# Patient Record
Sex: FEMALE | Race: WHITE | NOT HISPANIC OR LATINO | Employment: OTHER | URBAN - METROPOLITAN AREA
[De-identification: names, ages, dates, MRNs, and addresses within clinical notes are randomized per-mention and may not be internally consistent; named-entity substitution may affect disease eponyms.]

---

## 2017-03-06 ENCOUNTER — HOSPITAL ENCOUNTER (EMERGENCY)
Facility: MEDICAL CENTER | Age: 67
End: 2017-03-06
Attending: EMERGENCY MEDICINE
Payer: MEDICARE

## 2017-03-06 VITALS
HEART RATE: 62 BPM | BODY MASS INDEX: 23.04 KG/M2 | DIASTOLIC BLOOD PRESSURE: 56 MMHG | WEIGHT: 130 LBS | TEMPERATURE: 98.2 F | OXYGEN SATURATION: 96 % | RESPIRATION RATE: 15 BRPM | SYSTOLIC BLOOD PRESSURE: 109 MMHG | HEIGHT: 63 IN

## 2017-03-06 DIAGNOSIS — F13.10 XANAX USE DISORDER, MILD, ABUSE (HCC): ICD-10-CM

## 2017-03-06 PROCEDURE — 304562 HCHG STAT O2 MASK/CANNULA

## 2017-03-06 PROCEDURE — 99283 EMERGENCY DEPT VISIT LOW MDM: CPT

## 2017-03-06 PROCEDURE — 304561 HCHG STAT O2

## 2017-03-06 RX ORDER — PROPRANOLOL HYDROCHLORIDE 20 MG/1
20 TABLET ORAL 3 TIMES DAILY
Status: SHIPPED | COMMUNITY
End: 2017-04-07

## 2017-03-06 RX ORDER — GABAPENTIN 600 MG/1
600 TABLET ORAL 3 TIMES DAILY
Status: SHIPPED | COMMUNITY
End: 2017-04-07 | Stop reason: SDUPTHER

## 2017-03-06 RX ORDER — ALPRAZOLAM 2 MG/1
2 TABLET ORAL 3 TIMES DAILY
Status: SHIPPED | COMMUNITY
End: 2017-04-07

## 2017-03-06 RX ORDER — AMITRIPTYLINE HYDROCHLORIDE 10 MG/1
10-20 TABLET, FILM COATED ORAL NIGHTLY PRN
Status: SHIPPED | COMMUNITY
End: 2017-04-07 | Stop reason: SDUPTHER

## 2017-03-06 RX ORDER — CITALOPRAM 20 MG/1
20 TABLET ORAL EVERY MORNING
Status: SHIPPED | COMMUNITY
End: 2017-04-07 | Stop reason: SDUPTHER

## 2017-03-06 ASSESSMENT — LIFESTYLE VARIABLES
HAVE PEOPLE ANNOYED YOU BY CRITICIZING YOUR DRINKING: YES
AVERAGE NUMBER OF DAYS PER WEEK YOU HAVE A DRINK CONTAINING ALCOHOL: 4
HAVE YOU EVER FELT YOU SHOULD CUT DOWN ON YOUR DRINKING: YES
DO YOU DRINK ALCOHOL: YES
DOES PATIENT WANT TO TALK TO SOMEONE ABOUT QUITTING: YES
EVER HAD A DRINK FIRST THING IN THE MORNING TO STEADY YOUR NERVES TO GET RID OF A HANGOVER: YES
HOW MANY TIMES IN THE PAST YEAR HAVE YOU HAD 5 OR MORE DRINKS IN A DAY: 250
TOTAL SCORE: 4
TOTAL SCORE: 4
EVER FELT BAD OR GUILTY ABOUT YOUR DRINKING: YES
CONSUMPTION TOTAL: POSITIVE
DOES PATIENT WANT TO STOP DRINKING: YES
TOTAL SCORE: 4
ON A TYPICAL DAY WHEN YOU DRINK ALCOHOL HOW MANY DRINKS DO YOU HAVE: 3

## 2017-03-06 ASSESSMENT — PAIN SCALES - GENERAL: PAINLEVEL_OUTOF10: 0

## 2017-03-06 NOTE — ED AVS SNAPSHOT
CIBDO Access Code: [email or web address]  Expires: 4/5/2017  1:42 PM    Your email address is not on file at [facility].  Email Addresses are required for you to sign up for CIBDO, please contact [phone] to verify your personal information and to provide your email address prior to attempting to register for CIBDO.    Marcia Koch  [address] [place] [address]    CIBDO  A secure, online tool to manage your health information     [facility]’s CIBDO® is a secure, online tool that connects you to your personalized health information from the privacy of your home -- day or night - making it very easy for you to manage your healthcare. Once the activation process is completed, you can even access your medical information using the CIBDO norberto, which is available for free in the Apple Norberto store or Google Play store.     To learn more about CIBDO, visit [email or web address]    There are two levels of access available (as shown below):   My Chart Features  [facility] Primary Care Doctor [facility]  Specialists [facility]  Urgent  Care Non-[facility] Primary Care Doctor   Email your healthcare team securely and privately 24/7 X X X    Manage appointments: schedule your next appointment; view details of past/upcoming appointments X      Request prescription refills. X      View recent personal medical records, including lab and immunizations X X X X   View health record, including health history, allergies, medications X X X X   Read reports about your outpatient visits, procedures, consult and ER notes X X X X   See your discharge summary, which is a recap of your hospital and/or ER visit that includes your diagnosis, lab results, and care plan X X  X     How to register for CIBDO:  Once your e-mail address has been verified, follow the following steps to sign up for CIBDO.     1. Go to  [email or web address]  2. Click on the Sign Up Now box, which takes you to the New Member Sign Up page. You  will need to provide the following information:  a. Enter your [employer] Access Code exactly as it appears at the top of this page. (You will not need to use this code after you’ve completed the sign-up process. If you do not sign up before the expiration date, you must request a new code.)   b. Enter your date of birth.   c. Enter your home email address.   d. Click Submit, and follow the next screen’s instructions.  3. Create a Steviet ID. This will be your Kukunu login ID and cannot be changed, so think of one that is secure and easy to remember.  4. Create a Kukunu password. You can change your password at any time.  5. Enter your Password Reset Question and Answer. This can be used at a later time if you forget your password.   6. Enter your e-mail address. This allows you to receive e-mail notifications when new information is available in Kukunu.  7. Click Sign Up. You can now view your health information.    For assistance activating your Kukunu account, call [phone]

## 2017-03-06 NOTE — DISCHARGE INSTRUCTIONS
Alcohol Problems  Most adults who drink alcohol drink in moderation (not a lot) are at low risk for developing problems related to their drinking. However, all drinkers, including low-risk drinkers, should know about the health risks connected with drinking alcohol.  RECOMMENDATIONS FOR LOW-RISK DRINKING   Drink in moderation. Moderate drinking is defined as follows:   · Men - no more than 2 drinks per day.  · Nonpregnant women - no more than 1 drink per day.  · Over age 65 - no more than 1 drink per day.  A standard drink is 12 grams of pure alcohol, which is equal to a 12 ounce bottle of beer or wine cooler, a 5 ounce glass of wine, or 1.5 ounces of distilled spirits (such as whiskey, coy, vodka, or rum).   ABSTAIN FROM (DO NOT DRINK) ALCOHOL:  · When pregnant or considering pregnancy.  · When taking a medication that interacts with alcohol.  · If you are alcohol dependent.  · A medical condition that prohibits drinking alcohol (such as ulcer, liver disease, or heart disease).  DISCUSS WITH YOUR CAREGIVER:  · If you are at risk for coronary heart disease, discuss the potential benefits and risks of alcohol use: Light to moderate drinking is associated with lower rates of coronary heart disease in certain populations (for example, men over age 45 and postmenopausal women). Infrequent or nondrinkers are advised not to begin light to moderate drinking to reduce the risk of coronary heart disease so as to avoid creating an alcohol-related problem. Similar protective effects can likely be gained through proper diet and exercise.  · Women and the elderly have smaller amounts of body water than men. As a result women and the elderly achieve a higher blood alcohol concentration after drinking the same amount of alcohol.  · Exposing a fetus to alcohol can cause a broad range of birth defects referred to as Fetal Alcohol Syndrome (FAS) or Alcohol-Related Birth Defects (ARBD). Although FAS/ARBD is connected with excessive  alcohol consumption during pregnancy, studies also have reported neurobehavioral problems in infants born to mothers reporting drinking an average of 1 drink per day during pregnancy.  · Heavier drinking (the consumption of more than 4 drinks per occasion by men and more than 3 drinks per occasion by women) impairs learning (cognitive) and psychomotor functions and increases the risk of alcohol-related problems, including accidents and injuries.  CAGE QUESTIONS:   · Have you ever felt that you should Cut down on your drinking?  · Have people Annoyed you by criticizing your drinking?  · Have you ever felt bad or Guilty about your drinking?  · Have you ever had a drink first thing in the morning to steady your nerves or get rid of a hangover (Eye opener)?  If you answered positively to any of these questions: You may be at risk for alcohol-related problems if alcohol consumption is:   · Men: Greater than 14 drinks per week or more than 4 drinks per occasion.  · Women: Greater than 7 drinks per week or more than 3 drinks per occasion.  Do you or your family have a medical history of alcohol-related problems, such as:  · Blackouts.  · Sexual dysfunction.  · Depression.  · Trauma.  · Liver dysfunction.  · Sleep disorders.  · Hypertension.  · Chronic abdominal pain.  · Has your drinking ever caused you problems, such as problems with your family, problems with your work (or school) performance, or accidents/injuries?  · Do you have a compulsion to drink or a preoccupation with drinking?  · Do you have poor control or are you unable to stop drinking once you have started?  · Do you have to drink to avoid withdrawal symptoms?  · Do you have problems with withdrawal such as tremors, nausea, sweats, or mood disturbances?  · Does it take more alcohol than in the past to get you high?  · Do you feel a strong urge to drink?  · Do you change your plans so that you can have a drink?  · Do you ever drink in the morning to relieve  the shakes or a hangover?  If you have answered a number of the previous questions positively, it may be time for you to talk to your caregivers, family, and friends and see if they think you have a problem. Alcoholism is a chemical dependency that keeps getting worse and will eventually destroy your health and relationships. Many alcoholics end up dead, impoverished, or in FDC. This is often the end result of all chemical dependency.  · Do not be discouraged if you are not ready to take action immediately.  · Decisions to change behavior often involve up and down desires to change and feeling like you cannot decide.  · Try to think more seriously about your drinking behavior.  · Think of the reasons to quit.  WHERE TO GO FOR ADDITIONAL INFORMATION   · The National [place] on Alcohol Abuse and Alcoholism (NIAAA)  www.niaaa.nih.gov  · National [place] on Alcoholism and Drug Dependence (NCADD)  www.ncadd.org  · American Society of Addiction Medicine (ASAM)  www.asam.org   Document Released: 12/18/2006 Document Revised: 03/11/2013 Document Reviewed: 08/05/2009  ExitCare® Patient Information ©2014 pijajo.com.      Driving and Equipment Restrictions  Some medical problems make it dangerous to drive, ride a bike, or use machines. Some of these problems are:  · A hard blow to the head (concussion).  · Passing out (fainting).  · Twitching and shaking (seizures).  · Low blood sugar.  · Taking medicine to help you relax (sedatives).  · Taking pain medicines.  · Wearing an eye patch.  · Wearing splints. This can make it hard to use parts of your body that you need to drive safely.  HOME CARE   · Do not drive until your doctor says it is okay.  · Do not use machines until your doctor says it is okay.  You may need a form signed by your doctor (medical release) before you can drive again. You may also need this form before you do other tasks where you need to be fully alert.  MAKE SURE YOU:  · Understand these  instructions.  · Will watch your condition.  · Will get help right away if you are not doing well or get worse.     This information is not intended to replace advice given to you by your health care provider. Make sure you discuss any questions you have with your health care provider.     Document Released: 01/25/2006 Document Revised: 03/11/2013 Document Reviewed: 04/27/2011  [employer] Interactive Patient Education ©2016 [employer] Inc.

## 2017-03-06 NOTE — ED PROVIDER NOTES
ED Provider Note    CHIEF COMPLAINT  Chief Complaint   Patient presents with   • ALOC     confused patient found wandering around Doctor's office after driving there       HPI  Marciapatrick Koch is a 66 y.o. female who presents to the emergency department without behavior. The patient drank wine and 2 exam X this morning. She was noted to be acting strangely. Therefore she was brought to the emergency department for evaluation. On presentation the patient admits to taking Xanax and drinking wine. She says that she was recently in a treatment program but when she left the treatment program she was in an argument with her sister and she started drinking and using benzodiazepines once again. She has not had any trauma. She denies pain. She does not be suicidal or homicidal ideation.    REVIEW OF SYSTEMS  See HPI for further details. All other systems are negative.     PAST MEDICAL HISTORY  Past Medical History   Diagnosis Date   • Psychiatric disorder      anxiety, depression   • Hypertension    • Unspecified urinary incontinence    • Indigestion    • Hiatus hernia syndrome    • Urinary bladder disorder    • Sleep apnea    • Snoring    • Pneumonia      for 2 days   • Other specified disorder of intestines      diarrhea   • Chronic obstructive pulmonary disease ([facility])        FAMILY HISTORY  History reviewed. No pertinent family history.    SOCIAL HISTORY  Social History     Social History   • Marital Status:      Spouse Name: N/A   • Number of Children: N/A   • Years of Education: N/A     Social History Main Topics   • Smoking status: Former Smoker -- 0.25 packs/day for 25 years     Types: Cigarettes     Quit date: 08/26/2011   • Smokeless tobacco: None      Comment: occ   • Alcohol Use: Yes      Comment: post rehab, 1/2 bottle of wine some days   • Drug Use: No   • Sexual Activity: Not Asked     Other Topics Concern   • None     Social History Narrative       SURGICAL HISTORY  Past Surgical History  "  Procedure Laterality Date   • Gyn surgery  1991     hyst   • Other abdominal surgery  2002     choley   • Other  2010     surgery for sleep apnea   • Other  1956     tonsillectomy   • Other  2010     big toenails and second toenails removed   • Bladder sling female  8/30/2011     Performed by RENATE STERLING at [facility]S   • [facility] by laparoscopy       Cholecystectomy, Laparoscopic   • Abdominal hysterectomy total       Hysterectomy, Total Abdominal       CURRENT MEDICATIONS  Home Medications     Reviewed by Sarah Garrett (Pharmacy Tech) on 03/06/17 at 1254  Med List Status: Complete    Medication Last Dose Status    alprazolam (XANAX) 2 MG tablet 3/6/2017 Active    amitriptyline (ELAVIL) 10 MG Tab 3/5/2017 Active    citalopram (CELEXA) 20 MG Tab 3/6/2017 Active    gabapentin (NEURONTIN) 600 MG tablet 3/5/2017 Active    propranolol (INDERAL) 20 MG Tab 3/6/2017 Active    Vilazodone HCl (VIIBRYD) 40 MG TABS 3/6/2017 Active                ALLERGIES  Allergies   Allergen Reactions   • Codeine Vomiting   • Morphine Vomiting       PHYSICAL EXAM  VITAL SIGNS: /56 mmHg  Pulse 62  Temp(Src) 36.8 °C (98.2 °F)  Resp 15  Ht 1.6 m (5' 3\")  Wt 58.968 kg (130 lb)  BMI 23.03 kg/m2  SpO2 96%  Constitutional: Well developed, Well nourished, No acute distress, Non-toxic appearance. Awake, alert, slightly slurred intoxicated speech.  HENT: Normocephalic, atraumatic, oropharynx is moist.  Eyes: Pupils are equal round and reactive to light.  Neck: Normal range of motion, No tenderness, Supple, No stridor.   Cardiovascular: Normal heart rate, Normal rhythm, No murmurs, No rubs, No gallops.   Thorax & Lungs: Normal breath sounds, No respiratory distress, No wheezing, No chest tenderness.   Abdomen: Bowel sounds normal, Soft, No tenderness, No masses, No pulsatile masses.   Skin: Warm, Dry, No erythema, No rash.   Back: No tenderness, No CVA tenderness.   Extremities: Intact distal pulses, No edema, No " tenderness, No cyanosis, No clubbing.   Neurologic: Cranial nerves II through XII are intact. Slightly slurred intoxicated speech. Awake, alert, interactive, answers questions appropriately at this time.  Psychiatric: Affect normal, Judgment normal, Mood normal. No suicidal ideation.  EKG        RADIOLOGY/PROCEDURES        COURSE & MEDICAL DECISION MAKING  Pertinent Labs & Imaging studies reviewed. (See chart for details)    The patient presents today with on-call intoxication and contaminant benzodiazepine use. I feel that her odd behavior this morning was related to her polysubstance abuse.    The patient was observed in the emergency department. With sobriety she is able to live a stable gait. I told the patient that she is in no way able to drive. I told the patient that she may not drive if she is using Xanax or drinking alcohol. The patient verbalizes understanding and states that she will not drive. She plans to take a taxi. She seems sincere.    FINAL IMPRESSION  1. Alcoholism /alcohol abuse (CMS-HCC)    2. Xanax use disorder, mild, abuse              Electronically signed by: Fadi Mobley, 3/6/2017 5:22 PM

## 2017-03-06 NOTE — ED NOTES
i called for patient  Waiting for taxi before releasing patient  Given discharge instructions and report to Vladimir PALOMO

## 2017-03-06 NOTE — ED NOTES
Patient states she started drinking when released from passages  Currently drinking 1/2 bottle of wine per day

## 2017-03-06 NOTE — ED AVS SNAPSHOT
3/6/2017          Marcia Koch  [address] [place] [address]    Dear Marcia:    [facility] wants to ensure your discharge home is safe and you or your loved ones have had all your questions answered regarding your care after you leave the hospital.    You may receive a telephone call within two days of your discharge.  This call is to make certain you understand your discharge instructions as well as ensure we provided you with the best care possible during your stay with us.     The call will only last approximately 3-5 minutes and will be done by a nurse.    Once again, we want to ensure your discharge home is safe and that you have a clear understanding of any next steps in your care.  If you have any questions or concerns, please do not hesitate to contact us, we are here for you.  Thank you for choosing [facility] for your healthcare needs.    Sincerely,    Marvel Barnes    [facility]

## 2017-03-06 NOTE — ED NOTES
Patient brought in by [facility], after she was found confused wandering outside a doctor's office. Patient stated she took 2mg of Xanex and half a bottle of wine about 630 this morning. Was enrolled at [facility] for ETOH dependence, returned home Jan 28th.

## 2017-03-06 NOTE — ED NOTES
Patient wants to go home and getting dressed.  Nurse and Dr Mobley in room  Patient will go home via taxi and not  car until sober. She is alert and oriented

## 2017-03-06 NOTE — ED AVS SNAPSHOT
Home Care Instructions                                                                                                                Marcia Koch   MRN: [id number]    Department:  [facility], Emergency Dept   Date of Visit:  3/6/2017            [facility], Emergency Dept    [address] [place] [address]    Phone:  [phone]      You were seen by     Fadi Mobley M.D.      Your Diagnosis Was     Alcoholism /alcohol abuse ([facility])     F10.20       Follow-up Information     1. Follow up with [facility], Emergency Dept.    Specialty:  Emergency Medicine    Why:  If symptoms worsen    Contact information    [address]  [place] [address] [phone]        2. Schedule an appointment as soon as possible for a visit with Flaco Garber D.O..    Specialty:  Family Medicine    Contact information    [address] [address]  [place] [address]  [phone]        Medication Information     Review all of your home medications and newly ordered medications with your primary doctor and/or pharmacist as soon as possible. Follow medication instructions as directed by your doctor and/or pharmacist.     Please keep your complete medication list with you and share with your physician. Update the information when medications are discontinued, doses are changed, or new medications (including over-the-counter products) are added; and carry medication information at all times in the event of emergency situations.               Medication List      ASK your doctor about these medications        Instructions    amitriptyline 10 MG Tabs   Commonly known as:  ELAVIL    Take 10-20 mg by mouth at bedtime as needed for Sleep.   Dose:  10-20 mg       citalopram 20 MG Tabs   Commonly known as:  CELEXA    Take 20 mg by mouth every morning.   Dose:  20 mg       gabapentin 600 MG tablet   Commonly known as:  NEURONTIN    Take 600 mg by  mouth 3 times a day.   Dose:  600 mg       propranolol 20 MG Tabs   Commonly known as:  INDERAL    Take 20 mg by mouth 3 times a day.   Dose:  20 mg       VIIBRYD 40 MG Tabs   Generic drug:  Vilazodone HCl    Take 40 mg by mouth every day.   Dose:  40 mg       XANAX 2 MG tablet   Generic drug:  alprazolam    Take 2 mg by mouth 3 times a day.   Dose:  2 mg                 Discharge Instructions       Alcohol Problems  Most adults who drink alcohol drink in moderation (not a lot) are at low risk for developing problems related to their drinking. However, all drinkers, including low-risk drinkers, should know about the health risks connected with drinking alcohol.  RECOMMENDATIONS FOR LOW-RISK DRINKING   Drink in moderation. Moderate drinking is defined as follows:   · Men - no more than 2 drinks per day.  · Nonpregnant women - no more than 1 drink per day.  · Over age 65 - no more than 1 drink per day.  A standard drink is 12 grams of pure alcohol, which is equal to a 12 ounce bottle of beer or wine cooler, a 5 ounce glass of wine, or 1.5 ounces of distilled spirits (such as whiskey, coy, vodka, or rum).   ABSTAIN FROM (DO NOT DRINK) ALCOHOL:  · When pregnant or considering pregnancy.  · When taking a medication that interacts with alcohol.  · If you are alcohol dependent.  · A medical condition that prohibits drinking alcohol (such as ulcer, liver disease, or heart disease).  DISCUSS WITH YOUR CAREGIVER:  · If you are at risk for coronary heart disease, discuss the potential benefits and risks of alcohol use: Light to moderate drinking is associated with lower rates of coronary heart disease in certain populations (for example, men over age 45 and postmenopausal women). Infrequent or nondrinkers are advised not to begin light to moderate drinking to reduce the risk of coronary heart disease so as to avoid creating an alcohol-related problem. Similar protective effects can likely be gained through proper diet and  exercise.  · Women and the elderly have smaller amounts of body water than men. As a result women and the elderly achieve a higher blood alcohol concentration after drinking the same amount of alcohol.  · Exposing a fetus to alcohol can cause a broad range of birth defects referred to as Fetal Alcohol Syndrome (FAS) or Alcohol-Related Birth Defects (ARBD). Although FAS/ARBD is connected with excessive alcohol consumption during pregnancy, studies also have reported neurobehavioral problems in infants born to mothers reporting drinking an average of 1 drink per day during pregnancy.  · Heavier drinking (the consumption of more than 4 drinks per occasion by men and more than 3 drinks per occasion by women) impairs learning (cognitive) and psychomotor functions and increases the risk of alcohol-related problems, including accidents and injuries.  CAGE QUESTIONS:   · Have you ever felt that you should Cut down on your drinking?  · Have people Annoyed you by criticizing your drinking?  · Have you ever felt bad or Guilty about your drinking?  · Have you ever had a drink first thing in the morning to steady your nerves or get rid of a hangover (Eye opener)?  If you answered positively to any of these questions: You may be at risk for alcohol-related problems if alcohol consumption is:   · Men: Greater than 14 drinks per week or more than 4 drinks per occasion.  · Women: Greater than 7 drinks per week or more than 3 drinks per occasion.  Do you or your family have a medical history of alcohol-related problems, such as:  · Blackouts.  · Sexual dysfunction.  · Depression.  · Trauma.  · Liver dysfunction.  · Sleep disorders.  · Hypertension.  · Chronic abdominal pain.  · Has your drinking ever caused you problems, such as problems with your family, problems with your work (or school) performance, or accidents/injuries?  · Do you have a compulsion to drink or a preoccupation with drinking?  · Do you have poor control or are you  unable to stop drinking once you have started?  · Do you have to drink to avoid withdrawal symptoms?  · Do you have problems with withdrawal such as tremors, nausea, sweats, or mood disturbances?  · Does it take more alcohol than in the past to get you high?  · Do you feel a strong urge to drink?  · Do you change your plans so that you can have a drink?  · Do you ever drink in the morning to relieve the shakes or a hangover?  If you have answered a number of the previous questions positively, it may be time for you to talk to your caregivers, family, and friends and see if they think you have a problem. Alcoholism is a chemical dependency that keeps getting worse and will eventually destroy your health and relationships. Many alcoholics end up dead, impoverished, or in residential. This is often the end result of all chemical dependency.  · Do not be discouraged if you are not ready to take action immediately.  · Decisions to change behavior often involve up and down desires to change and feeling like you cannot decide.  · Try to think more seriously about your drinking behavior.  · Think of the reasons to quit.  WHERE TO GO FOR ADDITIONAL INFORMATION   · The National [place] on Alcohol Abuse and Alcoholism (NIAAA)  www.niaaa.nih.gov  · National Cecil on Alcoholism and Drug Dependence (NCADD)  www.ncadd.org  · American Society of Addiction Medicine (ASAM)  www.asam.org   Document Released: 12/18/2006 Document Revised: 03/11/2013 Document Reviewed: 08/05/2009  ExitCare® Patient Information ©2014 [email or web address].      Driving and Equipment Restrictions  Some medical problems make it dangerous to drive, ride a bike, or use machines. Some of these problems are:  · A hard blow to the head (concussion).  · Passing out (fainting).  · Twitching and shaking (seizures).  · Low blood sugar.  · Taking medicine to help you relax (sedatives).  · Taking pain medicines.  · Wearing an eye patch.  · Wearing splints. This can make it  hard to use parts of your body that you need to drive safely.  HOME CARE   · Do not drive until your doctor says it is okay.  · Do not use machines until your doctor says it is okay.  You may need a form signed by your doctor (medical release) before you can drive again. You may also need this form before you do other tasks where you need to be fully alert.  MAKE SURE YOU:  · Understand these instructions.  · Will watch your condition.  · Will get help right away if you are not doing well or get worse.     This information is not intended to replace advice given to you by your health care provider. Make sure you discuss any questions you have with your health care provider.     Document Released: 01/25/2006 Document Revised: 03/11/2013 Document Reviewed: 04/27/2011  [employer] Interactive Patient Education ©2016 [employer]            Patient Information     Patient Information    Following emergency treatment: all patient requiring follow-up care must return either to a private physician or a clinic if your condition worsens before you are able to obtain further medical attention, please return to the emergency room.     Billing Information    At [facility], we work to make the billing process streamlined for our patients.  Our Representatives are here to answer any questions you may have regarding your hospital bill.  If you have insurance coverage and have supplied your insurance information to us, we will submit a claim to your insurer on your behalf.  Should you have any questions regarding your bill, we can be reached online or by phone as follows:  Online: You are able pay your bills online or live chat with our representatives about any billing questions you may have. We are here to help Monday - Friday from 8:00am to 7:30pm and 9:00am - 12:00pm on Saturdays.  Please visit [email or web address]  for more information.   Phone:  [phone] or [phone]    Please note  that your emergency physician, surgeon, pathologist, radiologist, anesthesiologist, and other specialists are not employed by [facility] and will therefore bill separately for their services.  Please contact them directly for any questions concerning their bills at the numbers below:     Emergency Physician Services:  [phone]  [facility]:  [phone]  [facility]:  [phone]  [facility]:  [phone]    1. Your final bill may vary from the amount quoted upon discharge if all procedures are not complete at that time, or if your doctor has additional procedures of which we are not aware. You will receive an additional bill if you return to the Emergency Department at [facility] for suture removal regardless of the facility of which the sutures were placed.     2. Please arrange for settlement of this account at the emergency registration.    3. All self-pay accounts are due in full at the time of treatment.  If you are unable to meet this obligation then payment is expected within 4-5 days.     4. If you have had radiology studies (CT, X-ray, Ultrasound, MRI), you have received a preliminary result during your emergency department visit. Please contact the radiology department [phone] to receive a copy of your final result. Please discuss the Final result with your primary physician or with the follow up physician provided.     Crisis Hotline:  [place] Crisis Hotline:  5-741-MKYSDCY or 1-136.140.9989  [place] Crisis Hotline:    1-209.698.5415 or 768-528-3405         ED Discharge Follow Up Questions    1. In order to provide you with very good care, we would like to follow up with a phone call in the next few days.  May we have your permission to contact you?     YES /  NO    2. What is the best phone number to call you? (       )_____-__________    3. What is the best time to call you?      Morning  /  Afternoon  /  Evening                   Patient  Signature:  ____________________________________________________________    Date:  ____________________________________________________________      Your appointments     Mar 28, 2017  3:40 PM   MA [id number] with EARLE JUARES MG 1   [facility] [place] [facility] ([place])    [address]  [place] [address]   [phone]           No deodorant, powder, perfume or lotion under the arm or breast area.            Apr 07, 2017  1:15 PM   Initial Psychiatric Eval with DELANO Romero   [facility]    [address]  [address]  [place] [address] [phone]           30 MIN ARRIVAL PRIOR TO SCHEDULED APPOINTMENT IS REQUIRED. Your appointment will be rescheduled if you arrive later than 30 min before the appointed time.

## 2017-03-06 NOTE — ED NOTES
Patient relaxed in bed. Slow speech and delayed responses, some confusion but generally responds appropriately.

## 2017-03-27 ENCOUNTER — HOSPITAL ENCOUNTER (OUTPATIENT)
Dept: BEHAVIORAL HEALTH | Facility: MEDICAL CENTER | Age: 67
End: 2017-03-27
Attending: PSYCHIATRY & NEUROLOGY
Payer: MEDICARE

## 2017-03-27 DIAGNOSIS — F10.10 ETOH ABUSE: ICD-10-CM

## 2017-03-27 DIAGNOSIS — E78.5 DYSLIPIDEMIA: Primary | ICD-10-CM

## 2017-03-27 DIAGNOSIS — F33.1 MODERATE EPISODE OF RECURRENT MAJOR DEPRESSIVE DISORDER (HCC): ICD-10-CM

## 2017-03-27 NOTE — BH THERAPY
[facility]  INITIAL ASSESSMENT    Name: Marcia Koch  MRN: [id number]  : 1950  Age: 66 y.o.  Date of assessment: 3/27/2017  PCP: Flaco Garber D.O.  Persons in attendance: Patient    CHIEF COMPLAINT AND HISTORY OF PRESENTING PROBLEM: F33.1 Depressive disorder, F10.20 Alcohol use disorder, in remission and F41.1 MICHEAL  (as stated by Patient):  Marcia Koch is a 66 y.o., White female referred for assessment by No ref. provider found.  Primary presenting issue includes   Chief Complaint   Patient presents with   • Depression   • Addiction Problem   .    FAMILY/SOCIAL HISTORY  Current living situation/household members: lives alone with two cats  Relevant family history/structure/dynamics: moved to area to be close to sister and her wife, Abdon  Current family/social stressors: sister has RA, somewhat supportive  Quality/quantity of current family and/or social support: fair, sister has her own health issues.   Does patient/parent report a family history of behavioral health issues, diagnoses, or treatment? Yes  Family History   Problem Relation Age of Onset   • Alcohol abuse Mother    • Alcohol abuse Father    • Anxiety disorder Brother    • Depression Brother         BEHAVIORAL HEALTH TREATMENT HISTORY  Does patient/parent report a history of prior behavioral health treatment for patient? Yes:    Dates Level of Care Facilty/Provider Diagnosis/Problem Medications   2017 [facility] herbs   3/2015 [facility] none    [facility] ?                                                          History of untreated behavioral health issues identified? No    MEDICAL HISTORY  Primary care behavioral health screenings: @PHQ@   Past medical/surgical history:   Past Medical History   Diagnosis Date   • Psychiatric disorder      anxiety, depression   • Hypertension    • Unspecified urinary incontinence    • Indigestion    • Hiatus  hernia syndrome    • Urinary bladder disorder    • Sleep apnea    • Snoring    • Pneumonia      for 2 days   • Other specified disorder of intestines      diarrhea   • Chronic obstructive pulmonary disease (CMS-HCC)    • Alcoholism (CMS-[facility])    • Alcohol abuse    • Depression       Past Surgical History   Procedure Laterality Date   • Gyn surgery  1991     hyst   • Other abdominal surgery  2002     choley   • Other  2010     surgery for sleep apnea   • Other  1956     tonsillectomy   • Other  2010     big toenails and second toenails removed   • Bladder sling female  8/30/2011     Performed by RENATE STERLING at SURGERY [facility]   • [place] by laparoscopy       Cholecystectomy, Laparoscopic   • Abdominal hysterectomy total       Hysterectomy, Total Abdominal        Medication Allergies:  Codeine and Morphine     Patient reports last physical exam: 2017   Does patient/parent report any history of or current developmental concerns? No  Does patient/parent report nutritional concerns? No  Does patient/parent report change in appetite or weight loss/gain? No  Does patient/parent report history of eating disorder symptoms? No  Does patient/parent report dental problem? No  Does patient/parent report physical pain? No   Indicate if pain is acute or chronic, and location: n/a   Pain scale rating: [unfilled]   Does patient/parent report functional impact of medical, developmental, or pain issues?   no    EDUCATIONAL  Is patient currently enrolled in a school/educational program?   No:   Highest grade level completed: one year college  School performance/functioning: average  History of Special Education/repeated grades/learning issues: no  Preferred learning style: doing  Current learning needs (large print, language barrier, etc):  None noted    EMPLOYMENT/RESOURCES  Is the patient currently employed? No  Does the patient/parent report adequate financial resources? Yes  Does patient identify impact of presenting  issue on work functioning? No  Work or income-related stressors:  retired     HISTORY:  Does patient report current or past enlistment? No    [If yes, trigger below section]  Does patient report history of exposure to combat? No  Does patient report history of  sexual trauma? No  Does patient report other -related stressors? No    SPIRITUAL/CULTURAL/IDENTITY:  What are the patient’s/family’s spiritual beliefs or practices?    What is the patient’s cultural or ethnic background/identity?   How does the patient identify their sexual orientation? heterosexual  How does the patient identify their gender? female  Does the patient identify any spiritual/cultural/identity factors as relevant to the presenting issue? No    LEGAL HISTORY  Has the patient ever been involved with juvenile, adult, or family legal systems? Yes   [If yes, trigger section below:]  Does patient report ever being a victim of a crime?  No  Does patient report involvement in any current legal issues?  No  Does patient report ever being arrested or committing a crime? Yes  Does patient report any current agency (parole/probation/CPS/) involvement? No    ABUSE/NEGLECT/TRAUMA SCREENING  Does patient report feeling “unsafe” in his/her home, or afraid of anyone? No  Does patient report any history of physical, sexual, or emotional abuse? No  Does parent or significant other report any of the above? No  Is there evidence of neglect by self? No  Is there evidence of neglect by a caregiver? No  Does the patient/parent report any history of CPS/APS/police involvement related to suspected abuse/neglect or domestic violence? No  Does the patient/parent report any other history of potentially traumatic life events? No  Based on the information provided during the current assessment, is a mandated report of suspected abuse/neglect being made?  No     SAFETY ASSESSMENT - SELF  Does patient acknowledge  current or past symptoms of dangerousness to self? No  Does parent/significant other report patient has current or past symptoms of dangerousness to self? No      Recent change in frequency/specificity/intensity of suicidal thoughts or self-harm behavior? No  Current access to firearms, medications, or other identified means of suicide/self-harm? No  If yes, willing to restrict access to means of suicide/self-harm? No  Protective factors present: Future-oriented, Good impulse control, Optimism, Positive coping skills and Spiritual beliefs/practices    Current Suicide Risk: Not applicable  Crisis Safety Plan completed and copy given to patient: No    SAFETY ASSESSMENT - OTHERS  Does paor past symptoms of aggressive behavior or risk to others? No  Does parent/significant othtient acknowledge current or past symptoms of aggressive behavior or risk to others? No  Does parent/significant other report patient has current or past symptoms of aggressive behavior or risk to others? No    Recent change in frequency/specificity/intensity of thoughts or threats to harm others? No  Current access to firearms/other identified means of harm? No  If yes, willing to restrict access to weapons/means of harm? No  Protective factors present: Moral/spiritual prohibition, Well-developed sense of empathy and Positive impulse-control    Current Homicide Risk:  Not applicable  Crisis Safety Plan completed and copy given to patient? No  Based on information provided during the current assessment, is a mandated “duty to warn” being exercised? No    SUBSTANCE USE/ADDICTION HISTORY  [] Not applicable - patient 10 years of age or younger    Is there a family history of substance use/addiction? Yes  Does patient acknowledge or parent/significant other report use of/dependence on substances? Yes  Last time patient used alcohol: 2/1/17  Within the past week? No  Last time patient used marijuana: 2/1/17  Within the past month? No  Any other street  drugs ever tried even once? Yes  Any use of prescription medications/pills without a prescription, or for reasons others than originally prescribed?  No  Any other addictive behavior reported (gambling, shopping, sex)? Yes     Drug History:  Amphetamine:  Amphetamine frequency: Past rare use      Cannibis:  Cannabis frequency: Past occasional use  Cannabis last use: 2/1/17  Cannabis method: Smoke      Cocaine:  Cocaine frequency: Past occasional use  Cocaine method: Snort      Ecstasy:  Ecstasy frequency: Never used      Hallucinogen:  Hallucinogen frequency: Past rare use      Inhalant:   Inhalant frequency: Never used      Opiate:  Opiate frequency: Never used  Cannabis frequency: Past occasional use  Cannabis last use: 2/1/17      Other:  Other drug frequency: Never used      Sedative:   Sedative frequency: Daily  Sedative method: Pill  Comments: prescribed          What consequences does the patient associate with any of the above substance use and or addictive behaviors? Legal: DUI 2015, Family problems: sister and her wife, Health problems: various, Monetary problems: gambling addiction, in remission    Patient’s motivation/readiness for change: highly motivated to resume recovery with IOP; past treatment has been positive for the most part, especially the IOP    [] Patient denies use of any substance/addictive behaviors    STRENGTHS/ASSETS  Strengths Identified by interviewer: Self-awareness, Optimism, Effeectively addressed past stressors/challenges and History of effective treatment  Strengths Identified by patient: compassionate, generous, loving, caring and openminded.     MENTAL STATUS/OBSERVATIONS   Participation: Active verbal participation, Attentive, Engaged and Open to feedback  Grooming: Casual and Neat  Orientation:Alert and Fully Oriented   Behavior: Calm  Eye contact: Good   Mood:Depressed and Anxious  Affect:Flexible, Full range and Congruent with content  Thought process: Logical and  Goal-directed  Thought content:  Within normal limits  Speech: Rate within normal limits and Volume within normal limits  Perception: Within normal limits  Memory: No gross evidence of memory deficits  Insight: Adequate  Judgment:  Adequate  Other:    Family/couple interaction observations: n/a    RESULTS OF SCREENING MEASURES:  [] Not applicable  Measure:   Score:     Measure:   Score:       CLINICAL FORMULATION: 66 year old CF presents for treatment of depression, anxiety and alcohol use disorder. Marcia has been to rehabs before and felt she was successful with IOP format. She had last drink 2/1/17. She has history of depression and anxiety, feeling that post treatment in 2015 that she experienced PTSD symptoms and was prescribed Xanax. She would eventually like to be off Xanax as well in the future. She states she is willing to attend 12 step programs while in [facility] and can start tomorrow. Her support group is limited; she would like to belong to recovery community.       DIAGNOSTIC IMPRESSION(S):  No diagnosis found. F33.1 MDD, F41.1 MICHEAL and F10.20 Alcohol use disorder, in remission.       IDENTIFIED NEEDS/PLAN:  [If any of these marked, trigger DISPOSITION list]  Mood/anxiety and Substance use/Addictive behavior  Refer to [facility]: Intensive Outpatient Program    Does patient express agreement with the above plan? Yes     Referral appointment(s) scheduled? Yes     Yelena De La Vega R.N.

## 2017-03-28 ENCOUNTER — HOSPITAL ENCOUNTER (OUTPATIENT)
Dept: RADIOLOGY | Facility: MEDICAL CENTER | Age: 67
End: 2017-03-28
Attending: FAMILY MEDICINE
Payer: MEDICARE

## 2017-03-28 ENCOUNTER — HOSPITAL ENCOUNTER (OUTPATIENT)
Dept: BEHAVIORAL HEALTH | Facility: MEDICAL CENTER | Age: 67
End: 2017-03-28
Attending: PSYCHIATRY & NEUROLOGY
Payer: MEDICARE

## 2017-03-28 DIAGNOSIS — F10.10 ETOH ABUSE: ICD-10-CM

## 2017-03-28 DIAGNOSIS — Z13.9 SCREENING: ICD-10-CM

## 2017-03-28 PROCEDURE — 90853 GROUP PSYCHOTHERAPY: CPT

## 2017-03-29 NOTE — BH THERAPY
Behavioral Health  Group Therapy Progress Note    Name: Marcia Koch  MRN: [id number]  Date: 3/28/2017    Attendance: Attended   Attendance Duration (min): Greater than 60  Number of Participants: 5     Program/Group: Intensive Outpatient Program  Topics Covered: Steps 1/2/3  Participation: Active verbal participation, Attentive  Affect/Mood Range: Normal range, Flexible  Affect/Mood Display: Congruent w/content  Cognition: Alert, Oriented  Evidence of Imminent Suicide Risk: No  Evidence of imminent homicide risk: No  Therapeutic Interventions: Relapse prevention, Motivation addressed  Progress Toward Treatment Goal: No change.  Marcia shared the story of her many treatment programs and relapses.  This time she has been sober since 2/1/17 and is going to AA 5 times a week.        Treatment Plan: - Next appointment scheduled:  3/30/2017     Sary Bernabe R.N.

## 2017-03-29 NOTE — BH THERAPY
Group Therapy Checklist  Attendance: Attended  Attendance Duration (min): Greater than 60  Number of Participants: 5  Program/Group: Intensive Outpatient Program  Topics Covered: Steps 1/2/3  Participation: Active verbal participation, Attentive  Affect/Mood Range: Normal range, Flexible  Affect/Mood Display: Congruent w/content  Cognition: Alert, Oriented  Evidence of Imminent Suicide Risk: No  Evidence of imminent homicide risk: No  Therapeutic Interventions: Relapse prevention, Motivation addressed  Progress Toward Treatment Goal: No change.

## 2017-03-30 ENCOUNTER — HOSPITAL ENCOUNTER (OUTPATIENT)
Dept: BEHAVIORAL HEALTH | Facility: MEDICAL CENTER | Age: 67
End: 2017-03-30
Attending: PSYCHIATRY & NEUROLOGY
Payer: MEDICARE

## 2017-03-30 DIAGNOSIS — F10.10 ETOH ABUSE: ICD-10-CM

## 2017-03-30 PROCEDURE — 90853 GROUP PSYCHOTHERAPY: CPT

## 2017-03-31 NOTE — BH THERAPY
"Behavioral Health  Group Therapy Progress Note    Name: Marcia Koch  MRN: [id number]  Date: 3/30/2017    Attendance: Attended   Attendance Duration (min): Greater than 60  Number of Participants: 4     Program/Group: Intensive Outpatient Program  Topics Covered:  (bridging the gap)  Participation: Active verbal participation, Attentive  Affect/Mood Range: Normal range, Flexible  Affect/Mood Display: Congruent w/content  Cognition: Alert, Oriented  Evidence of Imminent Suicide Risk: No  Evidence of imminent homicide risk: No  Therapeutic Interventions: Relapse prevention, Goal-setting  Progress Toward Treatment Goal: Mild improvement.  She has been to 4 AA meetings this week and \"loves them\" and plans to go again tomorrow.  She remains motivated for recovery.          Treatment Plan: - \"Homework\" recommendation: next qappointment April 3, 1917     Sary Bernabe R.N.             "

## 2017-03-31 NOTE — BH THERAPY
Group Therapy Checklist  Attendance: Attended  Attendance Duration (min): Greater than 60  Number of Participants: 4  Program/Group: Intensive Outpatient Program  Topics Covered:  (bridging the gap)  Participation: Active verbal participation, Attentive  Affect/Mood Range: Normal range, Flexible  Affect/Mood Display: Congruent w/content  Cognition: Alert, Oriented  Evidence of Imminent Suicide Risk: No  Evidence of imminent homicide risk: No  Therapeutic Interventions: Relapse prevention, Goal-setting  Progress Toward Treatment Goal: Mild improvement.

## 2017-04-03 ENCOUNTER — HOSPITAL ENCOUNTER (OUTPATIENT)
Dept: BEHAVIORAL HEALTH | Facility: MEDICAL CENTER | Age: 67
End: 2017-04-03
Attending: PSYCHIATRY & NEUROLOGY
Payer: MEDICARE

## 2017-04-03 DIAGNOSIS — F33.1 MODERATE EPISODE OF RECURRENT MAJOR DEPRESSIVE DISORDER (HCC): ICD-10-CM

## 2017-04-03 DIAGNOSIS — F10.10 ETOH ABUSE: ICD-10-CM

## 2017-04-03 PROCEDURE — 90834 PSYTX W PT 45 MINUTES: CPT

## 2017-04-03 PROCEDURE — 90853 GROUP PSYCHOTHERAPY: CPT

## 2017-04-03 NOTE — CARE PLAN
Problem: Substance Withdrawal Symptoms  Goal: Abstinence  Intervention: Intensive Outpatient Program  Start EOP 3/28/17  Intervention: Psychiatric Evaluation and Management  Appointment 4/7/17 APRN for psych eval and med management  Intervention: Individual Counseling Sessions  First session today, developed treatment plan to complement relapse prevention plan developed at residential treatment center  Intervention: 12 Step guides  Step one assigned, meet 4/11 to discuss  Intervention: AA  Attending at noon most days, [facility]; enjoying the Fellowship

## 2017-04-03 NOTE — BH THERAPY
[facility]  Therapy Progress Note    Patient Name: Marcia Koch  Patient MRN: [id number]  Today's Date: 4/3/2017     Type of session:Individual psychotherapy  Length of session: 50  Persons in attendance:Patient    Subjective/New Info: initial individual session. Processed leisure and spiritual assessments; Marcia has a routine that complements both components. She is involved in yoga, chanting and Jain studies weekly; she is getting out, enjoying meditation and gardening daily. Marcia started acupuncture for cravings and for quitting cigarettes. She is attending AA at noon each day and enjoys the fellowship. She had expressed meeting sober people as one of her post residential treatment goals and is pleased that it is going so well. She stays in touch with people she met in treatment. She has a supportive relationship with her father and his girlfriend and she is reaching out to her sister and her wife with an amends. Marcia sees her PCP on Tuesday and the APRN on Friday for medication management. She is open to a Xanax taper.      Objective/Observations:   Participation: Active verbal participation, Attentive, Engaged and Open to feedback   Grooming: Casual and Neat   Cognition: Alert and Fully Oriented   Eye contact: Good   Mood: Depressed   Affect: Flexible and Full range   Thought process: Logical and Goal-directed   Speech: Rate within normal limits and Volume within normal limits   Other:     Diagnoses: No diagnosis found. F33.1 MDD and F10.20 Alcohol dependency, in remission    Current risk:   SUICIDE: Not applicable   Homicide: Not applicable   Self-harm: Not applicable   Relapse: Moderate   Other:    Safety Plan reviewed? Not Indicated   If evidence of imminent risk is present, intervention/plan:     Therapeutic Intervention(s): Clarify:  Clarify values, Develop/modify treatment plan, Establish rapport, Leisure and recreation skills, Self-care skills and Supportive  "psychotherapy    Treatment Goal(s)/Objective(s) addressed: Develop treatment plan, leisure and spiritual assessments     Progress toward Treatment Goals: Mild improvement    Plan:  - Continue Intensive Outpatient Program  - \"Homework\" recommendation: Step One  - Patient is in agreement with the above plan:  YES    Yelena De La Vega R.N.  4/3/2017                                     "

## 2017-04-04 ENCOUNTER — HOSPITAL ENCOUNTER (OUTPATIENT)
Dept: BEHAVIORAL HEALTH | Facility: MEDICAL CENTER | Age: 67
End: 2017-04-04
Attending: PSYCHIATRY & NEUROLOGY
Payer: MEDICARE

## 2017-04-04 DIAGNOSIS — F33.1 MODERATE EPISODE OF RECURRENT MAJOR DEPRESSIVE DISORDER (HCC): ICD-10-CM

## 2017-04-04 PROCEDURE — 90853 GROUP PSYCHOTHERAPY: CPT

## 2017-04-04 NOTE — BH THERAPY
Group Therapy Checklist  Attendance: Attended  Attendance Duration (min): Greater than 60  Number of Participants: 4  Program/Group: Intensive Outpatient Program  Topics Covered: Addiction behaviors  Participation: Active verbal participation (shared how she remembers growing up how her parents never communicated with her or her siblings.  Receptive to support from peers.)  Affect/Mood Range: Flexible, Normal range  Affect/Mood Display: Congruent w/content  Cognition: Oriented, Alert  Evidence of Imminent Suicide Risk: No  Evidence of imminent homicide risk: No  Therapeutic Interventions: Emotion clarification  Progress Toward Treatment Goal: Moderate improvement

## 2017-04-04 NOTE — BH THERAPY
"Group Therapy Checklist  Attendance: Attended  Attendance Duration (min): 46-60  Number of Participants: 4  Program/Group: Intensive Outpatient Program  Topics Covered: \"Pieces of Silence\"  Participation: Active verbal participation  Affect/Mood Range: Flexible, Normal range  Affect/Mood Display: Congruent w/content  Cognition: Oriented, Alert  Evidence of Imminent Suicide Risk: No  Evidence of imminent homicide risk: No  Therapeutic Interventions: Cognitive clarification  Progress Toward Treatment Goal: Moderate improvement  "

## 2017-04-05 NOTE — BH THERAPY
Group Therapy Checklist  Attendance: Attended  Attendance Duration (min): Greater than 60  Number of Participants: 4  Program/Group: Intensive Outpatient Program  Topics Covered: Other (Comment): (process group)  Participation: Active verbal participation, Attentive, Supportive to other group members, Open to feedback  Affect/Mood Range: Normal range, Flexible  Affect/Mood Display: Congruent w/content  Cognition: Alert, Oriented  Evidence of Imminent Suicide Risk: No  Evidence of imminent homicide risk: No  Therapeutic Interventions: Emotion clarification, Supportive psychotherapy  Progress Toward Treatment Goal: Mild improvement  Marcia processed her daily rituals and routines, classes and self care that support her recovery. She attends AA and feels she is working her program. She states idle time is her worst trigger. Receptive to peer support.

## 2017-04-06 ENCOUNTER — HOSPITAL ENCOUNTER (OUTPATIENT)
Dept: BEHAVIORAL HEALTH | Facility: MEDICAL CENTER | Age: 67
End: 2017-04-06
Attending: PSYCHIATRY & NEUROLOGY
Payer: MEDICARE

## 2017-04-06 DIAGNOSIS — F10.10 ETOH ABUSE: ICD-10-CM

## 2017-04-06 PROCEDURE — 90853 GROUP PSYCHOTHERAPY: CPT

## 2017-04-07 ENCOUNTER — OFFICE VISIT (OUTPATIENT)
Dept: BEHAVIORAL HEALTH | Facility: PHYSICIAN GROUP | Age: 67
End: 2017-04-07
Payer: MEDICARE

## 2017-04-07 DIAGNOSIS — F41.9 ANXIETY DISORDER, UNSPECIFIED: ICD-10-CM

## 2017-04-07 DIAGNOSIS — F13.20 MODERATE SEDATIVE, HYPNOTIC, OR ANXIOLYTIC USE DISORDER (HCC): ICD-10-CM

## 2017-04-07 DIAGNOSIS — F10.20 ALCOHOL USE DISORDER, MODERATE, DEPENDENCE (HCC): ICD-10-CM

## 2017-04-07 DIAGNOSIS — F33.1 MAJOR DEPRESSIVE DISORDER, RECURRENT EPISODE, MODERATE (HCC): ICD-10-CM

## 2017-04-07 DIAGNOSIS — F10.20 ALCOHOLISM (HCC): ICD-10-CM

## 2017-04-07 PROBLEM — F34.1 PERSISTENT DEPRESSIVE DISORDER: Status: ACTIVE | Noted: 2017-04-07

## 2017-04-07 PROCEDURE — 90792 PSYCH DIAG EVAL W/MED SRVCS: CPT | Performed by: NURSE PRACTITIONER

## 2017-04-07 PROCEDURE — 1036F TOBACCO NON-USER: CPT | Performed by: NURSE PRACTITIONER

## 2017-04-07 PROCEDURE — G8432 DEP SCR NOT DOC, RNG: HCPCS | Performed by: NURSE PRACTITIONER

## 2017-04-07 RX ORDER — AMITRIPTYLINE HYDROCHLORIDE 10 MG/1
10-20 TABLET, FILM COATED ORAL NIGHTLY PRN
Qty: 30 TAB | Refills: 0 | Status: SHIPPED | OUTPATIENT
Start: 2017-04-07 | End: 2017-05-11

## 2017-04-07 RX ORDER — NALTREXONE HYDROCHLORIDE 50 MG/1
50 TABLET, FILM COATED ORAL DAILY
Qty: 30 TAB | Refills: 0 | Status: SHIPPED | OUTPATIENT
Start: 2017-04-07 | End: 2017-06-07 | Stop reason: SDUPTHER

## 2017-04-07 RX ORDER — CITALOPRAM 20 MG/1
20 TABLET ORAL EVERY MORNING
Qty: 30 TAB | Refills: 0 | Status: SHIPPED | OUTPATIENT
Start: 2017-04-07 | End: 2017-04-18 | Stop reason: SDUPTHER

## 2017-04-07 RX ORDER — GABAPENTIN 600 MG/1
600 TABLET ORAL 3 TIMES DAILY
Qty: 90 TAB | Refills: 0 | Status: SHIPPED | OUTPATIENT
Start: 2017-04-07 | End: 2020-07-27

## 2017-04-07 RX ORDER — ALPRAZOLAM 1 MG/1
1 TABLET ORAL 3 TIMES DAILY PRN
Refills: 2 | COMMUNITY
Start: 2017-02-02 | End: 2020-07-27

## 2017-04-07 RX ORDER — MIRTAZAPINE 15 MG/1
15 TABLET, FILM COATED ORAL
Qty: 30 TAB | Refills: 0 | Status: SHIPPED | OUTPATIENT
Start: 2017-04-07 | End: 2017-05-06 | Stop reason: SDUPTHER

## 2017-04-07 RX ORDER — PROPRANOLOL HYDROCHLORIDE 20 MG/1
10 TABLET ORAL 3 TIMES DAILY
Qty: 90 TAB | COMMUNITY
Start: 2017-04-07 | End: 2017-05-11

## 2017-04-07 NOTE — PROGRESS NOTES
"BEHAVIORAL HEALTH  INITIAL PSYCHIATRIC EVALUATION    Name: Marcia Koch  MRN: [id number]  : 1950  Age: 66 y.o.  Date of assessment: 2017  PCP: Flaco Garber D.O.  Persons in attendance:Patient  Total face-to-face time: 45 minutes    CHIEF COMPLAINT AND HISTORY OF PRESENTING PROBLEM:   (As stated by Patient)  Marcia Koch is a 66 y.o., White female referred for assessment by No ref. provider found. Primary presenting issue includes: \"they told me to come here\"; patient is attending [facility] currently for alcohol problems .    HISTORY OF PRESENT ILLNESS:    Patient reports she has been attending [facility] for problems related to alcoholism. Completed an inpatient stay in drug treatment facility in [place], [place] for 60 days, then once released started taking alprazolam again.PCP prescribed her Xanax, has cut down her amount to taking 1 mg in the morning only now. Patient is taking a lot of medications, is concerned she is taking too many and would like to work on reducing her medication, states Viibryd is too expensive.  Patient reports history of depression, states she is feeling much better now and is sad anymore. Patient was drinking a bottle of wine/day prior to inpatient stay, now is managing cravings with naltrexone. Last drink was in February. Patient verbalizes depression started around , around time of mother's death. Had little interest in activities, would stay in bed, little enjoyment, was doing nothing but drinking. Denies suicidal thoughts, denies any past suicide attempts. Denies symptoms of dhaval or hypomania, has had a longstanding history of sleep problems and has been diagnosed with sleep apnea. Patient is attending AA 5 times/week, works with a spiritual healer, is using acupuncture and multiple modalities to heal and recover. Patient has brought her bag of medications for provider to sort out, patient is taking multiple antidepressants concurrently  CURRENT PSYCHIATRIC " MEDS:  Citalopram 20 mg daily  Mirtazapine 15 mg hs  Gabapentin 600 mg BID  Viibryd 40 mg daily  Propranolol 20 mg TID  Naltrexone 50 mg daily  Amitriptyline 10 mg hs  Alprazolam 1 mg qam      PAST PSYCHIATRIC MEDICATIONS TRIED:  Fluoxetine 20 mg daily  Patient previously took 6 mg alprazolam daily     FAMILY/SOCIAL HISTORY:  Does patient/parent report a family history of behavioral health issues, diagnoses, or treatment? father-alcoholism, sister is addicted to marijuana  Family History   Problem Relation Age of Onset   • Alcohol abuse Mother    • Alcohol abuse Father    • Anxiety disorder Brother    • Depression Brother    • Cancer Sister      breast   • Drug abuse Sister      Current living situation/household members:  X 45 years, lives alone; continues to be on good terms with her ex  Relevant family history/structure/dynamics: moved to [place] to be closer to her sister  Quality/quantity of current family and/or social support: sister is supportive, but has her own problems to manage   Social History     Social History   • Marital Status:      Spouse Name: N/A   • Number of Children: N/A   • Years of Education: N/A     Occupational History   • Not on file.     Social History Main Topics   • Smoking status: Former Smoker -- 0.25 packs/day for 25 years     Types: Cigarettes     Quit date: 08/26/2011   • Smokeless tobacco: Not on file      Comment: occ   • Alcohol Use: Yes      Comment: post rehab, 1/2 bottle of wine some days. no drinking now, sober X 2/1/17   • Drug Use: No   • Sexual Activity:     Partners: Male     Birth Control/ Protection: Abstinence     Other Topics Concern   • Not on file     Social History Narrative       [employer] PRACTICE/PREFERENCE: non denom    EMPLOYMENT/RESOURCES  Is the patient currently employed? No  Does the patient/parent report adequate financial resources? Yes  Does patient identify impact of presenting issue on work functioning? n/a  Work or income-related  stressors:  n/a     HISTORY:  Does patient report current or past enlistment? No    [If yes, trigger below section]  Does patient report history of exposure to combat? No  Does patient report history of  sexual trauma? No  Does patient report other -related stressors? No    SPIRITUAL/CULTURAL/IDENTITY:  What are the patient’s/family’s spiritual beliefs or practices? Non denom  What is the patient’s cultural or ethnic background/identity?   How does the patient identify their sexual orientation? hetero  How does the patient identify their gender? female  Does the patient identify any spiritual/cultural/identity factors as relevant to the presenting issue? No      PSYCHIATRIC TREATMENT HISTORY:  Does patient/parent report a history of outpatient psychiatric treatment for patient?   patient was treated by Dr. Cota PCP, also during inpatient detox stays.     Does patient/parent report a history of psychiatric hospitalizations for patient?  patient has gone to inpatient rehab X3    Does patient/parent report a history of psychotherapy or other behavioral health treatment for patient?   currently attending [facility], sees Jennifer Carl for spiritual counseling. Sees Rachel Houston for hypnotherapy, does acupuncture to stop smoking       PAST MEDICAL HISTORY:          REVIEW OF SYSTEMS:        Constitutional negative - denies feve   Eyes negative   Ears/Nose/Mouth/Throat negative   Cardiovascular Negative, hx hypertension   Respiratory  COPD listed in med history but patient denies respiratory problems or shortness of breath. Sleep apnea treated with surgery   Gastrointestinal Negative, denies liver problems   Genitourinary negative - denies kidney disease, post menopausal   Muscular negative   Integumentary negative   Neurological negative - denies seizure history   Endocrine negative - no diabetes or thyroid disease   Hematologic/Lymphatic negative - denies anemia or history of clots        Past  Medical History   Diagnosis Date   • Psychiatric disorder      anxiety, depression   • Hypertension    • Unspecified urinary incontinence    • Indigestion    • Hiatus hernia syndrome    • Urinary bladder disorder    • Sleep apnea    • Snoring    • Pneumonia      for 2 days   • Other specified disorder of intestines      diarrhea   • Chronic obstructive pulmonary disease (CMS-[facility])    • Alcoholism (CMS-[facility])    • Alcohol abuse    • Depression        LABS REVIEWED: none    Medication Allergies  Codeine and Morphine    Medications (non psychiatric)  Current Outpatient Prescriptions   Medication Sig Dispense Refill   • gabapentin (NEURONTIN) 600 MG tablet Take 1 Tab by mouth 3 times a day. 90 Tab 0   • citalopram (CELEXA) 20 MG Tab Take 1 Tab by mouth every morning. 30 Tab 0   • amitriptyline (ELAVIL) 10 MG Tab Take 1-2 Tabs by mouth at bedtime as needed for Sleep. 30 Tab 0   • naltrexone (DEPADE) 50 MG Tab Take 1 Tab by mouth every day. 30 Tab 0   • mirtazapine (REMERON) 15 MG Tab Take 1 Tab by mouth every bedtime. 30 Tab 0   • alprazolam (XANAX) 1 MG Tab 1 mg qam  2   • propranolol (INDERAL) 20 MG Tab Take 0.5 Tabs by mouth 3 times a day. Hold for pulse less than 60 beats/minute 90 Tab    • Vilazodone HCl (VIIBRYD) 40 MG TABS Take 40 mg by mouth every day.       No current facility-administered medications for this visit.       DEVELOPMENTAL HISTORY:  Delivery:Full term, normal vaginal delivery  Birth weight:   Prenatal complications:  No   complications:  No   complications:  No  In utero substance exposure:  no exposures known    Reached developmental milestones within normal limits?   Gross motor:  Yes  Fine motor:  Yes   Speech:  Yes   Social interaction:  Yes    EDUCATIONAL:  Is patient currently enrolled in a school/educational program?   High school plus some college    Psychiatric Review of Systems:   Mood: Other: denies depression, feels anxious in the morning  Anxiety: Situational  anxiety  Sleep: Frequent waking and Typical hours per night: sleeping 7 hours/night  Psychomotor/Energy: Other: within normal limits  Eating/Appetite: Other: within normal limits  Cognitive: Other: within normal limits  Behavior: Other: denies risky behaviors outside of drinking   Psychosis: Other: denies a/v hallucinations  Social: Other: patient active in many groups  Sensory: Other: within normal limits       Other symptoms:     LEGAL HISTORY:  Has the patient ever been involved with juvenile, adult, or family legal systems? DUI    Does patient report ever committing a crime? DUI   Does patient report every being arrested? Yes  Does patient report ever being a victim of a crime? No  Does patient report involvement in any current legal issues?No  Does patient report any current agency (parole/probation/CPS/) involvement? No    ABUSE/NEGLECT SCREENING:  Does patient report feeling “unsafe” in his/her home, or afraid of anyone? No  Does patient report any history of physical, sexual, or emotional abuse? No  Does parent or significant other report any of the above? No  Is there evidence of neglect by self?No  Is there evidence of neglect by a caregiver? No  Does the patient/parent report any history of CPS/APS/police involvement related to suspected abuse/neglect or domestic violence? No    Based on the information provided during the current assessment, is a mandated report of suspected abuse/neglect being made?   No    SAFETY ASSESSMENT - SELF:  Does patient acknowledge current or past symptoms of dangerousness to self? No    Does parent/significant other report patient has current or past symptoms of dangerousness to self? No      History of suicide by family member: No  History of suicide by friend/significant other: No    Recent change in amount/specificity/intensity of suicidal thoughts or self-harm behavior?No  Current access to firearms, medications, or other identified means of  "suicide/self-harm? No  If yes, willing to restrict access to means of suicide/self-harm? Yes  Protective factors present: Spiritual beliefs/practices and Strong family connections    Current Suicide Risk: Low  Safety Plan completed, documented in chart, and copy given to patient: No     Crisis Safety Plan completed and copy given to patient: No     SAFETY ASSESSMENT - OTHERS:  Does patient acknowledge current or past symptoms of aggressive behavior or risk to others? No  Does parent/significant other report patient has current or past symptoms of aggressive behavior or risk to others? No      Recent change in amount/specificity/intensity of thoughts or threats to harm others? No  Current access to firearms/other identified means of harm? n/a  If yes, willing to restrict access to weapons/means of harm? n/a    Current Homicide Risk: Not applicable  Crisis safety Plan completed, documented in chart, and copy given to patient? No    Based on information provided during the current assessment, is a mandated \"duty to warn\" being exercised? No    SUBSTANCE USE/ADDICTION HISTORY:  [] Not applicable - patient 10 years of age or younger    Is there a family history of substance use/addiction? Yes    Last time patient used alcohol: 2/2017  Within the past week? No  Last time patient used marijuana: n/a  Within the past month? No  Any other street drugs ever tried even once? Yes    Any use of prescription medications/pills without a prescription, or for reasons others than originally prescribed?  patient was prescribed benzos previously by PCP, did however return to taking Xanax after discharge from detox facility  Any other addictive behavior reported (gambling, shopping, sex)? No    Drug History:  Amphetamine:  Amphetamine frequency: Past rare use      Cannibis:  Cannabis frequency: Past occasional use  Cannabis last use: 2/1/17  Cannabis method: Smoke      Cocaine:  Cocaine frequency: Past occasional use  Cocaine method: " "Snort      Ecstasy:  Ecstasy frequency: Never used      Hallucinogen:  Hallucinogen frequency: Past rare use      Inhalant:   Inhalant frequency: Never used      Opiate:  Opiate frequency: Never used  Cannabis frequency: Past occasional use  Cannabis last use: 2/1/17      Other:  Other drug frequency: Never used      Sedative:   Sedative frequency: Daily  Sedative method: Pill  Comments: prescribed        What consequences does the patient associate with any of the above substance use and or addictive behaviors?   Patient’s motivation/readiness for change: patient appears motivated for maintaining sobriety    [] Patient denies use of any substance/addictive behaviors    STRENGTHS/ASSETS:  Strengths Identified by interviewer: Insight into problems and Self-awareness  Strengths Identified by patient: willing to engage in treatment    MENTAL STATUS EXAM/OBSERVATIONS  /82 mmHg  Pulse 54  Resp 13  Ht 1.6 m (5' 2.99\")  Wt 65.318 kg (144 lb)  BMI 25.51 kg/m2  Breastfeeding? No  Participation: Active verbal participation, Attentive, Engaged and Open to feedback  Grooming:  Casual and Neat  Orientation: Fully Oriented   Eye contact:  Good  Behavior: Calm   Mood:  Euthymic  Affect: Full range and Congruent with content  Thought process: Logical and Goal-directed  Thought content: Within normal limits  Speech: Rate within normal limits and Volume within normal limits  Perception: Within normal limits  Memory:  No gross evidence of memory deficits  Insight:  Good  Judgment: Good  Other:   Family/couple interaction observations: n/a          CLINICAL FORMULATION: 66 year old  hetero female, referred from [facility] for med management, is in possession of 5 antidepressants, 4 she has been taking concurrently. Alcoholism, has been through inpatient detox and is managing cravings with naltrexone, father was an alcoholic and sister is addicted to THC patient states. Depressive episode started around 2009, patient was " put on multiple medications and now feels she can start reducing the meds as she is feeling much less depressed and has remained sobriety. Patient returned to taking Xanax right after inpatient detox, has reduced her dose greatly but knows benzos are not a good option for her. No suicidal thoughts. Willing to engage in treatment, has started with a sponsor, attends AA regularly, engages in alternative spiritual practices and non-med modalities as well.       DIAGNOSTIC IMPRESSION(S):  1. Alcoholism (CMS-[facility])    2. Alcohol use disorder, moderate, dependence (CMS-[facility])    3. Major depressive disorder, recurrent episode, moderate (CMS-[facility])    4. Moderate sedative, hypnotic, or anxiolytic use disorder    5. Anxiety disorder, unspecified         IDENTIFIED NEEDS/PLAN:   -Discussed with patient she is taking too many antidepressants, will work to reduce to eventually 1 at proper dose.  -First titrate down and off of Viibryd, patient does not feel she can afford that one; will start by reducing dose to 20 mg daily for one week, then taking 20 mg qod X1 week then stop.  -discuss with patient her pulse is below 60, needs to hold propranolol. Patient is to check her pulse and if less than 60 beats per minute should not take it, reduce dose to 10 mg TID for now.  -review with patient she is in recovery, will eventually work to get her completely off of Xanax as she is not a good candidate for benzos.  -will for now continue mirtazapine, citalopram, amitriptyline, naltrexone for alcohol cravings, gabapentin.  -labs ordered for CMP, CMP. Reviewed with patient need to check her kidney and liver function.      Current outpatient prescriptions:   •  gabapentin (NEURONTIN) 600 MG tablet, Take 1 Tab by mouth 3 times a day., Disp: 90 Tab, Rfl: 0  •  citalopram (CELEXA) 20 MG Tab, Take 1 Tab by mouth every morning., Disp: 30 Tab, Rfl: 0  •  amitriptyline (ELAVIL) 10 MG Tab, Take 1-2 Tabs by mouth at bedtime as needed for Sleep., Disp:  30 Tab, Rfl: 0  •  naltrexone (DEPADE) 50 MG Tab, Take 1 Tab by mouth every day., Disp: 30 Tab, Rfl: 0  •  mirtazapine (REMERON) 15 MG Tab, Take 1 Tab by mouth every bedtime., Disp: 30 Tab, Rfl: 0  •  alprazolam (XANAX) 1 MG Tab, 1 mg qam, Disp: , Rfl: 2  •  propranolol (INDERAL) 20 MG Tab, Take 0.5 Tabs by mouth 3 times a day. Hold for pulse less than 60 beats/minute, Disp: 90 Tab, Rfl:   •  Vilazodone HCl (VIIBRYD) 40 MG TABS, Take 40 mg by mouth every day., Disp: , Rfl:      Recheck 1 month or sooner if needed    Does patient express agreement with the above plan? Yes      JAYDEN Romero.

## 2017-04-07 NOTE — BH THERAPY
Group Therapy Checklist  Attendance: Attended  Attendance Duration (min): Greater than 60  Number of Participants: 4  Program/Group: Intensive Outpatient Program  Topics Covered: Weekend planning  Participation: Active verbal participation, Attentive (shared that she is feeling very connected in AA.  Is having some challenges with her sister not being supportive.  Receptive to support from peers.)  Affect/Mood Range: Normal range, Flexible  Affect/Mood Display: Congruent w/content, Labile  Cognition: Oriented, Alert  Evidence of Imminent Suicide Risk: No  Evidence of imminent homicide risk: No  Therapeutic Interventions: Emotion clarification  Progress Toward Treatment Goal: Moderate improvement

## 2017-04-07 NOTE — BH THERAPY
Group Therapy Checklist  Attendance: Attended  Program/Group: Intensive Outpatient Program  Topics Covered: Belief Systems  Participation: Active verbal participation  Affect/Mood Range: Flexible, Normal range  Affect/Mood Display: Congruent w/content  Cognition: Oriented, Alert  Evidence of Imminent Suicide Risk: No  Evidence of imminent homicide risk: No  Therapeutic Interventions: Cognitive clarification  Progress Toward Treatment Goal: Moderate improvement

## 2017-04-10 ENCOUNTER — HOSPITAL ENCOUNTER (OUTPATIENT)
Dept: BEHAVIORAL HEALTH | Facility: MEDICAL CENTER | Age: 67
End: 2017-04-10
Attending: PSYCHIATRY & NEUROLOGY
Payer: MEDICARE

## 2017-04-10 VITALS
BODY MASS INDEX: 25.52 KG/M2 | RESPIRATION RATE: 13 BRPM | HEART RATE: 54 BPM | SYSTOLIC BLOOD PRESSURE: 108 MMHG | DIASTOLIC BLOOD PRESSURE: 82 MMHG | WEIGHT: 144 LBS | HEIGHT: 63 IN

## 2017-04-10 DIAGNOSIS — F41.9 ANXIETY DISORDER, UNSPECIFIED: ICD-10-CM

## 2017-04-10 DIAGNOSIS — F33.1 MODERATE EPISODE OF RECURRENT MAJOR DEPRESSIVE DISORDER (HCC): ICD-10-CM

## 2017-04-10 DIAGNOSIS — F10.20 ALCOHOL USE DISORDER, MODERATE, DEPENDENCE (HCC): ICD-10-CM

## 2017-04-10 PROCEDURE — 90853 GROUP PSYCHOTHERAPY: CPT

## 2017-04-11 ENCOUNTER — HOSPITAL ENCOUNTER (OUTPATIENT)
Dept: BEHAVIORAL HEALTH | Facility: MEDICAL CENTER | Age: 67
End: 2017-04-11
Attending: PSYCHIATRY & NEUROLOGY
Payer: MEDICARE

## 2017-04-11 DIAGNOSIS — F10.20 ALCOHOL USE DISORDER, MODERATE, DEPENDENCE (HCC): ICD-10-CM

## 2017-04-11 DIAGNOSIS — F33.1 MODERATE EPISODE OF RECURRENT MAJOR DEPRESSIVE DISORDER (HCC): ICD-10-CM

## 2017-04-11 DIAGNOSIS — F41.9 ANXIETY DISORDER, UNSPECIFIED: ICD-10-CM

## 2017-04-11 PROCEDURE — 90837 PSYTX W PT 60 MINUTES: CPT | Mod: XE

## 2017-04-11 PROCEDURE — 90853 GROUP PSYCHOTHERAPY: CPT

## 2017-04-11 NOTE — BH THERAPY
Group Therapy Checklist  Attendance: Attended  Attendance Duration (min): Greater than 60  Number of Participants: 4  Program/Group: Intensive Outpatient Program  Topics Covered: Recovery Planning  Participation: Active verbal participation, Attentive (Client shared her weekend with the group. she reported spending time with friends in a sober gathering. Marcia shared feeling sad due to her sisters relationship.Receptive to support to peers )  Affect/Mood Range: Normal range  Affect/Mood Display: Congruent w/content, Sad  Cognition: Alert  Evidence of Imminent Suicide Risk: No  Evidence of imminent homicide risk: No  Therapeutic Interventions: Socialization, Emotion clarification  Progress Toward Treatment Goal: Moderate improvement

## 2017-04-11 NOTE — BH THERAPY
Group Therapy Checklist  Attendance: Attended  Attendance Duration (min): 46-60  Number of Participants: 4  Program/Group: Intensive Outpatient Program  Topics Covered: Assertiveness  Participation: Active verbal participation  Affect/Mood Range: Flexible, Normal range  Affect/Mood Display: Congruent w/content  Cognition: Oriented, Alert  Evidence of Imminent Suicide Risk: No  Evidence of imminent homicide risk: No  Therapeutic Interventions: Emotion clarification  Progress Toward Treatment Goal: Moderate improvement

## 2017-04-12 ENCOUNTER — TELEPHONE (OUTPATIENT)
Dept: BEHAVIORAL HEALTH | Facility: MEDICAL CENTER | Age: 67
End: 2017-04-12

## 2017-04-12 NOTE — BH THERAPY
Attendance: Attended   Attendance Duration (min): 46-60  Number of Participants: 4     Program/Group: Intensive Outpatient Program  Topics Covered: Chalk talk  Participation: Attentive  Affect/Mood Range: Normal range  Affect/Mood Display: Congruent w/content  Cognition: Alert  Evidence of Imminent Suicide Risk: No  Evidence of imminent homicide risk: No  Therapeutic Interventions: Cognitive clarification, Relapse prevention  Progress Toward Treatment Goal: Moderate improvement

## 2017-04-12 NOTE — CARE PLAN
"Problem: Substance Withdrawal Symptoms  Goal: Abstinence  Intervention: 12 Step guides   [facility]  Therapy Progress Note    Patient Name: Marcia Koch  Patient MRN: [id number]  Today's Date: 4/11/2017     Type of session:Individual psychotherapy  Length of session: 60  Persons in attendance:Patient    Subjective/New Info: individual session. Processed step one, admitting powerless and unmanageable life    Objective/Observations:              Participation: Active verbal participation, Attentive, Engaged and Open to feedback              Grooming: Casual and Neat              Cognition: Alert and Fully Oriented              Eye contact: Good              Mood: Euthymic              Affect: Flexible, Full range and Congruent with content              Thought process: Logical and Goal-directed              Speech: Rate within normal limits and Volume within normal limits              Other:     Diagnoses: No diagnosis found.     Current risk:              SUICIDE: Not applicable              Homicide: Not applicable              Self-harm: Not applicable              Relapse: Low              Other:               Safety Plan reviewed? Not Indicated              If evidence of imminent risk is present, intervention/plan:     Therapeutic Intervention(s): Clarify:  Clarify values, Interpersonal effectiveness skills, Review treatment plan, Self-care skills and Supportive psychotherapy    Treatment Goal(s)/Objective(s) addressed: 12 step, relapse prevention     Progress toward Treatment Goals: Moderate improvement    Plan:  - Continue Intensive Outpatient Program  - \"Homework\" recommendation: step two  - Patient is in agreement with the above plan:  YES    Yelena De La Vega R.N.  4/11/2017                                                  Intervention: AA  Plans to attend more women's meetings; socializing with home group including brunch with members          "

## 2017-04-12 NOTE — BH THERAPY
Attendance: Attended   Attendance Duration (min): Greater than 60  Number of Participants: 4     Program/Group: Intensive Outpatient Program  Topics Covered: Recovery Planning (process group )  Participation: Active verbal participation, Attentive, Supportive to other group members  Affect/Mood Range: Normal range  Affect/Mood Display: Congruent w/content  Cognition: Alert  Evidence of Imminent Suicide Risk: No  Evidence of imminent homicide risk: No  Therapeutic Interventions: Cognitive modification, Self-care skills, Relapse prevention  Progress Toward Treatment Goal: Moderate improvement    Patient stated that she is looking forward for her massage this week. She stated that she has been practicing self care and is reading on her free time. She states that she has been attending AA meetings. She was positive about her recovery and AA. Patient also reflected on the video when asked and was exteremly insightful

## 2017-04-13 ENCOUNTER — TELEPHONE (OUTPATIENT)
Dept: BEHAVIORAL HEALTH | Facility: MEDICAL CENTER | Age: 67
End: 2017-04-13

## 2017-04-13 ENCOUNTER — HOSPITAL ENCOUNTER (OUTPATIENT)
Dept: LAB | Facility: MEDICAL CENTER | Age: 67
End: 2017-04-13
Attending: NURSE PRACTITIONER
Payer: MEDICARE

## 2017-04-13 ENCOUNTER — HOSPITAL ENCOUNTER (OUTPATIENT)
Dept: BEHAVIORAL HEALTH | Facility: MEDICAL CENTER | Age: 67
End: 2017-04-13
Attending: PSYCHIATRY & NEUROLOGY
Payer: MEDICARE

## 2017-04-13 LAB
ALBUMIN SERPL BCP-MCNC: 3.9 G/DL (ref 3.2–4.9)
ALBUMIN/GLOB SERPL: 1.4 G/DL
ALP SERPL-CCNC: 86 U/L (ref 30–99)
ALT SERPL-CCNC: 21 U/L (ref 2–50)
ANION GAP SERPL CALC-SCNC: 8 MMOL/L (ref 0–11.9)
AST SERPL-CCNC: 28 U/L (ref 12–45)
BASOPHILS # BLD AUTO: 0.8 % (ref 0–1.8)
BASOPHILS # BLD: 0.06 K/UL (ref 0–0.12)
BILIRUB SERPL-MCNC: 0.6 MG/DL (ref 0.1–1.5)
BUN SERPL-MCNC: 11 MG/DL (ref 8–22)
CALCIUM SERPL-MCNC: 9 MG/DL (ref 8.5–10.5)
CHLORIDE SERPL-SCNC: 102 MMOL/L (ref 96–112)
CO2 SERPL-SCNC: 28 MMOL/L (ref 20–33)
CREAT SERPL-MCNC: 0.77 MG/DL (ref 0.5–1.4)
EOSINOPHIL # BLD AUTO: 0.25 K/UL (ref 0–0.51)
EOSINOPHIL NFR BLD: 3.4 % (ref 0–6.9)
ERYTHROCYTE [DISTWIDTH] IN BLOOD BY AUTOMATED COUNT: 45.9 FL (ref 35.9–50)
GFR SERPL CREATININE-BSD FRML MDRD: >60 ML/MIN/1.73 M 2
GLOBULIN SER CALC-MCNC: 2.7 G/DL (ref 1.9–3.5)
GLUCOSE SERPL-MCNC: 97 MG/DL (ref 65–99)
HCT VFR BLD AUTO: 46.5 % (ref 37–47)
HGB BLD-MCNC: 15.5 G/DL (ref 12–16)
IMM GRANULOCYTES # BLD AUTO: 0.01 K/UL (ref 0–0.11)
IMM GRANULOCYTES NFR BLD AUTO: 0.1 % (ref 0–0.9)
LYMPHOCYTES # BLD AUTO: 3.19 K/UL (ref 1–4.8)
LYMPHOCYTES NFR BLD: 43.5 % (ref 22–41)
MCH RBC QN AUTO: 31.5 PG (ref 27–33)
MCHC RBC AUTO-ENTMCNC: 33.3 G/DL (ref 33.6–35)
MCV RBC AUTO: 94.5 FL (ref 81.4–97.8)
MONOCYTES # BLD AUTO: 0.57 K/UL (ref 0–0.85)
MONOCYTES NFR BLD AUTO: 7.8 % (ref 0–13.4)
NEUTROPHILS # BLD AUTO: 3.26 K/UL (ref 2–7.15)
NEUTROPHILS NFR BLD: 44.4 % (ref 44–72)
NRBC # BLD AUTO: 0 K/UL
NRBC BLD AUTO-RTO: 0 /100 WBC
PLATELET # BLD AUTO: 222 K/UL (ref 164–446)
PMV BLD AUTO: 9.8 FL (ref 9–12.9)
POTASSIUM SERPL-SCNC: 4.1 MMOL/L (ref 3.6–5.5)
PROT SERPL-MCNC: 6.6 G/DL (ref 6–8.2)
RBC # BLD AUTO: 4.92 M/UL (ref 4.2–5.4)
SODIUM SERPL-SCNC: 138 MMOL/L (ref 135–145)
WBC # BLD AUTO: 7.3 K/UL (ref 4.8–10.8)

## 2017-04-13 PROCEDURE — 80053 COMPREHEN METABOLIC PANEL: CPT

## 2017-04-13 PROCEDURE — 36415 COLL VENOUS BLD VENIPUNCTURE: CPT

## 2017-04-13 PROCEDURE — 90853 GROUP PSYCHOTHERAPY: CPT

## 2017-04-13 PROCEDURE — 85025 COMPLETE CBC W/AUTO DIFF WBC: CPT

## 2017-04-13 NOTE — TELEPHONE ENCOUNTER
[facility]    Treatment Team Staffing    Patient Name: Marcia Koch Program: IOP Date: 4/13/2017     Attendees: Madisyn Luke RN; Yelena De La Vega RN; Bart Stevens MD    Patient's Progress toward Goals Listed on the Treatment Plan: sobriety with AA meetings; several self care and spiritual activities scheduled daily    1. Client's Participation When in Attendance Was: Active in a Positive Way    2. Counselor's Evaluation of Client's Progress: Positive Movement    3. Patient is attending group and individual sessions and is progressing well toward the treatment goals: yes      YES NO   A. Relapse During Program []  [x]    B. Requires physician review []  [x]    C. Referral to program inappropriate []  [x]    D. Non compliance with Treatment Plan []  [x]    E. Early treatment termination (lack of attendance) []  [x]     []  []      Comments: out of [place] she relapsed, joined [facility], now working disciplined recovery program    Treatment Plan Review: - Continue Intensive Outpatient Program and 12-Step participation  - Next appointment scheduled:  4/13/2017  - Patient is in agreement with the above plan:  YES

## 2017-04-14 NOTE — ADDENDUM NOTE
Encounter addended by: Stefany Disla L.C.S.W. on: 4/14/2017  3:15 PM<BR>     Documentation filed: Notes Section, Chief Complaint Section, Visit Diagnoses

## 2017-04-14 NOTE — BH THERAPY
Attendance: Attended   Attendance Duration (min): Greater than 60  Number of Participants: 5     Program/Group: Intensive Outpatient Program  Topics Covered: Weekend planning  Participation: Attentive, Active verbal participation. Patient shared her experience at  this week. She reports that she is struggling to find a sponsor but states that she will continue to look for one in her meetings. Client stated that he has been in contact with ehr family and has plans to spend Easter with them. Client shared her relapse prevention skills with the group. She plans on taking her own drinks and reports that she will use effective communication to express her needs.   Affect/Mood Range: Normal range  Affect/Mood Display: Congruent w/content  Cognition: Alert, Oriented  Evidence of Imminent Suicide Risk: No  Evidence of imminent homicide risk: No  Therapeutic Interventions: Goal-setting, Relapse prevention, Self-care skills  Progress Toward Treatment Goal: Moderate improvement

## 2017-04-14 NOTE — BH THERAPY
Attendance: Attended   Attendance Duration (min): Greater than 60  Number of Participants: 5     Program/Group: Intensive Outpatient Program  Topics Covered: Weekend planning  Participation: Attentive, Active verbal participation  Affect/Mood Range: Normal range  Affect/Mood Display: Congruent w/content  Cognition: Alert, Oriented  Evidence of Imminent Suicide Risk: No  Evidence of imminent homicide risk: No  Therapeutic Interventions: Goal-setting, Relapse prevention, Self-care skills  Progress Toward Treatment Goal: Moderate improvement

## 2017-04-17 ENCOUNTER — HOSPITAL ENCOUNTER (OUTPATIENT)
Dept: BEHAVIORAL HEALTH | Facility: MEDICAL CENTER | Age: 67
End: 2017-04-17
Attending: PSYCHIATRY & NEUROLOGY
Payer: MEDICARE

## 2017-04-17 DIAGNOSIS — F10.20 ALCOHOL USE DISORDER, MODERATE, DEPENDENCE (HCC): ICD-10-CM

## 2017-04-17 DIAGNOSIS — F41.9 ANXIETY DISORDER, UNSPECIFIED: ICD-10-CM

## 2017-04-17 PROCEDURE — 90853 GROUP PSYCHOTHERAPY: CPT

## 2017-04-17 PROCEDURE — 90837 PSYTX W PT 60 MINUTES: CPT | Mod: XU

## 2017-04-18 ENCOUNTER — HOSPITAL ENCOUNTER (OUTPATIENT)
Dept: BEHAVIORAL HEALTH | Facility: MEDICAL CENTER | Age: 67
End: 2017-04-18
Attending: PSYCHIATRY & NEUROLOGY
Payer: MEDICARE

## 2017-04-18 PROCEDURE — 90853 GROUP PSYCHOTHERAPY: CPT

## 2017-04-18 RX ORDER — CITALOPRAM 20 MG/1
TABLET ORAL
Qty: 30 TAB | Refills: 0 | Status: SHIPPED | OUTPATIENT
Start: 2017-04-18 | End: 2017-05-28 | Stop reason: SDUPTHER

## 2017-04-18 NOTE — BH THERAPY
Group Therapy Checklist  Attendance: Attended  Attendance Duration (min): 46-60  Number of Participants: 5  Program/Group: Intensive Outpatient Program  Topics Covered: Addiction behaviors  Participation: Active verbal participation  Affect/Mood Range: Normal range, Flexible  Affect/Mood Display: Congruent w/content  Cognition: Oriented, Alert  Evidence of Imminent Suicide Risk: No  Evidence of imminent homicide risk: No  Therapeutic Interventions: Socialization  Progress Toward Treatment Goal: Moderate improvement

## 2017-04-18 NOTE — CARE PLAN
"Problem: Substance Withdrawal Symptoms  Goal: Abstinence  Intervention: Individual Counseling Sessions   [facility]  Therapy Progress Note    Patient Name: Marcia Koch  Patient MRN: [id number]  Today's Date: 4/17/2017     Type of session:Individual psychotherapy  Length of session: 60  Persons in attendance:Patient    Subjective/New Info: individual session. Processed step two: Marcia explores many wisdom traditions, participates in prayer, meditation and chanting. Attends AA, looking for sponsor.     Objective/Observations:              Participation: Active verbal participation, Attentive, Engaged and Open to feedback              Grooming: Casual and Neat              Cognition: Alert and Fully Oriented              Eye contact: Good              Mood: Euthymic              Affect: Flexible, Full range and Congruent with content              Thought process: Logical and Goal-directed              Speech: Rate within normal limits and Volume within normal limits              Other:     Diagnoses: No diagnosis found.     Current risk:              SUICIDE: Not applicable              Homicide: Not applicable              Self-harm: Not applicable              Relapse: Moderate              Other:               Safety Plan reviewed? Not Indicated              If evidence of imminent risk is present, intervention/plan:     Therapeutic Intervention(s): Clarify:  Clarify feelings, Interpersonal effectiveness skills, Leisure and recreation skills, Review treatment plan and Supportive psychotherapy    Treatment Goal(s)/Objective(s) addressed: relapse prevention     Progress toward Treatment Goals: Moderate improvement    Plan:  - Continue Intensive Outpatient Program and 12-Step participation  - \"Homework\" recommendation: step three  - Patient is in agreement with the above plan:  YES    Yelena D eLa Vega R.N.  4/17/2017                                                    Intervention: 12 Step " guides  Processed step two   Intervention: AA  Attending several meetings weekly, looking for sponsor.

## 2017-04-19 NOTE — BH THERAPY
Attendance: Attended   Attendance Duration (min): 46-60  Number of Participants: 4     Program/Group: Intensive Outpatient Program  Topics Covered: Relationships in Recovery  Participation: Active verbal participation, Attentive  Affect/Mood Range: Normal range  Affect/Mood Display: Congruent w/content  Cognition: Alert, Oriented  Evidence of Imminent Suicide Risk: No  Evidence of imminent homicide risk: No  Therapeutic Interventions: Problem-solving, Relapse prevention, Goal-setting  Progress Toward Treatment Goal: Moderate improvement

## 2017-04-19 NOTE — BH THERAPY
"Behavioral Health  Group Therapy Progress Note    Name: Marcia Koch  MRN: [id number]  Date: 4/18/2017    Attendance: Attended   Attendance Duration (min): Greater than 60  Number of Participants: 4     Program/Group: Intensive Outpatient Program  Topics Covered: Relapse Prevention  Participation: Active verbal participation  Affect/Mood Range: Normal range  Affect/Mood Display: Congruent w/content  Cognition: Alert, Oriented  Evidence of Imminent Suicide Risk: No  Evidence of imminent homicide risk: No  Therapeutic Interventions: Relapse prevention, Problem-solving  Progress Toward Treatment Goal: Moderate improvement    Marcia shared her biggest challenge in recovery with the group and something she did today for her recovery. Marcia stated that she attended a AA meeting which went extremely well. She also reports practicing self care and plans to have dinner with a friend tonight. She shared her concerns about having alcoholic drinks  around her at the restaurant. client was asked to problem solve during this session. She reported that she would set clear boundaries with her friend and would order an iced tea. Marcia was insightful throughout this process.     Treatment Plan: - \"Homework\" recommendation: Attend AA meeting      Stefany Disla L.C.S.W.             "

## 2017-04-20 ENCOUNTER — HOSPITAL ENCOUNTER (OUTPATIENT)
Dept: BEHAVIORAL HEALTH | Facility: MEDICAL CENTER | Age: 67
End: 2017-04-20
Attending: PSYCHIATRY & NEUROLOGY
Payer: MEDICARE

## 2017-04-20 PROCEDURE — 90853 GROUP PSYCHOTHERAPY: CPT

## 2017-04-21 NOTE — BH THERAPY
"Behavioral Health  Group Therapy Progress Note    Name: Marcia Koch  MRN: [id number]  Date: 4/21/2017    Attendance: Attended   Attendance Duration (min): Greater than 60  Number of Participants: 6     Program/Group: Intensive Outpatient Program  Topics Covered: Relapse Prevention, Acceptance, Recovery Planning, Weekend planning  Participation: Attentive, Active verbal participation  Affect/Mood Range: Normal range  Affect/Mood Display: Congruent w/content  Cognition: Alert, Oriented  Evidence of Imminent Suicide Risk: No  Evidence of imminent homicide risk: No  Therapeutic Interventions: Relapse prevention, Self-care skills, Problem-solving  Progress Toward Treatment Goal: Moderate improvement    Marcia is looking forward to the weekend as she will be spending time with her sister whom she has not seen in over 100 days. Marcia also reports finishing her big book and is looking for a sponsor at this time. Marcia reports having symptoms of anxiety in the mornings. She also related to others experiences of decrease in sleep and restless nights.     Treatment Plan: - \"Homework\" recommendation: attend AA GREGORY LealS.W.             "

## 2017-04-21 NOTE — BH THERAPY
Attendance: Attended   Attendance Duration (min): 45-60 minutes   Number of Participants: 6     Program/Group: Intensive Outpatient Program  Topics Covered: Relapse Prevention  Participation: Attentive, Active verbal participation  Affect/Mood Range: Normal range  Affect/Mood Display: Congruent w/content  Cognition: Alert, Oriented  Evidence of Imminent Suicide Risk: No  Evidence of imminent homicide risk: No  Therapeutic Interventions: Relapse prevention  Progress Toward Treatment Goal: Moderate improvement

## 2017-04-24 ENCOUNTER — HOSPITAL ENCOUNTER (OUTPATIENT)
Dept: BEHAVIORAL HEALTH | Facility: MEDICAL CENTER | Age: 67
End: 2017-04-24
Attending: PSYCHIATRY & NEUROLOGY
Payer: MEDICARE

## 2017-04-24 DIAGNOSIS — F10.20 ALCOHOL USE DISORDER, MODERATE, DEPENDENCE (HCC): ICD-10-CM

## 2017-04-24 PROCEDURE — 90837 PSYTX W PT 60 MINUTES: CPT | Mod: XU

## 2017-04-24 PROCEDURE — 90853 GROUP PSYCHOTHERAPY: CPT

## 2017-04-25 ENCOUNTER — HOSPITAL ENCOUNTER (OUTPATIENT)
Dept: BEHAVIORAL HEALTH | Facility: MEDICAL CENTER | Age: 67
End: 2017-04-25
Attending: PSYCHIATRY & NEUROLOGY
Payer: MEDICARE

## 2017-04-25 PROCEDURE — 90853 GROUP PSYCHOTHERAPY: CPT

## 2017-04-25 NOTE — CARE PLAN
Problem: Substance Withdrawal Symptoms  Goal: Abstinence  Intervention: 12 Step guides  Processed step three  Intervention: AA  Has a sponsor!

## 2017-04-25 NOTE — BH THERAPY
"Behavioral Health  Group Therapy Progress Note    Name: Marcia Koch  MRN: [id number]  Date: 4/24/2017    Attendance: Attended   Attendance Duration (min): Greater than 60  Number of Participants: 7     Program/Group: Intensive Outpatient Program  Topics Covered: Relapse Prevention, Recovery Planning, Letting Go  Participation: Active verbal participation  Affect/Mood Range: Normal range  Affect/Mood Display: Congruent w/content  Cognition: Alert, Oriented  Evidence of Imminent Suicide Risk: No  Evidence of imminent homicide risk: No  Therapeutic Interventions: Relapse prevention, Problem-solving, Socialization  Progress Toward Treatment Goal: Moderate improvement    Marcia shared her weekend with the group. She reports that her dinner with her sister did not go as planned, Marcia stated that she was disrespected by her sisters partner during dinner. Expressed her feelings of anger and shared her coping skills with the group. Marcia stated that she used her favorite  quote of ''let it go'' and was able to redirect her anger and processed her feelings later that evening.     Treatment Plan: - \"Homework\" recommendation: attend AA meeting, practice self care      GREGORY AlstonSLUCIA.             "

## 2017-04-25 NOTE — BH THERAPY
" [facility]  Therapy Progress Note    Patient Name: Marcia Koch  Patient MRN: [id number]  Today's Date: 4/24/2017     Type of session:Individual psychotherapy  Length of session: 60  Persons in attendance:Patient    Subjective/New Info: individual session. Processed dinner party at her dad's home; upset by sister and her wife. Explored strategies to manage differently next time. Processed step three.     Objective/Observations:   Participation: Active verbal participation, Attentive, Engaged and Open to feedback   Grooming: Casual and Neat   Cognition: Alert and Fully Oriented   Eye contact: Good   Mood: Depressed   Affect: Flexible and Full range   Thought process: Logical and Goal-directed   Speech: Rate within normal limits and Volume within normal limits   Other:     Diagnoses: No diagnosis found.     Current risk:   SUICIDE: Not applicable   Homicide: Not applicable   Self-harm: Not applicable   Relapse: Low   Other:    Safety Plan reviewed? Not Indicated   If evidence of imminent risk is present, intervention/plan:     Therapeutic Intervention(s): Behavior:  Behavior analysis, Clarify:  Clarify feelings, Interpersonal effectiveness skills and Review treatment plan    Treatment Goal(s)/Objective(s) addressed: relapse prevention     Progress toward Treatment Goals: Moderate improvement    Plan:  - Continue Intensive Outpatient Program  - \"Homework\" recommendation: relapse prevention plan  - Patient is in agreement with the above plan:  YES    Yelena De La Vega R.N.  4/24/2017                                     "

## 2017-04-25 NOTE — BH THERAPY
Group Therapy Checklist  Attendance: Attended  Attendance Duration (min): 46-60  Number of Participants: 7  Program/Group: Intensive Outpatient Program  Topics Covered: Relapse Prevention  Participation: Attentive  Affect/Mood Range: Normal range, Flexible  Affect/Mood Display: Congruent w/content  Cognition: Alert, Oriented  Evidence of Imminent Suicide Risk: No  Evidence of imminent homicide risk: No  Therapeutic Interventions: Relapse prevention  Progress Toward Treatment Goal: Mild improvement

## 2017-04-26 ENCOUNTER — TELEPHONE (OUTPATIENT)
Dept: BEHAVIORAL HEALTH | Facility: MEDICAL CENTER | Age: 67
End: 2017-04-26

## 2017-04-26 NOTE — BH THERAPY
Behavioral Health  Group Therapy Progress Note    Name: Marcia Koch  MRN: [id number]  Date: 4/25/2017    Attendance: Attended   Attendance Duration (min): 46-60  Number of Participants: 8     Program/Group: Intensive Outpatient Program  Topics Covered: Mindfulness  Participation: Active verbal participation  Affect/Mood Range: Normal range  Affect/Mood Display: Congruent w/content  Cognition: Alert, Oriented  Evidence of Imminent Suicide Risk: No  Evidence of imminent homicide risk: No  Therapeutic Interventions: Mindfulness exercise  Progress Toward Treatment Goal: Moderate improvement           Stefany Disla L.C.S.W.

## 2017-04-26 NOTE — TELEPHONE ENCOUNTER
[facility]    Treatment Team Staffing    Patient Name: Marcia Koch Program: IOP Date: 4/26/2017     Attendees: Madisyn Luke RN; Yelena De La Vega, RN and Bart Stevens MD    Patient's Progress toward Goals Listed on the Treatment Plan: on schedule to complete program 5/1/17; remains clean and sober    1. Client's Participation When in Attendance Was: Active in a Positive Way    2. Counselor's Evaluation of Client's Progress: Positive Movement    3. Patient is attending group and individual sessions and is progressing well toward the treatment goals: yes      YES NO   A. Relapse During Program []  [x]    B. Requires physician review []  [x]    C. Referral to program inappropriate []  [x]    D. Non compliance with Treatment Plan []  [x]    E. Early treatment termination (lack of attendance) []  [x]     []  []      Comments: attends AA; active treatment plan from treatment center, [facility] and is compliant    Treatment Plan Review: - Continue Intensive Outpatient Program  - Patient is in agreement with the above plan:  YES

## 2017-04-27 ENCOUNTER — HOSPITAL ENCOUNTER (OUTPATIENT)
Dept: BEHAVIORAL HEALTH | Facility: MEDICAL CENTER | Age: 67
End: 2017-04-27
Attending: PSYCHIATRY & NEUROLOGY
Payer: MEDICARE

## 2017-04-27 PROCEDURE — 90853 GROUP PSYCHOTHERAPY: CPT

## 2017-04-28 NOTE — BH THERAPY
Attendance: Attended   Attendance Duration (min): 46-60  Number of Participants: 8     Program/Group: Intensive Outpatient Program  Topics Covered: Grief & Loss  Participation: Active verbal participation  Affect/Mood Range: Normal range  Affect/Mood Display: Congruent w/content  Cognition: Alert, Oriented  Evidence of Imminent Suicide Risk: No  Evidence of imminent homicide risk: No  Therapeutic Interventions: Relapse prevention, Problem-solving, Socialization  Progress Toward Treatment Goal: Moderate improvement

## 2017-04-28 NOTE — BH THERAPY
"Behavioral Health  Group Therapy Progress Note    Name: Marcia Koch  MRN: [id number]  Date: 4/28/2017    Attendance: Attended   Attendance Duration (min): Greater than 60  Number of Participants: 8     Program/Group: Intensive Outpatient Program  Topics Covered: Recovery Planning, Relapse Prevention  Participation: Active verbal participation  Affect/Mood Range: Normal range  Affect/Mood Display: Congruent w/content  Cognition: Alert, Oriented  Evidence of Imminent Suicide Risk: No  Evidence of imminent homicide risk: No  Therapeutic Interventions: Relapse prevention  Progress Toward Treatment Goal: Moderate improvement    Marcia shared her goodbye letter with the class which was to her sister. She reports feeling thankful for the other group members. She states that she plans to continue group therapy in after care and will continue to see her therapist as scheduled.       Treatment Plan: - \"Homework\" recommendation: attend AA meetings      Stefany Disla L.C.S.W.             "

## 2017-04-28 NOTE — ADDENDUM NOTE
Encounter addended by: Stefany Disla L.C.S.W. on: 4/28/2017  8:21 AM<BR>     Documentation filed: LOS Section

## 2017-05-01 ENCOUNTER — TELEPHONE (OUTPATIENT)
Dept: BEHAVIORAL HEALTH | Facility: MEDICAL CENTER | Age: 67
End: 2017-05-01

## 2017-05-01 ENCOUNTER — HOSPITAL ENCOUNTER (OUTPATIENT)
Dept: BEHAVIORAL HEALTH | Facility: MEDICAL CENTER | Age: 67
End: 2017-05-01
Attending: PSYCHIATRY & NEUROLOGY
Payer: MEDICARE

## 2017-05-01 DIAGNOSIS — F10.20 ALCOHOL USE DISORDER, MODERATE, DEPENDENCE (HCC): ICD-10-CM

## 2017-05-01 DIAGNOSIS — F41.9 ANXIETY DISORDER, UNSPECIFIED: ICD-10-CM

## 2017-05-01 PROCEDURE — 90837 PSYTX W PT 60 MINUTES: CPT

## 2017-05-02 NOTE — TELEPHONE ENCOUNTER
[facility]  TRANSFER/DISCHARGE SUMMARY FORM    HHPI / SCP: yes Other Ins.: Medicare     Patient Name: Marcia Koch  Admission Date: 3/28/17  Level of Care Attended:  Intens.OP : 1950  Transfer/Discharge Date: MRN: [id number]  17       SIGNIFICANT FINDINGS/CLINICAL IMPRESSION:   DSM Codes:   F41.1 and F10.20    ICD10 Codes:   No diagnosis found.    Additional problems identified via assessment: relationship with her sister is unhappy    Treatment Components in Which Patient Participated (check all that apply):  Education group(s), 1:1 teaching/therapy, Group Therapy and 12-Step Group(s)    Summary of Course of Treatment: actively participated all groups and individual sessions    Condition at Time of Transfer/Discharge: Met treatment goals.    [] Medications Reviewed with Copy to Patient    Referred to: Refer to 12 Step program: AA, Primary Care Physician and [facility]     Patient is in agreement with discharge plan: yes    Yelena De La Vega R.N.

## 2017-05-02 NOTE — BH THERAPY
[facility]  Therapy Progress Note    Patient Name: Marcia Koch  Patient MRN: [id number]  Today's Date: 5/1/2017     Type of session:Individual psychotherapy  Length of session: 60  Persons in attendance:Patient    Subjective/New Info: last individual session. Processed relapse prevention plan. Marcia identified triggers, chrystal signs and wrote solutions that she can utilize before picking up a drink. She looked at her relationship with her sister and processed anger, resentment and sadness about their current situation. She has a schedule of activities including drumming, chanting, yoga, AA, coloring and hiking; she has an AA sponsor and plans to attend Aftercare on Tuesday at 5:00pm weekly.     Objective/Observations:   Participation: Active verbal participation, Attentive, Engaged and Open to feedback   Grooming: Casual and Neat   Cognition: Alert and Fully Oriented   Eye contact: Limited   Mood: Depressed and Anxious   Affect: Flexible and Full range   Thought process: Logical and Goal-directed   Speech: Rate within normal limits and Volume within normal limits   Other:     Diagnoses: No diagnosis found. F41.1 and F10.20    Current risk:   SUICIDE: Not applicable   Homicide: Not applicable   Self-harm: Not applicable   Relapse: Low   Other:    Safety Plan reviewed? Not Indicated   If evidence of imminent risk is present, intervention/plan:     Therapeutic Intervention(s): Clarify:  Clarify feelings and Clarify values, Conflict resolution skills, Develop/modify treatment plan, Interpersonal effectiveness skills, Limit-setting, Self-care skills and Supportive psychotherapy    Treatment Goal(s)/Objective(s) addressed: relapse prevention plan     Progress toward Treatment Goals: Mild improvement    Plan:  - Termination of IOP  - Patient is in agreement with the above plan:  YES    Yelena De La Vega R.N.  5/1/2017

## 2017-05-08 RX ORDER — MIRTAZAPINE 15 MG/1
TABLET, FILM COATED ORAL
Qty: 30 TAB | Refills: 0 | Status: SHIPPED | OUTPATIENT
Start: 2017-05-08 | End: 2017-06-06 | Stop reason: SDUPTHER

## 2017-05-11 ENCOUNTER — OFFICE VISIT (OUTPATIENT)
Dept: BEHAVIORAL HEALTH | Facility: PHYSICIAN GROUP | Age: 67
End: 2017-05-11
Payer: MEDICARE

## 2017-05-11 VITALS — RESPIRATION RATE: 13 BRPM | HEART RATE: 70 BPM | SYSTOLIC BLOOD PRESSURE: 110 MMHG | DIASTOLIC BLOOD PRESSURE: 82 MMHG

## 2017-05-11 DIAGNOSIS — F41.9 ANXIETY: ICD-10-CM

## 2017-05-11 DIAGNOSIS — F10.20 ALCOHOL USE DISORDER, MODERATE, DEPENDENCE (HCC): ICD-10-CM

## 2017-05-11 DIAGNOSIS — F34.1 PERSISTENT DEPRESSIVE DISORDER: ICD-10-CM

## 2017-05-11 DIAGNOSIS — F33.1 MAJOR DEPRESSIVE DISORDER, RECURRENT EPISODE, MODERATE (HCC): ICD-10-CM

## 2017-05-11 PROBLEM — F45.8 ANXIETY HYPERVENTILATION: Status: ACTIVE | Noted: 2017-04-10

## 2017-05-11 PROBLEM — F45.8 ANXIETY HYPERVENTILATION: Status: RESOLVED | Noted: 2017-04-10 | Resolved: 2017-05-11

## 2017-05-11 PROCEDURE — 3014F SCREEN MAMMO DOC REV: CPT | Performed by: NURSE PRACTITIONER

## 2017-05-11 PROCEDURE — 3017F COLORECTAL CA SCREEN DOC REV: CPT | Mod: 8P | Performed by: NURSE PRACTITIONER

## 2017-05-11 PROCEDURE — G8419 CALC BMI OUT NRM PARAM NOF/U: HCPCS | Performed by: NURSE PRACTITIONER

## 2017-05-11 PROCEDURE — G8432 DEP SCR NOT DOC, RNG: HCPCS | Performed by: NURSE PRACTITIONER

## 2017-05-11 PROCEDURE — 99214 OFFICE O/P EST MOD 30 MIN: CPT | Performed by: NURSE PRACTITIONER

## 2017-05-11 PROCEDURE — 4040F PNEUMOC VAC/ADMIN/RCVD: CPT | Performed by: NURSE PRACTITIONER

## 2017-05-11 PROCEDURE — 1036F TOBACCO NON-USER: CPT | Performed by: NURSE PRACTITIONER

## 2017-05-11 PROCEDURE — 1101F PT FALLS ASSESS-DOCD LE1/YR: CPT | Mod: 8P | Performed by: NURSE PRACTITIONER

## 2017-05-11 RX ORDER — AMITRIPTYLINE HYDROCHLORIDE 10 MG/1
5 TABLET, FILM COATED ORAL NIGHTLY PRN
Qty: 30 TAB | Refills: 0 | Status: SHIPPED | OUTPATIENT
Start: 2017-05-11 | End: 2017-07-13 | Stop reason: SDUPTHER

## 2017-05-11 RX ORDER — PROPRANOLOL HYDROCHLORIDE 40 MG/1
40 TABLET ORAL 3 TIMES DAILY
COMMUNITY
Start: 2017-05-11 | End: 2020-08-12

## 2017-05-11 NOTE — PROGRESS NOTES
[facility]  PSYCHIATRIC FOLLOW-UP NOTE    Name: Marcia Koch  MRN: [id number]  : 1950  Age: 66 y.o.  Date of assessment: 2017  PCP: DELANO Thakkar  Persons in attendance: Patient    REASON FOR VISIT/CHIEF COMPLAINT (as stated by Patient):  Marcia Koch is a 66 y.o., White female, attending follow-up appointment for depression, anxiety,alcohol use disorder.      HISTORY OF PRESENT ILLNESS:    Depression: patient is not feeling sad, denies any suicidal thoughts. Has been able to titrate off of Viibryd with no change in her mood. Continues to take citalopram 20 mg, mirtazapine 15 mg, and amitriptyline 10 mg hs. Would like to continue to decrease her med list if possible.  Anxiety: patient complains of anxiety in the morning while she is rushing to get a number of things done. Has continued to take Xanax 1 mg every morning despite being told she needs to start reducing the dose. Sleeping a full night, takes about 15 minutes.currently also taking propranolol 10 mg TID.   Alcohol use disorder: remaining sober for 190 days now she reports. Continues to take naltrexone.    PSYCHOSOCIAL CHANGES SINCE PREVIOUS CONTACT:  190 days sober    MEDICATION SIDE EFFECTS: none    REVIEW OF SYSTEMS:      General appearance: casually dressed  female, good grooming/hygiene. Pleasant and cooperative  Constitutional negative - no fever/chills   Eyes not tested   Ears/Nose/Mouth/Throat not tested   Cardiovascular negative - denies pulse below 60, has been taking propranolol    Respiratory not tested   Gastrointestinal negative   Genitourinary not tested   Muscular not tested   Integumentary not tested   Neurological negative   Endocrine not tested   Hematologic/Lymphatic not tested       PSYCHIATRIC EXAMINATION/MENTAL STATUS  /82 mmHg  Pulse 70  Resp 13  Breastfeeding? No  Participation: Active verbal participation, Attentive, Engaged and Open to feedback  Grooming:Casual and  Neat  Orientation: Fully Oriented  Eye contact: Good  Behavior:Calm and Hypoactive   Mood: Euthymic  Affect: Congruent with content  Thought process: Logical and Goal-directed  Thought content:  Within normal limits  Speech: Rate within normal limits and Soft  Perception:  Within normal limits  Memory:  No gross evidence of memory deficits  Insight: Adequate  Judgment: Good  Family/couple interaction observations:   Other:    Current risk:    Suicide: Low   Homicide: Not applicable   Self-harm: Not applicable  Relapse: Low  Other:   Crisis Safety Plan reviewed?n/a  If evidence of imminent risk is present, intervention/plan:    Medical Records/Labs/Diagnostic Tests Reviewed: none    Medical Records/Labs/Diagnostic Tests Ordered: none    DIAGNOSTIC IMPRESSION(S):  1. Major depressive disorder, recurrent episode, moderate (CMS-[facility])    2. Persistent depressive disorder    3. Alcohol use disorder, moderate, dependence (CMS-[facility])    4. Anxiety           ASSESSMENT AND PLAN:   Depression: improved. Continue to work to reduce number of medications patient is on.    -Patient can increase citalopram to 40 mg daily   -Reduce amitriptyline to 5 mg hs   -continue mirtazapine  Anxiety: patient still having anxiety in mornings. Increasing citalopram to help with anxiety.    -start reducing Xanax, patient is to reduce to 3/4 tablet daily. Discussed again she is in recovery, needs to start tapering off of it again.    -reduce propranolol to 10 mg BID.   Alcohol use disorder: remaining sober.    -continue naltrexone, patient is remaining sober      Current outpatient prescriptions:   •  propranolol (INDERAL) 20 MG Tab, Take 0.5 Tabs by mouth 2 times a day. Hold for pulse less than 60 beats/minute, Disp: , Rfl:   •  amitriptyline (ELAVIL) 10 MG Tab, Take 0.5 Tabs by mouth at bedtime as needed for Sleep., Disp: 30 Tab, Rfl: 0  •  mirtazapine (REMERON) 15 MG Tab, TAKE 1 TAB BY MOUTH EVERY BEDTIME., Disp: 30 Tab, Rfl: 0  •  gabapentin  "(NEURONTIN) 600 MG tablet, Take 1 Tab by mouth 3 times a day., Disp: 90 Tab, Rfl: 0  •  naltrexone (DEPADE) 50 MG Tab, Take 1 Tab by mouth every day., Disp: 30 Tab, Rfl: 0  •  alprazolam (XANAX) 1 MG Tab, 1 mg qam, Disp: , Rfl: 2    Patient educated many of her \" Herbs\" are not in the database to check interactions. Discussed this provider has no idea why she is taking each particular one of the many, no idea the effect on her body or with other medications and cannot make recommendations about them. Encourage patient to discuss vitamin d supplementation with primary care, has been taking 2 different supplements; also duplicating Ca++ supplements.     Recheck 2  Months or sooner if needed    JAYDEN Romero.                     "

## 2017-05-30 RX ORDER — CITALOPRAM 20 MG/1
TABLET ORAL
Qty: 30 TAB | Refills: 0 | Status: SHIPPED | OUTPATIENT
Start: 2017-05-30 | End: 2017-06-27 | Stop reason: SDUPTHER

## 2017-06-06 RX ORDER — MIRTAZAPINE 15 MG/1
TABLET, FILM COATED ORAL
Qty: 30 TAB | Refills: 0 | Status: SHIPPED | OUTPATIENT
Start: 2017-06-06 | End: 2017-07-03 | Stop reason: SDUPTHER

## 2017-06-07 RX ORDER — NALTREXONE HYDROCHLORIDE 50 MG/1
TABLET, FILM COATED ORAL
Qty: 30 TAB | Refills: 0 | Status: SHIPPED | OUTPATIENT
Start: 2017-06-07 | End: 2017-07-04 | Stop reason: SDUPTHER

## 2017-06-27 RX ORDER — CITALOPRAM 20 MG/1
TABLET ORAL
Qty: 30 TAB | Refills: 1 | Status: SHIPPED | OUTPATIENT
Start: 2017-06-27 | End: 2017-08-28 | Stop reason: SDUPTHER

## 2017-07-05 RX ORDER — NALTREXONE HYDROCHLORIDE 50 MG/1
TABLET, FILM COATED ORAL
Qty: 30 TAB | Refills: 0 | Status: SHIPPED | OUTPATIENT
Start: 2017-07-05 | End: 2017-08-05 | Stop reason: SDUPTHER

## 2017-07-05 RX ORDER — MIRTAZAPINE 15 MG/1
TABLET, FILM COATED ORAL
Qty: 30 TAB | Refills: 0 | Status: SHIPPED | OUTPATIENT
Start: 2017-07-05 | End: 2017-08-01 | Stop reason: SDUPTHER

## 2017-07-13 ENCOUNTER — HOSPITAL ENCOUNTER (OUTPATIENT)
Dept: LAB | Facility: MEDICAL CENTER | Age: 67
End: 2017-07-13
Attending: NURSE PRACTITIONER
Payer: MEDICARE

## 2017-07-13 LAB
25(OH)D3 SERPL-MCNC: 36 NG/ML (ref 30–100)
ALBUMIN SERPL BCP-MCNC: 3.9 G/DL (ref 3.2–4.9)
ALBUMIN/GLOB SERPL: 1.6 G/DL
ALP SERPL-CCNC: 86 U/L (ref 30–99)
ALT SERPL-CCNC: 15 U/L (ref 2–50)
ANION GAP SERPL CALC-SCNC: 4 MMOL/L (ref 0–11.9)
APPEARANCE UR: ABNORMAL
AST SERPL-CCNC: 17 U/L (ref 12–45)
BACTERIA #/AREA URNS HPF: ABNORMAL /HPF
BASOPHILS # BLD AUTO: 0.9 % (ref 0–1.8)
BASOPHILS # BLD: 0.06 K/UL (ref 0–0.12)
BILIRUB SERPL-MCNC: 0.3 MG/DL (ref 0.1–1.5)
BILIRUB UR QL STRIP.AUTO: NEGATIVE
BUN SERPL-MCNC: 9 MG/DL (ref 8–22)
CALCIUM SERPL-MCNC: 9 MG/DL (ref 8.5–10.5)
CHLORIDE SERPL-SCNC: 105 MMOL/L (ref 96–112)
CHOLEST SERPL-MCNC: 247 MG/DL (ref 100–199)
CO2 SERPL-SCNC: 31 MMOL/L (ref 20–33)
COLOR UR: YELLOW
CREAT SERPL-MCNC: 0.71 MG/DL (ref 0.5–1.4)
CREAT UR-MCNC: 43.9 MG/DL
CULTURE IF INDICATED INDCX: YES UA CULTURE
EOSINOPHIL # BLD AUTO: 0.21 K/UL (ref 0–0.51)
EOSINOPHIL NFR BLD: 3.3 % (ref 0–6.9)
EPI CELLS #/AREA URNS HPF: ABNORMAL /HPF
ERYTHROCYTE [DISTWIDTH] IN BLOOD BY AUTOMATED COUNT: 40.4 FL (ref 35.9–50)
EST. AVERAGE GLUCOSE BLD GHB EST-MCNC: 120 MG/DL
GFR SERPL CREATININE-BSD FRML MDRD: >60 ML/MIN/1.73 M 2
GLOBULIN SER CALC-MCNC: 2.5 G/DL (ref 1.9–3.5)
GLUCOSE SERPL-MCNC: 100 MG/DL (ref 65–99)
GLUCOSE UR STRIP.AUTO-MCNC: NEGATIVE MG/DL
HBA1C MFR BLD: 5.8 % (ref 0–5.6)
HCT VFR BLD AUTO: 45.3 % (ref 37–47)
HDLC SERPL-MCNC: 38 MG/DL
HGB BLD-MCNC: 15.2 G/DL (ref 12–16)
HYALINE CASTS #/AREA URNS LPF: ABNORMAL /LPF
IMM GRANULOCYTES # BLD AUTO: 0.01 K/UL (ref 0–0.11)
IMM GRANULOCYTES NFR BLD AUTO: 0.2 % (ref 0–0.9)
KETONES UR STRIP.AUTO-MCNC: NEGATIVE MG/DL
LDLC SERPL CALC-MCNC: 174 MG/DL
LEUKOCYTE ESTERASE UR QL STRIP.AUTO: ABNORMAL
LYMPHOCYTES # BLD AUTO: 2.62 K/UL (ref 1–4.8)
LYMPHOCYTES NFR BLD: 41.1 % (ref 22–41)
MCH RBC QN AUTO: 31.3 PG (ref 27–33)
MCHC RBC AUTO-ENTMCNC: 33.6 G/DL (ref 33.6–35)
MCV RBC AUTO: 93.4 FL (ref 81.4–97.8)
MICRO URNS: ABNORMAL
MICROALBUMIN UR-MCNC: <0.7 MG/DL
MICROALBUMIN/CREAT UR: NORMAL MG/G (ref 0–30)
MONOCYTES # BLD AUTO: 0.51 K/UL (ref 0–0.85)
MONOCYTES NFR BLD AUTO: 8 % (ref 0–13.4)
MUCOUS THREADS #/AREA URNS HPF: ABNORMAL /HPF
NEUTROPHILS # BLD AUTO: 2.96 K/UL (ref 2–7.15)
NEUTROPHILS NFR BLD: 46.5 % (ref 44–72)
NITRITE UR QL STRIP.AUTO: NEGATIVE
NRBC # BLD AUTO: 0 K/UL
NRBC BLD AUTO-RTO: 0 /100 WBC
PH UR STRIP.AUTO: 5.5 [PH]
PLATELET # BLD AUTO: 219 K/UL (ref 164–446)
PMV BLD AUTO: 10.1 FL (ref 9–12.9)
POTASSIUM SERPL-SCNC: 4.7 MMOL/L (ref 3.6–5.5)
PROT SERPL-MCNC: 6.4 G/DL (ref 6–8.2)
PROT UR QL STRIP: NEGATIVE MG/DL
RBC # BLD AUTO: 4.85 M/UL (ref 4.2–5.4)
RBC # URNS HPF: ABNORMAL /HPF
RBC UR QL AUTO: NEGATIVE
SODIUM SERPL-SCNC: 140 MMOL/L (ref 135–145)
SP GR UR STRIP.AUTO: 1.01
TRIGL SERPL-MCNC: 174 MG/DL (ref 0–149)
TSH SERPL DL<=0.005 MIU/L-ACNC: 3.32 UIU/ML (ref 0.3–3.7)
UROBILINOGEN UR STRIP.AUTO-MCNC: 0.2 MG/DL
WBC # BLD AUTO: 6.4 K/UL (ref 4.8–10.8)
WBC #/AREA URNS HPF: ABNORMAL /HPF
YEAST BUDDING URNS QL: PRESENT /HPF

## 2017-07-13 PROCEDURE — 85025 COMPLETE CBC W/AUTO DIFF WBC: CPT

## 2017-07-13 PROCEDURE — 82570 ASSAY OF URINE CREATININE: CPT

## 2017-07-13 PROCEDURE — 84443 ASSAY THYROID STIM HORMONE: CPT

## 2017-07-13 PROCEDURE — 81001 URINALYSIS AUTO W/SCOPE: CPT

## 2017-07-13 PROCEDURE — 82306 VITAMIN D 25 HYDROXY: CPT

## 2017-07-13 PROCEDURE — 80061 LIPID PANEL: CPT

## 2017-07-13 PROCEDURE — 80053 COMPREHEN METABOLIC PANEL: CPT

## 2017-07-13 PROCEDURE — 83036 HEMOGLOBIN GLYCOSYLATED A1C: CPT

## 2017-07-13 PROCEDURE — 82043 UR ALBUMIN QUANTITATIVE: CPT

## 2017-07-13 PROCEDURE — 36415 COLL VENOUS BLD VENIPUNCTURE: CPT

## 2017-07-13 PROCEDURE — 87086 URINE CULTURE/COLONY COUNT: CPT

## 2017-07-13 RX ORDER — AMITRIPTYLINE HYDROCHLORIDE 10 MG/1
TABLET, FILM COATED ORAL
Qty: 15 TAB | Refills: 0 | Status: SHIPPED | OUTPATIENT
Start: 2017-07-13 | End: 2017-08-21 | Stop reason: SDUPTHER

## 2017-07-15 LAB
BACTERIA UR CULT: NORMAL
SIGNIFICANT IND 70042: NORMAL
SOURCE SOURCE: NORMAL

## 2017-08-01 RX ORDER — MIRTAZAPINE 15 MG/1
TABLET, FILM COATED ORAL
Qty: 30 TAB | Refills: 0 | Status: SHIPPED | OUTPATIENT
Start: 2017-08-01 | End: 2017-08-28 | Stop reason: SDUPTHER

## 2017-08-07 RX ORDER — NALTREXONE HYDROCHLORIDE 50 MG/1
TABLET, FILM COATED ORAL
Qty: 30 TAB | Refills: 0 | Status: SHIPPED | OUTPATIENT
Start: 2017-08-07 | End: 2017-09-16 | Stop reason: SDUPTHER

## 2017-08-21 RX ORDER — AMITRIPTYLINE HYDROCHLORIDE 10 MG/1
TABLET, FILM COATED ORAL
Qty: 15 TAB | Refills: 0 | Status: SHIPPED | OUTPATIENT
Start: 2017-08-21 | End: 2020-10-06

## 2017-08-26 PROBLEM — D22.61 MELANOCYTIC NEVI OF RIGHT UPPER LIMB, INCLUDING SHOULDER: Status: ACTIVE | Noted: 2017-08-26

## 2017-08-28 RX ORDER — MIRTAZAPINE 15 MG/1
TABLET, FILM COATED ORAL
Qty: 30 TAB | Refills: 0 | Status: SHIPPED | OUTPATIENT
Start: 2017-08-28 | End: 2017-09-25 | Stop reason: SDUPTHER

## 2017-08-28 RX ORDER — CITALOPRAM 20 MG/1
TABLET ORAL
Qty: 30 TAB | Refills: 0 | Status: SHIPPED | OUTPATIENT
Start: 2017-08-28 | End: 2017-10-18 | Stop reason: SDUPTHER

## 2017-09-18 RX ORDER — NALTREXONE HYDROCHLORIDE 50 MG/1
TABLET, FILM COATED ORAL
Qty: 30 TAB | Refills: 0 | Status: SHIPPED | OUTPATIENT
Start: 2017-09-18 | End: 2017-10-16 | Stop reason: SDUPTHER

## 2017-09-26 RX ORDER — MIRTAZAPINE 15 MG/1
TABLET, FILM COATED ORAL
Qty: 30 TAB | Refills: 0 | Status: SHIPPED | OUTPATIENT
Start: 2017-09-26 | End: 2017-10-22 | Stop reason: SDUPTHER

## 2017-10-16 RX ORDER — NALTREXONE HYDROCHLORIDE 50 MG/1
TABLET, FILM COATED ORAL
Qty: 30 TAB | Refills: 0 | Status: SHIPPED | OUTPATIENT
Start: 2017-10-16 | End: 2020-07-27

## 2017-10-19 ENCOUNTER — TELEPHONE (OUTPATIENT)
Dept: BEHAVIORAL HEALTH | Facility: PHYSICIAN GROUP | Age: 67
End: 2017-10-19

## 2017-10-23 RX ORDER — MIRTAZAPINE 15 MG/1
TABLET, FILM COATED ORAL
Qty: 30 TAB | Refills: 0 | Status: SHIPPED | OUTPATIENT
Start: 2017-10-23 | End: 2017-11-18 | Stop reason: SDUPTHER

## 2017-10-26 ENCOUNTER — TELEPHONE (OUTPATIENT)
Dept: BEHAVIORAL HEALTH | Facility: PHYSICIAN GROUP | Age: 67
End: 2017-10-26

## 2017-11-06 ENCOUNTER — HOSPITAL ENCOUNTER (EMERGENCY)
Facility: MEDICAL CENTER | Age: 67
End: 2017-11-07
Attending: EMERGENCY MEDICINE
Payer: MEDICARE

## 2017-11-06 DIAGNOSIS — S61.211A LACERATION OF LEFT INDEX FINGER WITHOUT FOREIGN BODY, NAIL DAMAGE STATUS UNSPECIFIED, INITIAL ENCOUNTER: ICD-10-CM

## 2017-11-06 PROCEDURE — 99283 EMERGENCY DEPT VISIT LOW MDM: CPT

## 2017-11-06 PROCEDURE — 99284 EMERGENCY DEPT VISIT MOD MDM: CPT

## 2017-11-06 PROCEDURE — 304999 HCHG REPAIR-SIMPLE/INTERMED LEVEL 1

## 2017-11-06 PROCEDURE — 303747 HCHG EXTRA SUTURE

## 2017-11-06 PROCEDURE — 304217 HCHG IRRIGATION SYSTEM

## 2017-11-06 ASSESSMENT — PAIN SCALES - GENERAL: PAINLEVEL_OUTOF10: 4

## 2017-11-07 VITALS
HEIGHT: 63 IN | DIASTOLIC BLOOD PRESSURE: 78 MMHG | SYSTOLIC BLOOD PRESSURE: 144 MMHG | WEIGHT: 145.72 LBS | BODY MASS INDEX: 25.82 KG/M2 | OXYGEN SATURATION: 95 % | HEART RATE: 64 BPM | RESPIRATION RATE: 18 BRPM | TEMPERATURE: 97.3 F

## 2017-11-07 PROCEDURE — 700111 HCHG RX REV CODE 636 W/ 250 OVERRIDE (IP): Performed by: EMERGENCY MEDICINE

## 2017-11-07 PROCEDURE — 90471 IMMUNIZATION ADMIN: CPT

## 2017-11-07 PROCEDURE — 90715 TDAP VACCINE 7 YRS/> IM: CPT | Performed by: EMERGENCY MEDICINE

## 2017-11-07 RX ADMIN — CLOSTRIDIUM TETANI TOXOID ANTIGEN (FORMALDEHYDE INACTIVATED), CORYNEBACTERIUM DIPHTHERIAE TOXOID ANTIGEN (FORMALDEHYDE INACTIVATED), BORDETELLA PERTUSSIS TOXOID ANTIGEN (GLUTARALDEHYDE INACTIVATED), BORDETELLA PERTUSSIS FILAMENTOUS HEMAGGLUTININ ANTIGEN (FORMALDEHYDE INACTIVATED), BORDETELLA PERTUSSIS PERTACTIN ANTIGEN, AND BORDETELLA PERTUSSIS FIMBRIAE 2/3 ANTIGEN 0.5 ML: 5; 2; 2.5; 5; 3; 5 INJECTION, SUSPENSION INTRAMUSCULAR at 00:01

## 2017-11-07 ASSESSMENT — PAIN SCALES - GENERAL: PAINLEVEL_OUTOF10: 0

## 2017-11-07 NOTE — ED NOTES
Pt bib self with c/o of left index finger laceration. Pt states she accidentally lacerated her finger with her kitchen knife. Unsure of last tetanus vaccination

## 2017-11-07 NOTE — ED PROVIDER NOTES
ED Provider Note    CHIEF COMPLAINT  Chief Complaint   Patient presents with   • Digit Pain   • Laceration       HPI  Marcia Koch is a 67 y.o. female who presentsComplaining of a laceration to the tip of her left index finger. She states that she cut it with a very sharp knife, round 8 o'clock this evening. She placed Betadine on it immediately and then wrapped it with a very tight dressing. She is not diabetic and does not take any blood thinning medications. Her tetanus is not up-to-date.    REVIEW OF SYSTEMS  No history of poor wound healing diabetes or bony abnormalities     PAST MEDICAL HISTORY  Past Medical History:   Diagnosis Date   • Alcohol abuse    • Alcoholism (CMS-HCC)    • Chronic obstructive pulmonary disease (CMS-HCC)    • Depression    • Hiatus hernia syndrome    • Hypertension    • Indigestion    • Other specified disorder of intestines     diarrhea   • Pneumonia     for 2 days   • Psychiatric disorder     anxiety, depression   • Sleep apnea    • Snoring    • Unspecified urinary incontinence    • Urinary bladder disorder      Tetanus Is not up-to-date    SOCIAL HISTORY  Social History     Social History   • Marital status:      Spouse name: N/A   • Number of children: N/A   • Years of education: N/A     Social History Main Topics   • Smoking status: Former Smoker     Packs/day: 0.25     Years: 25.00     Types: Cigarettes     Quit date: 8/26/2011   • Smokeless tobacco: Never Used      Comment: occ   • Alcohol use Yes      Comment: post rehab, 1/2 bottle of wine some days. no drinking now, sober X 2/1/17   • Drug use: No   • Sexual activity: Not Currently     Partners: Male     Birth control/ protection: Abstinence     Other Topics Concern   • Not on file     Social History Narrative   • No narrative on file       CURRENT MEDICATIONS  Home Medications     Reviewed by Vladmiir Gonzales R.N. (Registered Nurse) on 11/06/17 at 2031  Med List Status: Complete   Medication Last Dose Status  "  alprazolam (XANAX) 1 MG Tab 11/6/2017 Active   amitriptyline (ELAVIL) 10 MG Tab 11/6/2017 Active   citalopram (CELEXA) 20 MG Tab 11/6/2017 Active   gabapentin (NEURONTIN) 600 MG tablet 11/6/2017 Active   mirtazapine (REMERON) 15 MG Tab 11/6/2017 Active   naltrexone (DEPADE) 50 MG Tab 11/6/2017 Active   propranolol (INDERAL) 20 MG Tab 11/6/2017 Active                ALLERGIES  Allergies   Allergen Reactions   • Codeine Vomiting   • Morphine Vomiting       PHYSICAL EXAM  VITAL SIGNS: /90   Pulse (!) 57   Temp 36.3 °C (97.3 °F)   Resp 18   Ht 1.6 m (5' 3\")   Wt 66.1 kg (145 lb 11.6 oz)   SpO2 93%   BMI 25.81 kg/m²    Constitutional: No distress  Skin: +3 centimeter laceration on the finger pad surface of the patient's left index finger past the DIP joint. Bleeding is controlled  Musculoskeletal: Positive sensation at the tip of the left index finger. Normal flexion and extension, normal capillary refill. No other injuries to the hand  Vascular: warm to touch good capillary refill   Neurologic: distally neurovascularly intact  Psychiatric: Affect normal      Medical Decision Making  Laceration Repair Procedure Note    Indication: Laceration    Procedure: The patient was placed in the appropriate position and anesthesia around the laceration was obtained with a full digital block of the left index finger using 1% Lidocaine without epinephrine. The area was then cleansed with betadine and draped in a sterile fashion and irrigated with normal saline. The laceration was closed with 5-0 Ethilon using interrupted sutures. There were no additional lacerations requiring repair. The wound area was then dressed with bacitracin and a sterile dressing.      Total repaired wound length: 3 cm.     Other Items: Suture count: 6    The patient tolerated the procedure well.    Complications: None      The patient's tetanus is updated. She'll be discharged home in improved condition. I gave her good wound care instructions " and advised to return for any signs of infection. She should have the sutures removed in 7-10 days.        Patient has had high blood pressure while in the emergency department, felt likely secondary to medical condition. Counseled patient to monitor blood pressure at home and follow up with primary care physician.    [facility], Emergency Dept  [id number] [facility] [address]  [phone]  In 1 week  For suture removal    [facility], Emergency Dept  [id number] [facility] [address]  [phone]    As needed, If symptoms worsen        Diagnosis  1. Laceration of left index finger without foreign body, nail damage status unspecified, initial encounter

## 2017-11-07 NOTE — DISCHARGE INSTRUCTIONS
Laceration Care, Adult  A laceration is a cut that goes through all layers of the skin. The cut also goes into the tissue that is right under the skin. Some cuts heal on their own. Others need to be closed with stitches (sutures), staples, skin adhesive strips, or wound glue. Taking care of your cut lowers your risk of infection and helps your cut to heal better.  HOW TO TAKE CARE OF YOUR CUT  For stitches or staples:  · Keep the wound clean and dry.  · If you were given a bandage (dressing), you should change it at least one time per day or as told by your doctor. You should also change it if it gets wet or dirty.  · Keep the wound completely dry for the first 24 hours or as told by your doctor. After that time, you may take a shower or a bath. However, make sure that the wound is not soaked in water until after the stitches or staples have been removed.  · Clean the wound one time each day or as told by your doctor:  ¨ Wash the wound with soap and water.  ¨ Rinse the wound with water until all of the soap comes off.  ¨ Pat the wound dry with a clean towel. Do not rub the wound.  · After you clean the wound, put a thin layer of antibiotic ointment on it as told by your doctor. This ointment:  ¨ Helps to prevent infection.  ¨ Keeps the bandage from sticking to the wound.  · Have your stitches or staples removed as told by your doctor.  If your doctor used skin adhesive strips:   · Keep the wound clean and dry.  · If you were given a bandage, you should change it at least one time per day or as told by your doctor. You should also change it if it gets dirty or wet.  · Do not get the skin adhesive strips wet. You can take a shower or a bath, but be careful to keep the wound dry.  · If the wound gets wet, pat it dry with a clean towel. Do not rub the wound.  · Skin adhesive strips fall off on their own. You can trim the strips as the wound heals. Do not remove any strips that are still stuck to the wound. They will  fall off after a while.  If your doctor used wound glue:  · Try to keep your wound dry, but you may briefly wet it in the shower or bath. Do not soak the wound in water, such as by swimming.  · After you take a shower or a bath, gently pat the wound dry with a clean towel. Do not rub the wound.  · Do not do any activities that will make you really sweaty until the skin glue has fallen off on its own.  · Do not apply liquid, cream, or ointment medicine to your wound while the skin glue is still on.  · If you were given a bandage, you should change it at least one time per day or as told by your doctor. You should also change it if it gets dirty or wet.  · If a bandage is placed over the wound, do not let the tape for the bandage touch the skin glue.  · Do not pick at the glue. The skin glue usually stays on for 5-10 days. Then, it falls off of the skin.  General Instructions   · To help prevent scarring, make sure to cover your wound with sunscreen whenever you are outside after stitches are removed, after adhesive strips are removed, or when wound glue stays in place and the wound is healed. Make sure to wear a sunscreen of at least 30 SPF.  · Take over-the-counter and prescription medicines only as told by your doctor.  · If you were given antibiotic medicine or ointment, take or apply it as told by your doctor. Do not stop using the antibiotic even if your wound is getting better.  · Do not scratch or pick at the wound.  · Keep all follow-up visits as told by your doctor. This is important.  · Check your wound every day for signs of infection. Watch for:  ¨ Redness, swelling, or pain.  ¨ Fluid, blood, or pus.  · Raise (elevate) the injured area above the level of your heart while you are sitting or lying down, if possible.  GET HELP IF:  · You got a tetanus shot and you have any of these problems at the injection site:  ¨ Swelling.  ¨ Very bad pain.  ¨ Redness.  ¨ Bleeding.  · You have a fever.  · A wound that was  closed breaks open.  · You notice a bad smell coming from your wound or your bandage.  · You notice something coming out of the wound, such as wood or glass.  · Medicine does not help your pain.  · You have more redness, swelling, or pain at the site of your wound.  · You have fluid, blood, or pus coming from your wound.  · You notice a change in the color of your skin near your wound.  · You need to change the bandage often because fluid, blood, or pus is coming from the wound.  · You start to have a new rash.  · You start to have numbness around the wound.  GET HELP RIGHT AWAY IF:  · You have very bad swelling around the wound.  · Your pain suddenly gets worse and is very bad.  · You notice painful lumps near the wound or on skin that is anywhere on your body.  · You have a red streak going away from your wound.  · The wound is on your hand or foot and you cannot move a finger or toe like you usually can.  · The wound is on your hand or foot and you notice that your fingers or toes look pale or bluish.     This information is not intended to replace advice given to you by your health care provider. Make sure you discuss any questions you have with your health care provider.     Document Released: 06/05/2009 Document Revised: 05/03/2016 Document Reviewed: 12/14/2015  [employer] Interactive Patient Education ©2016 [employer]

## 2017-11-16 ENCOUNTER — HOSPITAL ENCOUNTER (EMERGENCY)
Facility: MEDICAL CENTER | Age: 67
End: 2017-11-16
Payer: MEDICARE

## 2017-11-16 VITALS
DIASTOLIC BLOOD PRESSURE: 79 MMHG | BODY MASS INDEX: 26.09 KG/M2 | WEIGHT: 147.27 LBS | OXYGEN SATURATION: 93 % | TEMPERATURE: 97.9 F | SYSTOLIC BLOOD PRESSURE: 134 MMHG | HEIGHT: 63 IN | RESPIRATION RATE: 16 BRPM | HEART RATE: 63 BPM

## 2017-11-16 PROCEDURE — 99281 EMR DPT VST MAYX REQ PHY/QHP: CPT

## 2017-11-20 RX ORDER — MIRTAZAPINE 15 MG/1
TABLET, FILM COATED ORAL
Qty: 30 TAB | Refills: 0 | Status: ON HOLD | OUTPATIENT
Start: 2017-11-20 | End: 2021-01-27

## 2018-01-03 ENCOUNTER — TELEPHONE (OUTPATIENT)
Dept: BEHAVIORAL HEALTH | Facility: PHYSICIAN GROUP | Age: 68
End: 2018-01-03

## 2018-02-13 ENCOUNTER — HOSPITAL ENCOUNTER (OUTPATIENT)
Dept: LAB | Facility: MEDICAL CENTER | Age: 68
End: 2018-02-13
Attending: PSYCHIATRY & NEUROLOGY
Payer: MEDICARE

## 2018-02-13 LAB
25(OH)D3 SERPL-MCNC: 20 NG/ML (ref 30–100)
ALBUMIN SERPL BCP-MCNC: 4.1 G/DL (ref 3.2–4.9)
ALBUMIN/GLOB SERPL: 1.4 G/DL
ALP SERPL-CCNC: 87 U/L (ref 30–99)
ALT SERPL-CCNC: 15 U/L (ref 2–50)
AMPHET UR QL SCN: NEGATIVE
ANION GAP SERPL CALC-SCNC: 6 MMOL/L (ref 0–11.9)
APPEARANCE UR: CLEAR
AST SERPL-CCNC: 16 U/L (ref 12–45)
BACTERIA #/AREA URNS HPF: ABNORMAL /HPF
BARBITURATES UR QL SCN: NEGATIVE
BASOPHILS # BLD AUTO: 1.5 % (ref 0–1.8)
BASOPHILS # BLD: 0.1 K/UL (ref 0–0.12)
BENZODIAZ UR QL SCN: POSITIVE
BILIRUB SERPL-MCNC: 0.5 MG/DL (ref 0.1–1.5)
BILIRUB UR QL STRIP.AUTO: NEGATIVE
BUN SERPL-MCNC: 13 MG/DL (ref 8–22)
BZE UR QL SCN: NEGATIVE
CALCIUM SERPL-MCNC: 9.8 MG/DL (ref 8.5–10.5)
CANNABINOIDS UR QL SCN: NEGATIVE
CHLORIDE SERPL-SCNC: 104 MMOL/L (ref 96–112)
CHOLEST SERPL-MCNC: 270 MG/DL (ref 100–199)
CO2 SERPL-SCNC: 28 MMOL/L (ref 20–33)
COLOR UR: YELLOW
CREAT SERPL-MCNC: 0.69 MG/DL (ref 0.5–1.4)
CRP SERPL HS-MCNC: 1.4 MG/L (ref 0–7.5)
EOSINOPHIL # BLD AUTO: 0.28 K/UL (ref 0–0.51)
EOSINOPHIL NFR BLD: 4.2 % (ref 0–6.9)
EPI CELLS #/AREA URNS HPF: ABNORMAL /HPF
ERYTHROCYTE [DISTWIDTH] IN BLOOD BY AUTOMATED COUNT: 40.4 FL (ref 35.9–50)
EST. AVERAGE GLUCOSE BLD GHB EST-MCNC: 117 MG/DL
FOLATE SERPL-MCNC: 21.1 NG/ML
GLOBULIN SER CALC-MCNC: 2.9 G/DL (ref 1.9–3.5)
GLUCOSE SERPL-MCNC: 96 MG/DL (ref 65–99)
GLUCOSE UR STRIP.AUTO-MCNC: NEGATIVE MG/DL
HBA1C MFR BLD: 5.7 % (ref 0–5.6)
HCT VFR BLD AUTO: 46.9 % (ref 37–47)
HCYS SERPL-SCNC: 9.58 UMOL/L
HDLC SERPL-MCNC: 55 MG/DL
HGB BLD-MCNC: 16.2 G/DL (ref 12–16)
HYALINE CASTS #/AREA URNS LPF: ABNORMAL /LPF
IMM GRANULOCYTES # BLD AUTO: 0.02 K/UL (ref 0–0.11)
IMM GRANULOCYTES NFR BLD AUTO: 0.3 % (ref 0–0.9)
KETONES UR STRIP.AUTO-MCNC: NEGATIVE MG/DL
LDLC SERPL CALC-MCNC: 173 MG/DL
LEUKOCYTE ESTERASE UR QL STRIP.AUTO: ABNORMAL
LYMPHOCYTES # BLD AUTO: 2.81 K/UL (ref 1–4.8)
LYMPHOCYTES NFR BLD: 42.3 % (ref 22–41)
MCH RBC QN AUTO: 31.6 PG (ref 27–33)
MCHC RBC AUTO-ENTMCNC: 34.5 G/DL (ref 33.6–35)
MCV RBC AUTO: 91.4 FL (ref 81.4–97.8)
METHADONE UR QL SCN: NEGATIVE
MICRO URNS: ABNORMAL
MONOCYTES # BLD AUTO: 0.52 K/UL (ref 0–0.85)
MONOCYTES NFR BLD AUTO: 7.8 % (ref 0–13.4)
NEUTROPHILS # BLD AUTO: 2.91 K/UL (ref 2–7.15)
NEUTROPHILS NFR BLD: 43.9 % (ref 44–72)
NITRITE UR QL STRIP.AUTO: NEGATIVE
NRBC # BLD AUTO: 0 K/UL
NRBC BLD-RTO: 0 /100 WBC
OPIATES UR QL SCN: NEGATIVE
OXYCODONE UR QL SCN: NEGATIVE
PCP UR QL SCN: NEGATIVE
PH UR STRIP.AUTO: 5 [PH]
PLATELET # BLD AUTO: 238 K/UL (ref 164–446)
PMV BLD AUTO: 9.4 FL (ref 9–12.9)
POTASSIUM SERPL-SCNC: 4.2 MMOL/L (ref 3.6–5.5)
PROPOXYPH UR QL SCN: NEGATIVE
PROT SERPL-MCNC: 7 G/DL (ref 6–8.2)
PROT UR QL STRIP: NEGATIVE MG/DL
RBC # BLD AUTO: 5.13 M/UL (ref 4.2–5.4)
RBC # URNS HPF: ABNORMAL /HPF
RBC UR QL AUTO: NEGATIVE
SODIUM SERPL-SCNC: 138 MMOL/L (ref 135–145)
SP GR UR STRIP.AUTO: 1.02
T3FREE SERPL-MCNC: 2.97 PG/ML (ref 2.4–4.2)
T4 FREE SERPL-MCNC: 0.83 NG/DL (ref 0.53–1.43)
TRIGL SERPL-MCNC: 209 MG/DL (ref 0–149)
TSH SERPL DL<=0.005 MIU/L-ACNC: 2.53 UIU/ML (ref 0.38–5.33)
UROBILINOGEN UR STRIP.AUTO-MCNC: 0.2 MG/DL
VIT B12 SERPL-MCNC: 305 PG/ML (ref 211–911)
WBC # BLD AUTO: 6.6 K/UL (ref 4.8–10.8)
WBC #/AREA URNS HPF: ABNORMAL /HPF

## 2018-02-13 PROCEDURE — 84482 T3 REVERSE: CPT

## 2018-02-13 PROCEDURE — 82306 VITAMIN D 25 HYDROXY: CPT

## 2018-02-13 PROCEDURE — 81001 URINALYSIS AUTO W/SCOPE: CPT | Mod: XU

## 2018-02-13 PROCEDURE — 84439 ASSAY OF FREE THYROXINE: CPT

## 2018-02-13 PROCEDURE — 84481 FREE ASSAY (FT-3): CPT

## 2018-02-13 PROCEDURE — 83036 HEMOGLOBIN GLYCOSYLATED A1C: CPT

## 2018-02-13 PROCEDURE — 80053 COMPREHEN METABOLIC PANEL: CPT

## 2018-02-13 PROCEDURE — 85025 COMPLETE CBC W/AUTO DIFF WBC: CPT

## 2018-02-13 PROCEDURE — 84403 ASSAY OF TOTAL TESTOSTERONE: CPT

## 2018-02-13 PROCEDURE — 84270 ASSAY OF SEX HORMONE GLOBUL: CPT

## 2018-02-13 PROCEDURE — 80061 LIPID PANEL: CPT

## 2018-02-13 PROCEDURE — 83921 ORGANIC ACID SINGLE QUANT: CPT

## 2018-02-13 PROCEDURE — 82607 VITAMIN B-12: CPT

## 2018-02-13 PROCEDURE — 36415 COLL VENOUS BLD VENIPUNCTURE: CPT

## 2018-02-13 PROCEDURE — 86141 C-REACTIVE PROTEIN HS: CPT

## 2018-02-13 PROCEDURE — 80307 DRUG TEST PRSMV CHEM ANLYZR: CPT

## 2018-02-13 PROCEDURE — 83090 ASSAY OF HOMOCYSTEINE: CPT

## 2018-02-13 PROCEDURE — 82746 ASSAY OF FOLIC ACID SERUM: CPT

## 2018-02-13 PROCEDURE — 83735 ASSAY OF MAGNESIUM: CPT

## 2018-02-13 PROCEDURE — 84443 ASSAY THYROID STIM HORMONE: CPT

## 2018-02-16 LAB
METHYLMALONATE SERPL-SCNC: 0.37 UMOL/L (ref 0–0.4)
T3REVERSE SERPL-MCNC: 15.2 NG/DL (ref 9–27)
TEST NAME 95000: NORMAL

## 2018-02-17 LAB
SHBG SERPL-SCNC: 80 NMOL/L (ref 30–135)
TESTOST FREE SERPL-MCNC: 1.1 PG/ML (ref 0.6–3.8)
TESTOST SERPL-MCNC: 12 NG/DL (ref 5–32)

## 2018-04-18 ENCOUNTER — HOSPITAL ENCOUNTER (OUTPATIENT)
Dept: RADIOLOGY | Facility: MEDICAL CENTER | Age: 68
End: 2018-04-18
Attending: FAMILY MEDICINE
Payer: MEDICARE

## 2018-04-18 DIAGNOSIS — Z12.31 SCREENING MAMMOGRAM, ENCOUNTER FOR: ICD-10-CM

## 2018-04-18 PROCEDURE — 77067 SCR MAMMO BI INCL CAD: CPT

## 2018-05-07 RX ORDER — CITALOPRAM 20 MG/1
TABLET ORAL
Qty: 7 TAB | Refills: 0 | Status: SHIPPED | OUTPATIENT
Start: 2018-05-07 | End: 2020-07-27

## 2018-08-10 ENCOUNTER — APPOINTMENT (RX ONLY)
Dept: URBAN - METROPOLITAN AREA CLINIC 4 | Facility: CLINIC | Age: 68
Setting detail: DERMATOLOGY
End: 2018-08-10

## 2018-08-10 DIAGNOSIS — D18.0 HEMANGIOMA: ICD-10-CM

## 2018-08-10 DIAGNOSIS — L81.4 OTHER MELANIN HYPERPIGMENTATION: ICD-10-CM

## 2018-08-10 DIAGNOSIS — D22 MELANOCYTIC NEVI: ICD-10-CM

## 2018-08-10 DIAGNOSIS — L82.1 OTHER SEBORRHEIC KERATOSIS: ICD-10-CM

## 2018-08-10 PROBLEM — D22.71 MELANOCYTIC NEVI OF RIGHT LOWER LIMB, INCLUDING HIP: Status: ACTIVE | Noted: 2018-08-10

## 2018-08-10 PROBLEM — D22.62 MELANOCYTIC NEVI OF LEFT UPPER LIMB, INCLUDING SHOULDER: Status: ACTIVE | Noted: 2018-08-10

## 2018-08-10 PROBLEM — D22.72 MELANOCYTIC NEVI OF LEFT LOWER LIMB, INCLUDING HIP: Status: ACTIVE | Noted: 2018-08-10

## 2018-08-10 PROBLEM — D22.61 MELANOCYTIC NEVI OF RIGHT UPPER LIMB, INCLUDING SHOULDER: Status: ACTIVE | Noted: 2018-08-10

## 2018-08-10 PROBLEM — D48.5 NEOPLASM OF UNCERTAIN BEHAVIOR OF SKIN: Status: ACTIVE | Noted: 2018-08-10

## 2018-08-10 PROBLEM — D22.5 MELANOCYTIC NEVI OF TRUNK: Status: ACTIVE | Noted: 2018-08-10

## 2018-08-10 PROBLEM — D18.01 HEMANGIOMA OF SKIN AND SUBCUTANEOUS TISSUE: Status: ACTIVE | Noted: 2018-08-10

## 2018-08-10 PROCEDURE — ? BIOPSY BY SHAVE METHOD AND DESTRUCTION

## 2018-08-10 PROCEDURE — ? COUNSELING

## 2018-08-10 PROCEDURE — 11100: CPT

## 2018-08-10 PROCEDURE — ? SUNSCREEN RECOMMENDATIONS

## 2018-08-10 PROCEDURE — 99213 OFFICE O/P EST LOW 20 MIN: CPT | Mod: 25

## 2018-08-10 ASSESSMENT — LOCATION SIMPLE DESCRIPTION DERM
LOCATION SIMPLE: LEFT FOREARM
LOCATION SIMPLE: ABDOMEN
LOCATION SIMPLE: RIGHT HAND
LOCATION SIMPLE: LEFT UPPER BACK
LOCATION SIMPLE: LEFT THIGH
LOCATION SIMPLE: LEFT ANTERIOR NECK
LOCATION SIMPLE: RIGHT POSTERIOR UPPER ARM
LOCATION SIMPLE: RIGHT BREAST
LOCATION SIMPLE: LEFT POSTERIOR UPPER ARM
LOCATION SIMPLE: RIGHT LOWER BACK
LOCATION SIMPLE: RIGHT SHOULDER
LOCATION SIMPLE: RIGHT CHEEK
LOCATION SIMPLE: RIGHT THIGH
LOCATION SIMPLE: RIGHT FOREARM
LOCATION SIMPLE: RIGHT UPPER BACK
LOCATION SIMPLE: LEFT CHEEK
LOCATION SIMPLE: UPPER BACK
LOCATION SIMPLE: LEFT HAND

## 2018-08-10 ASSESSMENT — LOCATION DETAILED DESCRIPTION DERM
LOCATION DETAILED: LEFT ULNAR DORSAL HAND
LOCATION DETAILED: RIGHT MID-UPPER BACK
LOCATION DETAILED: RIGHT PROXIMAL DORSAL FOREARM
LOCATION DETAILED: LEFT SUPERIOR MEDIAL UPPER BACK
LOCATION DETAILED: RIGHT RADIAL DORSAL HAND
LOCATION DETAILED: RIGHT SUPERIOR UPPER BACK
LOCATION DETAILED: LEFT CENTRAL MALAR CHEEK
LOCATION DETAILED: RIGHT DISTAL POSTERIOR UPPER ARM
LOCATION DETAILED: LEFT PROXIMAL DORSAL FOREARM
LOCATION DETAILED: PERIUMBILICAL SKIN
LOCATION DETAILED: RIGHT MEDIAL BREAST 4-5:00 REGION
LOCATION DETAILED: RIGHT CENTRAL MALAR CHEEK
LOCATION DETAILED: SUPERIOR THORACIC SPINE
LOCATION DETAILED: LEFT INFERIOR ANTERIOR NECK
LOCATION DETAILED: RIGHT RIB CAGE
LOCATION DETAILED: RIGHT MEDIAL BREAST 5-6:00 REGION
LOCATION DETAILED: LEFT ANTERIOR PROXIMAL THIGH
LOCATION DETAILED: RIGHT ANTERIOR PROXIMAL THIGH
LOCATION DETAILED: RIGHT POSTERIOR SHOULDER
LOCATION DETAILED: RIGHT SUPERIOR MEDIAL MIDBACK
LOCATION DETAILED: LEFT PROXIMAL POSTERIOR UPPER ARM
LOCATION DETAILED: RIGHT INFRAMAMMARY CREASE (INNER QUADRANT)

## 2018-08-10 ASSESSMENT — LOCATION ZONE DERM
LOCATION ZONE: LEG
LOCATION ZONE: HAND
LOCATION ZONE: NECK
LOCATION ZONE: ARM
LOCATION ZONE: FACE
LOCATION ZONE: TRUNK

## 2018-08-10 NOTE — PROCEDURE: BIOPSY BY SHAVE METHOD AND DESTRUCTION
Post-Care Instructions: I reviewed with the patient in detail post-care instructions. Patient is to keep the biopsy site dry overnight, and then apply bacitracin twice daily until healed. Patient may apply hydrogen peroxide soaks to remove any crusting.
Hemostasis: Drysol
Size Of Lesion After Curettage: 0
Detail Level: Detailed
Render Post-Care Instructions In Note?: no
Number Of Curettages: 3
Anesthesia Type: 1% lidocaine with epinephrine
Consent: Written consent was obtained and risks were reviewed including but not limited to scarring, infection, bleeding, scabbing, incomplete removal, nerve damage and allergy to anesthesia.
Lab Facility: 
Billing Type: Third-Party Bill
Wound Care: Petrolatum
Anesthesia Volume In Cc: 2
Biopsy Type: H and E
Notification Instructions: Patient will be notified of biopsy results. However, patient instructed to call the office if not contacted within 2 weeks.
Bill As?: Biopsy by Shave Method
Lab: [id number]
Destruction Type: electrodesiccation

## 2018-09-17 ENCOUNTER — APPOINTMENT (RX ONLY)
Dept: URBAN - METROPOLITAN AREA CLINIC 4 | Facility: CLINIC | Age: 68
Setting detail: DERMATOLOGY
End: 2018-09-17

## 2018-09-17 PROBLEM — L57.0 ACTINIC KERATOSIS: Status: ACTIVE | Noted: 2018-09-17

## 2018-09-17 PROBLEM — C44.619 BASAL CELL CARCINOMA OF SKIN OF LEFT UPPER LIMB, INCLUDING SHOULDER: Status: ACTIVE | Noted: 2018-09-17

## 2018-09-17 PROCEDURE — 99212 OFFICE O/P EST SF 10 MIN: CPT

## 2018-09-17 PROCEDURE — ? ADDITIONAL NOTES

## 2018-09-17 PROCEDURE — ? OBSERVATION

## 2018-09-17 NOTE — PROCEDURE: ADDITIONAL NOTES
Additional Notes: ED&C at time of bx, well healed, NER. Continue to monitor and check at each FBE. Pt will contact the office with any signs of reoccurrence.
Detail Level: Simple

## 2019-03-18 ENCOUNTER — APPOINTMENT (RX ONLY)
Dept: URBAN - METROPOLITAN AREA CLINIC 4 | Facility: CLINIC | Age: 69
Setting detail: DERMATOLOGY
End: 2019-03-18

## 2019-03-18 DIAGNOSIS — D22 MELANOCYTIC NEVI: ICD-10-CM

## 2019-03-18 DIAGNOSIS — L82.1 OTHER SEBORRHEIC KERATOSIS: ICD-10-CM

## 2019-03-18 DIAGNOSIS — L81.4 OTHER MELANIN HYPERPIGMENTATION: ICD-10-CM

## 2019-03-18 DIAGNOSIS — D18.0 HEMANGIOMA: ICD-10-CM

## 2019-03-18 PROBLEM — D22.62 MELANOCYTIC NEVI OF LEFT UPPER LIMB, INCLUDING SHOULDER: Status: ACTIVE | Noted: 2019-03-18

## 2019-03-18 PROBLEM — D48.5 NEOPLASM OF UNCERTAIN BEHAVIOR OF SKIN: Status: ACTIVE | Noted: 2019-03-18

## 2019-03-18 PROBLEM — D18.01 HEMANGIOMA OF SKIN AND SUBCUTANEOUS TISSUE: Status: ACTIVE | Noted: 2019-03-18

## 2019-03-18 PROBLEM — D22.72 MELANOCYTIC NEVI OF LEFT LOWER LIMB, INCLUDING HIP: Status: ACTIVE | Noted: 2019-03-18

## 2019-03-18 PROBLEM — D22.5 MELANOCYTIC NEVI OF TRUNK: Status: ACTIVE | Noted: 2019-03-18

## 2019-03-18 PROBLEM — D22.61 MELANOCYTIC NEVI OF RIGHT UPPER LIMB, INCLUDING SHOULDER: Status: ACTIVE | Noted: 2019-03-18

## 2019-03-18 PROBLEM — D22.71 MELANOCYTIC NEVI OF RIGHT LOWER LIMB, INCLUDING HIP: Status: ACTIVE | Noted: 2019-03-18

## 2019-03-18 PROCEDURE — 99213 OFFICE O/P EST LOW 20 MIN: CPT | Mod: 25

## 2019-03-18 PROCEDURE — ? COUNSELING

## 2019-03-18 PROCEDURE — ? SUNSCREEN RECOMMENDATIONS

## 2019-03-18 PROCEDURE — 11102 TANGNTL BX SKIN SINGLE LES: CPT

## 2019-03-18 PROCEDURE — ? BIOPSY BY SHAVE METHOD

## 2019-03-18 ASSESSMENT — LOCATION SIMPLE DESCRIPTION DERM
LOCATION SIMPLE: RIGHT PRETIBIAL REGION
LOCATION SIMPLE: RIGHT LOWER BACK
LOCATION SIMPLE: LEFT CHEEK
LOCATION SIMPLE: LEFT UPPER BACK
LOCATION SIMPLE: LEFT HAND
LOCATION SIMPLE: UPPER BACK
LOCATION SIMPLE: RIGHT FOREARM
LOCATION SIMPLE: RIGHT POSTERIOR UPPER ARM
LOCATION SIMPLE: LEFT FOREARM
LOCATION SIMPLE: RIGHT CHEEK
LOCATION SIMPLE: RIGHT HAND
LOCATION SIMPLE: LEFT POSTERIOR UPPER ARM
LOCATION SIMPLE: RIGHT THIGH
LOCATION SIMPLE: LEFT THIGH
LOCATION SIMPLE: LEFT ANTERIOR NECK
LOCATION SIMPLE: ABDOMEN

## 2019-03-18 ASSESSMENT — LOCATION ZONE DERM
LOCATION ZONE: ARM
LOCATION ZONE: NECK
LOCATION ZONE: TRUNK
LOCATION ZONE: FACE
LOCATION ZONE: LEG
LOCATION ZONE: HAND

## 2019-03-18 ASSESSMENT — LOCATION DETAILED DESCRIPTION DERM
LOCATION DETAILED: RIGHT ANTERIOR PROXIMAL THIGH
LOCATION DETAILED: LEFT CENTRAL MALAR CHEEK
LOCATION DETAILED: SUPERIOR THORACIC SPINE
LOCATION DETAILED: LEFT ULNAR DORSAL HAND
LOCATION DETAILED: LEFT INFERIOR ANTERIOR NECK
LOCATION DETAILED: LEFT SUPERIOR MEDIAL UPPER BACK
LOCATION DETAILED: PERIUMBILICAL SKIN
LOCATION DETAILED: RIGHT RADIAL DORSAL HAND
LOCATION DETAILED: LEFT ANTERIOR PROXIMAL THIGH
LOCATION DETAILED: RIGHT RIB CAGE
LOCATION DETAILED: RIGHT DISTAL POSTERIOR UPPER ARM
LOCATION DETAILED: RIGHT PROXIMAL DORSAL FOREARM
LOCATION DETAILED: RIGHT CENTRAL MALAR CHEEK
LOCATION DETAILED: RIGHT SUPERIOR MEDIAL MIDBACK
LOCATION DETAILED: LEFT PROXIMAL POSTERIOR UPPER ARM
LOCATION DETAILED: LEFT PROXIMAL DORSAL FOREARM
LOCATION DETAILED: RIGHT PROXIMAL PRETIBIAL REGION

## 2019-03-18 NOTE — PROCEDURE: BIOPSY BY SHAVE METHOD
Lab: [id number]
Consent: Written consent was obtained and risks were reviewed including but not limited to scarring, infection, bleeding, scabbing, incomplete removal, nerve damage and allergy to anesthesia.
Biopsy Method: Personna blade
Notification Instructions: Patient will be notified of biopsy results. However, patient instructed to call the office if not contacted within 2 weeks.
Hemostasis: Drysol
Bill For Surgical Tray: no
Curettage Text: The wound bed was treated with curettage after the biopsy was performed.
Detail Level: Detailed
Anesthesia Type: 1% lidocaine with epinephrine
Additional Anesthesia Volume In Cc (Will Not Render If 0): 0
Wound Care: Bacitracin
Cryotherapy Text: The wound bed was treated with cryotherapy after the biopsy was performed.
Lab Facility: 
Billing Type: Third-Party Bill
Type Of Destruction Used: Curettage
Electrodesiccation And Curettage Text: The wound bed was treated with electrodesiccation and curettage after the biopsy was performed.
Depth Of Biopsy: dermis
Electrodesiccation Text: The wound bed was treated with electrodesiccation after the biopsy was performed.
Biopsy Type: H and E
Silver Nitrate Text: The wound bed was treated with silver nitrate after the biopsy was performed.
Was A Bandage Applied: Yes
Post-Care Instructions: I reviewed with the patient in detail post-care instructions. Patient is to keep the biopsy site dry overnight, and then apply bacitracin twice daily until healed. Patient may apply hydrogen peroxide soaks to remove any crusting.
Anesthesia Volume In Cc: 2
Dressing: bandage

## 2019-04-26 ENCOUNTER — HOSPITAL ENCOUNTER (OUTPATIENT)
Dept: RADIOLOGY | Facility: MEDICAL CENTER | Age: 69
End: 2019-04-26
Attending: FAMILY MEDICINE
Payer: MEDICARE

## 2019-04-26 DIAGNOSIS — Z12.39 SCREENING BREAST EXAMINATION: ICD-10-CM

## 2019-04-26 PROCEDURE — 77063 BREAST TOMOSYNTHESIS BI: CPT

## 2019-05-07 ENCOUNTER — APPOINTMENT (RX ONLY)
Dept: URBAN - METROPOLITAN AREA CLINIC 4 | Facility: CLINIC | Age: 69
Setting detail: DERMATOLOGY
End: 2019-05-07

## 2019-05-07 DIAGNOSIS — D69.2 OTHER NONTHROMBOCYTOPENIC PURPURA: ICD-10-CM

## 2019-05-07 DIAGNOSIS — Z86.007 PERSONAL HISTORY OF IN-SITU NEOPLASM OF SKIN: ICD-10-CM | Status: RESOLVED

## 2019-05-07 PROBLEM — Z85.828 PERSONAL HISTORY OF OTHER MALIGNANT NEOPLASM OF SKIN: Status: ACTIVE | Noted: 2019-05-07

## 2019-05-07 PROCEDURE — 99212 OFFICE O/P EST SF 10 MIN: CPT

## 2019-05-07 PROCEDURE — ? ADDITIONAL NOTES

## 2019-05-07 PROCEDURE — ? OBSERVATION

## 2019-05-07 PROCEDURE — ? COUNSELING

## 2019-05-07 ASSESSMENT — LOCATION DETAILED DESCRIPTION DERM
LOCATION DETAILED: RIGHT PROXIMAL PRETIBIAL REGION
LOCATION DETAILED: RIGHT DISTAL PRETIBIAL REGION
LOCATION DETAILED: LEFT DISTAL PRETIBIAL REGION

## 2019-05-07 ASSESSMENT — LOCATION SIMPLE DESCRIPTION DERM
LOCATION SIMPLE: RIGHT PRETIBIAL REGION
LOCATION SIMPLE: LEFT PRETIBIAL REGION

## 2019-05-07 ASSESSMENT — LOCATION ZONE DERM: LOCATION ZONE: LEG

## 2020-03-18 ENCOUNTER — APPOINTMENT (RX ONLY)
Dept: URBAN - METROPOLITAN AREA CLINIC 4 | Facility: CLINIC | Age: 70
Setting detail: DERMATOLOGY
End: 2020-03-18

## 2020-03-18 DIAGNOSIS — L57.0 ACTINIC KERATOSIS: ICD-10-CM

## 2020-03-18 DIAGNOSIS — L82.1 OTHER SEBORRHEIC KERATOSIS: ICD-10-CM

## 2020-03-18 DIAGNOSIS — L81.4 OTHER MELANIN HYPERPIGMENTATION: ICD-10-CM

## 2020-03-18 DIAGNOSIS — D18.0 HEMANGIOMA: ICD-10-CM

## 2020-03-18 DIAGNOSIS — Z71.89 OTHER SPECIFIED COUNSELING: ICD-10-CM

## 2020-03-18 DIAGNOSIS — Z85.828 PERSONAL HISTORY OF OTHER MALIGNANT NEOPLASM OF SKIN: ICD-10-CM

## 2020-03-18 DIAGNOSIS — D22 MELANOCYTIC NEVI: ICD-10-CM

## 2020-03-18 PROBLEM — D18.01 HEMANGIOMA OF SKIN AND SUBCUTANEOUS TISSUE: Status: ACTIVE | Noted: 2020-03-18

## 2020-03-18 PROBLEM — D48.5 NEOPLASM OF UNCERTAIN BEHAVIOR OF SKIN: Status: ACTIVE | Noted: 2020-03-18

## 2020-03-18 PROBLEM — D22.9 MELANOCYTIC NEVI, UNSPECIFIED: Status: ACTIVE | Noted: 2020-03-18

## 2020-03-18 PROCEDURE — 99214 OFFICE O/P EST MOD 30 MIN: CPT | Mod: 25

## 2020-03-18 PROCEDURE — 11103 TANGNTL BX SKIN EA SEP/ADDL: CPT

## 2020-03-18 PROCEDURE — ? BIOPSY BY SHAVE METHOD AND DESTRUCTION

## 2020-03-18 PROCEDURE — ? COUNSELING

## 2020-03-18 PROCEDURE — 17000 DESTRUCT PREMALG LESION: CPT | Mod: 59

## 2020-03-18 PROCEDURE — ? BIOPSY BY SHAVE METHOD

## 2020-03-18 PROCEDURE — ? LIQUID NITROGEN

## 2020-03-18 PROCEDURE — 11102 TANGNTL BX SKIN SINGLE LES: CPT

## 2020-03-18 PROCEDURE — 17003 DESTRUCT PREMALG LES 2-14: CPT

## 2020-03-18 ASSESSMENT — LOCATION SIMPLE DESCRIPTION DERM
LOCATION SIMPLE: RIGHT LOWER BACK
LOCATION SIMPLE: RIGHT UPPER BACK
LOCATION SIMPLE: RIGHT PRETIBIAL REGION
LOCATION SIMPLE: CHEST

## 2020-03-18 ASSESSMENT — LOCATION DETAILED DESCRIPTION DERM
LOCATION DETAILED: LEFT MEDIAL SUPERIOR CHEST
LOCATION DETAILED: RIGHT INFERIOR MEDIAL UPPER BACK
LOCATION DETAILED: LOWER STERNUM
LOCATION DETAILED: RIGHT DISTAL PRETIBIAL REGION
LOCATION DETAILED: MIDDLE STERNUM
LOCATION DETAILED: RIGHT SUPERIOR MEDIAL MIDBACK

## 2020-03-18 ASSESSMENT — LOCATION ZONE DERM
LOCATION ZONE: LEG
LOCATION ZONE: TRUNK

## 2020-03-18 NOTE — PROCEDURE: LIQUID NITROGEN
Post-Care Instructions: I reviewed with the patient in detail post-care instructions. Patient is to wear sunprotection, and avoid picking at any of the treated lesions. Pt may apply Vaseline to crusted or scabbing areas.
Render Note In Bullet Format When Appropriate: No
Duration Of Freeze Thaw-Cycle (Seconds): 2
Detail Level: Simple
Number Of Freeze-Thaw Cycles: 2 freeze-thaw cycles
Consent: The patient's consent was obtained including but not limited to risks of crusting, scabbing, blistering, scarring, darker or lighter pigmentary change, recurrence, incomplete removal and infection.

## 2020-03-18 NOTE — PROCEDURE: BIOPSY BY SHAVE METHOD
Detail Level: Detailed
Depth Of Biopsy: dermis
Was A Bandage Applied: Yes
Size Of Lesion In Cm: 0
Biopsy Type: H and E
Biopsy Method: Personna blade
Anesthesia Type: 1% lidocaine with epinephrine
Anesthesia Volume In Cc: 2
Hemostasis: Drysol
Wound Care: Bacitracin
Dressing: bandage
Destruction After The Procedure: No
Type Of Destruction Used: Curettage
Curettage Text: The wound bed was treated with curettage after the biopsy was performed.
Cryotherapy Text: The wound bed was treated with cryotherapy after the biopsy was performed.
Electrodesiccation Text: The wound bed was treated with electrodesiccation after the biopsy was performed.
Electrodesiccation And Curettage Text: The wound bed was treated with electrodesiccation and curettage after the biopsy was performed.
Silver Nitrate Text: The wound bed was treated with silver nitrate after the biopsy was performed.
Lab: [id number]
Lab Facility: 
Path Notes (To The Dermatopathologist): This area was already treated with ED&C, if positive must be excised.
Consent: Written consent was obtained and risks were reviewed including but not limited to scarring, infection, bleeding, scabbing, incomplete removal, nerve damage and allergy to anesthesia.
Post-Care Instructions: I reviewed with the patient in detail post-care instructions. Patient is to keep the biopsy site dry overnight, and then apply bacitracin twice daily until healed. Patient may apply hydrogen peroxide soaks to remove any crusting.
Notification Instructions: Patient will be notified of biopsy results. However, patient instructed to call the office if not contacted within 2 weeks.
Billing Type: Third-Party Bill
Information: Selecting Yes will display possible errors in your note based on the variables you have selected. This validation is only offered as a suggestion for you. PLEASE NOTE THAT THE VALIDATION TEXT WILL BE REMOVED WHEN YOU FINALIZE YOUR NOTE. IF YOU WANT TO FAX A PRELIMINARY NOTE YOU WILL NEED TO TOGGLE THIS TO 'NO' IF YOU DO NOT WANT IT IN YOUR FAXED NOTE.

## 2020-03-18 NOTE — PROCEDURE: MIPS QUALITY
Quality 111:Pneumonia Vaccination Status For Older Adults: Pneumococcal Vaccination Previously Received
Detail Level: Detailed
Quality 130: Documentation Of Current Medications In The Medical Record: Current Medications Documented
Quality 402: Tobacco Use And Help With Quitting Among Adolescents: Patient screened for tobacco and is a smoker AND received Cessation Counseling

## 2020-03-18 NOTE — PROCEDURE: BIOPSY BY SHAVE METHOD AND DESTRUCTION

## 2020-04-28 ENCOUNTER — APPOINTMENT (RX ONLY)
Dept: URBAN - METROPOLITAN AREA CLINIC 4 | Facility: CLINIC | Age: 70
Setting detail: DERMATOLOGY
End: 2020-04-28

## 2020-04-28 DIAGNOSIS — Z85.828 PERSONAL HISTORY OF OTHER MALIGNANT NEOPLASM OF SKIN: ICD-10-CM

## 2020-04-28 PROBLEM — C44.619 BASAL CELL CARCINOMA OF SKIN OF LEFT UPPER LIMB, INCLUDING SHOULDER: Status: ACTIVE | Noted: 2020-04-28

## 2020-04-28 PROCEDURE — 99213 OFFICE O/P EST LOW 20 MIN: CPT

## 2020-04-28 PROCEDURE — ? ADDITIONAL NOTES

## 2020-04-28 PROCEDURE — ? COUNSELING

## 2020-04-28 ASSESSMENT — LOCATION SIMPLE DESCRIPTION DERM
LOCATION SIMPLE: RIGHT THIGH
LOCATION SIMPLE: LEFT BREAST

## 2020-04-28 ASSESSMENT — LOCATION ZONE DERM
LOCATION ZONE: LEG
LOCATION ZONE: TRUNK

## 2020-04-28 ASSESSMENT — LOCATION DETAILED DESCRIPTION DERM
LOCATION DETAILED: LEFT MEDIAL BREAST 10-11:00 REGION
LOCATION DETAILED: RIGHT ANTERIOR PROXIMAL THIGH

## 2020-04-28 NOTE — PROCEDURE: ADDITIONAL NOTES
Detail Level: Simple
Additional Notes: Auth will be submitted today for excision with Dr. Gudino.
Additional Notes: Site healed well.

## 2020-05-12 ENCOUNTER — APPOINTMENT (RX ONLY)
Dept: URBAN - METROPOLITAN AREA CLINIC 4 | Facility: CLINIC | Age: 70
Setting detail: DERMATOLOGY
End: 2020-05-12

## 2020-05-12 PROBLEM — C44.619 BASAL CELL CARCINOMA OF SKIN OF LEFT UPPER LIMB, INCLUDING SHOULDER: Status: ACTIVE | Noted: 2020-05-12

## 2020-05-12 PROCEDURE — 13101 CMPLX RPR TRUNK 2.6-7.5 CM: CPT

## 2020-05-12 PROCEDURE — ? EXCISION

## 2020-05-12 PROCEDURE — 11604 EXC TR-EXT MAL+MARG 3.1-4 CM: CPT

## 2020-05-12 NOTE — PROCEDURE: MIPS QUALITY
Quality 130: Documentation Of Current Medications In The Medical Record: Current Medications Documented
Quality 265: Biopsy Follow-Up: Biopsy results reviewed, communicated, tracked, and documented
Quality 111:Pneumonia Vaccination Status For Older Adults: Pneumococcal Vaccination Previously Received
Detail Level: Detailed
Quality 226: Preventive Care And Screening: Tobacco Use: Screening And Cessation Intervention: Patient screened for tobacco use and is an ex/non-smoker

## 2020-07-27 ENCOUNTER — HOSPITAL ENCOUNTER (EMERGENCY)
Facility: MEDICAL CENTER | Age: 70
End: 2020-07-27
Attending: EMERGENCY MEDICINE
Payer: MEDICARE

## 2020-07-27 VITALS
SYSTOLIC BLOOD PRESSURE: 121 MMHG | TEMPERATURE: 96.9 F | HEART RATE: 62 BPM | WEIGHT: 164.9 LBS | HEIGHT: 63 IN | DIASTOLIC BLOOD PRESSURE: 67 MMHG | BODY MASS INDEX: 29.22 KG/M2 | OXYGEN SATURATION: 91 % | RESPIRATION RATE: 17 BRPM

## 2020-07-27 DIAGNOSIS — I10 HYPERTENSION, UNSPECIFIED TYPE: ICD-10-CM

## 2020-07-27 PROCEDURE — 99283 EMERGENCY DEPT VISIT LOW MDM: CPT

## 2020-07-27 RX ORDER — OMEPRAZOLE 20 MG/1
20 CAPSULE, DELAYED RELEASE ORAL DAILY
Status: SHIPPED | COMMUNITY
End: 2021-03-10

## 2020-07-27 RX ORDER — GABAPENTIN 800 MG/1
800 TABLET ORAL 2 TIMES DAILY
COMMUNITY
End: 2021-02-05

## 2020-07-27 RX ORDER — LISINOPRIL 10 MG/1
10 TABLET ORAL DAILY
Qty: 30 TAB | Refills: 0 | Status: SHIPPED | OUTPATIENT
Start: 2020-07-27 | End: 2020-08-12

## 2020-07-27 RX ORDER — BUPROPION HYDROCHLORIDE 300 MG/1
300 TABLET ORAL EVERY MORNING
Status: SHIPPED | COMMUNITY
End: 2021-05-05 | Stop reason: SDUPTHER

## 2020-07-27 RX ORDER — CITALOPRAM 40 MG/1
40 TABLET ORAL EVERY EVENING
Status: SHIPPED | COMMUNITY
End: 2022-02-11

## 2020-07-27 NOTE — ED NOTES
Dc instructions and medications discussed with patient at bedside. All questions answered at this time. VSS. No IV in place at this time. Pt to lobby without incident. self to drive patient home.

## 2020-07-27 NOTE — ED TRIAGE NOTES
Pt states has been taking BP over the last month. States it is starting to trend upwards and worse in the morning. Has no PCP until the end of august. Yesterday morning was 167/113. Denies CP or SOB.

## 2020-07-27 NOTE — ED PROVIDER NOTES
ED Provider Note    CHIEF COMPLAINT  Chief Complaint   Patient presents with   • Blood Pressure Problem       HPI  Marciapatrick Koch is a 70 y.o. female who presents for elevated blood pressure.  Patient has a history of hypertension.  She has been on propranolol for quite a while.  She is between doctors switching from [facility].  She has not been able to see anybody about her blood pressure.  The earliest she could get an appointment was at the end of the month.  She presents a log of her blood pressures she has been taking them multiple times a day for the last week.  Her blood pressures have consistently been elevated with a low systolic reading in the 130s.  Generally blood pressures have been in the 160s.  She states she has not had any change in her diet.  No drugs.  She has been clean from alcohol for the last 3 years.  States that she takes her blood pressure when she is calm and relaxed at home and not anxious.  She denies having any chest pain, shortness of breath, abdominal pain, weakness, numbness, headache or any symptoms associated with her elevated blood pressure.    REVIEW OF SYSTEMS  As per HPI, otherwise a 10 point review of systems is negative    PAST MEDICAL HISTORY  Past Medical History:   Diagnosis Date   • Alcohol abuse    • Alcoholism (HCC)    • Chronic obstructive pulmonary disease (HCC)    • Depression    • Hiatus hernia syndrome    • Hypertension    • Indigestion    • Other specified disorder of intestines     diarrhea   • Pneumonia     for 2 days   • Psychiatric disorder     anxiety, depression   • Sleep apnea    • Snoring    • Unspecified urinary incontinence    • Urinary bladder disorder        SOCIAL HISTORY  Social History     Tobacco Use   • Smoking status: Current Every Day Smoker     Packs/day: 0.25     Years: 25.00     Pack years: 6.25     Types: Cigarettes     Last attempt to quit: 2011     Years since quittin.9   • Smokeless  "tobacco: Never Used   • Tobacco comment: occ   Substance Use Topics   • Alcohol use: Yes     Comment: occassionally. post rehab, 1/2 bottle of wine some days. no drinking now, sober X 2/1/17   • Drug use: No       SURGICAL HISTORY  Past Surgical History:   Procedure Laterality Date   • BLADDER SLING FEMALE  8/30/2011    Performed by RENATE STERLING at SURGERY [facility] ORS   • OTHER  2010    surgery for sleep apnea   • OTHER  2010    big toenails and second toenails removed   • OTHER ABDOMINAL SURGERY  2002    choley   • GYN SURGERY  1991    hyst   • OTHER  1956    tonsillectomy   • ABDOMINAL HYSTERECTOMY TOTAL      Hysterectomy, Total Abdominal   • BRIEN BY LAPAROSCOPY      Cholecystectomy, Laparoscopic       CURRENT MEDICATIONS  Home Medications     Reviewed by Sarah Potter (Pharmacy Tech) on 07/27/20 at 1328  Med List Status: Partial   Medication Last Dose Status   alprazolam (XANAX) 1 MG Tab  Active   amitriptyline (ELAVIL) 10 MG Tab  Active   citalopram (CELEXA) 20 MG Tab  Active   citalopram (CELEXA) 20 MG Tab  Active   gabapentin (NEURONTIN) 800 MG tablet 7/27/2020 Active   mirtazapine (REMERON) 15 MG Tab  Active   naltrexone (DEPADE) 50 MG Tab  Active   propranolol (INDERAL) 20 MG Tab  Active                ALLERGIES  Allergies   Allergen Reactions   • Codeine Vomiting   • Morphine Vomiting       PHYSICAL EXAM  VITAL SIGNS: /86   Pulse 64   Temp 36.1 °C (96.9 °F) (Temporal)   Resp 17   Ht 1.6 m (5' 3\")   Wt 74.8 kg (164 lb 14.5 oz)   SpO2 93%   BMI 29.21 kg/m²    Constitutional: Awake and alert  HENT: Normal inspection  Eyes: Normal inspection  Neck: Supple  Cardiovascular: Normal heart rate, Normal rhythm.  Symmetric peripheral pulses.   Thorax & Lungs: No respiratory distress, No wheezing, No rales, No rhonchi, No chest tenderness.   Abdomen: Bowel sounds normal, soft, non-distended, nontender, no mass  Skin: Warm, Dry, No rash.   Back: No tenderness, No CVA tenderness. "   Extremities: No clubbing, cyanosis, edema, no Homans or cords   Neurologic: Grossly normal   Psychiatric: Anxious appearing        COURSE & MEDICAL DECISION MAKING  Patient presents with elevated blood pressure.  Blood pressure is not terrible at this time, but not ideal either.  She presents a log her blood pressure has been quite elevated lately and consistently so.  It seems reasonable to add an additional blood pressure medication from the ED as she cannot get into see a primary for a month.  She will keep a log and if her blood pressure goes too low this will be discontinued.  Treatment can be adjusted otherwise by primary provider at her appointment.  There is no indication for emergency testing at this time that she is free of any symptoms.  She will be discharged to return to the ER for any chest pain, shortness of breath, fevers or other concerning symptoms.    FINAL IMPRESSION  1.  Hypertension      This dictation was created using voice recognition software. The accuracy of the dictation is limited to the abilities of the software.  The nursing notes were reviewed and certain aspects of this information were incorporated into this note.      Electronically signed by: Osmani Chase M.D., 7/27/2020 1:29 PM

## 2020-07-29 ENCOUNTER — APPOINTMENT (RX ONLY)
Dept: URBAN - METROPOLITAN AREA CLINIC 4 | Facility: CLINIC | Age: 70
Setting detail: DERMATOLOGY
End: 2020-07-29

## 2020-07-29 DIAGNOSIS — Z85.828 PERSONAL HISTORY OF OTHER MALIGNANT NEOPLASM OF SKIN: ICD-10-CM

## 2020-07-29 DIAGNOSIS — Z86.007 PERSONAL HISTORY OF IN-SITU NEOPLASM OF SKIN: ICD-10-CM

## 2020-07-29 DIAGNOSIS — L57.0 ACTINIC KERATOSIS: ICD-10-CM

## 2020-07-29 DIAGNOSIS — L82.1 OTHER SEBORRHEIC KERATOSIS: ICD-10-CM

## 2020-07-29 PROCEDURE — ? COUNSELING

## 2020-07-29 PROCEDURE — 17000 DESTRUCT PREMALG LESION: CPT

## 2020-07-29 PROCEDURE — ? LIQUID NITROGEN

## 2020-07-29 PROCEDURE — ? OBSERVATION

## 2020-07-29 PROCEDURE — 99213 OFFICE O/P EST LOW 20 MIN: CPT | Mod: 25

## 2020-07-29 PROCEDURE — ? LIQUID NITROGEN (COSMETIC)

## 2020-07-29 ASSESSMENT — LOCATION SIMPLE DESCRIPTION DERM
LOCATION SIMPLE: LEFT THIGH
LOCATION SIMPLE: LEFT PRETIBIAL REGION
LOCATION SIMPLE: ABDOMEN
LOCATION SIMPLE: RIGHT UPPER BACK
LOCATION SIMPLE: RIGHT THIGH

## 2020-07-29 ASSESSMENT — LOCATION ZONE DERM
LOCATION ZONE: LEG
LOCATION ZONE: TRUNK

## 2020-07-29 ASSESSMENT — LOCATION DETAILED DESCRIPTION DERM
LOCATION DETAILED: LEFT ANTERIOR DISTAL THIGH
LOCATION DETAILED: RIGHT ANTERIOR PROXIMAL THIGH
LOCATION DETAILED: LEFT RIB CAGE
LOCATION DETAILED: RIGHT INFERIOR UPPER BACK
LOCATION DETAILED: RIGHT INFERIOR LATERAL UPPER BACK
LOCATION DETAILED: LEFT ANTERIOR PROXIMAL THIGH
LOCATION DETAILED: LEFT PROXIMAL PRETIBIAL REGION
LOCATION DETAILED: RIGHT ANTERIOR DISTAL THIGH

## 2020-07-29 NOTE — PROCEDURE: LIQUID NITROGEN
Duration Of Freeze Thaw-Cycle (Seconds): 2
Consent: The patient's consent was obtained including but not limited to risks of crusting, scabbing, blistering, scarring, darker or lighter pigmentary change, recurrence, incomplete removal and infection.
Render Post-Care Instructions In Note?: no
Post-Care Instructions: I reviewed with the patient in detail post-care instructions. Patient is to wear sunprotection, and avoid picking at any of the treated lesions. Pt may apply Vaseline to crusted or scabbing areas.
Detail Level: Simple
Number Of Freeze-Thaw Cycles: 2 freeze-thaw cycles

## 2020-07-29 NOTE — PROCEDURE: OBSERVATION
Body Location Override (Optional - Billing Will Still Be Based On Selected Body Map Location If Applicable): right anterior proximal thigh
Detail Level: Detailed
Size Of Lesion In Cm (Optional): 0

## 2020-07-29 NOTE — PROCEDURE: LIQUID NITROGEN (COSMETIC)
Detail Level: Detailed
Post-Care Instructions: I reviewed with the patient in detail post-care instructions. Patient is to wear sunprotection, and avoid picking at any of the treated lesions. Pt may apply Vaseline to crusted or scabbing areas.
Billing Information: Bill by Static Price
Render Post-Care Instructions In Note?: no
Consent: The patient's consent was obtained including but not limited to risks of crusting, scabbing, blistering, scarring, darker or lighter pigmentary change, recurrence, incomplete removal and infection. The patient understands that the procedure is cosmetic in nature and is not covered by insurance.

## 2020-08-12 ENCOUNTER — OFFICE VISIT (OUTPATIENT)
Dept: MEDICAL GROUP | Facility: MEDICAL CENTER | Age: 70
End: 2020-08-12
Payer: MEDICARE

## 2020-08-12 VITALS
RESPIRATION RATE: 16 BRPM | BODY MASS INDEX: 28.48 KG/M2 | DIASTOLIC BLOOD PRESSURE: 68 MMHG | HEART RATE: 52 BPM | HEIGHT: 63 IN | TEMPERATURE: 97.8 F | SYSTOLIC BLOOD PRESSURE: 116 MMHG | OXYGEN SATURATION: 92 % | WEIGHT: 160.72 LBS

## 2020-08-12 DIAGNOSIS — Z12.31 ENCOUNTER FOR SCREENING MAMMOGRAM FOR MALIGNANT NEOPLASM OF BREAST: ICD-10-CM

## 2020-08-12 DIAGNOSIS — R05.9 COUGH: ICD-10-CM

## 2020-08-12 DIAGNOSIS — R00.1 BRADYCARDIA: ICD-10-CM

## 2020-08-12 DIAGNOSIS — I10 ESSENTIAL HYPERTENSION: ICD-10-CM

## 2020-08-12 DIAGNOSIS — F10.20 ALCOHOL USE DISORDER, MODERATE, DEPENDENCE (HCC): ICD-10-CM

## 2020-08-12 DIAGNOSIS — F41.9 ANXIETY: ICD-10-CM

## 2020-08-12 DIAGNOSIS — F10.11 HISTORY OF ALCOHOL ABUSE: ICD-10-CM

## 2020-08-12 DIAGNOSIS — Z11.59 ENCOUNTER FOR HEPATITIS C SCREENING TEST FOR LOW RISK PATIENT: ICD-10-CM

## 2020-08-12 DIAGNOSIS — F13.20 MODERATE SEDATIVE, HYPNOTIC, OR ANXIOLYTIC USE DISORDER (HCC): ICD-10-CM

## 2020-08-12 DIAGNOSIS — E78.5 DYSLIPIDEMIA: ICD-10-CM

## 2020-08-12 DIAGNOSIS — Z23 NEED FOR VACCINATION: ICD-10-CM

## 2020-08-12 DIAGNOSIS — F17.200 TOBACCO DEPENDENCE: ICD-10-CM

## 2020-08-12 PROBLEM — F33.1 MAJOR DEPRESSIVE DISORDER, RECURRENT EPISODE, MODERATE (HCC): Status: RESOLVED | Noted: 2017-04-10 | Resolved: 2020-08-12

## 2020-08-12 PROBLEM — F34.1 PERSISTENT DEPRESSIVE DISORDER: Status: RESOLVED | Noted: 2017-04-07 | Resolved: 2020-08-12

## 2020-08-12 PROCEDURE — 90746 HEPB VACCINE 3 DOSE ADULT IM: CPT | Performed by: INTERNAL MEDICINE

## 2020-08-12 PROCEDURE — 90732 PPSV23 VACC 2 YRS+ SUBQ/IM: CPT | Performed by: INTERNAL MEDICINE

## 2020-08-12 PROCEDURE — 99204 OFFICE O/P NEW MOD 45 MIN: CPT | Mod: 25 | Performed by: INTERNAL MEDICINE

## 2020-08-12 PROCEDURE — G0009 ADMIN PNEUMOCOCCAL VACCINE: HCPCS | Performed by: INTERNAL MEDICINE

## 2020-08-12 PROCEDURE — G0010 ADMIN HEPATITIS B VACCINE: HCPCS | Performed by: INTERNAL MEDICINE

## 2020-08-12 RX ORDER — PROPRANOLOL HYDROCHLORIDE 120 MG/1
120 CAPSULE, EXTENDED RELEASE ORAL DAILY
Qty: 30 CAP | Refills: 11 | Status: SHIPPED
Start: 2020-08-12 | End: 2021-01-05

## 2020-08-12 RX ORDER — ALPRAZOLAM 1 MG/1
0.5 TABLET ORAL 2 TIMES DAILY PRN
COMMUNITY
Start: 2020-08-06 | End: 2021-05-14

## 2020-08-12 ASSESSMENT — PATIENT HEALTH QUESTIONNAIRE - PHQ9
5. POOR APPETITE OR OVEREATING: NOT AT ALL
7. TROUBLE CONCENTRATING ON THINGS, SUCH AS READING THE NEWSPAPER OR WATCHING TELEVISION: NOT AT ALL
SUM OF ALL RESPONSES TO PHQ9 QUESTIONS 1 AND 2: 0
6. FEELING BAD ABOUT YOURSELF - OR THAT YOU ARE A FAILURE OR HAVE LET YOURSELF OR YOUR FAMILY DOWN: NOT AL ALL
2. FEELING DOWN, DEPRESSED, IRRITABLE, OR HOPELESS: NOT AT ALL
3. TROUBLE FALLING OR STAYING ASLEEP OR SLEEPING TOO MUCH: NOT AT ALL
SUM OF ALL RESPONSES TO PHQ QUESTIONS 1-9: 0
8. MOVING OR SPEAKING SO SLOWLY THAT OTHER PEOPLE COULD HAVE NOTICED. OR THE OPPOSITE, BEING SO FIGETY OR RESTLESS THAT YOU HAVE BEEN MOVING AROUND A LOT MORE THAN USUAL: NOT AT ALL
9. THOUGHTS THAT YOU WOULD BE BETTER OFF DEAD, OR OF HURTING YOURSELF: NOT AT ALL
4. FEELING TIRED OR HAVING LITTLE ENERGY: NOT AT ALL
1. LITTLE INTEREST OR PLEASURE IN DOING THINGS: NOT AT ALL

## 2020-08-12 NOTE — LETTER
[facility]  Sonali Trammell M.D.  [address]  [place] [address]  Fax: [phone]   Authorization for Release/Disclosure of   Protected Health Information   Name: MARCIA ROSALES : 1950 SSN: [id number]   Address: [address] [place] [address] [address] [place] [address] Phone:    [phone] (home)    I authorize the entity listed below to release/disclose the PHI below to:   [facility]/Sonali Trammell M.D. and Sonali Trammell M.D.   Provider or Entity Name:     Address   City, State, Zip   Phone:      Fax:     Reason for request: continuity of care   Information to be released:    [  ] LAST COLONOSCOPY,  including any PATH REPORT and follow-up  [  ] LAST FIT/COLOGUARD RESULT [  ] LAST DEXA  [  ] LAST MAMMOGRAM  [  ] LAST PAP  [  ] LAST LABS [  ] RETINA EXAM REPORT  [  ] IMMUNIZATION RECORDS  [  ] Release all info      [  ] Check here and initial the line next to each item to release ALL health information INCLUDING  _____ Care and treatment for drug and / or alcohol abuse  _____ HIV testing, infection status, or AIDS  _____ Genetic Testing    DATES OF SERVICE OR TIME PERIOD TO BE DISCLOSED: _____________  I understand and acknowledge that:  * This Authorization may be revoked at any time by you in writing, except if your health information has already been used or disclosed.  * Your health information that will be used or disclosed as a result of you signing this authorization could be re-disclosed by the recipient. If this occurs, your re-disclosed health information may no longer be protected by State or Federal laws.  * You may refuse to sign this Authorization. Your refusal will not affect your ability to obtain treatment.  * This Authorization becomes effective upon signing and will  on (date) __________.      If no date is indicated, this Authorization will  one (1) year from the signature date.    Name: Marcia Rosales    Signature:   Date:     2020       PLEASE FAX  REQUESTED RECORDS BACK TO: [phone]

## 2020-08-12 NOTE — PROGRESS NOTES
CC:  Diagnoses of Essential hypertension, Cough, Need for vaccination, History of alcohol abuse, Encounter for hepatitis C screening test for low risk patient, Encounter for screening mammogram for malignant neoplasm of breast, Dyslipidemia, Bradycardia, Anxiety, Moderate sedative, hypnotic, or anxiolytic use disorder, Alcohol use disorder, moderate, dependence (HCC), and Tobacco dependence were pertinent to this visit.    HISTORY OF THE PRESENT ILLNESS: Patient is a 70 y.o. female. This pleasant patient is here today to establish care.    Was recently emergency room for concern over high blood pressure.  She says she changed her blood pressure monitor and has since seemed to get more accurate recordings.  Was started on lisinopril but after a few days quit because she developed a dry cough.  Says her psychiatrist increased her propanolol to 120 mg daily and this seemed to bring her numbers in the goal range overall.  She brought a home record systolic 103-134, diastolic 76-99, overall improving over time, heart rate 60s-70s.  She denies any cardiopulmonary symptoms.  Specifically she does not have any concerns over angina.    Has dry cough in the evening in particular.  Uses throat lozenges which seem to help.  She is not sure if it could be allergies but she did try some over-the-counter medication without benefit.  Has dinner around 6 PM goes to bed at 8 PM.  Has a glass of wine generally nightly.  She thinks she is taking her omeprazole every day.  She denies that the cough is GERD but we discussed this could be likely.  Old record reviewed EGD 3/6/2020 noted to be normal.  She denies any symptoms in the ears, nasal/sinus, postnasal drainage, sore throat, dyspnea, productive cough, wheeze, PND, orthopnea, palpitations, etc.  Does have ongoing smoking history she says her psychiatrist is working with her to quit.  Currently at 4 cigarettes/day.  She denies unintentional weight loss or constitutional  symptoms.    History of depression, she says this is well controlled on current medications her psychiatrist Dr. LIVAN Estrada and she also sees psychologist that specializes in drug and alcohol treatment.  PHQ 9 score today of 0.  No SI or HI.  She says that she is weaning down and off the Xanax with her psychiatrist currently taking 0.5 to 1 mg daily.  She does understand that Xanax in combination alcohol can be fatal, also particularly sedated of combination with her other medications, she says this is already been discussed with her.  Regarding her alcohol use she says she was drinking a bottle of wine a night, she went voluntarily to a rehabilitation center 30 days inpatient, 3 days outpatient roughly 5 years ago.  She indicates this was not the first time she was in rehab.  She has 1 glass of wine nightly, I asked her what her psychiatrist thought about this and patient tells me that he is aware and he thinks it is reasonable at this time.  She also has strong family history of alcohol as well as also drug abuse.    Noted history of bradycardia, this is likely drug reaction from propanolol.  No cardiopulmonary symptoms.  No syncope/presyncope.    Patient says she has history of dyslipidemia, not currently on statin.    She would like to complete her third hepatitis B vaccine at today's visit.  She started the course last year when she thought about working in hospice.  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    Colonoscopy report will be scanned from 3/2/2018, patient says she is never had any polyps.  She believes she was told she be due again in 10 years if indicated.    Allergies: Codeine, Lisinopril, and Morphine    Current Outpatient Medications Ordered in Epic   Medication Sig Dispense Refill   • ALPRAZolam (XANAX) 1 MG Tab      • propranolol CR (INDERAL LA) 120 MG CAPSULE SR 24 HR Take 1 Cap by mouth every day. 30 Cap 11   • gabapentin (NEURONTIN) 800 MG tablet Take 800 mg by mouth 3 times a day.      • buPROPion (WELLBUTRIN XL) 300 MG XL tablet Take 300 mg by mouth every morning.     • citalopram (CELEXA) 40 MG Tab Take 40 mg by mouth every evening.     • omeprazole (PRILOSEC) 20 MG delayed-release capsule Take 20 mg by mouth every day.     • mirtazapine (REMERON) 15 MG Tab TAKE 1 TABLET BY MOUTH AT BEDTIME 30 Tab 0   • amitriptyline (ELAVIL) 10 MG Tab TAKE 1/2 TABLET BY MOUTH AT BEDTIME AS NEEDED FOR SLEEP 15 Tab 0     No current Epic-ordered facility-administered medications on file.        Past Medical History:   Diagnosis Date   • Alcohol abuse    • Alcoholism (HCC)    • Anxiety    • Depression    • Hiatus hernia syndrome    • Hypertension    • Indigestion    • Other specified disorder of intestines     diarrhea   • Pneumonia     for 2 days   • Psychiatric disorder     anxiety, depression   • Sleep apnea    • Snoring    • Unspecified urinary incontinence    • Urinary bladder disorder        Past Surgical History:   Procedure Laterality Date   • BLADDER SLING FEMALE  2011    Performed by RENATE STERLING at SURGERY [facility]   • OTHER      surgery for sleep apnea   • OTHER      big toenails and second toenails removed   • OTHER ABDOMINAL SURGERY      choley   • GYN SURGERY      hyst   • OTHER      tonsillectomy   • ABDOMINAL HYSTERECTOMY TOTAL      Hysterectomy, Total Abdominal   • BRIEN BY LAPAROSCOPY      Cholecystectomy, Laparoscopic       Social History     Tobacco Use   • Smoking status: Current Every Day Smoker     Packs/day: 0.25     Years: 25.00     Pack years: 6.25     Types: Cigarettes     Last attempt to quit: 2011     Years since quittin.9   • Smokeless tobacco: Never Used   • Tobacco comment: occ   Substance Use Topics   • Alcohol use: Yes     Comment: occassionally. post rehab, 1/2 bottle of wine some days. no drinking now, sober X 17   • Drug use: No       Social History     Social History Narrative   • Not on file       Family History   Problem  "Relation Age of Onset   • Anxiety disorder Brother    • Depression Brother    • Alcohol abuse Mother    • Cancer Mother         colon cancer age 70s   • Alcohol abuse Father    • Cancer Sister         breast   • Drug abuse Sister    • Cancer Maternal Aunt         breast   • Cancer Maternal Uncle         nos       ROS:     - Constitutional: Negative for fever, chills, unexpected weight change, night sweats    - Eyes:   Negative for eye pain, discharge    - ENT:  Negative for hearing changes, ear pain, ear discharge, rhinorrhea, sinus congestion, sore throat     - Respiratory: Negative for sputum production, chest congestion, dyspnea, wheezing, and crackles.      - Cardiovascular: Negative for chest pain, palpitations, orthopnea, and bilateral lower extremity edema.     - Gastrointestinal: Negative for  nausea, vomiting, abdominal pain, hematochezia, melena, diarrhea, constipation, and greasy/foul-smelling stools.     - Genitourinary: Negative for dysuria    - Musculoskeletal: Negative for myalgias, back pain, and joint pain.     - Skin: Negative for rash, itching, cyanotic skin color change.     - Neurological: Negative for vertigo    - Endo:Negative for polyuria, heat/cold intolerance, excessive thirst    - Hem/lymphatic: Negative for swollen glands    -Allergic/immun: Negative for allergic rhinitis    - Psychiatric/Behavioral: Negative for suicidal/homicidal ideation and memory loss.      Exam: /68 (BP Location: Right arm, Patient Position: Sitting, BP Cuff Size: Adult)   Pulse (!) 52   Temp 36.6 °C (97.8 °F) (Temporal)   Resp 16   Ht 1.6 m (5' 3\")   Wt 72.9 kg (160 lb 11.5 oz)   SpO2 92%  Body mass index is 28.47 kg/m².    General: Normal appearing. No distress.  EYES: Conjunctiva clear lids without ptosis, pupils equal  EARS: Normal shape and contour   Neck: Supple without LAD. Thyroid is not enlarged.  Pulmonary: Clear to ausculation.  Normal effort. No rales or wheezing.  Cardiovascular: Regular " rate and rhythm without significant murmur.   Abdomen: Soft, nontender, nondistended. Normal bowel sounds.  Neurologic: Cranial nerves grossly nonfocal  Lymph: No cervical, supraclavicular nodes palpable  Skin: Warm and dry.  No obvious lesions.  Musculoskeletal: Normal gait. No extremity cyanosis, clubbing, or edema.  Psych: Normal mood and affect. Alert and oriented x3. Judgment and insight is normal.        Assessment/Plan  1. Essential hypertension  At this time blood pressure is controlled in clinic.  Advised patient that her systolics seem good at home the unsure diastolic less than 90.  Plan to continue to monitor blood pressure trends at home now on higher dose propranolol, follow-up in clinic in 1 month.  She had cough on ACE inhibitor.  - propranolol CR (INDERAL LA) 120 MG CAPSULE SR 24 HR; Take 1 Cap by mouth every day.  Dispense: 30 Cap; Refill: 11  - CBC WITH DIFFERENTIAL; Future  - Comp Metabolic Panel; Future    2. Cough  Dry cough has not resolved yet after discontinuing lisinopril.  Though she does say the cough also predated the medication.  Based on history and exam I told her I think it is most likely GERD, to more adequately treat this, stop the wine and any other GERD triggers.  With her longtime smoking history would be reasonable get chest x-ray.  Exam unremarkable.  - DX-CHEST-2 VIEWS; Future    3. Need for vaccination  - Pneumoccocal Polysaccharide Vaccine 23-Valent =>3yo SQ/IM  - Hepatitis B Vaccine Adult IM    4. History of alcohol abuse  She will work with her psychiatrist regarding her history of alcohol abuse.  Likely total alcohol cessation would be best for patient.  Patient reports this is stable at this time 1 glass of wine nightly.    5. Encounter for hepatitis C screening test for low risk patient  Patient reveals preventive health guideline given birth cohort.  - HEP C VIRUS ANTIBODY; Future    6. Encounter for screening mammogram for malignant neoplasm of breast  Strong family  history of breast cancer.  Patient reports no acute breast concerns at this time.  She is agreeable to mammogram.  - MA-SCREENING MAMMO BILAT W/TOMOSYNTHESIS W/CAD; Future    7. Dyslipidemia  Patient reports stable, chronic, not on statin, plan to assess status.  With her longtime smoking history, also hypertension, she does have risk factors for CAD, she will consider if she does want cardiac CT scan.  - Comp Metabolic Panel; Future  - Lipid Profile; Future    8. Bradycardia  Stable, chronic not thought to be pathologic, no cardiopulmonary symptoms of concern, noted on propanolol.    9. Anxiety  Chronic, stable followed by her psychiatrist.  Currently on medications which include mirtazapine, amitriptyline, citalopram, gabapentin, propanolol weaning off Xanax.    10. Moderate sedative, hypnotic, or anxiolytic use disorder  See #9 above    11. Alcohol use disorder, moderate, dependence (HCC)  Working with her psychiatrist    12. Tobacco dependence  Her psychiatrist is working with her to wean down and off.      rtc 1 mo awv        Please note that this dictation was created using voice recognition software. I have made every reasonable attempt to correct obvious errors, but I expect that there are errors of grammar and possibly content that I did not discover before finalizing the note.

## 2020-08-13 ENCOUNTER — APPOINTMENT (OUTPATIENT)
Dept: RADIOLOGY | Facility: MEDICAL CENTER | Age: 70
End: 2020-08-13
Attending: INTERNAL MEDICINE
Payer: MEDICARE

## 2020-08-21 ENCOUNTER — HOSPITAL ENCOUNTER (OUTPATIENT)
Dept: LAB | Facility: MEDICAL CENTER | Age: 70
End: 2020-08-21
Attending: INTERNAL MEDICINE
Payer: MEDICARE

## 2020-08-21 DIAGNOSIS — Z11.59 ENCOUNTER FOR HEPATITIS C SCREENING TEST FOR LOW RISK PATIENT: ICD-10-CM

## 2020-08-21 DIAGNOSIS — E78.5 DYSLIPIDEMIA: ICD-10-CM

## 2020-08-21 DIAGNOSIS — I10 ESSENTIAL HYPERTENSION: ICD-10-CM

## 2020-08-21 LAB
ALBUMIN SERPL BCP-MCNC: 4.1 G/DL (ref 3.2–4.9)
ALBUMIN/GLOB SERPL: 1.6 G/DL
ALP SERPL-CCNC: 108 U/L (ref 30–99)
ALT SERPL-CCNC: 12 U/L (ref 2–50)
ANION GAP SERPL CALC-SCNC: 10 MMOL/L (ref 7–16)
AST SERPL-CCNC: 17 U/L (ref 12–45)
BASOPHILS # BLD AUTO: 0.9 % (ref 0–1.8)
BASOPHILS # BLD: 0.06 K/UL (ref 0–0.12)
BILIRUB SERPL-MCNC: 0.4 MG/DL (ref 0.1–1.5)
BUN SERPL-MCNC: 10 MG/DL (ref 8–22)
CALCIUM SERPL-MCNC: 9.2 MG/DL (ref 8.5–10.5)
CHLORIDE SERPL-SCNC: 100 MMOL/L (ref 96–112)
CHOLEST SERPL-MCNC: 322 MG/DL (ref 100–199)
CO2 SERPL-SCNC: 28 MMOL/L (ref 20–33)
CREAT SERPL-MCNC: 0.63 MG/DL (ref 0.5–1.4)
EOSINOPHIL # BLD AUTO: 0.15 K/UL (ref 0–0.51)
EOSINOPHIL NFR BLD: 2.2 % (ref 0–6.9)
ERYTHROCYTE [DISTWIDTH] IN BLOOD BY AUTOMATED COUNT: 47.5 FL (ref 35.9–50)
FASTING STATUS PATIENT QL REPORTED: NORMAL
GLOBULIN SER CALC-MCNC: 2.5 G/DL (ref 1.9–3.5)
GLUCOSE SERPL-MCNC: 107 MG/DL (ref 65–99)
HCT VFR BLD AUTO: 49.2 % (ref 37–47)
HCV AB SER QL: NORMAL
HDLC SERPL-MCNC: 43 MG/DL
HGB BLD-MCNC: 16.6 G/DL (ref 12–16)
IMM GRANULOCYTES # BLD AUTO: 0.02 K/UL (ref 0–0.11)
IMM GRANULOCYTES NFR BLD AUTO: 0.3 % (ref 0–0.9)
LDLC SERPL CALC-MCNC: 206 MG/DL
LYMPHOCYTES # BLD AUTO: 2.27 K/UL (ref 1–4.8)
LYMPHOCYTES NFR BLD: 32.6 % (ref 22–41)
MCH RBC QN AUTO: 33.9 PG (ref 27–33)
MCHC RBC AUTO-ENTMCNC: 33.7 G/DL (ref 33.6–35)
MCV RBC AUTO: 100.4 FL (ref 81.4–97.8)
MONOCYTES # BLD AUTO: 0.51 K/UL (ref 0–0.85)
MONOCYTES NFR BLD AUTO: 7.3 % (ref 0–13.4)
NEUTROPHILS # BLD AUTO: 3.95 K/UL (ref 2–7.15)
NEUTROPHILS NFR BLD: 56.7 % (ref 44–72)
NRBC # BLD AUTO: 0 K/UL
NRBC BLD-RTO: 0 /100 WBC
PLATELET # BLD AUTO: 224 K/UL (ref 164–446)
PMV BLD AUTO: 9.5 FL (ref 9–12.9)
POTASSIUM SERPL-SCNC: 4.4 MMOL/L (ref 3.6–5.5)
PROT SERPL-MCNC: 6.6 G/DL (ref 6–8.2)
RBC # BLD AUTO: 4.9 M/UL (ref 4.2–5.4)
SODIUM SERPL-SCNC: 138 MMOL/L (ref 135–145)
TRIGL SERPL-MCNC: 363 MG/DL (ref 0–149)
WBC # BLD AUTO: 7 K/UL (ref 4.8–10.8)

## 2020-08-21 PROCEDURE — 36415 COLL VENOUS BLD VENIPUNCTURE: CPT

## 2020-08-21 PROCEDURE — 86803 HEPATITIS C AB TEST: CPT

## 2020-08-21 PROCEDURE — 80061 LIPID PANEL: CPT

## 2020-08-21 PROCEDURE — 85025 COMPLETE CBC W/AUTO DIFF WBC: CPT

## 2020-08-21 PROCEDURE — 80053 COMPREHEN METABOLIC PANEL: CPT

## 2020-09-08 ENCOUNTER — OFFICE VISIT (OUTPATIENT)
Dept: URGENT CARE | Facility: CLINIC | Age: 70
End: 2020-09-08
Payer: MEDICARE

## 2020-09-08 ENCOUNTER — APPOINTMENT (OUTPATIENT)
Dept: RADIOLOGY | Facility: IMAGING CENTER | Age: 70
End: 2020-09-08
Attending: PHYSICIAN ASSISTANT
Payer: MEDICARE

## 2020-09-08 VITALS
HEART RATE: 65 BPM | OXYGEN SATURATION: 95 % | HEIGHT: 63 IN | DIASTOLIC BLOOD PRESSURE: 88 MMHG | SYSTOLIC BLOOD PRESSURE: 130 MMHG | RESPIRATION RATE: 16 BRPM | WEIGHT: 160 LBS | TEMPERATURE: 97.4 F | BODY MASS INDEX: 28.35 KG/M2

## 2020-09-08 DIAGNOSIS — R05.3 CHRONIC COUGH: Primary | ICD-10-CM

## 2020-09-08 DIAGNOSIS — R05.3 CHRONIC COUGH: ICD-10-CM

## 2020-09-08 PROCEDURE — 99214 OFFICE O/P EST MOD 30 MIN: CPT | Performed by: PHYSICIAN ASSISTANT

## 2020-09-08 PROCEDURE — 71046 X-RAY EXAM CHEST 2 VIEWS: CPT | Mod: TC,FY | Performed by: PHYSICIAN ASSISTANT

## 2020-09-08 RX ORDER — METHYLPREDNISOLONE 4 MG/1
TABLET ORAL
Qty: 21 TAB | Refills: 0 | Status: SHIPPED
Start: 2020-09-08 | End: 2020-09-15

## 2020-09-08 RX ORDER — DEXTROMETHORPHAN HYDROBROMIDE AND PROMETHAZINE HYDROCHLORIDE 15; 6.25 MG/5ML; MG/5ML
5 SYRUP ORAL EVERY 4 HOURS PRN
Qty: 120 ML | Refills: 0 | Status: SHIPPED
Start: 2020-09-08 | End: 2020-09-15

## 2020-09-08 ASSESSMENT — FIBROSIS 4 INDEX: FIB4 SCORE: 1.51

## 2020-09-15 ENCOUNTER — OFFICE VISIT (OUTPATIENT)
Dept: MEDICAL GROUP | Facility: MEDICAL CENTER | Age: 70
End: 2020-09-15
Payer: MEDICARE

## 2020-09-15 VITALS
BODY MASS INDEX: 28.56 KG/M2 | SYSTOLIC BLOOD PRESSURE: 118 MMHG | WEIGHT: 161.2 LBS | DIASTOLIC BLOOD PRESSURE: 76 MMHG | HEART RATE: 52 BPM | TEMPERATURE: 97.6 F | OXYGEN SATURATION: 93 % | HEIGHT: 63 IN

## 2020-09-15 DIAGNOSIS — E78.5 DYSLIPIDEMIA: ICD-10-CM

## 2020-09-15 DIAGNOSIS — I10 ESSENTIAL HYPERTENSION: ICD-10-CM

## 2020-09-15 DIAGNOSIS — R00.1 BRADYCARDIA: ICD-10-CM

## 2020-09-15 DIAGNOSIS — F17.200 TOBACCO DEPENDENCE: ICD-10-CM

## 2020-09-15 DIAGNOSIS — F10.11 HISTORY OF ALCOHOL ABUSE: ICD-10-CM

## 2020-09-15 DIAGNOSIS — F32.A DEPRESSION, CONTROLLED: ICD-10-CM

## 2020-09-15 DIAGNOSIS — D75.1 POLYCYTHEMIA: ICD-10-CM

## 2020-09-15 DIAGNOSIS — F41.9 ANXIETY: ICD-10-CM

## 2020-09-15 DIAGNOSIS — R73.01 IMPAIRED FASTING BLOOD SUGAR: ICD-10-CM

## 2020-09-15 DIAGNOSIS — F10.20 ALCOHOL USE DISORDER, MODERATE, DEPENDENCE (HCC): ICD-10-CM

## 2020-09-15 PROBLEM — R05.9 COUGH: Status: RESOLVED | Noted: 2020-08-12 | Resolved: 2020-09-15

## 2020-09-15 PROCEDURE — G0439 PPPS, SUBSEQ VISIT: HCPCS | Performed by: INTERNAL MEDICINE

## 2020-09-15 RX ORDER — GABAPENTIN 300 MG/1
CAPSULE ORAL
COMMUNITY
Start: 2020-09-02 | End: 2020-10-06

## 2020-09-15 RX ORDER — ATORVASTATIN CALCIUM 20 MG/1
20 TABLET, FILM COATED ORAL DAILY
Qty: 30 TAB | Refills: 3 | Status: SHIPPED
Start: 2020-09-15 | End: 2020-09-21

## 2020-09-15 ASSESSMENT — FIBROSIS 4 INDEX: FIB4 SCORE: 1.51

## 2020-09-15 ASSESSMENT — ENCOUNTER SYMPTOMS: GENERAL WELL-BEING: EXCELLENT

## 2020-09-15 ASSESSMENT — PATIENT HEALTH QUESTIONNAIRE - PHQ9: CLINICAL INTERPRETATION OF PHQ2 SCORE: 0

## 2020-09-15 ASSESSMENT — ACTIVITIES OF DAILY LIVING (ADL): BATHING_REQUIRES_ASSISTANCE: 0

## 2020-09-15 NOTE — PROGRESS NOTES
Chief Complaint   Patient presents with   • Annual Wellness Visit         HPI:  Marcia is a 70 y.o. here for Medicare Annual Wellness Visit    We reviewed labs from 2020 show normal GFR.  CBC hemoglobin 16.2, hematocrit 49.2.  She denies symptoms of sleep apnea.  She is currently smoking 5 cigarettes/day.  Denies any carbon monoxide leak in her home.  CMP remarkable for glucose 107, alkaline phosphatase of 108.  She denies any right upper quadrant pain, nausea, vomiting, new bone pain.  Total cholesterol 322, triglycerides 363, HDL 43 and .  She says she was fasting and has a history of hyperlipidemia.  Prior labs do indicate consistent trend of hyperlipidemia.  10-year CVD risk at 38.6%.  She denies prior intolerance of statins.  She denies any cardiopulmonary or strokelike symptoms.  The cough from last visit did totally resolve and her chest x-ray incidentally 2020 shows no opacity, effusion or acute findings.    History of depression/ anxiety she says she sees her therapist Jennifer twice weekly and says this is very effective.  Her mood is little more depressed because her cat of 20 years recently .  Though she says she is coping reasonably okay.  No SI or HI.  Smoking 5 cigarettes/day.    Blood pressure remains controlled.  With the mild bradycardia her psychiatry team has her on propanolol.  Patient does not want to change her medications for mood she says they are working well.  Denies any presyncope, syncope.  No dizziness on exertion.    Patient Active Problem List    Diagnosis Date Noted   • Cough 2020   • History of alcohol abuse 2020   • Tobacco dependence 2020   • Anxiety 2017   • Alcohol use disorder, moderate, dependence (HCC) 2017   • Moderate sedative, hypnotic, or anxiolytic use disorder 2017   • HTN (hypertension) 2011   • Bradycardia 2011   • Dyslipidemia 2011   • Depression, controlled 2011       Current Outpatient  Medications   Medication Sig Dispense Refill   • ALPRAZolam (XANAX) 1 MG Tab      • propranolol CR (INDERAL LA) 120 MG CAPSULE SR 24 HR Take 1 Cap by mouth every day. 30 Cap 11   • gabapentin (NEURONTIN) 800 MG tablet Take 800 mg by mouth 3 times a day.     • buPROPion (WELLBUTRIN XL) 300 MG XL tablet Take 300 mg by mouth every morning.     • citalopram (CELEXA) 40 MG Tab Take 40 mg by mouth every evening.     • omeprazole (PRILOSEC) 20 MG delayed-release capsule Take 20 mg by mouth every day.     • mirtazapine (REMERON) 15 MG Tab TAKE 1 TABLET BY MOUTH AT BEDTIME 30 Tab 0   • amitriptyline (ELAVIL) 10 MG Tab TAKE 1/2 TABLET BY MOUTH AT BEDTIME AS NEEDED FOR SLEEP 15 Tab 0   • gabapentin (NEURONTIN) 300 MG Cap      • promethazine-dextromethorphan (PROMETHAZINE-DM) 6.25-15 MG/5ML syrup Take 5 mL by mouth every four hours as needed. (Patient not taking: Reported on 9/15/2020) 120 mL 0   • methylPREDNISolone (MEDROL DOSEPAK) 4 MG Tablet Therapy Pack Follow schedule on package instructions. (Patient not taking: Reported on 9/15/2020) 21 Tab 0     No current facility-administered medications for this visit.         Patient is taking medications as noted in medication list.  Current supplements as per medication list.     Allergies: Codeine, Lisinopril, and Morphine    Current social contact/activities: Pt reports reading, watching movies, going to AA meetings before COVID, visit with family.     Is patient current with immunizations? No, due for FLU. Patient is interested in receiving NONE today.    She  reports that she has been smoking cigarettes. She has a 6.25 pack-year smoking history. She has never used smokeless tobacco. She reports current alcohol use. She reports that she does not use drugs.  Ready to quit: No  Counseling given: Not Answered  Comment: occ        DPA/Advanced directive: Patient has Advanced Directive, Living Will and Durable Power of , but it is not on file. Instructed to bring in a copy  to scan into their chart.    ROS:    Gait: Uses no assistive device   Ostomy: No   Other tubes: No   Amputations: No   Chronic oxygen use No   Last eye exam Pt reports within the last 6 months   Wears hearing aids: No   : Denies any urinary leakage during the last 6 months        Depression Screening    Little interest or pleasure in doing things?  0 - not at all  Feeling down, depressed, or hopeless? 0 - not at all  Patient Health Questionnaire Score: 0    If depressive symptoms identified deferred to follow up visit unless specifically addressed in assessment and plan.    Interpretation of PHQ-9 Total Score   Score Severity   1-4 No Depression   5-9 Mild Depression   10-14 Moderate Depression   15-19 Moderately Severe Depression   20-27 Severe Depression    Screening for Cognitive Impairment    Three Minute Recall (river, nation, finger)  2/3 Missed nation   [place] clock face with all 12 numbers and set the hands to show 10 past 11.  No 3/5  If cognitive concerns identified, deferred for follow up unless specifically addressed in assessment and plan.    Fall Risk Assessment    Has the patient had two or more falls in the last year or any fall with injury in the last year?  No  If fall risk identified, deferred for follow up unless specifically addressed in assessment and plan.    Safety Assessment    Throw rugs on floor.  Yes  Handrails on all stairs.  Yes  Good lighting in all hallways.  Yes  Difficulty hearing.  No  Patient counseled about all safety risks that were identified.    Functional Assessment ADLs    Are there any barriers preventing you from cooking for yourself or meeting nutritional needs?  No.    Are there any barriers preventing you from driving safely or obtaining transportation?  No.    Are there any barriers preventing you from using a telephone or calling for help?  No.    Are there any barriers preventing you from shopping?  No.    Are there any barriers preventing you from taking care of  your own finances?  No.    Are there any barriers preventing you from managing your medications?  No.    Are there any barriers preventing you from showering, bathing or dressing yourself?  No.    Are you currently engaging in any exercise or physical activity?  Yes.  Pt reports walking 30 mins a day   What is your perception of your health?  Excellent.    Health Maintenance Summary                Annual Wellness Visit Overdue 1951     PAP SMEAR Overdue 1972     MAMMOGRAM Overdue 2020      Done 2019 MA-SCREENING MAMMO BILAT W/TOMOSYNTHESIS W/CAD     Patient has more history with this topic...    IMM INFLUENZA Overdue 2020      Done 10/15/2018 Imm Admin: Influenza, Unspecified - HISTORICAL DATA     Patient has more history with this topic...    BONE DENSITY Next Due 2021      Done 2016 DS-BONE DENSITY STUDY (DEXA)     Patient has more history with this topic...    IMM DTaP/Tdap/Td Vaccine Next Due 2027      Done 2017 Imm Admin: Tdap Vaccine    COLONOSCOPY Next Due 3/22/2028      Done 3/22/2018 no polyps          Patient Care Team:  Sonali Trammell M.D. as PCP - General (Internal Medicine)  Kedar Estrada M.D. as Consulting Physician (Psychiatry)    Social History     Tobacco Use   • Smoking status: Current Every Day Smoker     Packs/day: 0.25     Years: 25.00     Pack years: 6.25     Types: Cigarettes     Last attempt to quit: 2011     Years since quittin.0   • Smokeless tobacco: Never Used   • Tobacco comment: occ   Substance Use Topics   • Alcohol use: Yes     Comment: occassionally. post rehab, 1/2 bottle of wine some days. no drinking now, sober X 17   • Drug use: No     Family History   Problem Relation Age of Onset   • Anxiety disorder Brother    • Depression Brother    • Alcohol abuse Mother    • Cancer Mother         colon cancer age 70s   • Alcohol abuse Father    • Cancer Sister         breast   • Drug abuse Sister    • Cancer Maternal Aunt         " breast   • Cancer Maternal Uncle         nos     She  has a past medical history of Alcohol abuse, Alcoholism (HCC), Anxiety, Depression, Hiatus hernia syndrome, Hypertension, Indigestion, Other specified disorder of intestines, Pneumonia, Psychiatric disorder, Sleep apnea, Snoring, Unspecified urinary incontinence, and Urinary bladder disorder.   Past Surgical History:   Procedure Laterality Date   • BLADDER SLING FEMALE  8/30/2011    Performed by RENATE STERLING at SURGERY [facility]S   • OTHER  2010    surgery for sleep apnea   • OTHER  2010    big toenails and second toenails removed   • OTHER ABDOMINAL SURGERY  2002    choley   • GYN SURGERY  1991    hyst   • OTHER  1956    tonsillectomy   • ABDOMINAL HYSTERECTOMY TOTAL      Hysterectomy, Total Abdominal   • BRIEN BY LAPAROSCOPY      Cholecystectomy, Laparoscopic           Exam:     /76   Pulse (!) 52   Temp 36.4 °C (97.6 °F) (Temporal)   Ht 1.6 m (5' 3\")   Wt 73.1 kg (161 lb 3.2 oz)   SpO2 93%  Body mass index is 28.56 kg/m².    Hearing good.    Dentition good  Alert, oriented in no acute distress.  Eye contact is good, speech goal directed, affect calm      Assessment and Plan. The following treatment and monitoring plan is recommended:    1. Dyslipidemia  Chronic issue which is not controlled with elevated ten-year CVD risk at 38.6%.  She is agreeable to restart statin, she was advised to stop the drug and call me if she develops any adverse side effects including myalgias.  Otherwise follow-up labs 3 months.  - atorvastatin (LIPITOR) 20 MG Tab; Take 1 Tab by mouth every day.  Dispense: 30 Tab; Refill: 3  - Lipid Profile; Future  - Comp Metabolic Panel; Future  - Initial Annual Wellness Visit - Includes PPPS ()    2. Polycythemia  Could be due to the chronic tobacco use.  Patient denies symptoms of sleep apnea.  With tobacco she does have potentially elevated risk of renal malignancy, plan to evaluate other sources of polycythemia.  - " URINALYSIS; Future  - JAK2 GENE MUT RFLX CALR RFLX MPL; Future  - ERYTHROPOIETIN; Future  - Initial Annual Wellness Visit - Includes PPPS ()    3. Impaired fasting blood sugar  New issue, plan to assess further.  - HEMOGLOBIN A1C; Future  - Initial Annual Wellness Visit - Includes PPPS ()    4. Essential hypertension  Stable, chronic well-controlled continue current management.  - Initial Annual Wellness Visit - Includes PPPS ()    5. Bradycardia  Stable, chronic and appears asymptomatic, reasonable to continue with current management.  - Initial Annual Wellness Visit - Includes PPPS ()    6. Depression, controlled  Continue to follow-up with her psychiatric team, patient states stable controlled, continue current therapy.  - Initial Annual Wellness Visit - Includes PPPS ()    7. Alcohol use disorder, moderate, dependence (HCC)  She has been attending AA meetings, which is been difficult with the pandemic.  Stable and chronic.  - Initial Annual Wellness Visit - Includes PPPS ()    8. Anxiety  Continue to follow-up with her psychiatric team, patient states stable controlled, continue current therapy.  - Initial Annual Wellness Visit - Includes PPPS ()    9. History of alcohol abuse  Continue to follow-up with her psychiatric team, patient states stable controlled, continue current therapy.  - Initial Annual Wellness Visit - Includes PPPS ()    10. Tobacco dependence  Plan to help assist patient to further cut down and quit once acute stressor of loss of cat is further processed.  - Initial Annual Wellness Visit - Includes PPPS ()    RTC 3 months    Consider further memory testing next appointment: Memory testing had 3 of 5 clock and 2 out of 3 word    Services suggested: No services needed at this time  Health Care Screening recommendations as per orders if indicated.  Referrals offered: PT/OT/Nutrition counseling/Behavioral Health/Smoking cessation as per orders if  indicated.    Discussion today about general wellness and lifestyle habits:    · Prevent falls and reduce trip hazards; Cautioned about securing or removing rugs.  · Have a working fire alarm and carbon monoxide detector;   · Engage in regular physical activity and social activities       Follow-up: 3m

## 2020-09-21 ENCOUNTER — OFFICE VISIT (OUTPATIENT)
Dept: MEDICAL GROUP | Facility: MEDICAL CENTER | Age: 70
End: 2020-09-21
Payer: MEDICARE

## 2020-09-21 VITALS
HEART RATE: 64 BPM | BODY MASS INDEX: 28.35 KG/M2 | OXYGEN SATURATION: 93 % | HEIGHT: 63 IN | DIASTOLIC BLOOD PRESSURE: 64 MMHG | RESPIRATION RATE: 16 BRPM | SYSTOLIC BLOOD PRESSURE: 122 MMHG | WEIGHT: 160 LBS | TEMPERATURE: 98 F

## 2020-09-21 DIAGNOSIS — M25.562 ACUTE PAIN OF LEFT KNEE: ICD-10-CM

## 2020-09-21 DIAGNOSIS — T50.905A ADVERSE EFFECT OF DRUG, INITIAL ENCOUNTER: ICD-10-CM

## 2020-09-21 DIAGNOSIS — E78.5 HYPERLIPIDEMIA, UNSPECIFIED HYPERLIPIDEMIA TYPE: ICD-10-CM

## 2020-09-21 PROCEDURE — 99214 OFFICE O/P EST MOD 30 MIN: CPT | Performed by: INTERNAL MEDICINE

## 2020-09-21 RX ORDER — ROSUVASTATIN CALCIUM 5 MG/1
5 TABLET, COATED ORAL
Qty: 30 TAB | Refills: 3 | Status: SHIPPED
Start: 2020-09-21 | End: 2020-10-27

## 2020-09-21 ASSESSMENT — FIBROSIS 4 INDEX: FIB4 SCORE: 1.51

## 2020-09-21 NOTE — PROGRESS NOTES
CC:  Diagnoses of Hyperlipidemia, unspecified hyperlipidemia type, Acute pain of left knee, and Adverse effect of drug, initial encounter were pertinent to this visit.    HISTORY OF THE PRESENT ILLNESS: Patient is a 70 y.o. female. This pleasant patient is here today to follow-up on new symptoms.    Last appointment we discussed that her lipid levels were high, and her calculated 10-year cardiovascular risk was 38%.  We started her on Lipitor 20 milligrams and she says within a day of starting the medication she developed rather severe left knee pain.  She had no prior trauma or injury for the knee pain.  She felt wobbly due to her knee on the second day, Thursday pain persisted and by day 4 of being off the statin her symptoms did resolve.  She has no current knee pain today in clinic, no redness, swelling or limited range of motion.  The pain would seem to be in the knee joint itself not in the muscles.  She feels that she needs to change her diet for history of dyslipidemia.  She is had no cardiopulmonary or strokelike symptoms.    Allergies: Codeine, Lipitor [atorvastatin calcium], Lisinopril, and Morphine    Current Outpatient Medications Ordered in Epic   Medication Sig Dispense Refill   • rosuvastatin (CRESTOR) 5 MG Tab Take 1 Tab by mouth every 48 hours. 30 Tab 3   • gabapentin (NEURONTIN) 300 MG Cap      • ALPRAZolam (XANAX) 1 MG Tab      • propranolol CR (INDERAL LA) 120 MG CAPSULE SR 24 HR Take 1 Cap by mouth every day. 30 Cap 11   • gabapentin (NEURONTIN) 800 MG tablet Take 800 mg by mouth 3 times a day.     • buPROPion (WELLBUTRIN XL) 300 MG XL tablet Take 300 mg by mouth every morning.     • citalopram (CELEXA) 40 MG Tab Take 40 mg by mouth every evening.     • omeprazole (PRILOSEC) 20 MG delayed-release capsule Take 20 mg by mouth every day.     • mirtazapine (REMERON) 15 MG Tab TAKE 1 TABLET BY MOUTH AT BEDTIME 30 Tab 0   • amitriptyline (ELAVIL) 10 MG Tab TAKE 1/2 TABLET BY MOUTH AT BEDTIME AS  NEEDED FOR SLEEP 15 Tab 0     No current Epic-ordered facility-administered medications on file.        Past Medical History:   Diagnosis Date   • Alcohol abuse    • Alcoholism (HCC)    • Anxiety    • Depression    • Hiatus hernia syndrome    • Hypertension    • Indigestion    • Other specified disorder of intestines     diarrhea   • Pneumonia     for 2 days   • Psychiatric disorder     anxiety, depression   • Sleep apnea    • Snoring    • Unspecified urinary incontinence    • Urinary bladder disorder        Past Surgical History:   Procedure Laterality Date   • BLADDER SLING FEMALE  2011    Performed by RENATE STERLING at SURGERY [facility]   • OTHER      surgery for sleep apnea   • OTHER      big toenails and second toenails removed   • OTHER ABDOMINAL SURGERY      choley   • GYN SURGERY      hyst   • OTHER      tonsillectomy   • ABDOMINAL HYSTERECTOMY TOTAL      Hysterectomy, Total Abdominal   • BRIEN BY LAPAROSCOPY      Cholecystectomy, Laparoscopic       Social History     Tobacco Use   • Smoking status: Current Every Day Smoker     Packs/day: 0.25     Years: 25.00     Pack years: 6.25     Types: Cigarettes     Last attempt to quit: 2011     Years since quittin.0   • Smokeless tobacco: Never Used   • Tobacco comment: occ   Substance Use Topics   • Alcohol use: Yes     Comment: occassionally. post rehab, 1/2 bottle of wine some days. no drinking now, sober X 17   • Drug use: No       Social History     Social History Narrative   • Not on file       Family History   Problem Relation Age of Onset   • Anxiety disorder Brother    • Depression Brother    • Alcohol abuse Mother    • Cancer Mother         colon cancer age 70s   • Alcohol abuse Father    • Cancer Sister         breast   • Drug abuse Sister    • Cancer Maternal Aunt         breast   • Cancer Maternal Uncle         nos       ROS:     - Constitutional: Negative for fever, chills     - Eyes:   Negative for eye  "pain, discharge    - ENT:  Negative for  sore throat     - Respiratory: Negative for cough    - Cardiovascular: Negative for chest pain, palpitations, orthopnea, and bilateral lower extremity edema.     - Gastrointestinal: Negative for abdominal pain    - Genitourinary: Negative for dysuria    - Musculoskeletal: See HPI    - Skin: Negative for rash, itching, cyanotic skin color change.     - Neurological: Negative for vertigo    - Endo:Negative for polyuria, heat/cold intolerance, excessive thirst    - Hem/lymphatic: Negative for swollen glands    -Allergic/immun: Negative for allergic rhinitis    - Psychiatric/Behavioral: Negative for depression, suicidal/homicidal ideation and memory loss.      Exam: /64 (BP Location: Left arm, Patient Position: Sitting, BP Cuff Size: Adult)   Pulse 64   Temp 36.7 °C (98 °F) (Temporal)   Resp 16   Ht 1.6 m (5' 3\")   Wt 72.6 kg (160 lb)   SpO2 93%  Body mass index is 28.34 kg/m².    General: Normal appearing. No distress.  EYES: Conjunctiva clear lids without ptosis, pupils equal  EARS: Normal shape and contour   Pulmonary: Clear to ausculation.  Normal effort. No rales or wheezing.  Cardiovascular: Regular rate and rhythm without significant murmur.   Abdomen: Soft, nontender, nondistended. Normal bowel sounds.  Neurologic: Cranial nerves grossly nonfocal  Skin: Warm and dry.  No obvious lesions.  Musculoskeletal: Normal gait. No extremity cyanosis, clubbing, or edema.  Left knee no focal tenderness, redness, heat, etc.   psych: Normal mood and affect. Alert and oriented x3. Judgment and insight is normal.    Assessment/Plan  1. Hyperlipidemia, unspecified hyperlipidemia type  Change Lipitor to low-dose Crestor to be taken every other day.  If new myalgias or arthralgias develop she may stop and call me.  Plan for cardiac CT score to assess for plaque burden in the coronary arteries.  She has remained asymptomatic from a cardiac perspective.  Per up-to-date on Lipitor " 5% incidence of musculoskeletal pain, 2% or so joint swelling and limb pain of 9%.  - rosuvastatin (CRESTOR) 5 MG Tab; Take 1 Tab by mouth every 48 hours.  Dispense: 30 Tab; Refill: 3  - CT-CARDIAC SCORING; Future    2. Acute pain of left knee  The pain has since resolved, patient feels it was attributed to the Lipitor.    3. Adverse effect of drug, initial encounter  See discussion above, knee pain after starting Lipitor.        RTC in 3 months or sooner if needed      Please note that this dictation was created using voice recognition software. I have made every reasonable attempt to correct obvious errors, but I expect that there are errors of grammar and possibly content that I did not discover before finalizing the note.

## 2020-09-26 ENCOUNTER — APPOINTMENT (OUTPATIENT)
Dept: RADIOLOGY | Facility: MEDICAL CENTER | Age: 70
End: 2020-09-26
Attending: EMERGENCY MEDICINE
Payer: MEDICARE

## 2020-09-26 ENCOUNTER — HOSPITAL ENCOUNTER (EMERGENCY)
Facility: MEDICAL CENTER | Age: 70
End: 2020-09-26
Attending: EMERGENCY MEDICINE
Payer: MEDICARE

## 2020-09-26 VITALS
TEMPERATURE: 97 F | RESPIRATION RATE: 16 BRPM | DIASTOLIC BLOOD PRESSURE: 93 MMHG | OXYGEN SATURATION: 92 % | BODY MASS INDEX: 28.52 KG/M2 | WEIGHT: 160.94 LBS | SYSTOLIC BLOOD PRESSURE: 165 MMHG | HEIGHT: 63 IN | HEART RATE: 70 BPM

## 2020-09-26 DIAGNOSIS — S61.412A LACERATION OF LEFT HAND WITHOUT FOREIGN BODY, INITIAL ENCOUNTER: ICD-10-CM

## 2020-09-26 PROCEDURE — 73130 X-RAY EXAM OF HAND: CPT | Mod: LT

## 2020-09-26 PROCEDURE — 700111 HCHG RX REV CODE 636 W/ 250 OVERRIDE (IP): Performed by: EMERGENCY MEDICINE

## 2020-09-26 PROCEDURE — 90715 TDAP VACCINE 7 YRS/> IM: CPT | Performed by: EMERGENCY MEDICINE

## 2020-09-26 PROCEDURE — 304217 HCHG IRRIGATION SYSTEM

## 2020-09-26 PROCEDURE — 90471 IMMUNIZATION ADMIN: CPT

## 2020-09-26 PROCEDURE — 700101 HCHG RX REV CODE 250: Performed by: EMERGENCY MEDICINE

## 2020-09-26 PROCEDURE — 99283 EMERGENCY DEPT VISIT LOW MDM: CPT

## 2020-09-26 RX ADMIN — CLOSTRIDIUM TETANI TOXOID ANTIGEN (FORMALDEHYDE INACTIVATED), CORYNEBACTERIUM DIPHTHERIAE TOXOID ANTIGEN (FORMALDEHYDE INACTIVATED), BORDETELLA PERTUSSIS TOXOID ANTIGEN (GLUTARALDEHYDE INACTIVATED), BORDETELLA PERTUSSIS FILAMENTOUS HEMAGGLUTININ ANTIGEN (FORMALDEHYDE INACTIVATED), BORDETELLA PERTUSSIS PERTACTIN ANTIGEN, AND BORDETELLA PERTUSSIS FIMBRIAE 2/3 ANTIGEN 0.5 ML: 5; 2; 2.5; 5; 3; 5 INJECTION, SUSPENSION INTRAMUSCULAR at 17:41

## 2020-09-26 RX ADMIN — TETRACAINE HCL 3 ML: 10 INJECTION SUBARACHNOID at 17:41

## 2020-09-26 ASSESSMENT — FIBROSIS 4 INDEX: FIB4 SCORE: 1.51

## 2020-09-27 NOTE — ED NOTES
Discharged to home with instructions to follow up with her doctor. Taught signs of infection and the need to see a doctor is signs of infection occur.

## 2020-09-27 NOTE — ED PROVIDER NOTES
"ED Provider Note    CHIEF COMPLAINT  Chief Complaint   Patient presents with   • T-5000 FALL   • Arm Injury       HPI  Marcia Koch is a 70 y.o. female who presents with left hand pain.  The patient states last night her pillow fell off her bed and she leaned over to get the pillow and fell off her bed.  Somehow she cut the palmar aspect of her left hand and injured her hand over the second metacarpal.  The patient is unaware of any other injuries.  She does not have any wrist nor elbow pain.  She states her last tetanus shot was about 10 years ago.    REVIEW OF SYSTEMS  No other musculoskeletal complaints, no recent fever, no known sick contacts    PHYSICAL EXAM  VITAL SIGNS: BP (!) 165/93   Pulse 70   Temp 36.1 °C (97 °F) (Temporal)   Resp 16   Ht 1.6 m (5' 3\")   Wt 73 kg (160 lb 15 oz)   SpO2 92%   BMI 28.51 kg/m²   In general the patient does not appear toxic    Extremities the patient has a skin tear that is very superficial to the palmar aspect of the left hand over the distal second metacarpal.  She also has tenderness in this region with no obvious deformities.  She has full flexion and extension at the IP joints as well as the MCP joint.    Skin 2 cm skin tear described above    Neurovascular examination is intact to the left hand    RADIOLOGY/PROCEDURES  DX-HAND 3+ LEFT   Final Result      Calcific density adjacent to the base of the proximal phalanx of the first digit may be related to prior injury.      Degenerative changes.      Demineralization.               COURSE & MEDICAL DECISION MAKING  Pertinent Labs & Imaging studies reviewed. (See chart for details)  This a 70-year-old female who presents the emergency department after a fall.  She does have a skin tear to the palmar aspect of her left hand that is not amenable to primary closure due to the thin skin.  I did apply topical LET.  After approximately 15 minutes I removed the dressing and irrigated the wound.  I then reapplied the " loose skin and applied antibiotic ointment as well as a dressing.  The patient will remove the dressing about 24 hours.  She will return for any signs of infection.  X-ray does not show any evidence of a fracture.  I suspect her pain is from a contusion as well as the skin tear.  The patient did receive tetanus prophylaxis.    FINAL IMPRESSION  1.  2 cm left hand laceration  2.  Left hand palmar contusion         Disposition  The patient will be discharged in stable condition      Electronically signed by: Abraham Ceja M.D., 9/26/2020 5:23 PM

## 2020-09-29 ENCOUNTER — TELEMEDICINE (OUTPATIENT)
Dept: MEDICAL GROUP | Facility: MEDICAL CENTER | Age: 70
End: 2020-09-29
Payer: MEDICARE

## 2020-09-29 VITALS — BODY MASS INDEX: 28.35 KG/M2 | HEIGHT: 63 IN | WEIGHT: 160 LBS | TEMPERATURE: 97.7 F

## 2020-09-29 DIAGNOSIS — M25.532 LEFT WRIST PAIN: ICD-10-CM

## 2020-09-29 DIAGNOSIS — I10 ESSENTIAL HYPERTENSION: ICD-10-CM

## 2020-09-29 DIAGNOSIS — E78.5 DYSLIPIDEMIA: ICD-10-CM

## 2020-09-29 PROCEDURE — 99214 OFFICE O/P EST MOD 30 MIN: CPT | Mod: 95,CR | Performed by: INTERNAL MEDICINE

## 2020-09-29 ASSESSMENT — FIBROSIS 4 INDEX: FIB4 SCORE: 1.51

## 2020-09-29 NOTE — PROGRESS NOTES
Telemedicine: Established Patient   This evaluation was conducted via doximetry using secure and encrypted videoconferencing technology. The patient was in a private location in the state [facility].    The patient's identity was confirmed and verbal consent was obtained for this virtual visit.    Subjective:   CC:   Chief Complaint   Patient presents with   • Wrist Injury     SPRAINED       Marcia Koch is a 70 y.o. female presenting for evaluation and management of:    Seen ER 9/26/2020 for arm injury after fall (see ER notes for details).  She had left hand pain.  X-ray showed degenerative changes and possible signs of prior injury near the base of proximal first digit due to calcific density.  Her small superficial skin tear was treated.  She feels overall since injury she is getting better.  Using Advil per the bottle up to 4 times per day for when she has more severe pain.  The bruising near her wrist is becoming light purple in color.  She can move all of her fingers full flexion extension, no focal bony pain, no severe swelling.    Since starting the Crestor last appointment she is only had a chance to use 1 dose, no concerning symptoms so far she will let me know how this goes.    Blood pressures well controlled, even with the pain in her wrist today, her measurement was 118/83.    ROS    No chest pain, dyspnea, fever, chills    Allergies   Allergen Reactions   • Codeine Vomiting   • Lipitor [Atorvastatin Calcium]      See note, severe L knee pain within one day of taking medicatoin   • Lisinopril      Cough     • Morphine Vomiting       Current medicines (including changes today)  Current Outpatient Medications   Medication Sig Dispense Refill   • rosuvastatin (CRESTOR) 5 MG Tab Take 1 Tab by mouth every 48 hours. 30 Tab 3   • gabapentin (NEURONTIN) 300 MG Cap      • ALPRAZolam (XANAX) 1 MG Tab      • propranolol CR (INDERAL LA) 120 MG CAPSULE SR 24 HR Take 1 Cap by mouth every day. 30 Cap 11   •  gabapentin (NEURONTIN) 800 MG tablet Take 800 mg by mouth 3 times a day.     • buPROPion (WELLBUTRIN XL) 300 MG XL tablet Take 300 mg by mouth every morning.     • citalopram (CELEXA) 40 MG Tab Take 40 mg by mouth every evening.     • omeprazole (PRILOSEC) 20 MG delayed-release capsule Take 20 mg by mouth every day.     • mirtazapine (REMERON) 15 MG Tab TAKE 1 TABLET BY MOUTH AT BEDTIME 30 Tab 0   • amitriptyline (ELAVIL) 10 MG Tab TAKE 1/2 TABLET BY MOUTH AT BEDTIME AS NEEDED FOR SLEEP 15 Tab 0     No current facility-administered medications for this visit.        Patient Active Problem List    Diagnosis Date Noted   • Polycythemia 09/15/2020   • History of alcohol abuse 08/12/2020   • Tobacco dependence 08/12/2020   • Anxiety 05/11/2017   • Alcohol use disorder, moderate, dependence (HCC) 04/07/2017   • Moderate sedative, hypnotic, or anxiolytic use disorder 04/07/2017   • HTN (hypertension) 07/07/2011   • Bradycardia 07/07/2011   • Dyslipidemia 07/07/2011   • Depression, controlled 07/07/2011       Family History   Problem Relation Age of Onset   • Anxiety disorder Brother    • Depression Brother    • Alcohol abuse Mother    • Cancer Mother         colon cancer age 70s   • Alcohol abuse Father    • Cancer Sister         breast   • Drug abuse Sister    • Cancer Maternal Aunt         breast   • Cancer Maternal Uncle         nos       She  has a past medical history of Alcohol abuse, Alcoholism (HCC), Anxiety, Depression, Hiatus hernia syndrome, Hypertension, Indigestion, Other specified disorder of intestines, Pneumonia, Psychiatric disorder, Sleep apnea, Snoring, Unspecified urinary incontinence, and Urinary bladder disorder.  She  has a past surgical history that includes gyn surgery (1991); other abdominal surgery (2002); other (2010); other (1956); other (2010); bladder sling female (8/30/2011); teddy by laparoscopy; and abdominal hysterectomy total.       Objective:   Temp 36.5 °C (97.7 °F) (Oral)   Ht 1.6 m  "(5' 3\")   Wt 72.6 kg (160 lb)   BMI 28.34 kg/m²     Physical Exam  Constitutional: Alert, no distress, well-groomed.  Skin: No rashes in visible areas.  Eye: Round. Conjunctiva clear, lids normal. No icterus.   ENMT: Lips pink without lesions, good dentition, moist mucous membranes. Phonation normal.  Neck: No masses, no thyromegaly. Moves freely without pain.  CV: Pulse as reported by patient  Musculoskeletal: The left wrist and hand was visualized, fingers normal in color and full range of motion, intact range of motion of the wrist, light purple bruise over her wrist without any significant swelling, bony landmarks are still visualized.  Respiratory: Unlabored respiratory effort, no cough or audible wheeze  Psych: Alert and oriented x3, normal affect and mood.   Assessment and Plan:   The following treatment plan was discussed:     1. Dyslipidemia  Plan to reassess once she has been on Crestor for some time.     2. Essential hypertension  Stable, chronic, controlled, c/w current mgmt.     3. Left wrist pain  Wrist strain symptoms are improving  C/w conservative mgmt, she will let me know if does not continue to improve.  If needed can refer to OT hand therapist, and/or ortho.       Follow-up: prn         "

## 2020-10-05 ENCOUNTER — PATIENT MESSAGE (OUTPATIENT)
Dept: MEDICAL GROUP | Facility: MEDICAL CENTER | Age: 70
End: 2020-10-05

## 2020-10-05 DIAGNOSIS — F10.20 ALCOHOLISM (HCC): ICD-10-CM

## 2020-10-06 ENCOUNTER — OFFICE VISIT (OUTPATIENT)
Dept: MEDICAL GROUP | Facility: MEDICAL CENTER | Age: 70
End: 2020-10-06
Payer: MEDICARE

## 2020-10-06 VITALS
SYSTOLIC BLOOD PRESSURE: 112 MMHG | BODY MASS INDEX: 28.67 KG/M2 | OXYGEN SATURATION: 94 % | TEMPERATURE: 97.1 F | RESPIRATION RATE: 16 BRPM | DIASTOLIC BLOOD PRESSURE: 66 MMHG | HEART RATE: 63 BPM | HEIGHT: 63 IN | WEIGHT: 161.82 LBS

## 2020-10-06 DIAGNOSIS — I10 ESSENTIAL HYPERTENSION: ICD-10-CM

## 2020-10-06 DIAGNOSIS — F10.20 ALCOHOL USE DISORDER, MODERATE, DEPENDENCE (HCC): ICD-10-CM

## 2020-10-06 DIAGNOSIS — F32.A DEPRESSION, CONTROLLED: ICD-10-CM

## 2020-10-06 PROCEDURE — 99214 OFFICE O/P EST MOD 30 MIN: CPT | Performed by: INTERNAL MEDICINE

## 2020-10-06 ASSESSMENT — FIBROSIS 4 INDEX: FIB4 SCORE: 1.51

## 2020-10-06 NOTE — PROGRESS NOTES
"CC:  Diagnoses of Alcohol use disorder, moderate, dependence (HCC), Essential hypertension, and Depression, controlled were pertinent to this visit.    HISTORY OF THE PRESENT ILLNESS: Patient is a 70 y.o. female. This pleasant patient is here today follow-up concerns over alcohol.    Marcia's health  Jennifer senescent email with her permission, concern over alcohol use and risk for DUI and other complications.    Marcia tells me she had been sober in  for nearly 3 years, though once the classes went online, due to the pandemic, she says it was not the \"same touchy feeling \"that she really liked and she got out of the habit of attending.  She says her mood has been worse because her sister is not speaking to her.  Her cat of 20 years .  She is still close with her father who is in his 80s and she worries about losing him.  This in general makes her feel very lonely.  Her health  spends an hour with her twice a week.  She is on gabapentin taking 800 mg 3 times daily.  Noted other sedating drugs at night to include mirtazapine, amitriptyline.  She does not think the amitriptyline 10 mg is doing a whole lot so we plan to discontinue this and monitor.  When I asked her about her alcohol use she says she does not drink daily, she has 1 bottle of wine per week.  She thinks the bottle standard 750 cc.  Trying to ask her how much she actually drinks when she does drink, she only admits to 1 glass/day of white wine.  She says she is eating regularly, cooking at home.  Trying to stay busy with jigsaw puzzles.  She feels her current dose of SSRI medication is reasonable and noted probably would not be recommended to go higher given age.  Noted she is also on Wellbutrin.  She has no history of alcohol withdrawal, seizures, delirium tremens/hallucinations per patient.  He says she does not get tremulous during the day.    Regarding her to recent fall she absolutely denies alcohol use being a contributing factor, " states she did have any alcohol in those days.  She says first fall was 3 in the morning when she reached for her pillow and fell out of bed.  Another time was putting a political sign in her yard and the grass/soil was softer than she had anticipated and when she put the sign in the ground she lost her balance and fell.  She does have a gash on her left palm, right lower shin that she is watching closely, no expanding erythema, drainage, etc. she says she has been able to bandage it on her own.    Allergies: Codeine, Lipitor [atorvastatin calcium], Lisinopril, and Morphine    Current Outpatient Medications Ordered in Epic   Medication Sig Dispense Refill   • rosuvastatin (CRESTOR) 5 MG Tab Take 1 Tab by mouth every 48 hours. 30 Tab 3   • ALPRAZolam (XANAX) 1 MG Tab      • propranolol CR (INDERAL LA) 120 MG CAPSULE SR 24 HR Take 1 Cap by mouth every day. 30 Cap 11   • gabapentin (NEURONTIN) 800 MG tablet Take 800 mg by mouth 3 times a day.     • buPROPion (WELLBUTRIN XL) 300 MG XL tablet Take 300 mg by mouth every morning.     • citalopram (CELEXA) 40 MG Tab Take 40 mg by mouth every evening.     • omeprazole (PRILOSEC) 20 MG delayed-release capsule Take 20 mg by mouth every day.     • mirtazapine (REMERON) 15 MG Tab TAKE 1 TABLET BY MOUTH AT BEDTIME 30 Tab 0     No current Epic-ordered facility-administered medications on file.        Past Medical History:   Diagnosis Date   • Alcohol abuse    • Alcoholism (HCC)    • Anxiety    • Depression    • Hiatus hernia syndrome    • Hypertension    • Indigestion    • Other specified disorder of intestines     diarrhea   • Pneumonia     for 2 days   • Psychiatric disorder     anxiety, depression   • Sleep apnea    • Snoring    • Unspecified urinary incontinence    • Urinary bladder disorder        Past Surgical History:   Procedure Laterality Date   • BLADDER SLING FEMALE  8/30/2011    Performed by RENATE STERLING at SURGERY [facility]   • OTHER  2010    surgery for  sleep apnea   • OTHER      big toenails and second toenails removed   • OTHER ABDOMINAL SURGERY      choley   • GYN SURGERY      hyst   • OTHER      tonsillectomy   • ABDOMINAL HYSTERECTOMY TOTAL      Hysterectomy, Total Abdominal   • BRIEN BY LAPAROSCOPY      Cholecystectomy, Laparoscopic       Social History     Tobacco Use   • Smoking status: Current Every Day Smoker     Packs/day: 0.25     Years: 25.00     Pack years: 6.25     Types: Cigarettes     Last attempt to quit: 2011     Years since quittin.1   • Smokeless tobacco: Never Used   • Tobacco comment: occ   Substance Use Topics   • Alcohol use: Yes     Comment: occassionally. post rehab, 1/2 bottle of wine some days. no drinking now, sober X 17   • Drug use: No       Social History     Social History Narrative   • Not on file       Family History   Problem Relation Age of Onset   • Anxiety disorder Brother    • Depression Brother    • Alcohol abuse Mother    • Cancer Mother         colon cancer age 70s   • Alcohol abuse Father    • Cancer Sister         breast   • Drug abuse Sister    • Cancer Maternal Aunt         breast   • Cancer Maternal Uncle         nos       ROS:     - Constitutional: Negative for fever, chills     - Eyes:   Negative for eye pain, discharge    - ENT:  Negative for  sore throat     - Respiratory: Negative for cough    - Cardiovascular: Negative for chest pain    - Gastrointestinal: Negative for abdominal pain    - Genitourinary: Negative for dysuria    - Musculoskeletal: Only from recent injury see HPI.  She does not think she has any broken bones or need for imaging    - Skin: Skin injuries from recent fall see HPI    - Neurological: Negative forvertigo    - Endo:Negative for polyuria, heat/cold intolerance, excessive thirst    - Hem/lymphatic: Negative for swollen glands    -Allergic/immun: Negative for allergic rhinitis    - Psychiatric/Behavioral: Negative for depression, suicidal/homicidal ideation and  "memory loss.      Exam: /66 (BP Location: Right arm, Patient Position: Sitting, BP Cuff Size: Adult)   Pulse 63   Temp 36.2 °C (97.1 °F) (Temporal)   Resp 16   Ht 1.6 m (5' 3\")   Wt 73.4 kg (161 lb 13.1 oz)   SpO2 94%  Body mass index is 28.66 kg/m².    General: Normal appearing. No distress.  Properly dressed.  Does not appear intoxicated at today's visit.  EYES: Conjunctiva clear lids without ptosis, pupils equal  EARS: Normal shape and contour   Pulmonary: Clear to ausculation.  Normal effort. No rales or wheezing.  Cardiovascular: Regular rate and rhythm without significant murmur.   Abdomen: Soft, nontender, nondistended. Normal bowel sounds.  Neurologic: Cranial nerves grossly nonfocal.  No tremor  Skin: Warm and dry.  The regions of skin tear there is no expanding erythema, no drainage, no induration, no fluctuance.  Musculoskeletal: Normal gait. No extremity cyanosis, clubbing, or edema.  Psych: Normal mood and affect. Alert and oriented x3. Judgment and insight is normal.      Assessment/Plan  1. Alcohol use disorder, moderate, dependence (HCC)  After sustained sobriety on AA, patient has started drinking alcohol again in the setting of stressors due to the pandemic.  May be under reporting alcohol.  She is agreeable to establishing with a psychiatrist as well as a psychologist.  Team did fax a referral to [facility], she says that she is agreeable to having care here as she feels she would benefit from in person counseling.  She is already on gabapentin which can potentially help curb alcohol cravings.  She does not need any immediate admission, she is not experiencing withdrawal symptoms, she denies history of seizure, delirium tremens.    2. Essential hypertension  Stable, chronic and currently controlled on current treatment.    3. Depression, controlled  Patient does not wish to have any change in her depression medication at this time she is on Celexa 40 mg daily, Wellbutrin " 300 mg daily and additionally mirtazapine 15 mg nightly for sleep.  However she is agreeable stop amitriptyline 10 mg at night as she does not feel this is very helpful and likely this just add sedation risks to her already mirtazapine and gabapentin doses.        RTC within the month          Please note that this dictation was created using voice recognition software. I have made every reasonable attempt to correct obvious errors, but I expect that there are errors of grammar and possibly content that I did not discover before finalizing the note.

## 2020-10-14 ENCOUNTER — HOSPITAL ENCOUNTER (OUTPATIENT)
Dept: RADIOLOGY | Facility: MEDICAL CENTER | Age: 70
End: 2020-10-14
Attending: INTERNAL MEDICINE
Payer: COMMERCIAL

## 2020-10-14 DIAGNOSIS — E78.5 HYPERLIPIDEMIA, UNSPECIFIED HYPERLIPIDEMIA TYPE: ICD-10-CM

## 2020-10-14 PROCEDURE — 4410556 CT-CARDIAC SCORING

## 2020-10-26 ENCOUNTER — HOSPITAL ENCOUNTER (OUTPATIENT)
Dept: LAB | Facility: MEDICAL CENTER | Age: 70
End: 2020-10-26
Attending: INTERNAL MEDICINE
Payer: MEDICARE

## 2020-10-26 DIAGNOSIS — R73.01 IMPAIRED FASTING BLOOD SUGAR: ICD-10-CM

## 2020-10-26 DIAGNOSIS — E78.5 DYSLIPIDEMIA: ICD-10-CM

## 2020-10-26 DIAGNOSIS — D75.1 POLYCYTHEMIA: ICD-10-CM

## 2020-10-26 LAB
ALBUMIN SERPL BCP-MCNC: 4.2 G/DL (ref 3.2–4.9)
ALBUMIN/GLOB SERPL: 1.6 G/DL
ALP SERPL-CCNC: 126 U/L
ALT SERPL-CCNC: 25 U/L (ref 2–50)
ANION GAP SERPL CALC-SCNC: 9 MMOL/L (ref 7–16)
APPEARANCE UR: ABNORMAL
AST SERPL-CCNC: 31 U/L (ref 12–45)
BACTERIA #/AREA URNS HPF: ABNORMAL /HPF
BILIRUB SERPL-MCNC: 0.5 MG/DL (ref 0.1–1.5)
BILIRUB UR QL STRIP.AUTO: NEGATIVE
BUN SERPL-MCNC: 10 MG/DL (ref 8–22)
CALCIUM SERPL-MCNC: 9.5 MG/DL (ref 8.5–10.5)
CHLORIDE SERPL-SCNC: 100 MMOL/L (ref 96–112)
CHOLEST SERPL-MCNC: 289 MG/DL (ref 100–199)
CO2 SERPL-SCNC: 29 MMOL/L (ref 20–33)
COLOR UR: YELLOW
CREAT SERPL-MCNC: 0.61 MG/DL (ref 0.5–1.4)
EPI CELLS #/AREA URNS HPF: ABNORMAL /HPF
EST. AVERAGE GLUCOSE BLD GHB EST-MCNC: 114 MG/DL
FASTING STATUS PATIENT QL REPORTED: NORMAL
GLOBULIN SER CALC-MCNC: 2.7 G/DL (ref 1.9–3.5)
GLUCOSE SERPL-MCNC: 102 MG/DL (ref 65–99)
GLUCOSE UR STRIP.AUTO-MCNC: NEGATIVE MG/DL
HBA1C MFR BLD: 5.6 % (ref 0–5.6)
HDLC SERPL-MCNC: 49 MG/DL
HYALINE CASTS #/AREA URNS LPF: ABNORMAL /LPF
KETONES UR STRIP.AUTO-MCNC: NEGATIVE MG/DL
LDLC SERPL CALC-MCNC: 193 MG/DL
LEUKOCYTE ESTERASE UR QL STRIP.AUTO: ABNORMAL
MICRO URNS: ABNORMAL
NITRITE UR QL STRIP.AUTO: NEGATIVE
PH UR STRIP.AUTO: 5 [PH] (ref 5–8)
POTASSIUM SERPL-SCNC: 4.2 MMOL/L (ref 3.6–5.5)
PROT SERPL-MCNC: 6.9 G/DL (ref 6–8.2)
PROT UR QL STRIP: NEGATIVE MG/DL
RBC # URNS HPF: ABNORMAL /HPF
RBC UR QL AUTO: NEGATIVE
RENAL EPI CELLS #/AREA URNS HPF: ABNORMAL /HPF
SODIUM SERPL-SCNC: 138 MMOL/L (ref 135–145)
SP GR UR STRIP.AUTO: 1.02
TRIGL SERPL-MCNC: 234 MG/DL (ref 0–149)
UROBILINOGEN UR STRIP.AUTO-MCNC: 0.2 MG/DL
WBC #/AREA URNS HPF: ABNORMAL /HPF

## 2020-10-26 PROCEDURE — 81402 MOPATH PROCEDURE LEVEL 3: CPT

## 2020-10-26 PROCEDURE — 83036 HEMOGLOBIN GLYCOSYLATED A1C: CPT

## 2020-10-26 PROCEDURE — 81001 URINALYSIS AUTO W/SCOPE: CPT

## 2020-10-26 PROCEDURE — 36415 COLL VENOUS BLD VENIPUNCTURE: CPT

## 2020-10-26 PROCEDURE — 81219 CALR GENE COM VARIANTS: CPT

## 2020-10-26 PROCEDURE — 80053 COMPREHEN METABOLIC PANEL: CPT

## 2020-10-26 PROCEDURE — 80061 LIPID PANEL: CPT

## 2020-10-26 PROCEDURE — 81270 JAK2 GENE: CPT

## 2020-10-26 PROCEDURE — 82668 ASSAY OF ERYTHROPOIETIN: CPT

## 2020-10-27 ENCOUNTER — OFFICE VISIT (OUTPATIENT)
Dept: MEDICAL GROUP | Facility: MEDICAL CENTER | Age: 70
End: 2020-10-27
Payer: MEDICARE

## 2020-10-27 VITALS
WEIGHT: 161.8 LBS | RESPIRATION RATE: 16 BRPM | OXYGEN SATURATION: 91 % | SYSTOLIC BLOOD PRESSURE: 106 MMHG | HEART RATE: 58 BPM | BODY MASS INDEX: 28.67 KG/M2 | TEMPERATURE: 97.3 F | DIASTOLIC BLOOD PRESSURE: 64 MMHG | HEIGHT: 63 IN

## 2020-10-27 DIAGNOSIS — D75.1 POLYCYTHEMIA: ICD-10-CM

## 2020-10-27 DIAGNOSIS — S81.801S WOUND OF RIGHT LOWER EXTREMITY, SEQUELA: ICD-10-CM

## 2020-10-27 DIAGNOSIS — E78.5 DYSLIPIDEMIA: ICD-10-CM

## 2020-10-27 DIAGNOSIS — L03.818 CELLULITIS OF OTHER SPECIFIED SITE: ICD-10-CM

## 2020-10-27 DIAGNOSIS — F10.11 HISTORY OF ALCOHOL ABUSE: ICD-10-CM

## 2020-10-27 PROCEDURE — 99214 OFFICE O/P EST MOD 30 MIN: CPT | Performed by: INTERNAL MEDICINE

## 2020-10-27 RX ORDER — CEPHALEXIN 500 MG/1
500 CAPSULE ORAL 4 TIMES DAILY
Qty: 20 CAP | Refills: 0 | Status: SHIPPED | OUTPATIENT
Start: 2020-10-27 | End: 2020-11-01

## 2020-10-27 ASSESSMENT — FIBROSIS 4 INDEX: FIB4 SCORE: 1.91

## 2020-10-28 ENCOUNTER — APPOINTMENT (RX ONLY)
Dept: URBAN - METROPOLITAN AREA CLINIC 4 | Facility: CLINIC | Age: 70
Setting detail: DERMATOLOGY
End: 2020-10-28

## 2020-10-28 ENCOUNTER — APPOINTMENT (OUTPATIENT)
Dept: RADIOLOGY | Facility: MEDICAL CENTER | Age: 70
End: 2020-10-28
Attending: EMERGENCY MEDICINE
Payer: MEDICARE

## 2020-10-28 ENCOUNTER — HOSPITAL ENCOUNTER (EMERGENCY)
Facility: MEDICAL CENTER | Age: 70
End: 2020-10-28
Attending: EMERGENCY MEDICINE | Admitting: EMERGENCY MEDICINE
Payer: MEDICARE

## 2020-10-28 VITALS
WEIGHT: 164.46 LBS | RESPIRATION RATE: 18 BRPM | HEIGHT: 63 IN | SYSTOLIC BLOOD PRESSURE: 109 MMHG | HEART RATE: 55 BPM | DIASTOLIC BLOOD PRESSURE: 54 MMHG | OXYGEN SATURATION: 92 % | TEMPERATURE: 98.1 F | BODY MASS INDEX: 29.14 KG/M2

## 2020-10-28 DIAGNOSIS — D18.0 HEMANGIOMA: ICD-10-CM

## 2020-10-28 DIAGNOSIS — D22 MELANOCYTIC NEVI: ICD-10-CM

## 2020-10-28 DIAGNOSIS — S80.811A: ICD-10-CM

## 2020-10-28 DIAGNOSIS — T14.8XXA NONHEALING NONSURGICAL WOUND: ICD-10-CM

## 2020-10-28 DIAGNOSIS — Z85.828 PERSONAL HISTORY OF OTHER MALIGNANT NEOPLASM OF SKIN: ICD-10-CM

## 2020-10-28 DIAGNOSIS — L82.1 OTHER SEBORRHEIC KERATOSIS: ICD-10-CM

## 2020-10-28 DIAGNOSIS — T81.89 OTHER COMPLICATIONS OF PROCEDURES, NOT ELSEWHERE CLASSIFIED: ICD-10-CM

## 2020-10-28 DIAGNOSIS — T14.8XXA ABRASION: ICD-10-CM

## 2020-10-28 DIAGNOSIS — L81.4 OTHER MELANIN HYPERPIGMENTATION: ICD-10-CM

## 2020-10-28 DIAGNOSIS — L70.8 OTHER ACNE: ICD-10-CM

## 2020-10-28 DIAGNOSIS — Z71.89 OTHER SPECIFIED COUNSELING: ICD-10-CM

## 2020-10-28 PROBLEM — D18.01 HEMANGIOMA OF SKIN AND SUBCUTANEOUS TISSUE: Status: ACTIVE | Noted: 2020-10-28

## 2020-10-28 PROBLEM — T81.89XA OTHER COMPLICATIONS OF PROCEDURES, NOT ELSEWHERE CLASSIFIED, INITIAL ENCOUNTER: Status: ACTIVE | Noted: 2020-10-28

## 2020-10-28 PROBLEM — D22.9 MELANOCYTIC NEVI, UNSPECIFIED: Status: ACTIVE | Noted: 2020-10-28

## 2020-10-28 LAB
ALBUMIN SERPL BCP-MCNC: 4.1 G/DL (ref 3.2–4.9)
ALBUMIN/GLOB SERPL: 1.6 G/DL
ALP SERPL-CCNC: 144 U/L
ALT SERPL-CCNC: 21 U/L (ref 2–50)
ANION GAP SERPL CALC-SCNC: 9 MMOL/L (ref 7–16)
AST SERPL-CCNC: 24 U/L (ref 12–45)
BASOPHILS # BLD AUTO: 0.7 % (ref 0–1.8)
BASOPHILS # BLD: 0.05 K/UL (ref 0–0.12)
BILIRUB SERPL-MCNC: 0.2 MG/DL (ref 0.1–1.5)
BUN SERPL-MCNC: 8 MG/DL (ref 8–22)
CALCIUM SERPL-MCNC: 8.8 MG/DL (ref 8.4–10.2)
CHLORIDE SERPL-SCNC: 104 MMOL/L (ref 96–112)
CO2 SERPL-SCNC: 27 MMOL/L (ref 20–33)
CREAT SERPL-MCNC: 0.69 MG/DL (ref 0.5–1.4)
EOSINOPHIL # BLD AUTO: 0.14 K/UL
EOSINOPHIL NFR BLD: 1.9 % (ref 0–6.9)
EPO SERPL-ACNC: 14 MU/ML (ref 4–27)
ERYTHROCYTE [DISTWIDTH] IN BLOOD BY AUTOMATED COUNT: 50.6 FL (ref 35.9–50)
ERYTHROCYTE [SEDIMENTATION RATE] IN BLOOD BY WESTERGREN METHOD: 2 MM/HOUR (ref 0–20)
GLOBULIN SER CALC-MCNC: 2.5 G/DL (ref 1.9–3.5)
GLUCOSE SERPL-MCNC: 114 MG/DL (ref 65–99)
HCT VFR BLD AUTO: 45.2 % (ref 42–52)
HGB BLD-MCNC: 15.2 G/DL (ref 14–18)
IMM GRANULOCYTES # BLD AUTO: 0.03 K/UL (ref 0–0.11)
IMM GRANULOCYTES NFR BLD AUTO: 0.4 % (ref 0–0.9)
LYMPHOCYTES # BLD AUTO: 2.8 K/UL (ref 1–4.8)
LYMPHOCYTES NFR BLD: 37.1 % (ref 22–41)
MCH RBC QN AUTO: 33.5 PG (ref 27–33)
MCHC RBC AUTO-ENTMCNC: 33.6 G/DL (ref 33.7–35.3)
MCV RBC AUTO: 99.6 FL (ref 81.4–97.8)
MONOCYTES # BLD AUTO: 0.77 K/UL (ref 0–0.85)
MONOCYTES NFR BLD AUTO: 10.2 % (ref 0–13.4)
NEUTROPHILS # BLD AUTO: 3.76 K/UL (ref 1.82–7.42)
NEUTROPHILS NFR BLD: 49.7 % (ref 44–72)
NRBC # BLD AUTO: 0 K/UL
NRBC BLD-RTO: 0 /100 WBC
PLATELET # BLD AUTO: 238 K/UL (ref 164–446)
PMV BLD AUTO: 9.1 FL (ref 9–12.9)
POTASSIUM SERPL-SCNC: 3.7 MMOL/L (ref 3.6–5.5)
PROT SERPL-MCNC: 6.6 G/DL (ref 6–8.2)
RBC # BLD AUTO: 4.54 M/UL (ref 4.7–6.1)
SODIUM SERPL-SCNC: 140 MMOL/L (ref 135–145)
WBC # BLD AUTO: 7.6 K/UL (ref 4.8–10.8)

## 2020-10-28 PROCEDURE — 96375 TX/PRO/DX INJ NEW DRUG ADDON: CPT

## 2020-10-28 PROCEDURE — 99214 OFFICE O/P EST MOD 30 MIN: CPT

## 2020-10-28 PROCEDURE — 700111 HCHG RX REV CODE 636 W/ 250 OVERRIDE (IP): Performed by: EMERGENCY MEDICINE

## 2020-10-28 PROCEDURE — 85652 RBC SED RATE AUTOMATED: CPT

## 2020-10-28 PROCEDURE — ? ADDITIONAL NOTES

## 2020-10-28 PROCEDURE — 85025 COMPLETE CBC W/AUTO DIFF WBC: CPT

## 2020-10-28 PROCEDURE — ? COUNSELING

## 2020-10-28 PROCEDURE — 96374 THER/PROPH/DIAG INJ IV PUSH: CPT

## 2020-10-28 PROCEDURE — 99284 EMERGENCY DEPT VISIT MOD MDM: CPT

## 2020-10-28 PROCEDURE — 73590 X-RAY EXAM OF LOWER LEG: CPT | Mod: RT

## 2020-10-28 PROCEDURE — ? ORDER TESTS

## 2020-10-28 PROCEDURE — 80053 COMPREHEN METABOLIC PANEL: CPT

## 2020-10-28 RX ORDER — KETOROLAC TROMETHAMINE 30 MG/ML
15 INJECTION, SOLUTION INTRAMUSCULAR; INTRAVENOUS ONCE
Status: COMPLETED | OUTPATIENT
Start: 2020-10-28 | End: 2020-10-28

## 2020-10-28 RX ORDER — HYDROMORPHONE HYDROCHLORIDE 1 MG/ML
0.5 INJECTION, SOLUTION INTRAMUSCULAR; INTRAVENOUS; SUBCUTANEOUS ONCE
Status: COMPLETED | OUTPATIENT
Start: 2020-10-28 | End: 2020-10-28

## 2020-10-28 RX ORDER — ONDANSETRON 2 MG/ML
4 INJECTION INTRAMUSCULAR; INTRAVENOUS ONCE
Status: COMPLETED | OUTPATIENT
Start: 2020-10-28 | End: 2020-10-28

## 2020-10-28 RX ADMIN — HYDROMORPHONE HYDROCHLORIDE 0.5 MG: 1 INJECTION, SOLUTION INTRAMUSCULAR; INTRAVENOUS; SUBCUTANEOUS at 17:33

## 2020-10-28 RX ADMIN — ONDANSETRON 4 MG: 2 INJECTION INTRAMUSCULAR; INTRAVENOUS at 17:33

## 2020-10-28 RX ADMIN — KETOROLAC TROMETHAMINE 15 MG: 30 INJECTION, SOLUTION INTRAMUSCULAR; INTRAVENOUS at 17:33

## 2020-10-28 ASSESSMENT — LOCATION ZONE DERM
LOCATION ZONE: LIP
LOCATION ZONE: LEG

## 2020-10-28 ASSESSMENT — PAIN DESCRIPTION - PAIN TYPE
TYPE: ACUTE PAIN
TYPE: ACUTE PAIN

## 2020-10-28 ASSESSMENT — LOCATION DETAILED DESCRIPTION DERM
LOCATION DETAILED: LEFT UPPER CUTANEOUS LIP
LOCATION DETAILED: RIGHT PROXIMAL PRETIBIAL REGION

## 2020-10-28 ASSESSMENT — LOCATION SIMPLE DESCRIPTION DERM
LOCATION SIMPLE: LEFT LIP
LOCATION SIMPLE: RIGHT PRETIBIAL REGION

## 2020-10-28 ASSESSMENT — FIBROSIS 4 INDEX: FIB4 SCORE: 1.91

## 2020-10-28 NOTE — PROGRESS NOTES
"CC:  Diagnoses of Dyslipidemia, Wound of right lower extremity, sequela, Polycythemia, History of alcohol abuse, and Cellulitis of other specified site were pertinent to this visit.    HISTORY OF THE PRESENT ILLNESS: Patient is a 70 y.o. adult. This pleasant patient is here today to follow-up.    She says that she was not able to tolerate Crestor every other day.  She describes ADR as increasing cough.  She is very hesitant to try any statins or change to non-statin therapy.  She did have recent CT cardiac scan 10/14/2020 which showed mild plaque LAD.  Lab from 10/26/2020 shows total cholesterol 289, triglycerides 234, HDL 49 .  Same time CMP significant for glucose 102, A1c 5.6.  She ultimately wants to see vascular medicine.    Has wound right lower leg as previously described, she feels that it is getting worse, more drainage.    Mood is still described as somewhat depressed after the loss of her cat of 20 years.  He feels lonely, lives alone.  Her father has a girlfriend.  Plans to get a new kitten when she is ready.  Had been followed for at least 3 years by Dr. Kedar Estrada her psychiatrist, though she says she wants to establish with a new specialist due to financial reasons as she says that \"$150 for 15-minutes is too much.\"  She does understand that her benzodiazepine needs to come from psychiatry.  She will call to make appointments, referrals placed last visit.    Again we discussed her history of alcohol use disorder.  Patient had let us know by email that she does not want us to have any communication with her health .  Her health  had indicated previously that she thought Marcia was having trouble drinking again.  Marcia tells me today she drank \"moderate to excessive \"for only \"1 week.  \"She says her last drink was 10/21/2020.  She does understand she cannot mix benzodiazepines with alcohol as there are significant health risks.    The persistent polycythemia she is currently pending " erythropoietin and JAK2 levels.    Noted recent urinalysis showing blood, bacteria and white cells.  She denies any symptoms of UTI include frequency, hematuria, dysuria, fever, flank pain, bladder pressure, etc.    Allergies: Codeine, Crestor [rosuvastatin calcium], Lipitor [atorvastatin calcium], Lisinopril, and Morphine    Current Outpatient Medications Ordered in Epic   Medication Sig Dispense Refill   • cephALEXin (KEFLEX) 500 MG Cap Take 1 Cap by mouth 4 times a day for 5 days. 20 Cap 0   • ALPRAZolam (XANAX) 1 MG Tab      • propranolol CR (INDERAL LA) 120 MG CAPSULE SR 24 HR Take 1 Cap by mouth every day. 30 Cap 11   • gabapentin (NEURONTIN) 800 MG tablet Take 800 mg by mouth 3 times a day.     • buPROPion (WELLBUTRIN XL) 300 MG XL tablet Take 300 mg by mouth every morning.     • citalopram (CELEXA) 40 MG Tab Take 40 mg by mouth every evening.     • omeprazole (PRILOSEC) 20 MG delayed-release capsule Take 20 mg by mouth every day.     • mirtazapine (REMERON) 15 MG Tab TAKE 1 TABLET BY MOUTH AT BEDTIME 30 Tab 0     No current Epic-ordered facility-administered medications on file.        Past Medical History:   Diagnosis Date   • Alcohol abuse    • Alcoholism (HCC)    • Anxiety    • Depression    • Hiatus hernia syndrome    • Hypertension    • Indigestion    • Other specified disorder of intestines     diarrhea   • Pneumonia     for 2 days   • Psychiatric disorder     anxiety, depression   • Sleep apnea    • Snoring    • Unspecified urinary incontinence    • Urinary bladder disorder        Past Surgical History:   Procedure Laterality Date   • BLADDER SLING FEMALE  8/30/2011    Performed by RENATE STERLING at SURGERY [facility]   • OTHER  2010    surgery for sleep apnea   • OTHER  2010    big toenails and second toenails removed   • OTHER ABDOMINAL SURGERY  2002    choley   • GYN SURGERY  1991    hyst   • OTHER  1956    tonsillectomy   • ABDOMINAL HYSTERECTOMY TOTAL      Hysterectomy, Total Abdominal  "  • BRIEN BY LAPAROSCOPY      Cholecystectomy, Laparoscopic       Social History     Tobacco Use   • Smoking status: Current Every Day Smoker     Packs/day: 0.25     Years: 25.00     Pack years: 6.25     Types: Cigarettes     Last attempt to quit: 2011     Years since quittin.1   • Smokeless tobacco: Never Used   • Tobacco comment: occ   Substance Use Topics   • Alcohol use: Yes     Comment: occassionally. post rehab, 1/2 bottle of wine some days. no drinking now, sober X 17   • Drug use: No       Social History     Social History Narrative   • Not on file       Family History   Problem Relation Age of Onset   • Anxiety disorder Brother    • Depression Brother    • Alcohol abuse Mother    • Cancer Mother         colon cancer age 70s   • Alcohol abuse Father    • Cancer Sister         breast   • Drug abuse Sister    • Cancer Maternal Aunt         breast   • Cancer Maternal Uncle         nos       ROS:     - Constitutional: Negative for fever, chills     - Eyes:   Negative for eye pain, discharge    - ENT:  Negative for  sore throat     - Respiratory: Negative for   sputum production     - Cardiovascular: Negative for chest pain     - Gastrointestinal: Negative for  abdominal pain    - Genitourinary: Negative for dysuria    - Musculoskeletal: Negative for new myalgias, back pain, and joint pain.     - Skin: see hpi    - Neurological: Negative for vertigo    - Endo:Negative for polyuria, heat/cold intolerance, excessive thirst    - Hem/lymphatic: Negative for swollen glands    -Allergic/immun: Negative for allergic rhinitis    - Psychiatric/Behavioral: Negative for suicidal/homicidal ideation and memory loss.      Exam: /64 (BP Location: Left arm, Patient Position: Sitting, BP Cuff Size: Adult)   Pulse (!) 58   Temp 36.3 °C (97.3 °F) (Temporal)   Resp 16   Ht 1.6 m (5' 3\")   Wt 73.4 kg (161 lb 12.8 oz)   SpO2 91%  Body mass index is 28.66 kg/m².    General: Normal appearing. No distress.  EYES: " Conjunctiva clear lids without ptosis, pupils equal  EARS: Normal shape and contour   Pulmonary: Clear to ausculation.  Normal effort. No rales or wheezing.  Cardiovascular: Regular rate and rhythm without significant murmur.   Abdomen: Soft, nontender, nondistended. Normal bowel sounds.  Neurologic: Cranial nerves grossly nonfocal  Skin: Warm and dry.  R LE wound approx 6cm x 1cm with some oozing, no significant erythema, no significant induration, no fluctuance, no odor.  Musculoskeletal: Normal gait. No extremity cyanosis, clubbing, or edema.  Psych: Normal mood and affect. Alert and oriented x3. Judgment and insight is normal.      Assessment/Plan  1. Dyslipidemia  She does not wish to be on statin.  Her cardiac CT showed only mild plaque.  10-year ASCVD risk score 15%.  Unclear how much effect alcohol may have on recent lipid panel.  She wishes to discuss with vascular specialist.  - REFERRAL TO VASCULAR MEDICINE Reason for Referral? Lipids    2. Wound of right lower extremity, sequela  Overall the wound is probably making progress, but she is very concerned it is becoming infected, reasonable to do antibiotic at this time.  We will have wound clinic follow-up.  - REFERRAL TO WOUND CLINIC  - cephALEXin (KEFLEX) 500 MG Cap; Take 1 Cap by mouth 4 times a day for 5 days.  Dispense: 20 Cap; Refill: 0    3. Polycythemia  Pending results of JAK2 and erythropoietin.    4. History of alcohol abuse  Patient states she has been sober since 10/21/2020.  She is encouraged to have close follow-up with a psychologist and psychiatrist.    5. Cellulitis of other specified site  See #2 above  - cephALEXin (KEFLEX) 500 MG Cap; Take 1 Cap by mouth 4 times a day for 5 days.  Dispense: 20 Cap; Refill: 0          Patient wants 1 month follow-up    Please note that this dictation was created using voice recognition software. I have made every reasonable attempt to correct obvious errors, but I expect that there are errors of grammar  and possibly content that I did not discover before finalizing the note.

## 2020-10-28 NOTE — PROCEDURE: ORDER TESTS
Bill For Surgical Tray: no
Performing Laboratory: [id number]
Expected Date Of Service: 10/28/2020
Billing Type: Third-Party Bill

## 2020-10-28 NOTE — PROCEDURE: ADDITIONAL NOTES
Detail Level: Simple
Additional Notes: Patient states she tripped a couple weeks ago in her yard and had a deep cut in her R pretibial region. \\nPatient states she was referred to wound care at [facility] but has not been seen there.\\nPatient states she she was prescribed an antibiotic but since she has only taken 2 pills so far in her treatment, we advised the patient a culture would be helpful. \\nLkateon was cultured today in clinic.

## 2020-10-28 NOTE — ED TRIAGE NOTES
Pt presents for wound on right leg that is not healing. Increased drainage from wound. Reports a fall 2 weeks ago causing original injury. No fever. Denies diabetes. Pt A&Ox4 and ambulatory.     Patient masked. No respiratory symptoms, no recent travel, denies known COVID exposure.

## 2020-10-29 ENCOUNTER — APPOINTMENT (OUTPATIENT)
Dept: RADIOLOGY | Facility: MEDICAL CENTER | Age: 70
End: 2020-10-29
Attending: INTERNAL MEDICINE
Payer: MEDICARE

## 2020-10-29 NOTE — DISCHARGE INSTRUCTIONS
There is no signs of significant infection.  The x-rays were normal.    This is a slow or nonhealing wound.  And with this you are being referred to the wound care center.  I put in for consult they should contact you within the next 24 to 48 hours to set up an appointment.  The meantime take a shower daily wash with soap and water and keep covered with a large dressing.  Return if any change or worsening symptoms.

## 2020-10-29 NOTE — ED NOTES
Pt provided with discharge paper work and education, declines any questions at this time. Pt ambulated out of ER without questions.

## 2020-10-29 NOTE — ED PROVIDER NOTES
ED Provider    Means of arrival: Private vehicle  History obtained from: Patient  History limited by: None    CHIEF COMPLAINT  Chief Complaint   Patient presents with   • Wound Check       HPI  Marcia Koch is a 70 y.o. adult who presents with a right shin nonhealing wound.  She had a fall from her front porch which is stone, she scraped the leg and is not improving.  She complains of pain.  She was seen by her primary care doctor and is on a medication that is 4 times a day for 5 days I suspect Keflex.    There is no distal numbness or tingling.  She has no history of diabetes, she has had no fevers chills sweats or nausea or vomiting.    REVIEW OF SYSTEMS  See HPI for further details. All other systems are negative.     PAST MEDICAL HISTORY   has a past medical history of Alcohol abuse, Alcoholism (HCC), Anxiety, Depression, Hiatus hernia syndrome, Hypertension, Indigestion, Other specified disorder of intestines, Pneumonia, Psychiatric disorder, Sleep apnea, Snoring, Unspecified urinary incontinence, and Urinary bladder disorder.    SOCIAL HISTORY  Social History     Tobacco Use   • Smoking status: Current Every Day Smoker     Packs/day: 0.25     Years: 25.00     Pack years: 6.25     Types: Cigarettes     Last attempt to quit: 2011     Years since quittin.1   • Smokeless tobacco: Never Used   • Tobacco comment: occ   Substance and Sexual Activity   • Alcohol use: Yes     Comment: occassionally. post rehab, 1/2 bottle of wine some days. no drinking now, sober X 17   • Drug use: No   • Sexual activity: Not Currently     Partners: Male     Birth control/protection: Abstinence       SURGICAL HISTORY   has a past surgical history that includes gyn surgery (); other abdominal surgery (); other (); other (); other (); bladder sling female (2011); teddy by laparoscopy; and abdominal hysterectomy total.    CURRENT MEDICATIONS  Home Medications     Reviewed by Cata Rene  "ALEXANDRA (Registered Nurse) on 10/28/20 at 1625  Med List Status: Not Addressed   Medication Last Dose Status   ALPRAZolam (XANAX) 1 MG Tab  Active   buPROPion (WELLBUTRIN XL) 300 MG XL tablet  Active   cephALEXin (KEFLEX) 500 MG Cap  Active   citalopram (CELEXA) 40 MG Tab  Active   gabapentin (NEURONTIN) 800 MG tablet  Active   mirtazapine (REMERON) 15 MG Tab  Active   omeprazole (PRILOSEC) 20 MG delayed-release capsule  Active   propranolol CR (INDERAL LA) 120 MG CAPSULE SR 24 HR  Active                ALLERGIES  Allergies   Allergen Reactions   • Codeine Vomiting   • Crestor [Rosuvastatin Calcium]      Pt states \"I didn't tolerate\" but describes cough   • Lipitor [Atorvastatin Calcium]      See note, severe L knee pain within one day of taking medicatoin   • Lisinopril      Cough     • Morphine Vomiting       PHYSICAL EXAM  VITAL SIGNS: /54   Pulse (!) 55   Temp 36.7 °C (98.1 °F) (Temporal)   Resp 18   Ht 1.6 m (5' 3\")   Wt 74.6 kg (164 lb 7.4 oz)   SpO2 92%   BMI 29.13 kg/m²   Constitutional: Alert in no apparent distress.  HENT: No signs of trauma, Mucous membranes are moist   Eyes:  Conjunctiva normal, Non-icteric.   Neck: Normal range of motion, No tenderness, Supple,  Lymphatic: No lymphadenopathy noted.   Cardiovascular: Regular rate and rhythm, no murmurs.   Thorax & Lungs: Normal breath sounds, No respiratory distress, No wheezing, No chest tenderness.   Abdomen: Bowel sounds normal, Soft, No tenderness, No masses, No pulsatile masses. No peritoneal signs.  Skin: Warm, Dry,Normal color  Back: No bony tenderness, No CVA tenderness.   Extremities:No edema, , No cyanosis, there is abrasion on the right shin, it appears to be about a stage II in depth.  But there is significant crusting over it.  There is no surrounding erythema.  It is tender to touch.  Distal neurovascular is normal  Musculoskeletal: Good range of motion in all major joints. No tenderness to palpation or major deformities noted. "   Neurologic: Alert ,Oriented x4, Normal motor function, Normal sensory function, No focal deficits noted.   Psychiatric: Affect normal, Judgment normal, Mood normal.       MEDICAL DECISION MAKING  This is a 70 y.o. adult who presents with a wound to the right shin which is healing slowly or not healing at all.  She is already on an antibiotic there is no signs of cellulitis.  Cannot tell the depth with the crusting so the wound will be cleaned.  Should be get medicated with pain prior to being done.  He has already been done to evaluate for concerns of deep infection and osteomyelitis, x-ray to be done to evaluate periosteal reaction.    DIAGNOSTIC STUDIES / PROCEDURES    EKG      LABS  Results for orders placed or performed during the hospital encounter of 10/28/20   CBC WITH DIFFERENTIAL   Result Value Ref Range    WBC 7.6 4.8 - 10.8 K/uL    RBC 4.54 (L) 4.70 - 6.10 M/uL    Hemoglobin 15.2 14.0 - 18.0 g/dL    Hematocrit 45.2 42.0 - 52.0 %    MCV 99.6 (H) 81.4 - 97.8 fL    MCH 33.5 (H) 27.0 - 33.0 pg    MCHC 33.6 (L) 33.7 - 35.3 g/dL    RDW 50.6 (H) 35.9 - 50.0 fL    Platelet Count 238 164 - 446 K/uL    MPV 9.1 9.0 - 12.9 fL    Neutrophils-Polys 49.70 44.00 - 72.00 %    Lymphocytes 37.10 22.00 - 41.00 %    Monocytes 10.20 0.00 - 13.40 %    Eosinophils 1.90 0.00 - 6.90 %    Basophils 0.70 0.00 - 1.80 %    Immature Granulocytes 0.40 0.00 - 0.90 %    Nucleated RBC 0.00 /100 WBC    Neutrophils (Absolute) 3.76 1.82 - 7.42 K/uL    Lymphs (Absolute) 2.80 1.00 - 4.80 K/uL    Monos (Absolute) 0.77 0.00 - 0.85 K/uL    Eos (Absolute) 0.14 K/uL    Baso (Absolute) 0.05 0.00 - 0.12 K/uL    Immature Granulocytes (abs) 0.03 0.00 - 0.11 K/uL    NRBC (Absolute) 0.00 K/uL   COMP METABOLIC PANEL   Result Value Ref Range    Sodium 140 135 - 145 mmol/L    Potassium 3.7 3.6 - 5.5 mmol/L    Chloride 104 96 - 112 mmol/L    Co2 27 20 - 33 mmol/L    Anion Gap 9.0 7.0 - 16.0    Glucose 114 (H) 65 - 99 mg/dL    Bun 8 8 - 22 mg/dL     Creatinine 0.69 0.50 - 1.40 mg/dL    Calcium 8.8 8.4 - 10.2 mg/dL    AST(SGOT) 24 12 - 45 U/L    ALT(SGPT) 21 2 - 50 U/L    Alkaline Phosphatase 144 U/L    Total Bilirubin 0.2 0.1 - 1.5 mg/dL    Albumin 4.1 3.2 - 4.9 g/dL    Total Protein 6.6 6.0 - 8.2 g/dL    Globulin 2.5 1.9 - 3.5 g/dL    A-G Ratio 1.6 g/dL   Sed Rate   Result Value Ref Range    Sed Rate Westergren 2 0 - 20 mm/hour   ESTIMATED GFR   Result Value Ref Range    GFR If African American >60 >60 mL/min/1.73 m 2    GFR If Non African American >60 >60 mL/min/1.73 m 2         RADIOLOGY  DX-TIBIA AND FIBULA RIGHT   Final Result      Mild soft tissue swelling. Otherwise unremarkable examination.                  INTERPRETING LOCATION:  [address] [place] [address], [facility]          COURSE  Pertinent Labs & Imaging studies reviewed. (See chart for details)    5:08 PM - Patient seen and examined at bedside. Discussed plan of care    Patient is a wound to the right lower extremity.  She states it was down to the bone.  This time does not appear to be that the.  There is no signs of infection.  Lab test shows a normal white blood cell count x-ray was negative, and ESR is negative I do not suspect osteomyelitis at this time.  She was placed on antibiotics by primary doctor she is only been on them for about 24hours but with this nonhealing wound she is referred to our wound care clinic.      Discharged home in stable condition    FINAL IMPRESSION  1. Abrasion    2. Abrasion of lower extremity, right, initial encounter    3. Nonhealing nonsurgical wound        The note accurately reflects work and decisions made by me.  Keshawn Quiroga D.O.  10/28/2020  8:42 PM

## 2020-10-30 LAB — JAK2 P.V617F BLD/T QL: NOT DETECTED

## 2020-11-02 ENCOUNTER — TELEPHONE (OUTPATIENT)
Dept: VASCULAR LAB | Facility: MEDICAL CENTER | Age: 70
End: 2020-11-02

## 2020-11-02 NOTE — TELEPHONE ENCOUNTER
DANEM for pt to call back to schedule initial vascular medicine visit w/ either Dr. Bloch or Dr. Patel.

## 2020-11-03 LAB — GENE XXX MUT ANL BLD/T: NOT DETECTED

## 2020-11-04 ENCOUNTER — APPOINTMENT (OUTPATIENT)
Dept: WOUND CARE | Facility: MEDICAL CENTER | Age: 70
End: 2020-11-04
Attending: INTERNAL MEDICINE
Payer: MEDICARE

## 2020-11-05 ENCOUNTER — PATIENT OUTREACH (OUTPATIENT)
Dept: HEALTH INFORMATION MANAGEMENT | Facility: OTHER | Age: 70
End: 2020-11-05

## 2020-11-05 LAB — MPL P.W515 BLD/T QL: NOT DETECTED

## 2020-11-16 ENCOUNTER — OFFICE VISIT (OUTPATIENT)
Dept: VASCULAR LAB | Facility: MEDICAL CENTER | Age: 70
End: 2020-11-16
Attending: FAMILY MEDICINE
Payer: MEDICARE

## 2020-11-16 VITALS
WEIGHT: 161 LBS | BODY MASS INDEX: 28.53 KG/M2 | HEART RATE: 59 BPM | SYSTOLIC BLOOD PRESSURE: 124 MMHG | DIASTOLIC BLOOD PRESSURE: 57 MMHG | HEIGHT: 63 IN

## 2020-11-16 DIAGNOSIS — E78.5 DYSLIPIDEMIA: ICD-10-CM

## 2020-11-16 DIAGNOSIS — D75.1 POLYCYTHEMIA: ICD-10-CM

## 2020-11-16 DIAGNOSIS — E55.9 VITAMIN D DEFICIENCY: ICD-10-CM

## 2020-11-16 DIAGNOSIS — E78.1 HYPERTRIGLYCERIDEMIA: ICD-10-CM

## 2020-11-16 DIAGNOSIS — I10 ESSENTIAL HYPERTENSION: ICD-10-CM

## 2020-11-16 DIAGNOSIS — F17.200 TOBACCO DEPENDENCE: ICD-10-CM

## 2020-11-16 DIAGNOSIS — F10.11 HISTORY OF ALCOHOL ABUSE: ICD-10-CM

## 2020-11-16 PROCEDURE — 99204 OFFICE O/P NEW MOD 45 MIN: CPT | Mod: 25 | Performed by: FAMILY MEDICINE

## 2020-11-16 PROCEDURE — 99406 BEHAV CHNG SMOKING 3-10 MIN: CPT | Performed by: FAMILY MEDICINE

## 2020-11-16 RX ORDER — PRAVASTATIN SODIUM 20 MG
20 TABLET ORAL EVERY EVENING
Qty: 90 TAB | Refills: 3 | Status: SHIPPED
Start: 2020-11-16 | End: 2021-01-21

## 2020-11-16 RX ORDER — NICOTINE 21 MG/24HR
1 PATCH, TRANSDERMAL 24 HOURS TRANSDERMAL EVERY 24 HOURS
Qty: 30 PATCH | Refills: 1 | Status: SHIPPED | OUTPATIENT
Start: 2020-11-16 | End: 2021-01-15

## 2020-11-16 ASSESSMENT — ENCOUNTER SYMPTOMS
VOMITING: 0
SHORTNESS OF BREATH: 0
FEVER: 0
MYALGIAS: 0
DIZZINESS: 0
WHEEZING: 0
BLURRED VISION: 0
HEADACHES: 0
INSOMNIA: 0
PALPITATIONS: 0
DIARRHEA: 0
NERVOUS/ANXIOUS: 0
BLOOD IN STOOL: 0
WEAKNESS: 0
TREMORS: 0
SEIZURES: 0
ORTHOPNEA: 0
DOUBLE VISION: 0
BRUISES/BLEEDS EASILY: 0
CHILLS: 0
SORE THROAT: 0
HEMOPTYSIS: 0
NAUSEA: 0
ABDOMINAL PAIN: 0
COUGH: 0
FOCAL WEAKNESS: 0
DEPRESSION: 0

## 2020-11-16 ASSESSMENT — FIBROSIS 4 INDEX: FIB4 SCORE: 1.52

## 2020-11-17 NOTE — PATIENT INSTRUCTIONS
- in an effort to reduce statin-induced myalgias, we will initiate a trial of pravastatin 20mg mon/wed/fri for 2 weeks, then increase 1 day per week until taking 7 day per week or reaches max-tolerated frequency.  May consider increasing dosage strength at future visit     - start vit D3 5,000 units daily     - reduce or stop alcohol     -check labs in 2 months     - continue all current medications, see updated medication list for changes     BLOOD PRESSURE:  - if you notice BP > 140/>90 for more than 3 days, then contact our office ([facility], [phone]) for further instructions    If you are being treated for blood pressure today: please be advised that stabilizing BP may require multiple med changes and close monitoring over the next several months and initially there may be additional imaging and labs and the possibility of adverse drug reactions. Variability of your blood pressure and heart rate may occur until we have things stabilized.  Please feel free to contact our office as needed for questions or concerns.        SODIUM RESTRICTIONS:   - consume no more than 2,300mg of sodium daily (look at food labels) ,or if you have high BP then reduce to no more than 1,500mg of sodium daily   For more information visit: https://www.[place]/contents/low-sodium-diet-the-basics/print?elkvxOjv=0103&source=see_link       DASH DIET:  Overview: (https://www.heart.org/en/health-topics/high-blood-pressure/changes-you-can-make-to-manage-high-blood-pressure/managing-blood-pressure-with-a-heart-healthy-diet)  Tips for shopping:  https://www.[facility].org/healthy-lifestyle/nutrition-and-healthy-eating/in-depth/dash-diet/art-59611685?p=1   DASH is a flexible and balanced eating plan that helps create a heart-healthy eating style for life.  The DASH eating plan requires no special foods and instead provides daily and weekly nutritional goals. This plan recommends:  · Eating vegetables, fruits, and whole  grains  · Including fat-free or low-fat dairy products, fish, poultry, beans, nuts, and vegetable oils  · Limiting foods that are high in saturated fat, such as fatty meats, full-fat dairy products, and tropical oils such as coconut, palm kernel, and palm oils  · Limiting sugar-sweetened beverages and sweets.  Based on these recommendations, the following table shows examples of daily and weekly servings that meet DASH eating plan targets for a 2,417-pvposfl-b-day diet.    CHOLESTEROL-REDUCING DIETS:  1) AHA diet  (http://www.heart.org/en/healthy-living/healthy-eating/eat-smart/nutrition-basics/aha-diet-and-lifestyle-recommendations)   2) Mediterranean diet (http://www.heart.org/en/healthy-living/healthy-eating/eat-smart/nutrition-basics/mediterranean-diet)   3) Lowering triglycerides: (http://my.americanheart.org/idc/groups/ahamah-public/@[facility]/@sop/@[facility]/documents/downloadable/m_425988.pdf)     PHYSICAL ACTIVITY:  Unless directed otherwise at today's visit or you are physically incapable due to your current health or medical conditions, it is advised per the American Heart Association to engage in moderate to vigorous physical activity such as brisk walking, swimming, cycling, >150 minutes per week at 30-40 minutes per session, 3 to 5 times weekly.      GENERAL HEALTHY DIET ADVICE:  - the USDA food pattern (https://www.cnpp.usda.gov/USDAFoodPatterns)  - plate method (https://www.choosemyplate.gov/)  - consume diet that emphasizes intake of vegetables, fruits, and whole grains,  - use low fat diary products, poultry, fish, legumes, nontropical vegetable oils, nuts  - limit intake of sweets, sugar-sweetned beverages, and red meats   - reduce saturated and trans fats to <6% of your daily calories

## 2020-11-17 NOTE — PROGRESS NOTES
[facility] - Initial Visit  Date of Service: 20     Marcia Koch has been referred for evaluation and management of dyslipidemia    Referral Source: Sonali Trammell M.D.     HPI    Interval hx/concerns: denies new symptoms.  Leg has healed and no hx of PAD.     History of ASCVD: No  Other Established (non-atherosclerotic) Vascular Disease, if Present: None  Age at Initial Diagnosis of Dyslipidemia: at least 2017     Current Prescription Lipid Lowering Medications - including dose:   Statin: None  Non-Statin: None  Current Lipid Lowering and Related Supplements:   None  Any Current Side Effects Potentially Related to Lipid Lowering therapy?   No  Current Adherence to Lipid Lowering Therapies   Complete  Previously Attempted Interventions for Lipids - including outcome  Statin: crestor lipitor     Outcome: cough, knee pain - respectively   Non-Statin: None    Outcome: N/A  Any Previous History of Statin Intolerance?   Yes, Details: crestor, lipitor  Baseline Lipids Prior to Treatment:      Ref. Range 2020 12:13   Cholesterol,Tot Latest Ref Range: 100 - 199 mg/dL 322 (H)   Triglycerides Latest Ref Range: 0 - 149 mg/dL 363 (H)   HDL Latest Ref Range: >=40 mg/dL 43   LDL Latest Ref Range: <100 mg/dL 206 (H)     Other Pertinent History:     Anticoagulation/antiplats: No    History of other CV risk factors:     Tobacco:  Smoking 5 cigs/day.  Would like to quit.  Thinking about quitting.  46 years of smoking   Would like to use the patch  Had quit for 9 yrs.  Helped by NRT patches     HTN:  Current HTN concerns: No specific concerns since last visit   ADRs: No  HTN sx:  No current blurred or changed vision, chest pain, shortness of breath, headache, nausea, dizziness/vertigo   Home BP los/60s if she checks.    24h ABPM completed: not tested   Adherence to current HTN meds: compliant all of the time     Anticoagulation/antiplats:   None     T2D:  No but noted to have prior IFG and mild  high gluc that is ongoing.   Denies sx or prior meds.     Other:  Hypothyroidism:  none reported   Liver disease: none reported   Renal disease/nephrotic syndrome:  none reported   Vitamin D deficiency: none reported   Alcohol abuse:  Prior heavy etoh use, currently reports reduced and <1/2 bottle wine/day.  Denies prior withdrawals, sz, DTs.      PAST MEDICAL HISTORY:  has a past medical history of Alcohol abuse, Alcoholism (HCC), Anxiety, Depression, Hiatus hernia syndrome, Hypertension, Indigestion, Other specified disorder of intestines, Pneumonia, Psychiatric disorder, Sleep apnea, Snoring, Unspecified urinary incontinence, and Urinary bladder disorder.    PAST SURGICAL HISTORY:  has a past surgical history that includes gyn surgery (); other abdominal surgery (); other (); other (); other (); bladder sling female (2011); teddy by laparoscopy; and abdominal hysterectomy total.    CURRENT MEDICATIONS:   Current Outpatient Medications:   •  nicotine, 1 Patch, Transdermal, Q24HRS  •  Vitamin D-3, Take  by mouth.  •  pravastatin, 20 mg, Oral, Q EVENING  •  propranolol CR, , Taking  •  ALPRAZolam, , Taking  •  gabapentin, 800 mg, Oral, TID, Taking  •  buPROPion, 300 mg, Oral, QAM, Taking  •  citalopram, 40 mg, Oral, Q EVENING, Taking  •  omeprazole, 20 mg, Oral, DAILY, Taking  •  mirtazapine, TAKE 1 TABLET BY MOUTH AT BEDTIME, Taking  •  propranolol CR, 120 mg, Oral, DAILY (Patient not taking: Reported on 2020), Not Taking    ALLERGIES: Codeine, Crestor [rosuvastatin calcium], Lipitor [atorvastatin calcium], Lisinopril, and Morphine    FAMILY HISTORY:   Family History   Problem Relation Age of Onset   • Anxiety disorder Brother    • Depression Brother    • Other Brother          AIDS   • Alcohol abuse Mother    • Cancer Mother         colon cancer age 70s   • Alcohol abuse Father    • Hyperlipidemia Father    • Cancer Sister         breast   • Drug abuse Sister    • Cancer Maternal  "Aunt         breast   • Cancer Maternal Uncle         nos   • Stroke Neg Hx    • Heart Attack Neg Hx      SOCIAL HISTORY   Social History     Tobacco Use   • Smoking status: Current Every Day Smoker     Packs/day: 0.25     Years: 25.00     Pack years: 6.25     Types: Cigarettes     Last attempt to quit: 2011     Years since quittin.2   • Smokeless tobacco: Never Used   • Tobacco comment: occ   Substance Use Topics   • Alcohol use: Yes     Comment: occassionally. post rehab, 1/2 bottle of wine some days. no drinking now, sober X 17   • Drug use: No     Change in weight: Stable  BMI Readings from Last 5 Encounters:   20 28.52 kg/m²   20 28.52 kg/m²   10/28/20 29.13 kg/m²   10/27/20 28.66 kg/m²   10/06/20 28.66 kg/m²   ]  Exercise habits: no regular exercise program  Diet: common adult    Review of Systems   Constitutional: Negative for chills, fever and malaise/fatigue.   HENT: Negative for nosebleeds, sore throat and tinnitus.    Eyes: Negative for blurred vision and double vision.   Respiratory: Negative for cough, hemoptysis, shortness of breath and wheezing.    Cardiovascular: Negative for chest pain, palpitations, orthopnea and leg swelling.   Gastrointestinal: Negative for abdominal pain, blood in stool, diarrhea, melena, nausea and vomiting.   Genitourinary: Negative for hematuria.   Musculoskeletal: Negative for joint pain and myalgias.   Skin: Negative for itching and rash.   Neurological: Negative for dizziness, tremors, focal weakness, seizures, weakness and headaches.   Endo/Heme/Allergies: Does not bruise/bleed easily.   Psychiatric/Behavioral: Negative for depression. The patient is not nervous/anxious and does not have insomnia.      Vitals:    20 1619   BP: 124/57   BP Location: Left arm   Patient Position: Sitting   BP Cuff Size: Adult   Pulse: (!) 59   Weight: 73 kg (161 lb)   Height: 1.6 m (5' 3\")      BP Readings from Last 5 Encounters:   20 124/57   10/28/20 " 109/54   10/27/20 106/64   10/06/20 112/66   09/26/20 (!) 165/93   ]  Physical Exam   Constitutional: She is oriented to person, place, and time. She appears well-developed and well-nourished. No distress.   HENT:   Head: Normocephalic and atraumatic.   Neck: Normal range of motion. Neck supple. No JVD present. No thyromegaly present.   Cardiovascular: Normal rate, regular rhythm, normal heart sounds and intact distal pulses. Exam reveals no gallop and no friction rub.   No murmur heard.  Pulses:       Carotid pulses are 2+ on the right side and 2+ on the left side.       Radial pulses are 2+ on the right side and 2+ on the left side.        Dorsalis pedis pulses are 2+ on the right side and 2+ on the left side.        Posterior tibial pulses are 2+ on the right side and 2+ on the left side.   Edema     RLE: none     LLE: none   Spider telangectasia:       RLE:  None      LLE: none   Varicosities:         RLE: none      LLE: none   Corona phlebectatica:      RLE:  None       LLE:  None   Cording:         RLE:  None     LLE: None      Pulmonary/Chest: Effort normal and breath sounds normal.   Abdominal: Soft. Bowel sounds are normal. She exhibits no distension and no mass. There is no abdominal tenderness. There is no rebound and no guarding.   Musculoskeletal: Normal range of motion.         General: No tenderness.   Neurological: She is alert and oriented to person, place, and time. No cranial nerve deficit. She exhibits normal muscle tone. Coordination normal.   Skin: Skin is warm and dry. She is not diaphoretic.   Psychiatric: She has a normal mood and affect.       DATA REVIEW:  Most Recent Lipid Panel:   Lab Results   Component Value Date    CHOLSTRLTOT 289 (H) 10/26/2020    TRIGLYCERIDE 234 (H) 10/26/2020    HDL 49 10/26/2020     (H) 10/26/2020     Other Pertinent Blood Work:   Lab Results   Component Value Date    SODIUM 140 10/28/2020    POTASSIUM 3.7 10/28/2020    CHLORIDE 104 10/28/2020    CO2 27  10/28/2020    ANION 9.0 10/28/2020    GLUCOSE 114 (H) 10/28/2020    BUN 8 10/28/2020    CREATININE 0.69 10/28/2020    CALCIUM 8.8 10/28/2020    ASTSGOT 24 10/28/2020    ALTSGPT 21 10/28/2020    ALKPHOSPHAT 144 10/28/2020    TBILIRUBIN 0.2 10/28/2020    ALBUMIN 4.1 10/28/2020    AGRATIO 1.6 10/28/2020      Ref. Range 2/13/2018 14:20   TSH Latest Ref Range: 0.380 - 5.330 uIU/mL 2.530      Ref. Range 2/13/2018 14:20   25-Hydroxy   Vitamin D 25 Latest Ref Range: 30 - 100 ng/mL 20 (L)   Folate -Folic Acid Latest Ref Range: >4.0 ng/mL 21.1   Homocysteine Latest Ref Range: <11.00 umol/L 9.58   Vitamin B12 -True Cobalamin Latest Ref Range: 211 - 911 pg/mL 305     VASCULAR IMAGING:     Last EKG:   Results for orders placed or performed during the hospital encounter of 04/19/12   EKG (ER)   Result Value Ref Range    Report SINUS BRADYCARDIA [Remains]     Report BORDERLINE RIGHT AXIS DEVIATION [Insig. Chg.]     Report NO SIGNIFICANT CHANGE        ASSESSMENT AND PLAN  1. Dyslipidemia  Comp Metabolic Panel    CREATINE KINASE    LDL, DIRECT    Lipid Profile    LIPOPROTEIN A    pravastatin (PRAVACHOL) 20 MG Tab   2. Essential hypertension     3. History of alcohol abuse     4. Tobacco dependence  nicotine (NICODERM) 21 MG/24HR PATCH 24 HR   5. Polycythemia     6. Vitamin D deficiency  VITAMIN D,25 HYDROXY   7. Hypertriglyceridemia  LDL, DIRECT    LIPOPROTEIN A    pravastatin (PRAVACHOL) 20 MG Tab     Patient Type, check all that apply:   Primary Prevention    Established Atherosclerotic Cardiovascular Disease (ASCVD)  No    Other Established (non-atherosclerotic) Vascular Disease, if Present:  None    Evidence of Heterozygous Familial Hypercholesterolemia (FH):   possible but less likely (LDLc >190 baseline, no other baseline fhx or pmhx of ascvd)  Phenotypic FH but likely polygenic     ACC/AHA Indication for Statin Therapy, callum all that apply:  Baseline Calculated ASCVD risk >7.5%: Indication for Moderate intensity  statin    Calculated Risk for ASCVD, if applicable    N/A, LDLc >190     Other Significant Risk Markers, if any, callum all that apply   None   - update apoB and lp(a) at next visit     Goal LDL-C and nonHDL-C based on Clinic Protocol  NLA/ACC/AHA risk category: High:  3+ major ASCVD risk factors, LDL-C >189  Tx threshold: non-HDL-C >129, LDL-C >99  Tx goal:  non-HDL <130, LDL-C <100 (optional: apoB<90)   At goal:  no    - update labs in 6-8 weeks with advanced lipid profiles     Lifestyle Recommendations From Today’s Visit:      Smoking:  Preparation to action stage     reports that she has been smoking cigarettes. She has a 6.25 pack-year smoking history. She has never used smokeless tobacco.   Provided strong recommendation for complete cessation and informed this is the primary contributor to the majority of all ASCVD and cancer-related conditions and can result in significant morbidity and early mortality.   - reviewed resources for cessation including tobacco cessation clinic visit, pharmacotx meds, quit lines  - face-to-face counseling - 7 min  - start nicoderm 21mg daily for next 30-60d then will down-titrate   Quit 11/26/20  - review at every visit      Physical Activity: continue healthy activity to improve CV fitness, see care instructions for additional details     Weight Management and Nutrition: Dietary plan was discussed with patient at this visit including DASH, low sodium and/or as outlined in care instructions   Eating Plan: Concentrate on  Low sat/Trans fat and DASH/Med Style diet   - reduce etoh consumption     Lipid lowering medication management:     Statin Therapy Recommendations from Today’s Visit:   - in an effort to reduce statin-induced myalgias, we will initiate a trial of pravastatin 20mg mon/wed/fri for 2 weeks, then increase 1 day per week until taking 7 day per week or reaches max-tolerated frequency.  May consider increasing dosage strength at future visit     Non-Statin Medications  Recommendations from Today’s Visit:   - add zetia as next agent   - consider vascepa vs fibrate if trigs remains elevated despite  Statin/zetia    Indication for PCSK9 Inhibitor, if applicable:  Not currently indicated    Supplements Recommended at this visit:   1) cholest off   2) vit D3 5,000 units      Recommendations for Other Cardiovascular Risk Factors, callum all that apply:     1) Blood Pressure Management:  ACC/AHA (2017) goal <130/80  Home BP at goal: yes  Office BP at goal:  yes  Echo: normal   UACR: n/a   Plan:   Monitoring:   - start/continue home BP monitoring, reviewed correct technique:  Yes   - order 24h ABPM:  NO  Medications: continue propranolol 160mg ER daily     2) Glycemic Status: Prediabetic, IFG only  Lab Results   Component Value Date    HBA1C 5.6 10/26/2020   prior high 5.7   Plan:  - continue healthy diet, weight reduction, daily physical activity   - consider metformin up to 850mg BID to slow or offset progression to T2D as per DPP trial   - monitor labs Q6-12mo     3) Antithrombotic therapy:  None    Other Issues:    1) psychiatric disorders on multiple meds  - defer mgmt to psychiatry     Studies Ordered at Todays Visit: None   Blood Work Ordered At Today’s visit: as noted above   Follow-Up: 2 months    Gene Patel M.D.  [facility]   [facility]     CC:  LIZETH Escalona Heather A, M.D.

## 2020-12-01 ENCOUNTER — TELEPHONE (OUTPATIENT)
Dept: VASCULAR LAB | Facility: MEDICAL CENTER | Age: 70
End: 2020-12-01

## 2020-12-01 NOTE — TELEPHONE ENCOUNTER
Pt called and stated that Dr. Patel had prescribed Pravastatin and she states she cannot tolerate statins and would like to know what else she should try and that isn't going to cause her any side effects such as limping.   Dr. TAVERA is out of the office until the 7th.

## 2020-12-02 ENCOUNTER — TELEPHONE (OUTPATIENT)
Dept: VASCULAR LAB | Facility: MEDICAL CENTER | Age: 70
End: 2020-12-02

## 2020-12-02 NOTE — TELEPHONE ENCOUNTER
"Spoke with pt on the phone after receiving a msg that she was having issues with pravastatin.  Pt states that she started pravastatin 20mg on 11/17/20 and that she started to have L shoulder aching almost \"immediately.\"  She states that this aching lasted 3 days and also started to have rib pain and R leg pain, causing her to limp.  She did NOT take any additional doses, just the first dose on 11/17.    We discussed that these findings may be coincidental, and not \"caused\" by pravastatin, which she agrees to.  She is willing to retry pravastatin next Mon and Wed to see if any similar symptoms arise.  We again discussed the benefits of statin drugs, which she is very aware of.      Will f/u via phone visit next Fri.      CYNDY Nieves  [facility]    "

## 2020-12-11 ENCOUNTER — OFFICE VISIT (OUTPATIENT)
Dept: VASCULAR LAB | Facility: MEDICAL CENTER | Age: 70
End: 2020-12-11
Payer: MEDICARE

## 2020-12-11 DIAGNOSIS — I10 ESSENTIAL HYPERTENSION: ICD-10-CM

## 2020-12-11 DIAGNOSIS — F17.200 TOBACCO DEPENDENCE: ICD-10-CM

## 2020-12-11 DIAGNOSIS — F10.20 ALCOHOL USE DISORDER, MODERATE, DEPENDENCE (HCC): ICD-10-CM

## 2020-12-11 DIAGNOSIS — E78.5 DYSLIPIDEMIA: ICD-10-CM

## 2020-12-11 PROCEDURE — 99406 BEHAV CHNG SMOKING 3-10 MIN: CPT | Performed by: NURSE PRACTITIONER

## 2020-12-11 PROCEDURE — 99443 PR PHYSICIAN TELEPHONE EVALUATION 21-30 MIN: CPT | Mod: 25,CR | Performed by: NURSE PRACTITIONER

## 2020-12-11 ASSESSMENT — ENCOUNTER SYMPTOMS
DIZZINESS: 0
NERVOUS/ANXIOUS: 0
WEAKNESS: 0
FEVER: 0
SORE THROAT: 0
DOUBLE VISION: 0
CHILLS: 0
SEIZURES: 0
ABDOMINAL PAIN: 0
HEMOPTYSIS: 0
MYALGIAS: 0
NAUSEA: 0
DIARRHEA: 0
INSOMNIA: 0
BLURRED VISION: 0
FOCAL WEAKNESS: 0
PALPITATIONS: 0
SHORTNESS OF BREATH: 0
TREMORS: 0
WHEEZING: 0
COUGH: 0
BLOOD IN STOOL: 0
VOMITING: 0
BRUISES/BLEEDS EASILY: 0
HEADACHES: 0
ORTHOPNEA: 0
DEPRESSION: 0

## 2020-12-11 NOTE — PROGRESS NOTES
Telephone Appointment Visit   As a means of avoiding spread of COVID-19, this visit is being conducted by telephone. This telephone visit was initiated by the patient and they verbally consented.    Time at start of call: 1400    [facility] - Follow up Visit  Date of Service: 12/11/2020    Marcia Koch has been referred for evaluation and management of dyslipidemia    Referral Source: Sonali Trammell M.D.     HPI    Interval hx/concerns: denies new symptoms.  Leg has healed and no hx of PAD.     History of ASCVD: No  Other Established (non-atherosclerotic) Vascular Disease, if Present: None  Age at Initial Diagnosis of Dyslipidemia: at least 2017     Current Prescription Lipid Lowering Medications - including dose:   Statin: None  Non-Statin: None  Current Lipid Lowering and Related Supplements:   None  Any Current Side Effects Potentially Related to Lipid Lowering therapy?   No  Current Adherence to Lipid Lowering Therapies   Complete  Previously Attempted Interventions for Lipids - including outcome  Statin: crestor, lipitor     Outcome: cough, knee pain - respectively   Non-Statin: None    Outcome: N/A  Any Previous History of Statin Intolerance?   Yes, Details: crestor, lipitor  Baseline Lipids Prior to Treatment:      Ref. Range 8/21/2020 12:13   Cholesterol,Tot Latest Ref Range: 100 - 199 mg/dL 322 (H)   Triglycerides Latest Ref Range: 0 - 149 mg/dL 363 (H)   HDL Latest Ref Range: >=40 mg/dL 43   LDL Latest Ref Range: <100 mg/dL 206 (H)     Other Pertinent History:     Anticoagulation/antiplats: No    History of other CV risk factors:     Tobacco:  Smoking 5 cigs/day.  Would like to quit.  Thinking about quitting.  46 years of smoking   Would like to use the patch  Had quit for 9 yrs.  Helped by NRT patches     HTN:  Current HTN concerns: No specific concerns since last visit   ADRs: No  HTN sx:  No current blurred or changed vision, chest pain, shortness of breath, headache, nausea,  dizziness/vertigo   Home BP los/60s if she checks.    24h ABPM completed: not tested   Adherence to current HTN meds: compliant all of the time     Anticoagulation/antiplats:   None     T2D:  No but noted to have prior IFG and mild high gluc that is ongoing.   Denies sx or prior meds.     Other:  Hypothyroidism:  none reported   Liver disease: none reported   Renal disease/nephrotic syndrome:  none reported   Vitamin D deficiency: none reported   Alcohol abuse:  Prior heavy etoh use, currently reports reduced and <1/2 bottle wine/day.  Denies prior withdrawals, sz, DTs.      PAST MEDICAL HISTORY:  has a past medical history of Alcohol abuse, Alcoholism (HCC), Anxiety, Depression, Hiatus hernia syndrome, Hypertension, Indigestion, Other specified disorder of intestines, Pneumonia, Psychiatric disorder, Sleep apnea, Snoring, Unspecified urinary incontinence, and Urinary bladder disorder.    PAST SURGICAL HISTORY:  has a past surgical history that includes gyn surgery (); other abdominal surgery (); other (); other (); other (); bladder sling female (2011); teddy by laparoscopy; and abdominal hysterectomy total.    CURRENT MEDICATIONS:   Current Outpatient Medications:   •  nicotine, 1 Patch, Transdermal, Q24HRS  •  Vitamin D-3, Take  by mouth.  •  pravastatin, 20 mg, Oral, Q EVENING  •  propranolol CR,   •  ALPRAZolam,   •  propranolol CR, 120 mg, Oral, DAILY (Patient not taking: Reported on 2020)  •  gabapentin, 800 mg, Oral, TID  •  buPROPion, 300 mg, Oral, QAM  •  citalopram, 40 mg, Oral, Q EVENING  •  omeprazole, 20 mg, Oral, DAILY  •  mirtazapine, TAKE 1 TABLET BY MOUTH AT BEDTIME    ALLERGIES: Codeine, Crestor [rosuvastatin calcium], Lipitor [atorvastatin calcium], Lisinopril, and Morphine    SOCIAL HISTORY   Social History     Tobacco Use   • Smoking status: Current Every Day Smoker     Packs/day: 0.25     Years: 25.00     Pack years: 6.25     Types: Cigarettes     Last  attempt to quit: 2011     Years since quittin.3   • Smokeless tobacco: Never Used   • Tobacco comment: occ   Substance Use Topics   • Alcohol use: Yes     Comment: occassionally. post rehab, 1/2 bottle of wine some days. no drinking now, sober X 17   • Drug use: No     Change in weight: Stable  BMI Readings from Last 5 Encounters:   20 28.52 kg/m²   20 28.52 kg/m²   10/28/20 29.13 kg/m²   10/27/20 28.66 kg/m²   10/06/20 28.66 kg/m²   ]  Exercise habits: no regular exercise program  Diet: common adult    Review of Systems   Constitutional: Negative for chills, fever and malaise/fatigue.   HENT: Negative for nosebleeds, sore throat and tinnitus.    Eyes: Negative for blurred vision and double vision.   Respiratory: Negative for cough, hemoptysis, shortness of breath and wheezing.    Cardiovascular: Negative for chest pain, palpitations, orthopnea and leg swelling.   Gastrointestinal: Negative for abdominal pain, blood in stool, diarrhea, melena, nausea and vomiting.   Genitourinary: Negative for hematuria.   Musculoskeletal: Negative for joint pain and myalgias.   Skin: Negative for itching and rash.   Neurological: Negative for dizziness, tremors, focal weakness, seizures, weakness and headaches.   Endo/Heme/Allergies: Does not bruise/bleed easily.   Psychiatric/Behavioral: Negative for depression. The patient is not nervous/anxious and does not have insomnia.      There were no vitals filed for this visit.   BP Readings from Last 5 Encounters:   20 124/57   10/28/20 109/54   10/27/20 106/64   10/06/20 112/66   20 (!) 165/93   ]  Physical Exam     DATA REVIEW:  Most Recent Lipid Panel:   Lab Results   Component Value Date    CHOLSTRLTOT 289 (H) 10/26/2020    TRIGLYCERIDE 234 (H) 10/26/2020    HDL 49 10/26/2020     (H) 10/26/2020     Other Pertinent Blood Work:   Lab Results   Component Value Date    SODIUM 140 10/28/2020    POTASSIUM 3.7 10/28/2020    CHLORIDE 104  10/28/2020    CO2 27 10/28/2020    ANION 9.0 10/28/2020    GLUCOSE 114 (H) 10/28/2020    BUN 8 10/28/2020    CREATININE 0.69 10/28/2020    CALCIUM 8.8 10/28/2020    ASTSGOT 24 10/28/2020    ALTSGPT 21 10/28/2020    ALKPHOSPHAT 144 10/28/2020    TBILIRUBIN 0.2 10/28/2020    ALBUMIN 4.1 10/28/2020    AGRATIO 1.6 10/28/2020      Ref. Range 2/13/2018 14:20   TSH Latest Ref Range: 0.380 - 5.330 uIU/mL 2.530      Ref. Range 2/13/2018 14:20   25-Hydroxy   Vitamin D 25 Latest Ref Range: 30 - 100 ng/mL 20 (L)   Folate -Folic Acid Latest Ref Range: >4.0 ng/mL 21.1   Homocysteine Latest Ref Range: <11.00 umol/L 9.58   Vitamin B12 -True Cobalamin Latest Ref Range: 211 - 911 pg/mL 305     VASCULAR IMAGING:     Last EKG:   Results for orders placed or performed during the hospital encounter of 04/19/12   EKG (ER)   Result Value Ref Range    Report SINUS BRADYCARDIA [Remains]     Report BORDERLINE RIGHT AXIS DEVIATION [Insig. Chg.]     Report NO SIGNIFICANT CHANGE      ASSESSMENT AND PLAN  1. Essential hypertension     2. Dyslipidemia     3. Alcohol use disorder, moderate, dependence (HCC)     4. Tobacco dependence       Patient Type, check all that apply:   Primary Prevention    Established Atherosclerotic Cardiovascular Disease (ASCVD)  No    Other Established (non-atherosclerotic) Vascular Disease, if Present:  None    Evidence of Heterozygous Familial Hypercholesterolemia (FH):   possible but less likely (LDLc >190 baseline, no other baseline fhx or pmhx of ascvd)  Phenotypic FH but likely polygenic     ACC/AHA Indication for Statin Therapy, callum all that apply:  Baseline Calculated ASCVD risk >7.5%: Indication for Moderate intensity statin    Calculated Risk for ASCVD, if applicable    N/A, LDLc >190     Other Significant Risk Markers, if any, callum all that apply   None   - update apoB and lp(a) at next visit     Goal LDL-C and nonHDL-C based on Clinic Protocol  NLA/ACC/AHA risk category: High:  3+ major ASCVD risk factors,  LDL-C >189  Tx threshold: non-HDL-C >129, LDL-C >99  Tx goal: non-HDL <130, LDL-C <100 (optional: apoB<90)   At goal: no  - update labs in 6-8 weeks with advanced lipid profiles     Lifestyle Recommendations From Today’s Visit:      Smoking:  Preparation to action stage     reports that she has been smoking cigarettes. She has a 6.25 pack-year smoking history. She has never used smokeless tobacco.   Provided strong recommendation for complete cessation and informed this is the primary contributor to the majority of all ASCVD and cancer-related conditions and can result in significant morbidity and early mortality.   Has not started nicoderm patches yet, but has them on hand  Decreased to 1/2 pack per day  Rescheduled quit date  - reviewed resources for cessation including tobacco cessation clinic visit, pharmacotx meds, quit lines  - face-to-face counseling - 7 min  - start nicoderm 21mg daily for next 30-60 days  Quit date 01/01/2021  - review at every visit      Physical Activity: continue healthy activity to improve CV fitness, see care instructions for additional details     Weight Management and Nutrition: Dietary plan was discussed with patient at this visit including DASH, low sodium and/or as outlined in care instructions   Eating Plan: Concentrate on  Low sat/Trans fat and DASH/Med Style diet   - reduce etoh consumption     Lipid lowering medication management:     Statin Therapy Recommendations from Today’s Visit:   - in an effort to reduce statin-induced myalgias with alternate day dosing and increase as tolerated  - Continue pravastatin 20mg 5 days per week currently, if continues to tolerate increase to 7 days per week  - Recheck labs as previously ordered     Non-Statin Medications Recommendations from Today’s Visit:   - add zetia as next agent   - consider vascepa vs fibrate if trigs remains elevated despite Statin/zetia    Indication for PCSK9 Inhibitor, if applicable:  Not currently  indicated    Supplements Recommended at this visit:   1) continue cholest off   2) continue vit D3 5,000 units      Recommendations for Other Cardiovascular Risk Factors, callum all that apply:     1) Blood Pressure Management:  ACC/AHA (2017) goal <130/80  Home BP at goal: yes  Office BP at goal: yes  Echo: normal   UACR: n/a   Plan:   Monitoring:   - start/continue home BP monitoring, reviewed correct technique:  Yes   - order 24h ABPM:  NO  Medications: continue propranolol 160mg ER daily     2) Glycemic Status: Prediabetic, IFG only  Lab Results   Component Value Date    HBA1C 5.6 10/26/2020   prior high 5.7   Plan:  - continue healthy diet, weight reduction, daily physical activity   - consider metformin up to 850mg BID to slow or offset progression to T2D as per DPP trial   - monitor labs Q 6-12mo     3) Antithrombotic therapy:  None    Other Issues:    1) psychiatric disorders on multiple meds  - defer mgmt to psychiatry     Studies Ordered at Todays Visit: None   Blood Work Ordered At Today’s visit: as noted above     Follow-up: 6 weeks as previously scheduled    Time at end of call: 1430  Total Time Spent: 21-30 minutes    SILVIO Scanlon.

## 2021-01-05 ENCOUNTER — OFFICE VISIT (OUTPATIENT)
Dept: MEDICAL GROUP | Facility: MEDICAL CENTER | Age: 71
End: 2021-01-05
Payer: MEDICARE

## 2021-01-05 VITALS
OXYGEN SATURATION: 90 % | BODY MASS INDEX: 28.88 KG/M2 | WEIGHT: 163 LBS | HEIGHT: 63 IN | DIASTOLIC BLOOD PRESSURE: 82 MMHG | TEMPERATURE: 97.7 F | SYSTOLIC BLOOD PRESSURE: 130 MMHG | HEART RATE: 53 BPM | RESPIRATION RATE: 16 BRPM

## 2021-01-05 DIAGNOSIS — E78.5 DYSLIPIDEMIA: ICD-10-CM

## 2021-01-05 DIAGNOSIS — I10 ESSENTIAL HYPERTENSION: ICD-10-CM

## 2021-01-05 DIAGNOSIS — R73.01 IMPAIRED FASTING GLUCOSE: ICD-10-CM

## 2021-01-05 DIAGNOSIS — F32.A DEPRESSION, CONTROLLED: ICD-10-CM

## 2021-01-05 DIAGNOSIS — F13.20 MODERATE SEDATIVE, HYPNOTIC, OR ANXIOLYTIC USE DISORDER (HCC): ICD-10-CM

## 2021-01-05 DIAGNOSIS — F17.200 TOBACCO DEPENDENCE: ICD-10-CM

## 2021-01-05 DIAGNOSIS — F41.9 ANXIETY: ICD-10-CM

## 2021-01-05 PROCEDURE — 8041 PR SCP AHA: Performed by: INTERNAL MEDICINE

## 2021-01-05 PROCEDURE — 99214 OFFICE O/P EST MOD 30 MIN: CPT | Performed by: INTERNAL MEDICINE

## 2021-01-05 RX ORDER — PROPRANOLOL HYDROCHLORIDE 160 MG/1
160 CAPSULE, EXTENDED RELEASE ORAL DAILY
Qty: 30 CAP | Refills: 0 | Status: SHIPPED | OUTPATIENT
Start: 2021-01-05 | End: 2021-02-08

## 2021-01-05 ASSESSMENT — FIBROSIS 4 INDEX: FIB4 SCORE: 1.52

## 2021-01-05 NOTE — PROGRESS NOTES
CC:  Diagnoses of Essential hypertension, Dyslipidemia, Tobacco dependence, Anxiety, Moderate sedative, hypnotic, or anxiolytic use disorder, and Impaired fasting glucose were pertinent to this visit.    HISTORY OF THE PRESENT ILLNESS: Patient is a 70 y.o. adult. This pleasant patient is here today routine follow-up.    The [facility] paperwork was completed.  There is question of COPD on the paperwork.  She does have smoking history.  On available imaging CT cardiac 10/14/2020 the lungs were notably clear, there is no mention of emphysematous changes.  Chest x-ray 9/8/2020 do not appreciate any findings of COPD.  I do not see any pulmonary function testing to review.  She is not on inhalers.  She denies any pulmonary symptoms of dyspnea, wheeze, etc.  Overall she has roughly 6.25-pack-year smoking history.  Therefore I am going to say that she does not have any clinical objective findings of any COPD.  She is still smoking but says she is weaned down to 4 cigarettes/day.  She is still planning a quit date, she has nicotine patches at home.    The form also question about nonthrombocytopenic purpura.  She has no current rash to suggest this.  I have no dermatology notes to review.    She does have history of major depression, she feels comfortable with her current psychiatrist Dr. Tessa Orta and she feels that currently her mood is reasonly well controlled.  Her propanolol was recently increased to 160 mg daily and she says this has helped her anxiety.  She ran out of her Wellbutrin for a while and got more depressed but restarted it says symptoms controlled.  Her psychiatry team is prescribing Xanax as well as Celexa, mirtazapine that she is additionally on.  No SI or HI.  Still has episodes of low mood she says due to the pandemic and feeling like she cannot leave her home.  With her history of alcoholism she says she had 3 glasses champagne new years amna and approximately 1 glass of wine every few  days.  She knows that no amount of alcohol is recommended with her history of alcohol use disorder/dependence and she says she is working on total cessation with her psychiatrist.  She says to improve her mood she is looking for a new kitten, she still very much misses her orange cat Huang.    History of impaired fasting glucose but not diabetes.  The highest A1c I see in the chart is at 5.8, most recently controlled at 5.6 on labs 10/26/2020.    Labs were reviewed from 10/26/2020, her H&H did normalize, and the work-up for her more recent polycythemia showed non-detected JAK2 mutation, normal epo of 14, and no blood in urine.    Annual Health Assessment Questions:    1.  Are you currently engaging in any exercise or physical activity? Yes    2.  How would you describe your mood or emotional well-being today? good    3.  Have you had any falls in the last year? Yes (see prior pcp notes)    4.  Have you noticed any problems with your balance or had difficulty walking? No    5.  In the last six months have you experienced any leakage of urine? Yes, at night sometime but not much (chronic)    6. DPA/Advanced Directive: Patient has Advanced Directive on file.     Allergies: Codeine, Crestor [rosuvastatin calcium], Lipitor [atorvastatin calcium], Lisinopril, and Morphine    Current Outpatient Medications Ordered in Epic   Medication Sig Dispense Refill   • propranolol CR (INDERAL LA) 160 MG CAPSULE SR 24 HR capsule Take 1 Cap by mouth every day. 30 Cap 0   • nicotine (NICODERM) 21 MG/24HR PATCH 24 HR Place 1 Patch on the skin every 24 hours for 60 days. 30 Patch 1   • Cholecalciferol (VITAMIN D-3) 125 MCG (5000 UT) Tab Take  by mouth. 30 Tab    • pravastatin (PRAVACHOL) 20 MG Tab Take 1 Tab by mouth every evening for 360 days. 90 Tab 3   • ALPRAZolam (XANAX) 1 MG Tab      • gabapentin (NEURONTIN) 800 MG tablet Take 800 mg by mouth 3 times a day.     • buPROPion (WELLBUTRIN XL) 300 MG XL tablet Take 300 mg by mouth every  morning.     • citalopram (CELEXA) 40 MG Tab Take 40 mg by mouth every evening.     • omeprazole (PRILOSEC) 20 MG delayed-release capsule Take 20 mg by mouth every day.     • mirtazapine (REMERON) 15 MG Tab TAKE 1 TABLET BY MOUTH AT BEDTIME 30 Tab 0     No current Epic-ordered facility-administered medications on file.        Past Medical History:   Diagnosis Date   • Alcohol abuse    • Alcoholism (HCC)    • Anxiety    • Depression    • Hiatus hernia syndrome    • Hypertension    • Indigestion    • Other specified disorder of intestines     diarrhea   • Pneumonia     for 2 days   • Psychiatric disorder     anxiety, depression   • Sleep apnea    • Snoring    • Unspecified urinary incontinence    • Urinary bladder disorder        Past Surgical History:   Procedure Laterality Date   • BLADDER SLING FEMALE  2011    Performed by RENATE STERLING at SURGERY [facility]   • OTHER      surgery for sleep apnea   • OTHER      big toenails and second toenails removed   • OTHER ABDOMINAL SURGERY      choley   • GYN SURGERY      hyst   • OTHER      tonsillectomy   • ABDOMINAL HYSTERECTOMY TOTAL      Hysterectomy, Total Abdominal   • BRIEN BY LAPAROSCOPY      Cholecystectomy, Laparoscopic       Social History     Tobacco Use   • Smoking status: Current Every Day Smoker     Packs/day: 0.25     Years: 25.00     Pack years: 6.25     Types: Cigarettes     Last attempt to quit: 2011     Years since quittin.3   • Smokeless tobacco: Never Used   • Tobacco comment: occ   Substance Use Topics   • Alcohol use: Yes     Comment: occassionally. post rehab, 1/2 bottle of wine some days. no drinking now, sober X 17   • Drug use: No       Social History     Social History Narrative   • Not on file       Family History   Problem Relation Age of Onset   • Anxiety disorder Brother    • Depression Brother    • Other Brother          AIDS   • Alcohol abuse Mother    • Cancer Mother         colon cancer  "age 70s   • Alcohol abuse Father    • Hyperlipidemia Father    • Cancer Sister         breast   • Drug abuse Sister    • Cancer Maternal Aunt         breast   • Cancer Maternal Uncle         nos   • Stroke Neg Hx    • Heart Attack Neg Hx        ROS:   Denies chest pain, dyspnea or pulmonary symptoms, abdominal pain or new GI symptoms, fever.  Her wound from last appointment is totally healed.    Exam: /82 (BP Location: Left arm, Patient Position: Sitting, BP Cuff Size: Adult)   Pulse (!) 53   Temp 36.5 °C (97.7 °F) (Temporal)   Resp 16   Ht 1.6 m (5' 3\")   Wt 73.9 kg (163 lb)   SpO2 90%  Body mass index is 28.87 kg/m².    General: Normal appearing. No distress.  EYES: Conjunctiva clear lids without ptosis, pupils equal  EARS: Normal shape and contour   Pulmonary: Clear to ausculation.  Normal effort. No rales or wheezing.  Cardiovascular: Regular rate and rhythm without significant murmur.   Abdomen: Soft, nontender, nondistended. Normal bowel sounds.  Neurologic: Cranial nerves grossly nonfocal  Skin: Warm and dry.  No obvious lesions.  Musculoskeletal: Normal gait. No extremity cyanosis, clubbing, or edema.  Psych: Normal mood and affect. Alert and oriented x3. Judgment and insight is normal.      Assessment/Plan  1. Essential hypertension  Reasonably controlled on clinic measurements, plan to continue propanolol now at higher dose.  Can add medication management if needed,  blood pressure goal 120/80.    2. Dyslipidemia  She is followed by the vascular clinic, prior intolerance to statin but she says that she is tolerating pravastatin taking 5 days/week.  She will get her repeat lipid levels done soon.  Vascular notes indicate potential to add Zetia if needed.  Also advised patient on alcohol cessation which could potentially improve her lipids.    3. Tobacco dependence  She has cut down to 4 cigarettes/day, has nicotine patches at home for when she decides on her quit date.  She does understand that " smoking carries cardiovascular disease risk.  She has no symptoms or objective evidence of any significant COPD.    4. Anxiety  Patient feels anxiety is reasonable controlled on current medications from her psychiatrist, now on higher dose propanolol.  Stable and chronic.  - propranolol CR (INDERAL LA) 160 MG CAPSULE SR 24 HR capsule; Take 1 Cap by mouth every day.  Dispense: 30 Cap; Refill: 0    5. Moderate sedative, hypnotic, or anxiolytic use disorder  Xanax prescribed by her psychiatrist.  Discussed with her no amount of alcohol safe, she will work with her psychiatrist for total cessation.    6. Impaired fasting glucose  Recent A1c controlled, continue to monitor diet exercise habits.  Stable.    7.  Depression, controlled  Patient feels symptoms are controlled, continue current management through her psychiatry team, she sees Dr. Orta now.    She says she will schedule her mammogram, routine, no new symptoms    rtc 4m    Please note that this dictation was created using voice recognition software. I have made every reasonable attempt to correct obvious errors, but I expect that there are errors of grammar and possibly content that I did not discover before finalizing the note.

## 2021-01-05 NOTE — PROGRESS NOTES
Annual Health Assessment Questions:    1.  Are you currently engaging in any exercise or physical activity? Yes    2.  How would you describe your mood or emotional well-being today? good    3.  Have you had any falls in the last year? Yes    4.  Have you noticed any problems with your balance or had difficulty walking? No    5.  In the last six months have you experienced any leakage of urine? Yes, at night sometime but not much    6. DPA/Advanced Directive: Patient has Advanced Directive on file.

## 2021-01-15 ENCOUNTER — HOSPITAL ENCOUNTER (OUTPATIENT)
Dept: LAB | Facility: MEDICAL CENTER | Age: 71
End: 2021-01-15
Attending: FAMILY MEDICINE
Payer: MEDICARE

## 2021-01-15 DIAGNOSIS — E78.1 HYPERTRIGLYCERIDEMIA: ICD-10-CM

## 2021-01-15 DIAGNOSIS — E55.9 VITAMIN D DEFICIENCY: ICD-10-CM

## 2021-01-15 DIAGNOSIS — E78.5 DYSLIPIDEMIA: ICD-10-CM

## 2021-01-15 LAB
ALBUMIN SERPL BCP-MCNC: 4.1 G/DL (ref 3.2–4.9)
ALBUMIN/GLOB SERPL: 1.6 G/DL
ALP SERPL-CCNC: 114 U/L
ALT SERPL-CCNC: 16 U/L (ref 2–50)
ANION GAP SERPL CALC-SCNC: 12 MMOL/L (ref 7–16)
AST SERPL-CCNC: 26 U/L (ref 12–45)
BILIRUB SERPL-MCNC: 0.5 MG/DL (ref 0.1–1.5)
BUN SERPL-MCNC: 7 MG/DL (ref 8–22)
CALCIUM SERPL-MCNC: 9 MG/DL (ref 8.5–10.5)
CHLORIDE SERPL-SCNC: 95 MMOL/L (ref 96–112)
CHOLEST SERPL-MCNC: 220 MG/DL (ref 100–199)
CK SERPL-CCNC: 44 U/L (ref 0–154)
CO2 SERPL-SCNC: 27 MMOL/L (ref 20–33)
CREAT SERPL-MCNC: 0.68 MG/DL (ref 0.5–1.4)
FASTING STATUS PATIENT QL REPORTED: NORMAL
GLOBULIN SER CALC-MCNC: 2.6 G/DL (ref 1.9–3.5)
GLUCOSE SERPL-MCNC: 87 MG/DL (ref 65–99)
HDLC SERPL-MCNC: 46 MG/DL
LDLC SERPL CALC-MCNC: 105 MG/DL
POTASSIUM SERPL-SCNC: 4 MMOL/L (ref 3.6–5.5)
PROT SERPL-MCNC: 6.7 G/DL (ref 6–8.2)
SODIUM SERPL-SCNC: 134 MMOL/L (ref 135–145)
TRIGL SERPL-MCNC: 345 MG/DL (ref 0–149)

## 2021-01-15 PROCEDURE — 80053 COMPREHEN METABOLIC PANEL: CPT

## 2021-01-15 PROCEDURE — 82306 VITAMIN D 25 HYDROXY: CPT

## 2021-01-15 PROCEDURE — 36415 COLL VENOUS BLD VENIPUNCTURE: CPT

## 2021-01-15 PROCEDURE — 80061 LIPID PANEL: CPT

## 2021-01-15 PROCEDURE — 83695 ASSAY OF LIPOPROTEIN(A): CPT

## 2021-01-15 PROCEDURE — 82550 ASSAY OF CK (CPK): CPT

## 2021-01-15 PROCEDURE — 83721 ASSAY OF BLOOD LIPOPROTEIN: CPT | Mod: XU

## 2021-01-16 LAB — 25(OH)D3 SERPL-MCNC: 77 NG/ML (ref 30–100)

## 2021-01-17 LAB — LDLC SERPL-MCNC: 137 MG/DL (ref 0–129)

## 2021-01-18 LAB — LPA SERPL-MCNC: 15 MG/DL

## 2021-01-21 ENCOUNTER — OFFICE VISIT (OUTPATIENT)
Dept: VASCULAR LAB | Facility: MEDICAL CENTER | Age: 71
End: 2021-01-21
Attending: FAMILY MEDICINE
Payer: MEDICARE

## 2021-01-21 VITALS
BODY MASS INDEX: 28.88 KG/M2 | HEIGHT: 63 IN | WEIGHT: 163 LBS | DIASTOLIC BLOOD PRESSURE: 82 MMHG | SYSTOLIC BLOOD PRESSURE: 150 MMHG | HEART RATE: 53 BPM

## 2021-01-21 DIAGNOSIS — E78.1 HYPERTRIGLYCERIDEMIA: ICD-10-CM

## 2021-01-21 DIAGNOSIS — R03.0 ELEVATED BLOOD PRESSURE READING WITHOUT DIAGNOSIS OF HYPERTENSION: ICD-10-CM

## 2021-01-21 DIAGNOSIS — E78.5 DYSLIPIDEMIA: ICD-10-CM

## 2021-01-21 DIAGNOSIS — I10 ESSENTIAL HYPERTENSION: ICD-10-CM

## 2021-01-21 PROCEDURE — 99406 BEHAV CHNG SMOKING 3-10 MIN: CPT | Performed by: FAMILY MEDICINE

## 2021-01-21 PROCEDURE — 99212 OFFICE O/P EST SF 10 MIN: CPT | Mod: 25

## 2021-01-21 PROCEDURE — 99214 OFFICE O/P EST MOD 30 MIN: CPT | Mod: 25 | Performed by: FAMILY MEDICINE

## 2021-01-21 RX ORDER — ACETAMINOPHEN 160 MG
2000 TABLET,DISINTEGRATING ORAL DAILY
Qty: 30 CAP | COMMUNITY
Start: 2021-01-21 | End: 2021-03-10

## 2021-01-21 RX ORDER — OMEGA-3-ACID ETHYL ESTERS 1 G/1
2 CAPSULE, LIQUID FILLED ORAL 2 TIMES DAILY
Qty: 360 CAP | Refills: 3 | Status: SHIPPED
Start: 2021-01-21 | End: 2021-04-23

## 2021-01-21 RX ORDER — PRAVASTATIN SODIUM 40 MG
40 TABLET ORAL DAILY
Qty: 90 TAB | Refills: 3 | Status: SHIPPED
Start: 2021-01-21 | End: 2021-04-23

## 2021-01-21 ASSESSMENT — ENCOUNTER SYMPTOMS
VOMITING: 0
FEVER: 0
WEAKNESS: 0
CHILLS: 0
WHEEZING: 0
COUGH: 0
NAUSEA: 0
SHORTNESS OF BREATH: 0
DIZZINESS: 0
PALPITATIONS: 0
DIARRHEA: 0
TREMORS: 0
FOCAL WEAKNESS: 0
HEADACHES: 0
HEMOPTYSIS: 0
SEIZURES: 0
BRUISES/BLEEDS EASILY: 0
ABDOMINAL PAIN: 0
MYALGIAS: 0

## 2021-01-21 ASSESSMENT — FIBROSIS 4 INDEX: FIB4 SCORE: 1.88

## 2021-01-21 NOTE — PROGRESS NOTES
[facility] - Follow-up   Date of Service: 01/21/21    Marcia Koch has been referred for evaluation and management of dyslipidemia    Referral Source: Sonali Trammell M.D.     HPI    Interval hx/concerns: had fall last week, mild rib pain and mild head injury.  No syncope, was hanging picture. No other new HLD concerns.     History of ASCVD: No  Other Established (non-atherosclerotic) Vascular Disease, if Present: None  Age at Initial Diagnosis of Dyslipidemia: at least 2017     Current Prescription Lipid Lowering Medications - including dose:   Statin: pravastatin 20mg   Non-Statin: None  Current Lipid Lowering and Related Supplements:   None  Any Current Side Effects Potentially Related to Lipid Lowering therapy?   No  Current Adherence to Lipid Lowering Therapies   Complete  Previously Attempted Interventions for Lipids - including outcome  Statin: crestor, lipitor     Outcome: cough, knee pain - respectively   Non-Statin: None    Outcome: N/A  Any Previous History of Statin Intolerance?   Yes, Details: crestor, lipitor  Baseline Lipids Prior to Treatment:      Ref. Range 8/21/2020 12:13   Cholesterol,Tot Latest Ref Range: 100 - 199 mg/dL 322 (H)   Triglycerides Latest Ref Range: 0 - 149 mg/dL 363 (H)   HDL Latest Ref Range: >=40 mg/dL 43   LDL Latest Ref Range: <100 mg/dL 206 (H)     Other Pertinent History:     Anticoagulation/antiplats: No    Tobacco:   reports that she has been smoking cigarettes. She has a 6.25 pack-year smoking history. She has never used smokeless tobacco.   Smoking 5 cigs/day.  Would like to quit.  Thinking about quitting.  46 years of smoking   Would like to use the patch  Had quit for 9 yrs.  Helped by NRT patches     HTN:  Current HTN concerns: no new sx, concerns.    ADRs: No  HTN sx:  No current blurred or changed vision, chest pain, shortness of breath, headache, nausea, dizziness/vertigo   Home BP log: reports home BP 120s/70s   24h ABPM completed: not tested    Adherence to current HTN meds: compliant all of the time     Anticoagulation/antiplats: None     T2D:  Lab Results   Component Value Date    HBA1C 5.6 10/26/2020    no current symtptoms or meds.     Other:  Hypothyroidism:  none reported   Liver disease: none reported   Renal disease/nephrotic syndrome:  none reported   Vitamin D deficiency: none reported   Alcohol abuse:  Prior heavy etoh use, currently reports reduced and <1/2 bottle wine/day.  Denies prior withdrawals, sz, DTs.      CURRENT MEDICATIONS:   Current Outpatient Medications:   •  Vitamin D3, Take  by mouth.  •  omega-3 acid ethyl esters, 2 g, Oral, BID  •  pravastatin, 40 mg, Oral, DAILY  •  propranolol CR, 160 mg, Oral, DAILY  •  ALPRAZolam,   •  gabapentin, 800 mg, Oral, TID  •  buPROPion, 300 mg, Oral, QAM  •  citalopram, 40 mg, Oral, Q EVENING  •  omeprazole, 20 mg, Oral, DAILY  •  mirtazapine, TAKE 1 TABLET BY MOUTH AT BEDTIME    ALLERGIES: Codeine, Crestor [rosuvastatin calcium], Lipitor [atorvastatin calcium], Lisinopril, and Morphine    SOCIAL HISTORY   Social History     Tobacco Use   • Smoking status: Current Every Day Smoker     Packs/day: 0.25     Years: 25.00     Pack years: 6.25     Types: Cigarettes     Last attempt to quit: 2011     Years since quittin.4   • Smokeless tobacco: Never Used   • Tobacco comment: occ   Substance Use Topics   • Alcohol use: Yes     Comment: occassionally. post rehab, 1/2 bottle of wine some days. no drinking now, sober X 17   • Drug use: No     Change in weight: Stable  BMI Readings from Last 5 Encounters:   21 28.87 kg/m²   21 28.87 kg/m²   20 28.52 kg/m²   20 28.52 kg/m²   10/28/20 29.13 kg/m²   ]  Exercise habits: no regular exercise program  Diet: common adult    Review of Systems   Constitutional: Negative for chills, fever and malaise/fatigue.   Respiratory: Negative for cough, hemoptysis, shortness of breath and wheezing.    Cardiovascular: Negative for chest  "pain, palpitations and leg swelling.   Gastrointestinal: Negative for abdominal pain, diarrhea, nausea and vomiting.   Musculoskeletal: Negative for joint pain and myalgias.   Skin: Negative for itching and rash.   Neurological: Negative for dizziness, tremors, focal weakness, seizures, weakness and headaches.   Endo/Heme/Allergies: Does not bruise/bleed easily.     Vitals:    01/21/21 1023 01/21/21 1030   BP: 150/78 150/82   BP Location: Left arm Left arm   Patient Position: Sitting Sitting   BP Cuff Size: Adult Adult   Pulse: (!) 54 (!) 53   Weight: 73.9 kg (163 lb)    Height: 1.6 m (5' 3\")       BP Readings from Last 5 Encounters:   01/21/21 150/82   01/05/21 130/82   11/16/20 124/57   10/28/20 109/54   10/27/20 106/64     Physical Exam   Constitutional: She is oriented to person, place, and time. She appears well-developed and well-nourished. No distress.   HENT:   Head: Normocephalic and atraumatic.   Neck: Normal range of motion. Neck supple.   Cardiovascular: Normal rate, regular rhythm, normal heart sounds and intact distal pulses. Exam reveals no gallop and no friction rub.   No murmur heard.  Pulmonary/Chest: Effort normal and breath sounds normal.   Musculoskeletal: Normal range of motion.         General: No tenderness or edema.   Neurological: She is alert and oriented to person, place, and time.   Skin: Skin is warm and dry. She is not diaphoretic.   Psychiatric: She has a normal mood and affect.     DATA REVIEW:  Most Recent Lipid Panel:   Lab Results   Component Value Date    CHOLSTRLTOT 220 (H) 01/15/2021    TRIGLYCERIDE 345 (H) 01/15/2021    HDL 46 01/15/2021     (H) 01/15/2021     Results for VIDHI ROSALES (MRN [id number]) as of 1/21/2021 10:40   Ref. Range 1/15/2021 16:10   LDL Direct Latest Ref Range: 0 - 129 mg/dL 137 (H)   Lipoprotein A Latest Ref Range: <=29 mg/dL 15     Other Pertinent Blood Work:   Lab Results   Component Value Date    SODIUM 134 (L) 01/15/2021    POTASSIUM 4.0 " 01/15/2021    CHLORIDE 95 (L) 01/15/2021    CO2 27 01/15/2021    ANION 12.0 01/15/2021    GLUCOSE 87 01/15/2021    BUN 7 (L) 01/15/2021    CREATININE 0.68 01/15/2021    CALCIUM 9.0 01/15/2021    ASTSGOT 26 01/15/2021    ALTSGPT 16 01/15/2021    ALKPHOSPHAT 114 01/15/2021    TBILIRUBIN 0.5 01/15/2021    ALBUMIN 4.1 01/15/2021    AGRATIO 1.6 01/15/2021    LIPOPROTA 15 01/15/2021    CPKTOTAL 44 01/15/2021      Ref. Range 2/13/2018 14:20   TSH Latest Ref Range: 0.380 - 5.330 uIU/mL 2.530      Ref. Range 2/13/2018 14:20   25-Hydroxy   Vitamin D 25 Latest Ref Range: 30 - 100 ng/mL 20 (L)   Folate -Folic Acid Latest Ref Range: >4.0 ng/mL 21.1   Homocysteine Latest Ref Range: <11.00 umol/L 9.58   Vitamin B12 -True Cobalamin Latest Ref Range: 211 - 911 pg/mL 305      Ref. Range 1/15/2021 16:10   25-Hydroxy   Vitamin D 25 Latest Ref Range: 30 - 100 ng/mL 77     VASCULAR IMAGING:     Last EKG:     ASSESSMENT AND PLAN  1. Dyslipidemia  pravastatin (PRAVACHOL) 40 MG tablet    APOLIPOPROTEIN B    Comp Metabolic Panel    LDL, DIRECT    Lipid Profile   2. Hypertriglyceridemia  omega-3 acid ethyl esters (LOVAZA) 1 GM capsule    pravastatin (PRAVACHOL) 40 MG tablet    APOLIPOPROTEIN B    Comp Metabolic Panel    LDL, DIRECT    Lipid Profile   3. Essential hypertension     4. Elevated blood pressure reading without diagnosis of hypertension  REFERRAL TO 24-HOUR BLOOD PRESSURE MONITORING        Patient Type, check all that apply:   Primary Prevention    Established Atherosclerotic Cardiovascular Disease (ASCVD)  No    Other Established (non-atherosclerotic) Vascular Disease, if Present:  None    Evidence of Heterozygous Familial Hypercholesterolemia (FH):   possible but less likely (LDLc >190 baseline, no other baseline fhx or pmhx of ascvd)  Phenotypic FH but likely polygenic     ACC/AHA Indication for Statin Therapy, callum all that apply:  Baseline Calculated ASCVD risk >7.5%: Indication for Moderate intensity statin    Calculated Risk  for ASCVD, if applicable    N/A, LDLc >190     Other Significant Risk Markers, if any, callum all that apply   None   - continue to monitor direct LDL-C and apoB due to hypertrig     Goal LDL-C and nonHDL-C based on Clinic Protocol  NLA/ACC/AHA risk category: High:  3+ major ASCVD risk factors, LDL-C >189  Tx goal: non-HDL <130, LDL-C <100 (optional: apoB<90)   At goal: no  - update labs in 2mo    Lifestyle Recommendations From Today’s Visit:      Smoking:  Action stage, prior successful quit for 9yrs     reports that she has been smoking cigarettes. She has a 6.25 pack-year smoking history. She has never used smokeless tobacco.   Has reduced to 2 cigs/day   Provided strong recommendation for complete cessation and informed this is the primary contributor to the majority of all ASCVD and cancer-related conditions and can result in significant morbidity and early mortality.   - reviewed resources for cessation including tobacco cessation clinic visit, pharmacotx meds, quit lines  - face-to-face counseling - 3min  - start nicoderm 21mg daily for next 30-60 days  Quit date 2/14/21   - review at every visit      Physical Activity: continue healthy activity to improve CV fitness, see care instructions for additional details     Weight Management and Nutrition: Dietary plan was discussed with patient at this visit including DASH, low sodium and/or as outlined in care instructions   Eating Plan: Concentrate on  Low sat/Trans fat and DASH/Med Style diet   - reduce etoh consumption     Lipid lowering medication management:     Statin Therapy Recommendations from Today’s Visit:   - intol to atorva, rosuva  - increase pravastatin to 40mg QHS     Non-Statin Medications Recommendations from Today’s Visit:   - add zetia as next agent   - start lovaza 2g BID     Indication for PCSK9 Inhibitor, if applicable:  Not currently indicated    Supplements Recommended at this visit:   1) continue cholest off   2) continue vit D3 reduce 2,000  units     Recommendations for Other Cardiovascular Risk Factors, callum all that apply:     1) Blood Pressure Management:  ACC/AHA (2017) goal <130/80  Home BP at goal: yes  Office BP at goal: yes  24h ABPM: pending   Echo: normal   UACR: n/a   Plan:   Monitoring:   - start/continue home BP monitoring, reviewed correct technique:  Yes   - order 24h ABPM:  1/21/21   Medications: continue propranolol 160mg ER daily     2) Glycemic Status: Prediabetic, IFG only  Lab Results   Component Value Date    HBA1C 5.6 10/26/2020   prior high 5.7   Plan:  - continue healthy diet, weight reduction, daily physical activity   - consider metformin up to 850mg BID to slow or offset progression to T2D as per DPP trial   - monitor labs Q 6-12mo     3) Antithrombotic therapy: None    Other Issues:    1) psychiatric disorders on multiple meds  - defer mgmt to psychiatry     Studies Ordered at Todays Visit: None   Blood Work Ordered At Today’s visit: as noted above     Follow-up: 2mo with shari Patel M.D.   [facility]   [facility]   [phone]

## 2021-01-21 NOTE — PATIENT INSTRUCTIONS
Helpful links:     Https://www.nutrition.va.gov/docs/UpdatedPatientEd/Mediterraneandiet.pdf    https://www.[employer]/wp-content/uploads/2013/06/The-Mediterranean-Diet.pdf

## 2021-01-25 ENCOUNTER — HOSPITAL ENCOUNTER (INPATIENT)
Facility: MEDICAL CENTER | Age: 71
LOS: 1 days | DRG: 083 | End: 2021-01-26
Attending: EMERGENCY MEDICINE | Admitting: SURGERY
Payer: MEDICARE

## 2021-01-25 ENCOUNTER — APPOINTMENT (OUTPATIENT)
Dept: RADIOLOGY | Facility: MEDICAL CENTER | Age: 71
End: 2021-01-25
Attending: EMERGENCY MEDICINE
Payer: MEDICARE

## 2021-01-25 ENCOUNTER — NURSE TRIAGE (OUTPATIENT)
Dept: HEALTH INFORMATION MANAGEMENT | Facility: OTHER | Age: 71
End: 2021-01-25

## 2021-01-25 ENCOUNTER — HOSPITAL ENCOUNTER (EMERGENCY)
Facility: MEDICAL CENTER | Age: 71
End: 2021-01-25
Attending: EMERGENCY MEDICINE
Payer: MEDICARE

## 2021-01-25 VITALS
WEIGHT: 166.67 LBS | BODY MASS INDEX: 29.53 KG/M2 | HEIGHT: 63 IN | SYSTOLIC BLOOD PRESSURE: 198 MMHG | RESPIRATION RATE: 17 BRPM | OXYGEN SATURATION: 97 % | HEART RATE: 58 BPM | TEMPERATURE: 98.4 F | DIASTOLIC BLOOD PRESSURE: 106 MMHG

## 2021-01-25 DIAGNOSIS — S22.41XA CLOSED FRACTURE OF MULTIPLE RIBS OF RIGHT SIDE, INITIAL ENCOUNTER: ICD-10-CM

## 2021-01-25 DIAGNOSIS — S06.5XAA SDH (SUBDURAL HEMATOMA) (HCC): ICD-10-CM

## 2021-01-25 DIAGNOSIS — I10 HYPERTENSION, UNSPECIFIED TYPE: ICD-10-CM

## 2021-01-25 DIAGNOSIS — S06.5XAA SUBDURAL HEMATOMA (HCC): ICD-10-CM

## 2021-01-25 DIAGNOSIS — S22.41XA MULTIPLE FRACTURES OF RIBS, RIGHT SIDE, INITIAL ENCOUNTER FOR CLOSED FRACTURE: ICD-10-CM

## 2021-01-25 DIAGNOSIS — R09.02 HYPOXEMIA: ICD-10-CM

## 2021-01-25 DIAGNOSIS — R09.02 HYPOXIA: ICD-10-CM

## 2021-01-25 PROBLEM — F10.90 ALCOHOL USE: Status: ACTIVE | Noted: 2021-01-25

## 2021-01-25 PROBLEM — T14.90XA TRAUMA: Status: ACTIVE | Noted: 2021-01-25

## 2021-01-25 PROBLEM — K21.9 GERD (GASTROESOPHAGEAL REFLUX DISEASE): Status: ACTIVE | Noted: 2021-01-25

## 2021-01-25 PROBLEM — Z11.9 SCREENING EXAMINATION FOR INFECTIOUS DISEASE: Status: ACTIVE | Noted: 2021-01-25

## 2021-01-25 PROBLEM — Z72.0 TOBACCO USE: Status: ACTIVE | Noted: 2021-01-25

## 2021-01-25 PROBLEM — Z78.9 ALCOHOL USE: Status: ACTIVE | Noted: 2021-01-25

## 2021-01-25 LAB
ALBUMIN SERPL BCP-MCNC: 3.8 G/DL (ref 3.2–4.9)
ALBUMIN/GLOB SERPL: 1.5 G/DL
ALP SERPL-CCNC: 122 U/L
ALT SERPL-CCNC: 17 U/L (ref 2–50)
ANION GAP SERPL CALC-SCNC: 11 MMOL/L (ref 7–16)
APTT PPP: 27.5 SEC (ref 24.7–36)
AST SERPL-CCNC: 20 U/L (ref 12–45)
BASOPHILS # BLD AUTO: 0.8 % (ref 0–1.8)
BASOPHILS # BLD: 0.05 K/UL (ref 0–0.12)
BILIRUB SERPL-MCNC: 0.3 MG/DL (ref 0.1–1.5)
BUN SERPL-MCNC: 16 MG/DL (ref 8–22)
CALCIUM SERPL-MCNC: 8.8 MG/DL (ref 8.4–10.2)
CHLORIDE SERPL-SCNC: 101 MMOL/L (ref 96–112)
CO2 SERPL-SCNC: 27 MMOL/L (ref 20–33)
CREAT SERPL-MCNC: 0.76 MG/DL (ref 0.5–1.4)
EOSINOPHIL # BLD AUTO: 0.12 K/UL
EOSINOPHIL NFR BLD: 1.9 % (ref 0–6.9)
ERYTHROCYTE [DISTWIDTH] IN BLOOD BY AUTOMATED COUNT: 45.7 FL (ref 35.9–50)
ETHANOL BLD-MCNC: 18.2 MG/DL (ref 0–10)
FLUAV RNA SPEC QL NAA+PROBE: NEGATIVE
FLUBV RNA SPEC QL NAA+PROBE: NEGATIVE
GLOBULIN SER CALC-MCNC: 2.6 G/DL (ref 1.9–3.5)
GLUCOSE SERPL-MCNC: 84 MG/DL (ref 65–99)
HCT VFR BLD AUTO: 42.4 % (ref 42–52)
HGB BLD-MCNC: 14.1 G/DL (ref 14–18)
IMM GRANULOCYTES # BLD AUTO: 0.01 K/UL (ref 0–0.11)
IMM GRANULOCYTES NFR BLD AUTO: 0.2 % (ref 0–0.9)
INR PPP: 0.9 (ref 0.87–1.13)
LIPASE SERPL-CCNC: 17 U/L (ref 7–58)
LYMPHOCYTES # BLD AUTO: 2.42 K/UL (ref 1–4.8)
LYMPHOCYTES NFR BLD: 38.8 % (ref 22–41)
MCH RBC QN AUTO: 32.9 PG (ref 27–33)
MCHC RBC AUTO-ENTMCNC: 33.3 G/DL (ref 33.7–35.3)
MCV RBC AUTO: 99.1 FL (ref 81.4–97.8)
MONOCYTES # BLD AUTO: 0.5 K/UL (ref 0–0.85)
MONOCYTES NFR BLD AUTO: 8 % (ref 0–13.4)
NEUTROPHILS # BLD AUTO: 3.13 K/UL (ref 1.82–7.42)
NEUTROPHILS NFR BLD: 50.3 % (ref 44–72)
NRBC # BLD AUTO: 0 K/UL
NRBC BLD-RTO: 0 /100 WBC
PLATELET # BLD AUTO: 200 K/UL (ref 164–446)
PMV BLD AUTO: 8.9 FL (ref 9–12.9)
POTASSIUM SERPL-SCNC: 4.3 MMOL/L (ref 3.6–5.5)
PROT SERPL-MCNC: 6.4 G/DL (ref 6–8.2)
PROTHROMBIN TIME: 11.9 SEC (ref 12–14.6)
RBC # BLD AUTO: 4.28 M/UL (ref 4.7–6.1)
RSV RNA SPEC QL NAA+PROBE: NEGATIVE
SARS-COV-2 RNA RESP QL NAA+PROBE: NOTDETECTED
SODIUM SERPL-SCNC: 139 MMOL/L (ref 135–145)
SPECIMEN SOURCE: NORMAL
WBC # BLD AUTO: 6.2 K/UL (ref 4.8–10.8)

## 2021-01-25 PROCEDURE — 85025 COMPLETE CBC W/AUTO DIFF WBC: CPT

## 2021-01-25 PROCEDURE — 82077 ASSAY SPEC XCP UR&BREATH IA: CPT

## 2021-01-25 PROCEDURE — 99285 EMERGENCY DEPT VISIT HI MDM: CPT

## 2021-01-25 PROCEDURE — 700111 HCHG RX REV CODE 636 W/ 250 OVERRIDE (IP): Performed by: EMERGENCY MEDICINE

## 2021-01-25 PROCEDURE — 83690 ASSAY OF LIPASE: CPT

## 2021-01-25 PROCEDURE — 94760 N-INVAS EAR/PLS OXIMETRY 1: CPT

## 2021-01-25 PROCEDURE — 96375 TX/PRO/DX INJ NEW DRUG ADDON: CPT

## 2021-01-25 PROCEDURE — 770006 HCHG ROOM/CARE - MED/SURG/GYN SEMI*

## 2021-01-25 PROCEDURE — 700102 HCHG RX REV CODE 250 W/ 637 OVERRIDE(OP): Performed by: NURSE PRACTITIONER

## 2021-01-25 PROCEDURE — 700101 HCHG RX REV CODE 250: Performed by: NURSE PRACTITIONER

## 2021-01-25 PROCEDURE — 85730 THROMBOPLASTIN TIME PARTIAL: CPT

## 2021-01-25 PROCEDURE — 71250 CT THORAX DX C-: CPT

## 2021-01-25 PROCEDURE — 80053 COMPREHEN METABOLIC PANEL: CPT

## 2021-01-25 PROCEDURE — 0241U HCHG SARS-COV-2 COVID-19 NFCT DS RESP RNA 4 TRGT MIC: CPT

## 2021-01-25 PROCEDURE — A9270 NON-COVERED ITEM OR SERVICE: HCPCS | Performed by: NURSE PRACTITIONER

## 2021-01-25 PROCEDURE — 96374 THER/PROPH/DIAG INJ IV PUSH: CPT

## 2021-01-25 PROCEDURE — 36415 COLL VENOUS BLD VENIPUNCTURE: CPT

## 2021-01-25 PROCEDURE — 85610 PROTHROMBIN TIME: CPT

## 2021-01-25 PROCEDURE — 70450 CT HEAD/BRAIN W/O DYE: CPT

## 2021-01-25 RX ORDER — PROPRANOLOL HYDROCHLORIDE 80 MG/1
160 CAPSULE, EXTENDED RELEASE ORAL DAILY
Status: DISCONTINUED | OUTPATIENT
Start: 2021-01-26 | End: 2021-01-26 | Stop reason: HOSPADM

## 2021-01-25 RX ORDER — LIDOCAINE 50 MG/G
1 PATCH TOPICAL
Status: DISCONTINUED | OUTPATIENT
Start: 2021-01-25 | End: 2021-01-26 | Stop reason: HOSPADM

## 2021-01-25 RX ORDER — MORPHINE SULFATE 4 MG/ML
4 INJECTION, SOLUTION INTRAMUSCULAR; INTRAVENOUS ONCE
Status: COMPLETED | OUTPATIENT
Start: 2021-01-25 | End: 2021-01-25

## 2021-01-25 RX ORDER — OXYCODONE HYDROCHLORIDE 5 MG/1
5 TABLET ORAL
Status: DISCONTINUED | OUTPATIENT
Start: 2021-01-25 | End: 2021-01-26 | Stop reason: HOSPADM

## 2021-01-25 RX ORDER — CITALOPRAM 40 MG/1
40 TABLET ORAL DAILY
Status: DISCONTINUED | OUTPATIENT
Start: 2021-01-26 | End: 2021-01-26 | Stop reason: HOSPADM

## 2021-01-25 RX ORDER — AMOXICILLIN 250 MG
1 CAPSULE ORAL NIGHTLY
Status: DISCONTINUED | OUTPATIENT
Start: 2021-01-25 | End: 2021-01-26 | Stop reason: HOSPADM

## 2021-01-25 RX ORDER — POLYETHYLENE GLYCOL 3350 17 G/17G
1 POWDER, FOR SOLUTION ORAL 2 TIMES DAILY
Status: DISCONTINUED | OUTPATIENT
Start: 2021-01-25 | End: 2021-01-26 | Stop reason: HOSPADM

## 2021-01-25 RX ORDER — PRAVASTATIN SODIUM 20 MG
80 TABLET ORAL
Status: DISCONTINUED | OUTPATIENT
Start: 2021-01-26 | End: 2021-01-26 | Stop reason: HOSPADM

## 2021-01-25 RX ORDER — BUPROPION HYDROCHLORIDE 150 MG/1
300 TABLET, EXTENDED RELEASE ORAL DAILY
Status: DISCONTINUED | OUTPATIENT
Start: 2021-01-26 | End: 2021-01-25

## 2021-01-25 RX ORDER — ENEMA 19; 7 G/133ML; G/133ML
1 ENEMA RECTAL
Status: DISCONTINUED | OUTPATIENT
Start: 2021-01-25 | End: 2021-01-26 | Stop reason: HOSPADM

## 2021-01-25 RX ORDER — ONDANSETRON 2 MG/ML
4 INJECTION INTRAMUSCULAR; INTRAVENOUS ONCE
Status: COMPLETED | OUTPATIENT
Start: 2021-01-25 | End: 2021-01-25

## 2021-01-25 RX ORDER — IBUPROFEN 200 MG
400-600 TABLET ORAL 3 TIMES DAILY
COMMUNITY
End: 2021-02-08

## 2021-01-25 RX ORDER — MIRTAZAPINE 30 MG/1
15 TABLET, FILM COATED ORAL
Status: DISCONTINUED | OUTPATIENT
Start: 2021-01-25 | End: 2021-01-26 | Stop reason: HOSPADM

## 2021-01-25 RX ORDER — BUPROPION HYDROCHLORIDE 150 MG/1
150 TABLET, EXTENDED RELEASE ORAL 2 TIMES DAILY
Status: DISCONTINUED | OUTPATIENT
Start: 2021-01-26 | End: 2021-01-26 | Stop reason: HOSPADM

## 2021-01-25 RX ORDER — AMOXICILLIN 250 MG
1 CAPSULE ORAL
Status: DISCONTINUED | OUTPATIENT
Start: 2021-01-25 | End: 2021-01-26 | Stop reason: HOSPADM

## 2021-01-25 RX ORDER — GABAPENTIN 400 MG/1
800 CAPSULE ORAL 2 TIMES DAILY
Status: DISCONTINUED | OUTPATIENT
Start: 2021-01-25 | End: 2021-01-26 | Stop reason: HOSPADM

## 2021-01-25 RX ORDER — FAMOTIDINE 20 MG/1
20 TABLET, FILM COATED ORAL 2 TIMES DAILY
Status: DISCONTINUED | OUTPATIENT
Start: 2021-01-25 | End: 2021-01-26 | Stop reason: HOSPADM

## 2021-01-25 RX ORDER — ACETAMINOPHEN 325 MG/1
650 TABLET ORAL EVERY 6 HOURS
Status: DISCONTINUED | OUTPATIENT
Start: 2021-01-26 | End: 2021-01-26 | Stop reason: HOSPADM

## 2021-01-25 RX ORDER — BISACODYL 10 MG
10 SUPPOSITORY, RECTAL RECTAL
Status: DISCONTINUED | OUTPATIENT
Start: 2021-01-25 | End: 2021-01-26 | Stop reason: HOSPADM

## 2021-01-25 RX ORDER — OXYCODONE HYDROCHLORIDE 5 MG/1
2.5 TABLET ORAL
Status: DISCONTINUED | OUTPATIENT
Start: 2021-01-25 | End: 2021-01-26 | Stop reason: HOSPADM

## 2021-01-25 RX ORDER — ALPRAZOLAM 0.5 MG/1
0.5 TABLET ORAL 3 TIMES DAILY PRN
Status: DISCONTINUED | OUTPATIENT
Start: 2021-01-25 | End: 2021-01-26 | Stop reason: HOSPADM

## 2021-01-25 RX ORDER — DOCUSATE SODIUM 100 MG/1
100 CAPSULE, LIQUID FILLED ORAL 2 TIMES DAILY
Status: DISCONTINUED | OUTPATIENT
Start: 2021-01-25 | End: 2021-01-26 | Stop reason: HOSPADM

## 2021-01-25 RX ADMIN — ONDANSETRON 4 MG: 2 INJECTION INTRAMUSCULAR; INTRAVENOUS at 15:25

## 2021-01-25 RX ADMIN — LIDOCAINE 1 PATCH: 50 PATCH TOPICAL at 23:24

## 2021-01-25 RX ADMIN — OXYCODONE 5 MG: 5 TABLET ORAL at 22:25

## 2021-01-25 RX ADMIN — FAMOTIDINE 20 MG: 20 TABLET ORAL at 21:16

## 2021-01-25 RX ADMIN — OXYCODONE 5 MG: 5 TABLET ORAL at 18:57

## 2021-01-25 RX ADMIN — GABAPENTIN 800 MG: 400 CAPSULE ORAL at 18:02

## 2021-01-25 RX ADMIN — MORPHINE SULFATE 4 MG: 4 INJECTION INTRAVENOUS at 15:30

## 2021-01-25 RX ADMIN — MIRTAZAPINE 15 MG: 30 TABLET, FILM COATED ORAL at 21:15

## 2021-01-25 RX ADMIN — ACETAMINOPHEN 650 MG: 325 TABLET ORAL at 23:24

## 2021-01-25 SDOH — HEALTH STABILITY: MENTAL HEALTH: HOW OFTEN DO YOU HAVE A DRINK CONTAINING ALCOHOL?: 2-3 TIMES A WEEK

## 2021-01-25 SDOH — ECONOMIC STABILITY: FOOD INSECURITY: WITHIN THE PAST 12 MONTHS, YOU WORRIED THAT YOUR FOOD WOULD RUN OUT BEFORE YOU GOT MONEY TO BUY MORE.: NEVER TRUE

## 2021-01-25 SDOH — HEALTH STABILITY: MENTAL HEALTH: HOW OFTEN DO YOU HAVE 6 OR MORE DRINKS ON ONE OCCASION?: NEVER

## 2021-01-25 SDOH — ECONOMIC STABILITY: FOOD INSECURITY: WITHIN THE PAST 12 MONTHS, THE FOOD YOU BOUGHT JUST DIDN'T LAST AND YOU DIDN'T HAVE MONEY TO GET MORE.: NEVER TRUE

## 2021-01-25 SDOH — HEALTH STABILITY: MENTAL HEALTH: HOW MANY STANDARD DRINKS CONTAINING ALCOHOL DO YOU HAVE ON A TYPICAL DAY?: 1 OR 2

## 2021-01-25 SDOH — HEALTH STABILITY: MENTAL HEALTH: HOW OFTEN DO YOU HAVE A DRINK CONTAINING ALCOHOL?: MONTHLY OR LESS

## 2021-01-25 SDOH — ECONOMIC STABILITY: TRANSPORTATION INSECURITY
IN THE PAST 12 MONTHS, HAS THE LACK OF TRANSPORTATION KEPT YOU FROM MEDICAL APPOINTMENTS OR FROM GETTING MEDICATIONS?: NO

## 2021-01-25 SDOH — ECONOMIC STABILITY: TRANSPORTATION INSECURITY
IN THE PAST 12 MONTHS, HAS LACK OF TRANSPORTATION KEPT YOU FROM MEETINGS, WORK, OR FROM GETTING THINGS NEEDED FOR DAILY LIVING?: NO

## 2021-01-25 ASSESSMENT — COGNITIVE AND FUNCTIONAL STATUS - GENERAL
MOBILITY SCORE: 24
SUGGESTED CMS G CODE MODIFIER DAILY ACTIVITY: CH
SUGGESTED CMS G CODE MODIFIER MOBILITY: CH
DAILY ACTIVITIY SCORE: 24

## 2021-01-25 ASSESSMENT — LIFESTYLE VARIABLES
DOES PATIENT WANT TO TALK TO SOMEONE ABOUT QUITTING: NO
DOES PATIENT WANT TO STOP DRINKING: YES
ON A TYPICAL DAY WHEN YOU DRINK ALCOHOL HOW MANY DRINKS DO YOU HAVE: 2
CONSUMPTION TOTAL: NEGATIVE
AVERAGE NUMBER OF DAYS PER WEEK YOU HAVE A DRINK CONTAINING ALCOHOL: 3
EVER HAD A DRINK FIRST THING IN THE MORNING TO STEADY YOUR NERVES TO GET RID OF A HANGOVER: NO
TOTAL SCORE: 1
HAVE PEOPLE ANNOYED YOU BY CRITICIZING YOUR DRINKING: NO
TOTAL SCORE: 1
HAVE YOU EVER FELT YOU SHOULD CUT DOWN ON YOUR DRINKING: YES
HOW MANY TIMES IN THE PAST YEAR HAVE YOU HAD 5 OR MORE DRINKS IN A DAY: 0
EVER FELT BAD OR GUILTY ABOUT YOUR DRINKING: NO
TOTAL SCORE: 1
ALCOHOL_USE: YES

## 2021-01-25 ASSESSMENT — FIBROSIS 4 INDEX
FIB4 SCORE: 1.67
FIB4 SCORE: 1.67
FIB4 SCORE: 1.88

## 2021-01-25 ASSESSMENT — PATIENT HEALTH QUESTIONNAIRE - PHQ9
SUM OF ALL RESPONSES TO PHQ9 QUESTIONS 1 AND 2: 0
2. FEELING DOWN, DEPRESSED, IRRITABLE, OR HOPELESS: NOT AT ALL
1. LITTLE INTEREST OR PLEASURE IN DOING THINGS: NOT AT ALL

## 2021-01-25 ASSESSMENT — PAIN DESCRIPTION - PAIN TYPE
TYPE: ACUTE PAIN
TYPE: ACUTE PAIN

## 2021-01-25 NOTE — TELEPHONE ENCOUNTER
On January 14th pt fell off the 3rd rung of a ladder and hit head and ribs.  She continues to have severe pain and difficulty with taking a deep breath.  Can not sleep in bed and cannot lift anything with RIGHT hand.    Patient was hanging a picture that was too heavy.    Sent to ED.    Reason for Disposition  • Sounds like a serious injury to the triager    Additional Information  • Negative: ACUTE NEUROLOGIC SYMPTOM and symptom present now  • Negative: Knocked out (unconscious) > 1 minute  • Negative: Seizure (convulsion) occurred (Exception: prior history of seizures and now alert and without Acute Neurologic Symptoms)  • Negative: Neck pain after dangerous injury (e.g., MVA, diving, trampoline, contact sports, fall > 10 feet or 3 meters) (Exception: neck pain began > 1 hour after injury)  • Negative: Major bleeding (actively dripping or spurting) that can't be stopped  • Negative: Penetrating head injury (e.g., knife, gunshot wound, metal object)  • Negative: Sounds like a life-threatening emergency to the triager  • Negative: Recently examined and diagnosed with a concussion by a healthcare provider and has questions about concussion symptoms  • Negative: Can't remember what happened (amnesia)  • Negative: Vomiting once or more  • Negative: Watery or blood-tinged fluid dripping from the nose or ears  • Negative: ACUTE NEUROLOGIC SYMPTOM and now fine  • Negative: Knocked out (unconscious) < 1 minute and now fine  • Negative: Severe headache  • Negative: Dangerous injury (e.g., MVA, diving, trampoline, contact sports, fall > 10 feet or 3 meters) or severe blow from hard object (e.g., golf club or baseball bat)  • Negative: Large swelling or bruise > 2 inches (5 cm)  • Negative: Skin is split open or gaping (length > 1/2 inch or 12 mm)  • Negative: Bleeding won't stop after 10 minutes of direct pressure (using correct technique)  • Negative: One or two 'black eyes' (bruising, purple color of eyelids)  • Negative:  "Taking Coumadin (warfarin) or other strong blood thinner, or known bleeding disorder (e.g., thrombocytopenia)  • Negative: Age over 65 years with and area of head swelling or bruise    Answer Assessment - Initial Assessment Questions  1. MECHANISM: \"How did the injury happen?\" For falls, ask: \"What height did you fall from?\" and \"What surface did you fall against?\"       Fell of 3rd rung of ladder  2. ONSET: \"When did the injury happen?\" (Minutes or hours ago)     01/14/2021  3. NEUROLOGIC SYMPTOMS: \"Was there any loss of consciousness?\" \"Are there any other neurological symptoms?\"    no  4. MENTAL STATUS: \"Does the person know who he is, who you are, and where he is?\"       No alteration in mental status  5. LOCATION: \"What part of the head was hit?\"       Back of head  6. SCALP APPEARANCE: \"What does the scalp look like? Is it bleeding now?\" If so, ask: \"Is it difficult to stop?\"       bump  7. SIZE: For cuts, bruises, or swelling, ask: \"How large is it?\" (e.g., inches or centimeters)       unknown  8. PAIN: \"Is there any pain?\" If so, ask: \"How bad is it?\"  (e.g., Scale 1-10; or mild, moderate, severe)      Severe rib pain from fall.  Unable to sleep or take deep breaths or lift with right hand  9. TETANUS: For any breaks in the skin, ask: \"When was the last tetanus booster?\"      no  10. OTHER SYMPTOMS: \"Do you have any other symptoms?\" (e.g., neck pain, vomiting)        no  11. PREGNANCY: \"Is there any chance you are pregnant?\" \"When was your last menstrual period?\"        NA    Protocols used: HEAD INJURY-A-OH      "

## 2021-01-25 NOTE — DISCHARGE PLANNING
SERA Mejia communicated with [facility]. [facility] PCS and COBRA completed Dr Gansert sending and Dr Bueno receiving MD. Chart copy sent. [facility] setup already

## 2021-01-25 NOTE — ED NOTES
"1525:  Verified w/ ERP and Pharmacist to give Morphine.  Pt medicated w/ Zofran.  Covid Culture collected and sent to lab.  1530:  Pt medicated as ordered w/ Morphine.  Pt stated has taken Morphine and unsure if nausea was from Morphine or \"something else.\"  1540:  [facility] arrived to transfer pt to [facility].  "

## 2021-01-25 NOTE — ED PROVIDER NOTES
ED Provider Note    CHIEF COMPLAINT  Chief Complaint   Patient presents with   • T-5000 FALL     R rib pain after falling off 3 rungs of ladder 3 days ago       HPI  Marciapatrick Koch is a 70 y.o. adult who presents for evaluation of rib pain and head pain.  The patient states that 10 days ago she was on the third rung of a ladder putting up a picture when she fell landing and striking the left backside of her head as well as her right side of her ribs.  The patient presents here complaining of persistent pain mostly over the right anterior lateral rib region.  She does have persistent mild headache.  She is not on any anticoagulants at this time.  The patient denies: Neck pain, back pain, abdominal pain, extremity pain, motor weakness or paresthesias.  She denies any recent illness.  No other acute symptomatology or complaints.    REVIEW OF SYSTEMS  See HPI for further details. Review of systems otherwise negative.     PAST MEDICAL HISTORY  Past Medical History:   Diagnosis Date   • Alcohol abuse    • Alcoholism (HCC)    • Anxiety    • Depression    • Hiatus hernia syndrome    • Hypertension    • Indigestion    • Other specified disorder of intestines     diarrhea   • Pneumonia     for 2 days   • Psychiatric disorder     anxiety, depression   • Sleep apnea    • Snoring    • Unspecified urinary incontinence    • Urinary bladder disorder        FAMILY HISTORY  Family History   Problem Relation Age of Onset   • Anxiety disorder Brother    • Depression Brother    • Other Brother          AIDS   • Alcohol abuse Mother    • Cancer Mother         colon cancer age 70s   • Alcohol abuse Father    • Hyperlipidemia Father    • Cancer Sister         breast   • Drug abuse Sister    • Cancer Maternal Aunt         breast   • Cancer Maternal Uncle         nos   • Stroke Neg Hx    • Heart Attack Neg Hx        SOCIAL HISTORY  Lives locally with her father;     SURGICAL HISTORY  Past Surgical History:   Procedure Laterality Date  "  • BLADDER SLING FEMALE  8/30/2011    Performed by RENATE STERLING at SURGERY [facility] ORS   • OTHER  2010    surgery for sleep apnea   • OTHER  2010    big toenails and second toenails removed   • OTHER ABDOMINAL SURGERY  2002    choley   • GYN SURGERY  1991    hyst   • OTHER  1956    tonsillectomy   • ABDOMINAL HYSTERECTOMY TOTAL      Hysterectomy, Total Abdominal   • BRIEN BY LAPAROSCOPY      Cholecystectomy, Laparoscopic       CURRENT MEDICATIONS  See nurses notes    ALLERGIES  Allergies   Allergen Reactions   • Codeine Vomiting   • Crestor [Rosuvastatin Calcium]      Pt states \"I didn't tolerate\" but describes cough   • Lipitor [Atorvastatin Calcium]      See note, severe L knee pain within one day of taking medicatoin   • Lisinopril      Cough     • Morphine Vomiting       PHYSICAL EXAM  VITAL SIGNS: BP (!) 181/76   Pulse (!) 54   Temp 36.9 °C (98.4 °F) (Temporal)   Resp 14   Ht 1.6 m (5' 3\")   Wt 75.6 kg (166 lb 10.7 oz)   SpO2 90%   BMI 29.52 kg/m²    Constitutional: 70-year-old female, awake, oriented x3  HENT: Calvarium: Mild swelling to the left parietal occipital area, No Moreno's sign, No racoon sign, No hemotypanum, No midface trauma, No intraoral trauma, No malocclusion;   Eyes: PERRL, EOMI,   Neck:  No tenderness over the spinous processes, no step-offs; Trachea: midline; No JVD;   Cardiovascular: Normal heart rate, Normal rhythm, No murmurs, No rubs, No gallops.   Thorax & Lungs: She has tenderness to the mid right lateral rib area extending inferiorly, No palpable deformities or palpable rib fractures, No subcutaneous emphysema;Equal breath sounds, Lungs are clear to auscultation,No respiratory distress.   Abdomen: No tenderness in the right upper quadrant area; soft, Nontender without guarding, rebound or rigidity; No left or right upper quadrant tenderness; Bowel sounds normal in quality;  Skin: Warm, Dry, No erythema, No rash.   Back: No palpable deformities; No localized " tenderness over the spinous processes of the thoracic/lumbar spine;  Extremities: Intact distal pulses, No edema, No tenderness, No cyanosis, No clubbing.   Musculoskeletal: No palpable deformity to the upper lower extremities with full range of motion without discomfort;  Neurologic: Alert & oriented x 3, Curtiss Coma Score: 15; Normal motor function, Normal sensory function, No focal deficits noted.     RADIOLOGY/PROCEDURES  CT-CHEST (THORAX) W/O   Final Result      Right fourth through seventh rib fractures without evidence of pneumothorax.      Right lower lobe atelectasis with trace right pleural fluid.         CT-HEAD W/O   Final Result      Small amount of left parafalcine subdural hemorrhage measuring up to 2 mm in width.      Scalp hematoma without evidence of skull fracture.      This was discussed with Dr. Gansert at 12:37 PM.            COURSE & MEDICAL DECISION MAKING  Pertinent Labs & Imaging studies reviewed. (See chart for details)  1.  Saline lock  2.  Zofran 4 mg IV  3.  Morphine 4 mg IV    Laboratory studies: CBC shows white count of 6.2, 50% neutrophils, 38% lymphocytes, 8% monocytes, hemoglobin 14.1 hematocrit 42.4; CMP within normal; lipase 17; PT 11.9/INR 0.9; PTT 27; diagnostic alcohol 0.018;    Discussion/consultation: At this time, the patient presents 10 days after a fall off of a ladder.  The patient has a very small 2 mm left parafalcine subdural hematoma resulting in no cerebral edema or effacement or midline shift and has no associated fracture.  CT of the chest showed right 4-7th rib fractures without evidence of pneumothorax.  Patient has associated hypoxia of 86% on room air.  The patient meets criteria to be evaluated and treated at a trauma center.  At this time, I did speak with trauma surgery at [facility].  The patient will be transferred to the emergency department for further evaluation and disposition.  I discussed the findings and treatment plan with the patient.  She  agrees to proceeding with transfer.  Patient will be transferred by EMS.    FINAL IMPRESSION  1. Multiple fractures of ribs, right side, initial encounter for closed fracture    2. Subdural hematoma (HCC)    3. Hypoxia        PLAN  1.  Patient will be transferred to [facility] trauma services    Electronically signed by: Guy G Gansert, M.D., 1/25/2021

## 2021-01-25 NOTE — ED NOTES
Pt fell off ladder on 01/15/2021, not three days ago.  Pt reports was talking to friend today, c/o continues to have difficulty taking deep breaths and sleeping r/t pain.

## 2021-01-25 NOTE — ED NOTES
Pt reports is having difficulty taking deep breaths r/t pain.    Pt placed on 3L oxygen via NC.  ERP aware.

## 2021-01-25 NOTE — ED NOTES
Med rec updated and complete  Allergies reviewed  Pt reports that she has been taking 2-3 IBUPROFEN 3 times a day for the last 10 days.  I informed the nurse and pharmacists    Pt reports no antibiotics in the last 2 weeks

## 2021-01-26 ENCOUNTER — HOSPITAL ENCOUNTER (OUTPATIENT)
Facility: MEDICAL CENTER | Age: 71
End: 2021-01-27
Attending: EMERGENCY MEDICINE | Admitting: STUDENT IN AN ORGANIZED HEALTH CARE EDUCATION/TRAINING PROGRAM
Payer: MEDICARE

## 2021-01-26 ENCOUNTER — HOSPITAL ENCOUNTER (EMERGENCY)
Dept: HOSPITAL 8 - ED | Age: 71
Discharge: ANOTHER HOSPITAL | End: 2021-01-26
Payer: COMMERCIAL

## 2021-01-26 ENCOUNTER — APPOINTMENT (OUTPATIENT)
Dept: RADIOLOGY | Facility: MEDICAL CENTER | Age: 71
DRG: 083 | End: 2021-01-26
Attending: NURSE PRACTITIONER
Payer: MEDICARE

## 2021-01-26 ENCOUNTER — TELEPHONE (OUTPATIENT)
Dept: VASCULAR LAB | Facility: MEDICAL CENTER | Age: 71
End: 2021-01-26

## 2021-01-26 VITALS
TEMPERATURE: 97.4 F | HEIGHT: 63 IN | SYSTOLIC BLOOD PRESSURE: 119 MMHG | HEART RATE: 48 BPM | OXYGEN SATURATION: 94 % | WEIGHT: 164.46 LBS | DIASTOLIC BLOOD PRESSURE: 59 MMHG | BODY MASS INDEX: 29.14 KG/M2 | RESPIRATION RATE: 18 BRPM

## 2021-01-26 VITALS — HEIGHT: 63 IN | BODY MASS INDEX: 29.8 KG/M2 | WEIGHT: 168.21 LBS

## 2021-01-26 VITALS — DIASTOLIC BLOOD PRESSURE: 70 MMHG | SYSTOLIC BLOOD PRESSURE: 157 MMHG

## 2021-01-26 DIAGNOSIS — R09.02 HYPOXIA: ICD-10-CM

## 2021-01-26 DIAGNOSIS — Z72.0: ICD-10-CM
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
## 2021-01-26 DIAGNOSIS — J96.91: ICD-10-CM
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
## 2021-01-26 DIAGNOSIS — S22.41XA: Primary | ICD-10-CM

## 2021-01-26 DIAGNOSIS — S22.41XA CLOSED FRACTURE OF MULTIPLE RIBS OF RIGHT SIDE, INITIAL ENCOUNTER: ICD-10-CM
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
## 2021-01-26 LAB
ALBUMIN SERPL BCP-MCNC: 4.1 G/DL (ref 3.2–4.9)
ALBUMIN/GLOB SERPL: 1.5 G/DL
ALP SERPL-CCNC: 124 U/L
ALT SERPL-CCNC: 16 U/L (ref 2–50)
ANION GAP SERPL CALC-SCNC: 10 MMOL/L (ref 7–16)
AST SERPL-CCNC: 23 U/L (ref 12–45)
BASOPHILS # BLD AUTO: 0.8 % (ref 0–1.8)
BASOPHILS # BLD: 0.07 K/UL (ref 0–0.12)
BILIRUB SERPL-MCNC: 0.4 MG/DL (ref 0.1–1.5)
BUN SERPL-MCNC: 13 MG/DL (ref 8–22)
CALCIUM SERPL-MCNC: 9 MG/DL (ref 8.5–10.5)
CHLORIDE SERPL-SCNC: 100 MMOL/L (ref 96–112)
CO2 SERPL-SCNC: 28 MMOL/L (ref 20–33)
CREAT SERPL-MCNC: 0.62 MG/DL (ref 0.5–1.4)
EOSINOPHIL # BLD AUTO: 0.16 K/UL
EOSINOPHIL NFR BLD: 1.9 % (ref 0–6.9)
ERYTHROCYTE [DISTWIDTH] IN BLOOD BY AUTOMATED COUNT: 49.9 FL (ref 35.9–50)
GLOBULIN SER CALC-MCNC: 2.7 G/DL (ref 1.9–3.5)
GLUCOSE SERPL-MCNC: 96 MG/DL (ref 65–99)
HCT VFR BLD AUTO: 48.9 % (ref 42–52)
HGB BLD-MCNC: 15.4 G/DL (ref 14–18)
IMM GRANULOCYTES # BLD AUTO: 0.03 K/UL (ref 0–0.11)
IMM GRANULOCYTES NFR BLD AUTO: 0.4 % (ref 0–0.9)
LYMPHOCYTES # BLD AUTO: 2.89 K/UL (ref 1–4.8)
LYMPHOCYTES NFR BLD: 34.3 % (ref 22–41)
MAGNESIUM SERPL-MCNC: 1.9 MG/DL (ref 1.5–2.5)
MCH RBC QN AUTO: 32.6 PG (ref 27–33)
MCHC RBC AUTO-ENTMCNC: 31.5 G/DL (ref 33.6–35)
MCV RBC AUTO: 103.4 FL (ref 81.4–97.8)
MONOCYTES # BLD AUTO: 0.62 K/UL (ref 0–0.85)
MONOCYTES NFR BLD AUTO: 7.4 % (ref 0–13.4)
NEUTROPHILS # BLD AUTO: 4.66 K/UL (ref 1.82–7.42)
NEUTROPHILS NFR BLD: 55.2 % (ref 44–72)
NRBC # BLD AUTO: 0 K/UL
NRBC BLD-RTO: 0 /100 WBC
PHOSPHATE SERPL-MCNC: 5.1 MG/DL (ref 2.5–4.5)
PLATELET # BLD AUTO: 236 K/UL (ref 164–446)
PMV BLD AUTO: 9.7 FL (ref 9–12.9)
POTASSIUM SERPL-SCNC: 4 MMOL/L (ref 3.6–5.5)
PROT SERPL-MCNC: 6.8 G/DL (ref 6–8.2)
RBC # BLD AUTO: 4.73 M/UL (ref 4.7–6.1)
SODIUM SERPL-SCNC: 138 MMOL/L (ref 135–145)
WBC # BLD AUTO: 8.4 K/UL (ref 4.8–10.8)

## 2021-01-26 PROCEDURE — 71045 X-RAY EXAM CHEST 1 VIEW: CPT

## 2021-01-26 PROCEDURE — 83735 ASSAY OF MAGNESIUM: CPT | Mod: 91

## 2021-01-26 PROCEDURE — 700102 HCHG RX REV CODE 250 W/ 637 OVERRIDE(OP): Performed by: NURSE PRACTITIONER

## 2021-01-26 PROCEDURE — 83735 ASSAY OF MAGNESIUM: CPT

## 2021-01-26 PROCEDURE — 80053 COMPREHEN METABOLIC PANEL: CPT

## 2021-01-26 PROCEDURE — 84100 ASSAY OF PHOSPHORUS: CPT | Mod: 91

## 2021-01-26 PROCEDURE — 97165 OT EVAL LOW COMPLEX 30 MIN: CPT

## 2021-01-26 PROCEDURE — A9270 NON-COVERED ITEM OR SERVICE: HCPCS | Performed by: NURSE PRACTITIONER

## 2021-01-26 PROCEDURE — 99285 EMERGENCY DEPT VISIT HI MDM: CPT

## 2021-01-26 PROCEDURE — 84100 ASSAY OF PHOSPHORUS: CPT

## 2021-01-26 PROCEDURE — 85025 COMPLETE CBC W/AUTO DIFF WBC: CPT

## 2021-01-26 PROCEDURE — 97161 PT EVAL LOW COMPLEX 20 MIN: CPT

## 2021-01-26 PROCEDURE — 36415 COLL VENOUS BLD VENIPUNCTURE: CPT

## 2021-01-26 RX ADMIN — GABAPENTIN 800 MG: 400 CAPSULE ORAL at 05:34

## 2021-01-26 RX ADMIN — BUPROPION HYDROCHLORIDE 150 MG: 150 TABLET, EXTENDED RELEASE ORAL at 05:34

## 2021-01-26 RX ADMIN — OXYCODONE 5 MG: 5 TABLET ORAL at 02:46

## 2021-01-26 RX ADMIN — CITALOPRAM HYDROBROMIDE 40 MG: 40 TABLET ORAL at 05:34

## 2021-01-26 RX ADMIN — OXYCODONE 5 MG: 5 TABLET ORAL at 10:56

## 2021-01-26 RX ADMIN — FAMOTIDINE 20 MG: 20 TABLET ORAL at 05:34

## 2021-01-26 RX ADMIN — ACETAMINOPHEN 650 MG: 325 TABLET ORAL at 05:34

## 2021-01-26 ASSESSMENT — ENCOUNTER SYMPTOMS
SHORTNESS OF BREATH: 0
BACK PAIN: 0
MYALGIAS: 1
PALPITATIONS: 0
CHILLS: 0
FOCAL WEAKNESS: 0
NAUSEA: 0
VOMITING: 0
TINGLING: 0
ABDOMINAL PAIN: 0
HEADACHES: 0
NECK PAIN: 0
FEVER: 0
COUGH: 0
DOUBLE VISION: 0
SENSORY CHANGE: 0

## 2021-01-26 ASSESSMENT — COGNITIVE AND FUNCTIONAL STATUS - GENERAL
MOBILITY SCORE: 24
HELP NEEDED FOR BATHING: A LITTLE
SUGGESTED CMS G CODE MODIFIER DAILY ACTIVITY: CI
DAILY ACTIVITIY SCORE: 23
SUGGESTED CMS G CODE MODIFIER MOBILITY: CH

## 2021-01-26 ASSESSMENT — PAIN DESCRIPTION - PAIN TYPE
TYPE: ACUTE PAIN

## 2021-01-26 ASSESSMENT — ACTIVITIES OF DAILY LIVING (ADL): TOILETING: INDEPENDENT

## 2021-01-26 ASSESSMENT — COPD QUESTIONNAIRES
COPD SCREENING SCORE: 4
HAVE YOU SMOKED AT LEAST 100 CIGARETTES IN YOUR ENTIRE LIFE: YES
DO YOU EVER COUGH UP ANY MUCUS OR PHLEGM?: NO/ONLY WITH OCCASIONAL COLDS OR INFECTIONS
DURING THE PAST 4 WEEKS HOW MUCH DID YOU FEEL SHORT OF BREATH: NONE/LITTLE OF THE TIME

## 2021-01-26 ASSESSMENT — FIBROSIS 4 INDEX: FIB4 SCORE: 1.68

## 2021-01-26 ASSESSMENT — GAIT ASSESSMENTS
DISTANCE (FEET): 230
GAIT LEVEL OF ASSIST: SUPERVISED

## 2021-01-26 NOTE — ASSESSMENT & PLAN NOTE
History of anxiety and depression.  Chronic condition treated with propranolol, alprazolam, bupropion, citalopram, mirtazapine.  Resumed maintenance medication on admission.

## 2021-01-26 NOTE — PROGRESS NOTES
"This RN was called by the CNA due to patient attempting to leave unit. I discussed with the patient the necessity for her to wait for the O2 to be delivered before she was discharged. She refused to put O2 on or let us check her O2 sat. She wanted to \" go outside for some fresh air and call her  Dad\". Patient still wanting to leave AMA. Called for assistance from Heike Charge RN and Anila Trauma APRN. The risks of her leaving without O2 were thoroughly discussed and repeated. Patient was told if she left the unit, she was not able to return and that she would have to go to the ER. Patient refusing to listen to any advice or recommendations by staff. She was escorted to the elevator with this RN and Heike. Additional attempts to get the patient to stay and wait for O2 delivery. Patient refused and left AMA.  "

## 2021-01-26 NOTE — ED PROVIDER NOTES
ED Provider Note    Scribed for Lei Bueno M.D. by Luca Vo. 1/25/2021, 4:08 PM.    Primary care provider: Sonali Trammell M.D.  Means of arrival: EMS Transport  History obtained from: Patient and transferring Physician  History limited by: None    CHIEF COMPLAINT  Chief Complaint   Patient presents with   • Other     Transferred from [facility] ER for Trauma Specialty for continued pain to  R Chest   • T-5000 FALL     Mechanical Fall x10 days ago. Seen at hospital and found to have (+) subdural and (+) Right 4,5,6 Rib fx.         HPI  Marcia Koch is a 70 y.o. adult who presents to the Emergency Department as a trama green transfer from [facility]. The patient reportedly fell from a ladder landing on her right side. Since then the patient has been experiencing pain so she went to the hospital today. Imaging showed right rib fractures and a subdural bleed. Patient has associated hypoxia of 86% on room air. Patient received morphine and zofran.     At present the patient states that she continues to have right sided frontal chest pain. In addition to this the patient states that she does have mild head pain, but it is only noticeable when she is taking a shower. Patient denies leg injury. Patient states that she has taken her morning medications, but not her afternoon or evening. Patient states that her pain is exacerbated when she sneezes.     REVIEW OF SYSTEMS  As above otherwise all other systems are negative    PAST MEDICAL HISTORY   has a past medical history of Alcohol abuse, Alcoholism (HCC), Anxiety, Depression, Hiatus hernia syndrome, Hypertension, Indigestion, Other specified disorder of intestines, Pneumonia, Psychiatric disorder, Sleep apnea, Snoring, Unspecified urinary incontinence, and Urinary bladder disorder.    SURGICAL HISTORY   has a past surgical history that includes gyn surgery (1991); other abdominal surgery (2002); other (2010); other (1956); other  (); bladder sling female (2011); teddy by laparoscopy; and abdominal hysterectomy total.    SOCIAL HISTORY  Social History     Tobacco Use   • Smoking status: Current Every Day Smoker     Packs/day: 0.25     Years: 25.00     Pack years: 6.25     Types: Cigarettes     Last attempt to quit: 2011     Years since quittin.4   • Smokeless tobacco: Never Used   • Tobacco comment: occ   Substance Use Topics   • Alcohol use: Yes     Frequency: Monthly or less     Comment: occassionally. post rehab, 1/2 bottle of wine some days. no drinking now, sober X 17   • Drug use: No      Social History     Substance and Sexual Activity   Drug Use No       FAMILY HISTORY  Family History   Problem Relation Age of Onset   • Anxiety disorder Brother    • Depression Brother    • Other Brother          AIDS   • Alcohol abuse Mother    • Cancer Mother         colon cancer age 70s   • Alcohol abuse Father    • Hyperlipidemia Father    • Cancer Sister         breast   • Drug abuse Sister    • Cancer Maternal Aunt         breast   • Cancer Maternal Uncle         nos   • Stroke Neg Hx    • Heart Attack Neg Hx        CURRENT MEDICATIONS  Home Medications     Reviewed by Anila Peraza (Pharmacy Tech) on 21 at 1653  Med List Status: Complete   Medication Last Dose Status   ALPRAZolam (XANAX) 1 MG Tab 2021 Active   buPROPion (WELLBUTRIN XL) 300 MG XL tablet 2021 Active   Cholecalciferol (VITAMIN D3) 2000 UNIT Cap 2021 Active   citalopram (CELEXA) 40 MG Tab 2021 Active   gabapentin (NEURONTIN) 800 MG tablet 2021 Active   ibuprofen (MOTRIN) 200 MG Tab 2021 Active   mirtazapine (REMERON) 15 MG Tab 2021 Active   omega-3 acid ethyl esters (LOVAZA) 1 GM capsule 2021 Active   omeprazole (PRILOSEC) 20 MG delayed-release capsule 2021 Active   pravastatin (PRAVACHOL) 40 MG tablet 2021 Active   propranolol CR (INDERAL LA) 160 MG CAPSULE SR 24 HR capsule 2021 Active        "         ALLERGIES  Allergies   Allergen Reactions   • Codeine Nausea   • Crestor [Rosuvastatin Calcium]      Pt states \"I didn't tolerate\" but describes cough   • Lipitor [Atorvastatin Calcium]      See note, severe L knee pain within one day of taking medicatoin   • Lisinopril      Cough     • Morphine Nausea       PHYSICAL EXAM  VITAL SIGNS: BP (!) 172/72   Pulse (!) 56   Temp 36.3 °C (97.3 °F) (Tympanic)   Resp 19   Ht 1.6 m (5' 3\")   Wt 72.6 kg (160 lb)   SpO2 94%   BMI 28.34 kg/m²     Constitutional: Well developed, Well nourished, No acute distress, Non-toxic appearance.   HENT: Normocephalic, Atraumatic, Bilateral external ears normal, oropharynx moist, No oral exudates, Nose normal.   Eyes:conjunctiva is normal, there are no signs of exudate.   Neck: Supple, no meningeal signs.  Lymphatic: No lymphadenopathy noted.   Cardiovascular: Regular rate and rhythm without murmurs gallops or rubs.   Thorax & Lungs: No respiratory distress. Breathing comfortably.'s are diminished right greater than left there are no wheezes no rales. Chest wall is nontender.  Abdomen: Soft, nontender, nondistended. Bowel sounds are present.   Skin: Warm, Dry, No erythema,   Back: No tenderness, No CVA tenderness.  Musculoskeletal: Good range of motion in all major joints. No tenderness to palpation or major deformities noted. Intact distal pulses, no clubbing, no cyanosis, no edema,   Neurologic: Alert & oriented x 3, Moving all extremities. No gross abnormalities.    Psychiatric: Affect normal, Judgment normal, Mood normal.   dimin breath sounds rig right side chest wall tender no crepitus. Right sided chest wall tenderness    LABS  Results for orders placed or performed during the hospital encounter of 01/25/21   CBC WITH DIFFERENTIAL   Result Value Ref Range    WBC 6.2 4.8 - 10.8 K/uL    RBC 4.28 (L) 4.70 - 6.10 M/uL    Hemoglobin 14.1 14.0 - 18.0 g/dL    Hematocrit 42.4 42.0 - 52.0 %    MCV 99.1 (H) 81.4 - 97.8 fL    MCH " 32.9 27.0 - 33.0 pg    MCHC 33.3 (L) 33.7 - 35.3 g/dL    RDW 45.7 35.9 - 50.0 fL    Platelet Count 200 164 - 446 K/uL    MPV 8.9 (L) 9.0 - 12.9 fL    Neutrophils-Polys 50.30 44.00 - 72.00 %    Lymphocytes 38.80 22.00 - 41.00 %    Monocytes 8.00 0.00 - 13.40 %    Eosinophils 1.90 0.00 - 6.90 %    Basophils 0.80 0.00 - 1.80 %    Immature Granulocytes 0.20 0.00 - 0.90 %    Nucleated RBC 0.00 /100 WBC    Neutrophils (Absolute) 3.13 1.82 - 7.42 K/uL    Lymphs (Absolute) 2.42 1.00 - 4.80 K/uL    Monos (Absolute) 0.50 0.00 - 0.85 K/uL    Eos (Absolute) 0.12 K/uL    Baso (Absolute) 0.05 0.00 - 0.12 K/uL    Immature Granulocytes (abs) 0.01 0.00 - 0.11 K/uL    NRBC (Absolute) 0.00 K/uL   COMP METABOLIC PANEL   Result Value Ref Range    Sodium 139 135 - 145 mmol/L    Potassium 4.3 3.6 - 5.5 mmol/L    Chloride 101 96 - 112 mmol/L    Co2 27 20 - 33 mmol/L    Anion Gap 11.0 7.0 - 16.0    Glucose 84 65 - 99 mg/dL    Bun 16 8 - 22 mg/dL    Creatinine 0.76 0.50 - 1.40 mg/dL    Calcium 8.8 8.4 - 10.2 mg/dL    AST(SGOT) 20 12 - 45 U/L    ALT(SGPT) 17 2 - 50 U/L    Alkaline Phosphatase 122 U/L    Total Bilirubin 0.3 0.1 - 1.5 mg/dL    Albumin 3.8 3.2 - 4.9 g/dL    Total Protein 6.4 6.0 - 8.2 g/dL    Globulin 2.6 1.9 - 3.5 g/dL    A-G Ratio 1.5 g/dL   PROTHROMBIN TIME   Result Value Ref Range    PT 11.9 (L) 12.0 - 14.6 sec    INR 0.90 0.87 - 1.13   APTT   Result Value Ref Range    APTT 27.5 24.7 - 36.0 sec   LIPASE   Result Value Ref Range    Lipase 17 7 - 58 U/L   ETHYL ALCOHOL (BLOOD)   Result Value Ref Range    Diagnostic Alcohol 18.2 (H) 0.0 - 10.0 mg/dL   ESTIMATED GFR   Result Value Ref Range    GFR If African American >60 >60 mL/min/1.73 m 2    GFR If Non African American >60 >60 mL/min/1.73 m 2   COV-2, FLU A/B, AND RSV BY PCR (2-4 HOURS CEPHEID): Collect NP swab in VTM    Specimen: Nasopharyngeal; Respirate   Result Value Ref Range    Influenza virus A RNA Negative Negative    Influenza virus B, PCR Negative Negative    RSV,  PCR Negative Negative    SARS-CoV-2 by PCR NotDetected     SARS-CoV-2 Source NP Swab      All labs reviewed by me.    RADIOLOGY  CT-CHEST (THORAX) W/O  IMPRESSION:  Right fourth through seventh rib fractures without evidence of pneumothorax.  Right lower lobe atelectasis with trace right pleural fluid.    CT-HEAD W/O  IMPRESSION:  Small amount of left parafalcine subdural hemorrhage measuring up to 2 mm in width.  Scalp hematoma without evidence of skull fracture.    The radiologist's interpretation of all radiological studies have been reviewed by me.    COURSE & MEDICAL DECISION MAKING  Pertinent Labs & Imaging studies reviewed. (See chart for details)    4:08 PM - Patient seen and examined at bedside.     4:14 PM - Paged Trauma surgery.     4:15 PM - Review of patient's labs and imaging from [facility].     4:23 PM - I discussed the patient's case and the above findings with Dr. Norman (Trauma) who will see the patient. I spoke to Dr. Norman (Trauma Surgery) regarding the patient’s pertinent abnormalities. Dr. Norman agrees to evaluate the patient for hospitalization.    Decision Making:  He has multiple rib fractures and is hypoxemic.  She does have a very small CTA findings of a subdural hematoma.  The patient is approximately 10 days out.  From the standpoint of the subdural hematoma is a very stable bleed there is no signs for any further intervention.  From the multiple rib fractures however the patient is hypoxemic and will require further pain control and oxygenation.  I spoken to trauma who will admit the patient for further treatment and care.    DISPOSITION:  Patient will be hospitalized by Dr. Norman in guarded condition.     FINAL IMPRESSION  1. Closed fracture of multiple ribs of right side, initial encounter    2. SDH (subdural hematoma) (HCC)    3. Hypoxemia    4. Hypertension, unspecified type          I, Luca Vo (Scribkendal), am scribing for, and in the presence of, Lei Bueno,  M.D..    Electronically signed by: Luca Vo (Scribe), 1/25/2021    ILei M.D. personally performed the services described in this documentation, as scribed by Luca Vo in my presence, and it is both accurate and complete. C    The note accurately reflects work and decisions made by me.  Lei Bueno M.D.  1/25/2021  10:05 PM

## 2021-01-26 NOTE — NUR
REPORT TO VIVIAN GODINEZ TO ASSUME CARE UNTIL TRANSPORT TO [facility].

## 2021-01-26 NOTE — THERAPY
Occupational Therapy   Initial Evaluation     Patient Name: Marcia Koch  Age:  70 y.o., Sex:  adult  Medical Record #: [id number]  Today's Date: 1/26/2021     Precautions  Precautions: Fall Risk  Comments: SDH and R rib fxs    Assessment  Patient is 70 y.o. adult admitted after fall from ladder (10 days ago), found to have L SDH (non op) and R rib fxs, as well as hypoxia. PMHx of ETOH abuse, anxiety/depression, and GERD. Completed ADLs/txfs with SPV and functional ambulation w/o AD and SPV. Spent time educating on home safety w/ securing rugs and setup/DME, as well as adaptive techniques for pain mgmt with ADLs. Reports has a friend who can assist with IADLs PRN. Patient will not be actively followed for occupational therapy services at this time, however may be seen if requested by physician for 1 more visit within 30 days to address any discharge or equipment needs.     Plan    Recommend Occupational Therapy d/c needs only      DC Equipment Recommendations: Tub / Shower Seat   Discharge Recommendations: Anticipate that the patient will have no further occupational therapy needs after discharge from the hospital(pt reports she is at baseline besides pain)      Objective     01/26/21 0937   Prior Living Situation   Prior Services None   Housing / Facility 1 Story House   Steps Into Home 1   Bathroom Set up Walk In Shower;Shower Chair   Equipment Owned Tub / Shower Seat   Lives with - Patient's Self Care Capacity Alone and Able to Care For Self   Comments Reports has a friend who can assist w/IADLs PRN   Prior Level of ADL Function   Self Feeding Independent   Grooming / Hygiene Independent   Bathing Independent   Dressing Independent   Toileting Independent   Prior Level of IADL Function   Medication Management Independent   Laundry Independent   Kitchen Mobility Independent   Finances Independent   Home Management Independent   Shopping Independent   Prior Level Of Mobility Independent Without Device in  Community;Independent Without Device in Home   Driving / Transportation Driving Independent   Occupation (Pre-Hospital Vocational) Retired Due To Age   History of Falls   History of Falls Yes   Date of Last Fall   (reason for admit- off a ladder)   Vitals   O2 (LPM) 3   O2 Delivery Device Silicone Nasal Cannula   Pain 0 - 10 Group   Location Rib Cage   Location Orientation Right   Therapist Pain Assessment Post Activity;During Activity;Nurse Notified  (not quantified)   Cognition    Cognition / Consciousness WDL   Level of Consciousness Alert   Comments pleasant and cooperative; 1 instance of inconsistent report, but reports she is baseline   Passive ROM Upper Body   Passive ROM Upper Body WDL   Active ROM Upper Body   Active ROM Upper Body  WDL   Dominant Hand Right   Comments limited by pain, req extra time   Strength Upper Body   Upper Body Strength  X   Comments limited by pain   Sensation Upper Body   Upper Extremity Sensation  WDL   Coordination Upper Body   Coordination WDL   Balance Assessment   Sitting Balance (Static) Good   Sitting Balance (Dynamic) Good   Standing Balance (Static) Good   Standing Balance (Dynamic) Fair +   Weight Shift Sitting Good   Weight Shift Standing Good   Comments w/o AD   Bed Mobility    Scooting Supervised   Comments found in BR and left in chair   ADL Assessment   Grooming Supervision;Standing  (washing hands)   Upper Body Dressing Supervision  (seated)   Lower Body Dressing Supervision  (extra time d/t pain)   Toileting Supervision   Functional Mobility   Sit to Stand Supervised   Bed, Chair, Wheelchair Transfer Supervised   Toilet Transfers Supervised   Transfer Method Stand Step   Mobility w/o AD; in room and hallway   Visual Perception   Visual Perception  WDL   Edema / Skin Assessment   Edema / Skin  Not Assessed   Activity Tolerance   Sitting in Chair 10+ min left in chair   Sitting Edge of Bed 3 min   Standing 6 min   Anticipated Discharge Equipment and Recommendations    DC Equipment Recommendations Tub / Shower Seat   Discharge Recommendations Anticipate that the patient will have no further occupational therapy needs after discharge from the hospital  (pt reports she is at baseline besides pain)

## 2021-01-26 NOTE — ASSESSMENT & PLAN NOTE
Chronic condition treated with pravastatin and Lovaza.  Resumed maintenance medication on admission.

## 2021-01-26 NOTE — ASSESSMENT & PLAN NOTE
Mechanical GLF ~ 10 days ago.  T-5000 Activation Transfer from [facility].  Lior Norman MD. Trauma Surgery.

## 2021-01-26 NOTE — NUR
Pt.'s daughter requesting that pt.'s IV be removed. This RN explained that we 
may need this IV to administer medications or run tests. Pt stated she would 
like it removed. IV removed, catheter intact, hemostasis achieved, dressing 
applied.

## 2021-01-26 NOTE — CARE PLAN
Problem: Safety  Goal: Will remain free from falls  Outcome: PROGRESSING AS EXPECTED  Note: Bed alarm on, yellow socks, fall signage outside of door, call light within reach.     Problem: Pain Management  Goal: Pain level will decrease to patient's comfort goal  Outcome: PROGRESSING AS EXPECTED  Note: Pain controlled with current regimen, pillows in use for support/positioning.

## 2021-01-26 NOTE — PROGRESS NOTES
2 RN skin check    ~5cm hematoma to top of head, scant dried blood, otherwise CDI.    All other skin intact. No signs of skin breakdown over bony prominences.    Devices in place: NC, PIV.

## 2021-01-26 NOTE — THERAPY
Physical Therapy   Initial Evaluation     Patient Name: Marcia Koch  Age:  70 y.o., Sex:  adult  Medical Record #: [id number]  Today's Date: 1/26/2021     Precautions: Fall Risk    Assessment  Patient is 70 y.o. adult s/p fall with subsequent non-op SDH and rib fractures presenting to PT very near or at her functional baseline. Pt required supervision during mobility for 02 management but was able to complete x230' of gait without overt LOB and reported no difficulty with bed mobility other than rib pain. At this time, pt does not require further acute PT intervention and appears functionally capable of return home.       Plan    Recommend Physical Therapy for Evaluation only     DC Equipment Recommendations: None  Discharge Recommendations: Anticipate that the patient will have no further physical therapy needs after discharge from the hospital        Objective       01/26/21 0946   Prior Living Situation   Prior Services None   Housing / Facility 1 Story House   Steps Into Home 1   Lives with - Patient's Self Care Capacity Alone and Able to Care For Self   Comments pt reports having friends who can assist prn   Prior Level of Functional Mobility   Bed Mobility Independent   Transfer Status Independent   Ambulation Independent   Distance Ambulation (Feet)   (community)   Assistive Devices Used None   Stairs Independent   Balance Assessment   Comments no device; spv   Gait Analysis   Gait Level Of Assist Supervised   Assistive Device None   Distance (Feet) 230   Weight Bearing Status FWB   Bed Mobility    Supine to Sit   (pt reporting no difficulty; painful )   Scooting Supervised   Functional Mobility   Sit to Stand Supervised   Bed, Chair, Wheelchair Transfer Supervised   Transfer Method Stand Step   Anticipated Discharge Equipment and Recommendations   DC Equipment Recommendations None   Discharge Recommendations Anticipate that the patient will have no further physical therapy needs after discharge from the  [facility]

## 2021-01-26 NOTE — PROGRESS NOTES
"TRAUMA TERTIARY SURVEY     Mental status adequate for full examination?: Yes    Spine cleared (radiologically and/or clinically): Yes    PHYSICAL EXAMINATION:  Vitals: Blood Pressure 118/61   Pulse (Abnormal) 50   Temperature 36.7 °C (98 °F) (Temporal)   Respiration 18   Height 1.6 m (5' 3\")   Weight 74.6 kg (164 lb 7.4 oz)   Oxygen Saturation 93%   Body Mass Index 29.13 kg/m²   Constitutional:     General Appearance: appears stated age, is in no apparent distress.  HEENT:    Small hematoma and scab to cranium. The pupils are equal, round, and reactive to light bilaterally. The extraocular muscles are intact bilaterally.. The nares and oropharynx are clear. The midface and jaw are stable. No malocclusion is evident.  Neck:    The cervical spine is supple and non tender. Normal range of motion.  Respiratory:   Inspection: Unlabored respirations, no intercostal retractions, paradoxical motion, or accessory muscle use.   Palpation:  The chest is tender to compression on the left. The clavicles are non deformed bilaterally..   Auscultation: normal, clear to auscultation.  Cardiovascular:   Auscultation: normal and regular rate and rhythm.   Peripheral Pulses: Normal.   Abdomen:   Abdomen is soft, nontender, without organomegaly or masses.  Musculoskeletal:   The pelvis is stable.  No significant angulation, deformity, or soft tissue injury involving the upper and lower extremities. Normal range of motion.   Back:   The thoracolumbar spine was examined. Examination is remarkable for no significant tenderness, swelling, or deformity in the thoracolumbar region.  Skin:   The skin is warm and dry.  Neurologic:    Laurie Coma Scale (GCS) 15. [id number]. Neurologic examination revealed no focal deficits noted, mental status intact, oriented for age x3.  Psychiatric:   The patient does not appear depressed or anxious.    IMAGING:  DX-CHEST-PORTABLE (1 VIEW)   Final Result         1.  Pulmonary edema and/or infiltrates, " greatest in the lung bases.   2.  Small left pleural effusion   3.  Cardiomegaly        All current laboratory studies/radiology exams reviewed: Yes    Completed Consultations:  N/A     Pending Consultations:  None     Newly Identified Diagnoses and Injuries:  None noted at time of exam    TOTAL RAP SCORE:  RAP Score Total: 8      ETOH Screening  CAGE Score: 1  Assessment complete date: 1/26/2021 (Denies habitual substance abuse)

## 2021-01-26 NOTE — ED NOTES
Med rec updated and complete  Allergies reviewed  Pt reports that she has been taking 2-3 IBUPROFEN 3 times a day for the last 10 days.  I informed the nurse and pharmacists    Pt reports no antibiotics in the last 2 weeks               Med rec completed at [facility] prior to transfer per [employer].

## 2021-01-26 NOTE — DISCHARGE PLANNING
Anticipated Discharge Disposition: AMA    Action: Oxygen Order received at 1:43pm. By the time this LSW arrived to the patient's room, bedside RN reported the patient left AMA.      Plan: As Above.

## 2021-01-26 NOTE — PROGRESS NOTES
"Blood Pressure 119/59   Pulse (Abnormal) 48 Comment: RN notified  Temperature 36.3 °C (97.4 °F) (Temporal)   Respiration 18   Height 1.6 m (5' 3\")   Weight 74.6 kg (164 lb 7.4 oz)   Oxygen Saturation 94%   Body Mass Index 29.13 kg/m²     Patient left AMA  Discussed need for home oxygen and current level of hypoxia without oxygen in place  Risks of leaving AMA discussed, up to and including death  Attempting to arrange home 02 but patient was unwilling to wait for home 02  "

## 2021-01-26 NOTE — NUR
PT HERE WITH C/O PAIN WITH INSPIRATION AND COUGHING, STATES SHE FELL 10 DAYS 
AGO, SEEN AT [facility] EARLIER TODAY AND LEFT AMA. PT REPORTS SHE HAD IMAGING AT 
[facility], AND WAS TOLD SHE HAS BROKEN RIBS AND HEAD BLEED. PT PLACED ON VITALS 
MONITORS, FALL PRECAUTIONS IN PLACE, CALL LIGHT WITHIN REACH.

## 2021-01-26 NOTE — DISCHARGE PLANNING
Bedside assessment completed with information obtained from pt. Pt is independent with all her ADL's. Pt drives and uses the [facility] on [facility] for her RX needs. Pt support system consists of her father and friends and neighbors. Pt drove self to [facility] initially and then was transferred to [facility] the [facility]. Pt will need assistance in getting back to [facility] at time of DC.     Care Transition Team Assessment    Information Source  Orientation : Oriented x 4  Information Given By: Patient  Who is responsible for making decisions for patient? : Patient    Readmission Evaluation  Is this a readmission?: No         Interdisciplinary Discharge Planning  Does Admitting Nurse Feel This Could be a Complex Discharge?: No  Primary Care Physician: Sonali Trammell MD  Lives with - Patient's Self Care Capacity: Alone and Able to Care For Self  Support Systems: Family Member(s), Friends / Neighbors, Parent  Housing / Facility: [address] [place] [address] House  Do You Take your Prescribed Medications Regularly: Yes([facility])  Able to Return to Previous ADL's: No  Mobility Issues: No  Prior Services: None  Patient Prefers to be Discharged to:: Home  Assistance Needed: Unknown at this Time  Durable Medical Equipment: Not Applicable    Discharge Preparedness  What are your discharge supports?: Parent(Friends, family, neighbors)  Prior Functional Level: Ambulatory, Drives Self, Independent with Activities of Daily Living, Independent with Medication Management  Difficulity with ADLs: None  Difficulity with IADLs: None    Functional Assesment  Prior Functional Level: Ambulatory, Drives Self, Independent with Activities of Daily Living, Independent with Medication Management    Finances  Financial Barriers to Discharge: No  Prescription Coverage: Yes    Vision / Hearing Impairment  Vision Impairment : (reading glasses)  Hearing Impairment : No    Values / Beliefs / Concerns  Values / Beliefs Concerns : No    Advance  Directive  Advance Directive?: (on chart)         Psychological Assessment  History of Substance Abuse: None(pt denies)  History of Psychiatric Problems: (Depression-takes Wellbutrin)    Discharge Risks or Barriers  Discharge risks or barriers?: Transportation(pt's car at [facility])    Anticipated Discharge Information  Discharge Disposition: Still a Patient (30)  Discharge Address: [address] [facility]  Discharge Contact Phone Number: pt will need a ride to [facility](pt initially drove self to  and her car is parked there)

## 2021-01-26 NOTE — ED TRIAGE NOTES
".  Chief Complaint   Patient presents with   • Other     Transferred from [facility] ER for Trauma Specialty for continued pain to  R Chest   • T-5000 FALL     Mechanical Fall x10 days ago. Seen at hospital and found to have (+) subdural and (+) Right 4,5,6 Rib fx.     .BP (!) 187/79   Pulse (!) 53   Temp 36.3 °C (97.3 °F) (Tympanic)   Resp 13   Ht 1.6 m (5' 3\")   Wt 72.6 kg (160 lb)   SpO2 97%   BMI 28.34 kg/m²   "

## 2021-01-26 NOTE — PROGRESS NOTES
Trauma / Surgical Daily Progress Note    Date of Service  1/26/2021    Chief Complaint  70 y.o. adult admitted 1/25/2021 with Fell off ladder, SDH, right ribs fx    Interval Events  New admit to [facility]  Pain control adequate  Tertiary survey completed    - Nursing to obtain walking and resting 02 saturations  - Therapy evaluations requested     Review of Systems  Review of Systems   Constitutional: Negative for chills and fever.   Eyes: Negative for double vision.   Respiratory: Negative for cough and shortness of breath.    Cardiovascular: Negative for palpitations.   Gastrointestinal: Negative for abdominal pain, nausea and vomiting.   Genitourinary:        Voiding   Musculoskeletal: Positive for myalgias (left chest wall). Negative for back pain, joint pain and neck pain.   Neurological: Negative for tingling, sensory change, focal weakness and headaches.        Vital Signs  Temp:  [36.3 °C (97.3 °F)-36.7 °C (98 °F)] 36.7 °C (98 °F)  Pulse:  [50-85] 50  Resp:  [13-40] 18  BP: (118-187)/() 118/61  SpO2:  [91 %-97 %] 93 %    Physical Exam  Physical Exam  Vitals signs and nursing note reviewed.   Constitutional:       General: She is not in acute distress.     Appearance: She is not toxic-appearing.   HENT:      Head: Normocephalic.      Nose: Nose normal.      Mouth/Throat:      Mouth: Mucous membranes are moist.      Pharynx: Oropharynx is clear.   Eyes:      Extraocular Movements: Extraocular movements intact.      Conjunctiva/sclera: Conjunctivae normal.   Neck:      Musculoskeletal: Neck supple.   Cardiovascular:      Rate and Rhythm: Normal rate and regular rhythm.      Pulses: Normal pulses.   Pulmonary:      Effort: Pulmonary effort is normal. No respiratory distress.      Breath sounds: Normal breath sounds.      Comments: IS 1000  Chest:      Chest wall: Tenderness present.   Abdominal:      General: There is no distension.      Palpations: Abdomen is soft.      Tenderness: There is no abdominal  tenderness.   Musculoskeletal:      Comments: Moves all extremities   Skin:     General: Skin is warm and dry.      Capillary Refill: Capillary refill takes less than 2 seconds.   Neurological:      Mental Status: She is alert and oriented to person, place, and time.         Laboratory  Recent Results (from the past 24 hour(s))   CBC WITH DIFFERENTIAL    Collection Time: 01/25/21 12:46 PM   Result Value Ref Range    WBC 6.2 4.8 - 10.8 K/uL    RBC 4.28 (L) 4.70 - 6.10 M/uL    Hemoglobin 14.1 14.0 - 18.0 g/dL    Hematocrit 42.4 42.0 - 52.0 %    MCV 99.1 (H) 81.4 - 97.8 fL    MCH 32.9 27.0 - 33.0 pg    MCHC 33.3 (L) 33.7 - 35.3 g/dL    RDW 45.7 35.9 - 50.0 fL    Platelet Count 200 164 - 446 K/uL    MPV 8.9 (L) 9.0 - 12.9 fL    Neutrophils-Polys 50.30 44.00 - 72.00 %    Lymphocytes 38.80 22.00 - 41.00 %    Monocytes 8.00 0.00 - 13.40 %    Eosinophils 1.90 0.00 - 6.90 %    Basophils 0.80 0.00 - 1.80 %    Immature Granulocytes 0.20 0.00 - 0.90 %    Nucleated RBC 0.00 /100 WBC    Neutrophils (Absolute) 3.13 1.82 - 7.42 K/uL    Lymphs (Absolute) 2.42 1.00 - 4.80 K/uL    Monos (Absolute) 0.50 0.00 - 0.85 K/uL    Eos (Absolute) 0.12 K/uL    Baso (Absolute) 0.05 0.00 - 0.12 K/uL    Immature Granulocytes (abs) 0.01 0.00 - 0.11 K/uL    NRBC (Absolute) 0.00 K/uL   COMP METABOLIC PANEL    Collection Time: 01/25/21 12:46 PM   Result Value Ref Range    Sodium 139 135 - 145 mmol/L    Potassium 4.3 3.6 - 5.5 mmol/L    Chloride 101 96 - 112 mmol/L    Co2 27 20 - 33 mmol/L    Anion Gap 11.0 7.0 - 16.0    Glucose 84 65 - 99 mg/dL    Bun 16 8 - 22 mg/dL    Creatinine 0.76 0.50 - 1.40 mg/dL    Calcium 8.8 8.4 - 10.2 mg/dL    AST(SGOT) 20 12 - 45 U/L    ALT(SGPT) 17 2 - 50 U/L    Alkaline Phosphatase 122 U/L    Total Bilirubin 0.3 0.1 - 1.5 mg/dL    Albumin 3.8 3.2 - 4.9 g/dL    Total Protein 6.4 6.0 - 8.2 g/dL    Globulin 2.6 1.9 - 3.5 g/dL    A-G Ratio 1.5 g/dL   PROTHROMBIN TIME    Collection Time: 01/25/21 12:46 PM   Result Value Ref  Range    PT 11.9 (L) 12.0 - 14.6 sec    INR 0.90 0.87 - 1.13   APTT    Collection Time: 01/25/21 12:46 PM   Result Value Ref Range    APTT 27.5 24.7 - 36.0 sec   LIPASE    Collection Time: 01/25/21 12:46 PM   Result Value Ref Range    Lipase 17 7 - 58 U/L   ETHYL ALCOHOL (BLOOD)    Collection Time: 01/25/21 12:46 PM   Result Value Ref Range    Diagnostic Alcohol 18.2 (H) 0.0 - 10.0 mg/dL   ESTIMATED GFR    Collection Time: 01/25/21 12:46 PM   Result Value Ref Range    GFR If African American >60 >60 mL/min/1.73 m 2    GFR If Non African American >60 >60 mL/min/1.73 m 2   COV-2, FLU A/B, AND RSV BY PCR (2-4 HOURS CEPHEID): Collect NP swab in VTM    Collection Time: 01/25/21  3:28 PM    Specimen: Nasopharyngeal; Respirate   Result Value Ref Range    Influenza virus A RNA Negative Negative    Influenza virus B, PCR Negative Negative    RSV, PCR Negative Negative    SARS-CoV-2 by PCR NotDetected     SARS-CoV-2 Source NP Swab    CBC with Differential: Tomorrow AM    Collection Time: 01/26/21  2:08 AM   Result Value Ref Range    WBC 8.4 4.8 - 10.8 K/uL    RBC 4.73 4.70 - 6.10 M/uL    Hemoglobin 15.4 14.0 - 18.0 g/dL    Hematocrit 48.9 42.0 - 52.0 %    .4 (H) 81.4 - 97.8 fL    MCH 32.6 27.0 - 33.0 pg    MCHC 31.5 (L) 33.6 - 35.0 g/dL    RDW 49.9 35.9 - 50.0 fL    Platelet Count 236 164 - 446 K/uL    MPV 9.7 9.0 - 12.9 fL    Neutrophils-Polys 55.20 44.00 - 72.00 %    Lymphocytes 34.30 22.00 - 41.00 %    Monocytes 7.40 0.00 - 13.40 %    Eosinophils 1.90 0.00 - 6.90 %    Basophils 0.80 0.00 - 1.80 %    Immature Granulocytes 0.40 0.00 - 0.90 %    Nucleated RBC 0.00 /100 WBC    Neutrophils (Absolute) 4.66 1.82 - 7.42 K/uL    Lymphs (Absolute) 2.89 1.00 - 4.80 K/uL    Monos (Absolute) 0.62 0.00 - 0.85 K/uL    Eos (Absolute) 0.16 K/uL    Baso (Absolute) 0.07 0.00 - 0.12 K/uL    Immature Granulocytes (abs) 0.03 0.00 - 0.11 K/uL    NRBC (Absolute) 0.00 K/uL   Comp Metabolic Panel (CMP): Tomorrow AM    Collection Time:  01/26/21  2:08 AM   Result Value Ref Range    Sodium 138 135 - 145 mmol/L    Potassium 4.0 3.6 - 5.5 mmol/L    Chloride 100 96 - 112 mmol/L    Co2 28 20 - 33 mmol/L    Anion Gap 10.0 7.0 - 16.0    Glucose 96 65 - 99 mg/dL    Bun 13 8 - 22 mg/dL    Creatinine 0.62 0.50 - 1.40 mg/dL    Calcium 9.0 8.5 - 10.5 mg/dL    AST(SGOT) 23 12 - 45 U/L    ALT(SGPT) 16 2 - 50 U/L    Alkaline Phosphatase 124 U/L    Total Bilirubin 0.4 0.1 - 1.5 mg/dL    Albumin 4.1 3.2 - 4.9 g/dL    Total Protein 6.8 6.0 - 8.2 g/dL    Globulin 2.7 1.9 - 3.5 g/dL    A-G Ratio 1.5 g/dL   MAGNESIUM    Collection Time: 01/26/21  2:08 AM   Result Value Ref Range    Magnesium 1.9 1.5 - 2.5 mg/dL   PHOSPHORUS    Collection Time: 01/26/21  2:08 AM   Result Value Ref Range    Phosphorus 5.1 (H) 2.5 - 4.5 mg/dL   ESTIMATED GFR    Collection Time: 01/26/21  2:08 AM   Result Value Ref Range    GFR If African American >60 >60 mL/min/1.73 m 2    GFR If Non African American >60 >60 mL/min/1.73 m 2       Fluids  No intake or output data in the 24 hours ending 01/26/21 0826    Core Measures & Quality Metrics  Labs reviewed, Medications reviewed and Radiology images reviewed  Strong catheter: No Strong      DVT Prophylaxis: Contraindicated - High bleeding risk  DVT prophylaxis - mechanical: SCDs  Ulcer prophylaxis: Yes        RAP Score Total: 8    ETOH Screening  CAGE Score: 1  Assessment complete date: 1/26/2021 (Denies habitual substance abuse)        Assessment/Plan  Hypoxia- (present on admission)  Assessment & Plan  SaO2 86% on room air at referring facility.  Supplemental oxygen to maintain SaO2 greater than 93%.  Aggressive pulmonary hygiene and serial chest radiography.     Closed fracture of multiple ribs of right side- (present on admission)  Assessment & Plan  Right fourth through seventh rib fractures without evidence of pneumothorax.  Aggressive pulmonary hygiene and multimodal pain management.     Subdural hematoma (HCC)- (present on  admission)  Assessment & Plan  Small amount of left parafalcine subdural hemorrhage measuring up to 2 mm in width.  Non-operative management.  Post traumatic pharmacologic seizure prophylaxis not indicated.  Brian Frost MD. Neurosurgeon. [facility].    Alcohol use- (present on admission)  Assessment & Plan  Long standing history of alcohol abuse.  Admission BA 0.02.    Anxiety- (present on admission)  Assessment & Plan  History of anxiety and depression.  Chronic condition treated with propranolol, alprazolam, bupropion, citalopram, mirtazapine.  Resumed maintenance medication on admission.    GERD (gastroesophageal reflux disease)- (present on admission)  Assessment & Plan  Chronic condition treated with omeprozole.  Pepcid while in the acute care setting.    Trauma- (present on admission)  Assessment & Plan  Mechanical GLF ~ 10 days ago.  T-5000 Activation Transfer from [facility].  Lior Norman MD. Trauma Surgery.    Screening examination for infectious disease- (present on admission)  Assessment & Plan  Admission SARS-CoV-2 testing negative. Repeat SARS-CoV-2 testing not indicated. Isolation precautions de-escalated.    Dyslipidemia- (present on admission)  Assessment & Plan  Chronic condition treated with pravastatin and Lovaza.  Resumed maintenance medication on admission.        Discussed patient condition with RN, Therapies, Patient and trauma surgery, Dr. Norman.

## 2021-01-26 NOTE — ASSESSMENT & PLAN NOTE
Right fourth through seventh rib fractures without evidence of pneumothorax.  Aggressive pulmonary hygiene and multimodal pain management.

## 2021-01-26 NOTE — H&P
"Trauma Surgery History and Physical    Chief Complaint: Ground-level fall    History of Present Illness: The patient is a 70-year-old woman who experienced a ground-level fall 10 days ago.  She experienced increasing pain prompting her to seek medical attention.  At an outside facility she was noted to have rib fractures and hypoxia.  On time of exam she does complain of some pain in her right chest.  She denies neck pain, abdominal pain, numbness, tingling, or weakness.  Triage Category: The patient was triaged as a Trauma Green Transfer activation. The patient was initially evaluated at [facility] in [place], [place] where CT imaging demonstrated Rib fractures. The patient was transported to [facility] in [place], [place] for a definitive trauma evaluation. An expeditious primary and secondary survey with required adjuncts was conducted. See Trauma Narrator for full details.    Past Medical History:  has a past medical history of Alcohol abuse, Alcoholism (HCC), Anxiety, Depression, Hiatus hernia syndrome, Hypertension, Indigestion, Other specified disorder of intestines, Pneumonia, Psychiatric disorder, Sleep apnea, Snoring, Unspecified urinary incontinence, and Urinary bladder disorder.    Past Surgical History:  has a past surgical history that includes gyn surgery (1991); other abdominal surgery (2002); other (2010); other (1956); other (2010); bladder sling female (8/30/2011); teddy by laparoscopy; and abdominal hysterectomy total.    Allergies:   Allergies   Allergen Reactions   • Codeine Nausea   • Crestor [Rosuvastatin Calcium]      Pt states \"I didn't tolerate\" but describes cough   • Lipitor [Atorvastatin Calcium]      See note, severe L knee pain within one day of taking medicatoin   • Lisinopril      Cough     • Morphine Nausea       Current Medications:    Home Medications     Reviewed by Anila Peraza (Pharmacy Tech) on 01/25/21 at 2761  Med List Status: Complete " "  Medication Last Dose Status   ALPRAZolam (XANAX) 1 MG Tab 1/25/2021 Active   buPROPion (WELLBUTRIN XL) 300 MG XL tablet 1/25/2021 Active   Cholecalciferol (VITAMIN D3) 2000 UNIT Cap 1/25/2021 Active   citalopram (CELEXA) 40 MG Tab 1/25/2021 Active   gabapentin (NEURONTIN) 800 MG tablet 1/25/2021 Active   ibuprofen (MOTRIN) 200 MG Tab 1/24/2021 Active   mirtazapine (REMERON) 15 MG Tab 1/24/2021 Active   omega-3 acid ethyl esters (LOVAZA) 1 GM capsule 1/24/2021 Active   omeprazole (PRILOSEC) 20 MG delayed-release capsule 1/24/2021 Active   pravastatin (PRAVACHOL) 40 MG tablet 1/24/2021 Active   propranolol CR (INDERAL LA) 160 MG CAPSULE SR 24 HR capsule 1/25/2021 Active                Family History: family history includes Alcohol abuse in her father and mother; Anxiety disorder in her brother; Cancer in her maternal aunt, maternal uncle, mother, and sister; Depression in her brother; Drug abuse in her sister; Hyperlipidemia in her father; Other in her brother.    Social History:  reports that she has been smoking cigarettes. She has a 6.25 pack-year smoking history. She has never used smokeless tobacco. She reports current alcohol use. She reports that she does not use drugs.    Review of Systems: Comprehensive review of systems is negative with the exception of the aforementioned HPI, PMH, and PSH bullets in accordance with CMS guidelines.    Physical Examination:     Constitutional:     Vital Signs: BP (!) 172/72   Pulse (!) 56   Temp 36.3 °C (97.3 °F) (Tympanic)   Resp 19   Ht 1.6 m (5' 3\")   Wt 72.6 kg (160 lb)   SpO2 94%    General Appearance: The patient is a pleasant  and cooperative elderly woman in no critical distress.  HEENT:    No significant external craniofacial trauma. The pupils are equal, round, and reactive to light bilaterally. The extraocular muscles are intact bilaterally. The ear canals and tympanic membranes are clear bilaterally. The nares and oropharynx are clear. The midface and jaw " are stable.  No malocclusion is evident.  Neck:    The cervical spine is supple and non tender.  Respiratory:   Inspection: Unlabored respirations, no intercostal retractions, paradoxical motion, or accessory muscle use.   Palpation:  The chest is nontender. The clavicles are non deformed bilaterally.   Auscultation: clear to auscultation.  Cardiovascular:   Inspection: The skin is warm.  Auscultation: Regular rate and rhythm.   Peripheral Pulses: Normal.   Abdomen:   Inspection: Abdominal inspection reveals no abrasions, contusions, lacerations or penetrating wounds.   Palpation: Palpation is remarkable for no significant tenderness, guarding, or peritoneal findings.  Musculoskeletal:   The pelvis is stable. No significant angulation, deformity, or soft tissue injury involving the upper and lower extremities.  Back:   The thoracolumbar spine was examined utilizing spinal motion restriction. Examination is remarkable for no significant tenderness, swelling, or deformity in the thoracolumbar region.  Skin:    Examination of the skin reveals no significant abrasions, contusion, lacerations, or other soft tissue injury.  Neurologic:    Lucinda Coma Scale (GCS) 15.    Neurologic examination reveals no focal deficits noted.  Psychiatric:   The patient does not appear depressed or anxious.    Laboratory Values:   Recent Labs     01/25/21  1246   WBC 6.2   RBC 4.28*   HEMOGLOBIN 14.1   HEMATOCRIT 42.4   MCV 99.1*   MCH 32.9   MCHC 33.3*   RDW 45.7   PLATELETCT 200   MPV 8.9*     Recent Labs     01/25/21  1246   SODIUM 139   POTASSIUM 4.3   CHLORIDE 101   CO2 27   GLUCOSE 84   BUN 16   CREATININE 0.76   CALCIUM 8.8     Recent Labs     01/25/21  1246   ASTSGOT 20   ALTSGPT 17   TBILIRUBIN 0.3   ALKPHOSPHAT 122   GLOBULIN 2.6   INR 0.90     Recent Labs     01/25/21  1246   APTT 27.5   INR 0.90        Imaging:   DX-CHEST-PORTABLE (1 VIEW)    (Results Pending)       Problems:    Closed fracture of multiple ribs of right  side  Right fourth through seventh rib fractures without evidence of pneumothorax.  Aggressive pulmonary hygiene and multimodal pain management.    Alcohol use  Long standing history of alcohol abuse.  Admission BA 0.02.    Screening examination for infectious disease  Admission SARS-CoV-2 testing negative. Repeat SARS-CoV-2 testing not indicated. Isolation precautions de-escalated.    Subdural hematoma (HCC)  Small amount of left parafalcine subdural hemorrhage measuring up to 2 mm in width.  Non-operative management.  Post traumatic pharmacologic seizure prophylaxis not indicated.  Brian Frost MD. Neurosurgeon. [facility].    Trauma  Mechanical GLF ~ 10 days ago.  T-5000 Activation Transfer from [facility].  Lior Norman MD. Trauma Surgery.    Dyslipidemia  Chronic condition treated with pravastatin and Lovaza.  Resumed maintenance medication on admission.    Anxiety  History of anxiety and depression.  Chronic condition treated with propranolol, alprazolam, bupropion, citalopram, mirtazapine.  Resumed maintenance medication on admission.    GERD (gastroesophageal reflux disease)  Chronic condition treated with omeprozole.  Pepcid while in the acute care setting.    Assessment and plan:  70-year-old woman 10 days status post a ground-level fall.  She does have injuries includin.  Multiple right-sided rib fractures-these have resulted in pain and splinting with signs of subsequent hypoxia and atelectasis.  She will be admitted for aggressive pulmonary toilet Multimodal pain management.  2.  Traumatic brain injury-was quite small and since she is 10 days out no further work-up is indicated.  Overall she is clinically stable and appropriate for admission to the floor.  Disposition: General Surgery Unit (GSU).  Trauma tertiary survey.    Critical Care Time: 32 minutes excluding procedures.       ____________________________________     Lior Norman M.D.    DD: 2021  5:20 PM

## 2021-01-26 NOTE — ASSESSMENT & PLAN NOTE
Small amount of left parafalcine subdural hemorrhage measuring up to 2 mm in width.  Non-operative management.  Post traumatic pharmacologic seizure prophylaxis not indicated.  Brian Frost MD. Neurosurgeon. [facility].

## 2021-01-26 NOTE — FACE TO FACE
Face to Face Note  -  Durable Medical Equipment    DELANO Parry - NPI: [id number]  I certify that this patient is under my care and that they had a durable medical equipment(DME)face to face encounter by the nurse practitioner working collaboratively with me that meets the physician DME face-to-face encounter requirements with this patient on:    Date of encounter:   Patient:                    MRN:                       YOB: 2020  Marcia Koch  [id number]  1950     The encounter with the patient was in whole, or in part, for the following medical condition, which is the primary reason for durable medical equipment:  Other - hypoxia    I certify that, based on my findings, the following durable medical equipment is medically necessary:  Oxygen.    HOME O2 Saturation Measurements:(Values must be present for Home Oxygen orders)  Room air sat at rest: 78(RN notified)  Room air sat with amb: 63(RN notified)  With liters of O2: 4, O2 sat at rest with O2: 94(RN notified)  With Liters of O2: 6, O2 sat with amb with O2 : 92(RN notified)  Is the patient mobile?: Yes    My Clinical findings support the need for the above equipment due to:  Hypoxia    Supporting Symptoms: see above walking and resting sats    If patient feels more short of breath, they can go up to 6 liters per minute and contact healthcare provider.

## 2021-01-26 NOTE — ASSESSMENT & PLAN NOTE
SaO2 86% on room air at referring facility.  Supplemental oxygen to maintain SaO2 greater than 93%.  Aggressive pulmonary hygiene and serial chest radiography.

## 2021-01-26 NOTE — CARE PLAN
Problem: Communication  Goal: The ability to communicate needs accurately and effectively will improve  Outcome: PROGRESSING AS EXPECTED     Problem: Safety  Goal: Will remain free from injury  Outcome: PROGRESSING AS EXPECTED  Goal: Will remain free from falls  Outcome: PROGRESSING AS EXPECTED     Problem: Bowel/Gastric:  Goal: Normal bowel function is maintained or improved  Outcome: PROGRESSING AS EXPECTED  Goal: Will not experience complications related to bowel motility  Outcome: PROGRESSING AS EXPECTED     Problem: Pain Management  Goal: Pain level will decrease to patient's comfort goal  Outcome: PROGRESSING AS EXPECTED     Problem: Mobility  Goal: Risk for activity intolerance will decrease  Outcome: PROGRESSING AS EXPECTED     Problem: Respiratory:  Goal: Respiratory status will improve  Outcome: PROGRESSING AS EXPECTED

## 2021-01-26 NOTE — PROGRESS NOTES
Bedside report received. Assessment completed.  Pt is A&O x4. Pt on 3L nasal cannula.  Medicating for pain PRN per MAR Pain 8/10  Denies nausea.   - numbness, - tingling.  Scalp hematoma, CDI   Last BM 1/25/21 . +flatus,   +void.  Regular diet. Tolerates well.   Pt up SBA.  Call light and belongings within reach. All needs met at this time. Fall Precautions and hourly rounding in place.

## 2021-01-26 NOTE — ASSESSMENT & PLAN NOTE
Chronic condition treated with bupropion, citalopram.  Definitive medication reconciliation pending.

## 2021-01-27 ENCOUNTER — HOSPITAL ENCOUNTER (OUTPATIENT)
Dept: RADIOLOGY | Facility: MEDICAL CENTER | Age: 71
End: 2021-01-27
Attending: EMERGENCY MEDICINE
Payer: MEDICARE

## 2021-01-27 VITALS
HEART RATE: 57 BPM | HEIGHT: 63 IN | WEIGHT: 160 LBS | SYSTOLIC BLOOD PRESSURE: 143 MMHG | TEMPERATURE: 98.8 F | BODY MASS INDEX: 28.35 KG/M2 | OXYGEN SATURATION: 94 % | RESPIRATION RATE: 16 BRPM | DIASTOLIC BLOOD PRESSURE: 81 MMHG

## 2021-01-27 PROBLEM — J96.01 ACUTE RESPIRATORY FAILURE WITH HYPOXIA (HCC): Status: ACTIVE | Noted: 2021-01-27

## 2021-01-27 LAB
MAGNESIUM SERPL-MCNC: 1.7 MG/DL (ref 1.5–2.5)
PHOSPHATE SERPL-MCNC: 3.1 MG/DL (ref 2.5–4.5)

## 2021-01-27 PROCEDURE — 96372 THER/PROPH/DIAG INJ SC/IM: CPT

## 2021-01-27 PROCEDURE — 71045 X-RAY EXAM CHEST 1 VIEW: CPT

## 2021-01-27 PROCEDURE — A9270 NON-COVERED ITEM OR SERVICE: HCPCS | Performed by: EMERGENCY MEDICINE

## 2021-01-27 PROCEDURE — 700102 HCHG RX REV CODE 250 W/ 637 OVERRIDE(OP): Performed by: EMERGENCY MEDICINE

## 2021-01-27 PROCEDURE — 700102 HCHG RX REV CODE 250 W/ 637 OVERRIDE(OP): Performed by: STUDENT IN AN ORGANIZED HEALTH CARE EDUCATION/TRAINING PROGRAM

## 2021-01-27 PROCEDURE — A9270 NON-COVERED ITEM OR SERVICE: HCPCS | Performed by: STUDENT IN AN ORGANIZED HEALTH CARE EDUCATION/TRAINING PROGRAM

## 2021-01-27 PROCEDURE — 97535 SELF CARE MNGMENT TRAINING: CPT

## 2021-01-27 PROCEDURE — G0378 HOSPITAL OBSERVATION PER HR: HCPCS

## 2021-01-27 PROCEDURE — 99234 HOSP IP/OBS SM DT SF/LOW 45: CPT | Performed by: STUDENT IN AN ORGANIZED HEALTH CARE EDUCATION/TRAINING PROGRAM

## 2021-01-27 PROCEDURE — 700111 HCHG RX REV CODE 636 W/ 250 OVERRIDE (IP): Performed by: STUDENT IN AN ORGANIZED HEALTH CARE EDUCATION/TRAINING PROGRAM

## 2021-01-27 RX ORDER — TEMAZEPAM 15 MG/1
15 CAPSULE ORAL
Status: DISCONTINUED | OUTPATIENT
Start: 2021-01-27 | End: 2021-01-27 | Stop reason: HOSPADM

## 2021-01-27 RX ORDER — ONDANSETRON 4 MG/1
4 TABLET, ORALLY DISINTEGRATING ORAL EVERY 4 HOURS PRN
Status: DISCONTINUED | OUTPATIENT
Start: 2021-01-27 | End: 2021-01-27 | Stop reason: HOSPADM

## 2021-01-27 RX ORDER — AMOXICILLIN 250 MG
2 CAPSULE ORAL 2 TIMES DAILY
Status: DISCONTINUED | OUTPATIENT
Start: 2021-01-27 | End: 2021-01-27 | Stop reason: HOSPADM

## 2021-01-27 RX ORDER — GABAPENTIN 400 MG/1
800 CAPSULE ORAL 2 TIMES DAILY
Status: DISCONTINUED | OUTPATIENT
Start: 2021-01-27 | End: 2021-01-27 | Stop reason: HOSPADM

## 2021-01-27 RX ORDER — PROPRANOLOL HYDROCHLORIDE 80 MG/1
160 CAPSULE, EXTENDED RELEASE ORAL DAILY
Status: DISCONTINUED | OUTPATIENT
Start: 2021-01-27 | End: 2021-01-27 | Stop reason: HOSPADM

## 2021-01-27 RX ORDER — POLYETHYLENE GLYCOL 3350 17 G/17G
1 POWDER, FOR SOLUTION ORAL
Status: DISCONTINUED | OUTPATIENT
Start: 2021-01-27 | End: 2021-01-27 | Stop reason: HOSPADM

## 2021-01-27 RX ORDER — CITALOPRAM 40 MG/1
40 TABLET ORAL DAILY
Status: DISCONTINUED | OUTPATIENT
Start: 2021-01-27 | End: 2021-01-27 | Stop reason: HOSPADM

## 2021-01-27 RX ORDER — AMOXICILLIN 250 MG
2 CAPSULE ORAL 2 TIMES DAILY
Qty: 30 TAB | Refills: 0 | Status: SHIPPED | OUTPATIENT
Start: 2021-01-27 | End: 2021-02-08

## 2021-01-27 RX ORDER — BUPROPION HYDROCHLORIDE 150 MG/1
300 TABLET, EXTENDED RELEASE ORAL EVERY MORNING
Status: DISCONTINUED | OUTPATIENT
Start: 2021-01-27 | End: 2021-01-27 | Stop reason: HOSPADM

## 2021-01-27 RX ORDER — HYDROCODONE BITARTRATE AND ACETAMINOPHEN 5; 325 MG/1; MG/1
1-2 TABLET ORAL EVERY 6 HOURS PRN
Status: DISCONTINUED | OUTPATIENT
Start: 2021-01-27 | End: 2021-01-27

## 2021-01-27 RX ORDER — LABETALOL HYDROCHLORIDE 5 MG/ML
10 INJECTION, SOLUTION INTRAVENOUS EVERY 4 HOURS PRN
Status: DISCONTINUED | OUTPATIENT
Start: 2021-01-27 | End: 2021-01-27 | Stop reason: HOSPADM

## 2021-01-27 RX ORDER — HYDROCODONE BITARTRATE AND ACETAMINOPHEN 5; 325 MG/1; MG/1
1-2 TABLET ORAL EVERY 4 HOURS PRN
Status: DISCONTINUED | OUTPATIENT
Start: 2021-01-27 | End: 2021-01-27 | Stop reason: HOSPADM

## 2021-01-27 RX ORDER — ACETAMINOPHEN 500 MG
1000 TABLET ORAL ONCE
Status: COMPLETED | OUTPATIENT
Start: 2021-01-27 | End: 2021-01-27

## 2021-01-27 RX ORDER — HYDROCODONE BITARTRATE AND ACETAMINOPHEN 5; 325 MG/1; MG/1
1-2 TABLET ORAL EVERY 4 HOURS PRN
Qty: 20 TAB | Refills: 0 | Status: SHIPPED | OUTPATIENT
Start: 2021-01-27 | End: 2021-02-01

## 2021-01-27 RX ORDER — ONDANSETRON 2 MG/ML
4 INJECTION INTRAMUSCULAR; INTRAVENOUS EVERY 4 HOURS PRN
Status: DISCONTINUED | OUTPATIENT
Start: 2021-01-27 | End: 2021-01-27 | Stop reason: HOSPADM

## 2021-01-27 RX ORDER — OMEPRAZOLE 20 MG/1
20 CAPSULE, DELAYED RELEASE ORAL DAILY
Status: DISCONTINUED | OUTPATIENT
Start: 2021-01-27 | End: 2021-01-27 | Stop reason: HOSPADM

## 2021-01-27 RX ORDER — METHOCARBAMOL 750 MG/1
1500 TABLET, FILM COATED ORAL ONCE
Status: COMPLETED | OUTPATIENT
Start: 2021-01-27 | End: 2021-01-27

## 2021-01-27 RX ORDER — LIDOCAINE 50 MG/G
1 PATCH TOPICAL
Status: DISCONTINUED | OUTPATIENT
Start: 2021-01-27 | End: 2021-01-27 | Stop reason: HOSPADM

## 2021-01-27 RX ORDER — ACETAMINOPHEN 325 MG/1
650 TABLET ORAL EVERY 6 HOURS PRN
Status: DISCONTINUED | OUTPATIENT
Start: 2021-01-27 | End: 2021-01-27 | Stop reason: HOSPADM

## 2021-01-27 RX ORDER — BISACODYL 10 MG
10 SUPPOSITORY, RECTAL RECTAL
Status: DISCONTINUED | OUTPATIENT
Start: 2021-01-27 | End: 2021-01-27 | Stop reason: HOSPADM

## 2021-01-27 RX ORDER — LIDOCAINE 50 MG/G
1 PATCH TOPICAL
Qty: 10 PATCH | Refills: 0 | Status: SHIPPED
Start: 2021-01-27 | End: 2021-04-23

## 2021-01-27 RX ADMIN — HYDROCODONE BITARTRATE AND ACETAMINOPHEN 1 TABLET: 5; 325 TABLET ORAL at 03:43

## 2021-01-27 RX ADMIN — ACETAMINOPHEN 1000 MG: 500 TABLET ORAL at 00:40

## 2021-01-27 RX ADMIN — ENOXAPARIN SODIUM 40 MG: 40 INJECTION SUBCUTANEOUS at 05:50

## 2021-01-27 RX ADMIN — METHOCARBAMOL 1500 MG: 750 TABLET ORAL at 00:40

## 2021-01-27 RX ADMIN — CITALOPRAM 40 MG: 40 TABLET, FILM COATED ORAL at 05:49

## 2021-01-27 RX ADMIN — GABAPENTIN 800 MG: 400 CAPSULE ORAL at 05:49

## 2021-01-27 RX ADMIN — ACETAMINOPHEN 650 MG: 325 TABLET ORAL at 07:44

## 2021-01-27 RX ADMIN — HYDROCODONE BITARTRATE AND ACETAMINOPHEN 2 TABLET: 5; 325 TABLET ORAL at 09:33

## 2021-01-27 RX ADMIN — DOCUSATE SODIUM 50 MG AND SENNOSIDES 8.6 MG 2 TABLET: 8.6; 5 TABLET, FILM COATED ORAL at 05:48

## 2021-01-27 RX ADMIN — HYDROCODONE BITARTRATE AND ACETAMINOPHEN 2 TABLET: 5; 325 TABLET ORAL at 14:18

## 2021-01-27 RX ADMIN — BUPROPION HYDROCHLORIDE 300 MG: 150 TABLET, EXTENDED RELEASE ORAL at 05:48

## 2021-01-27 RX ADMIN — OMEPRAZOLE 20 MG: 20 CAPSULE, DELAYED RELEASE ORAL at 05:49

## 2021-01-27 RX ADMIN — PROPRANOLOL HYDROCHLORIDE 160 MG: 80 CAPSULE, EXTENDED RELEASE ORAL at 05:48

## 2021-01-27 ASSESSMENT — ENCOUNTER SYMPTOMS
FALLS: 1
FEVER: 0
DIZZINESS: 0
NERVOUS/ANXIOUS: 1
VOMITING: 0
PHOTOPHOBIA: 0
NAUSEA: 0
SHORTNESS OF BREATH: 1
COUGH: 0
BACK PAIN: 0
SPUTUM PRODUCTION: 0
PALPITATIONS: 0
DEPRESSION: 1
ABDOMINAL PAIN: 0
SEIZURES: 0
CHILLS: 0
FOCAL WEAKNESS: 0
MYALGIAS: 1
SORE THROAT: 0
EYE PAIN: 0
NECK PAIN: 0
HEADACHES: 0

## 2021-01-27 ASSESSMENT — PATIENT HEALTH QUESTIONNAIRE - PHQ9
1. LITTLE INTEREST OR PLEASURE IN DOING THINGS: NOT AT ALL
SUM OF ALL RESPONSES TO PHQ9 QUESTIONS 1 AND 2: 0
2. FEELING DOWN, DEPRESSED, IRRITABLE, OR HOPELESS: NOT AT ALL

## 2021-01-27 ASSESSMENT — PAIN DESCRIPTION - PAIN TYPE
TYPE: ACUTE PAIN

## 2021-01-27 ASSESSMENT — LIFESTYLE VARIABLES
ALCOHOL_USE: NO
HAVE PEOPLE ANNOYED YOU BY CRITICIZING YOUR DRINKING: NO
EVER FELT BAD OR GUILTY ABOUT YOUR DRINKING: NO
AVERAGE NUMBER OF DAYS PER WEEK YOU HAVE A DRINK CONTAINING ALCOHOL: 0
TOTAL SCORE: 0
EVER HAD A DRINK FIRST THING IN THE MORNING TO STEADY YOUR NERVES TO GET RID OF A HANGOVER: NO
CONSUMPTION TOTAL: NEGATIVE
TOTAL SCORE: 0
HAVE YOU EVER FELT YOU SHOULD CUT DOWN ON YOUR DRINKING: NO
DOES PATIENT WANT TO TALK TO SOMEONE ABOUT QUITTING: NO
HOW MANY TIMES IN THE PAST YEAR HAVE YOU HAD 5 OR MORE DRINKS IN A DAY: 0
ON A TYPICAL DAY WHEN YOU DRINK ALCOHOL HOW MANY DRINKS DO YOU HAVE: 0
DOES PATIENT WANT TO STOP DRINKING: NO
TOTAL SCORE: 0

## 2021-01-27 NOTE — CARE PLAN
Problem: Pain Management  Goal: Pain level will decrease to patient's comfort goal  Outcome: PROGRESSING AS EXPECTED   Pain medicated PRN to pt's comfort goal  Problem: Respiratory:  Goal: Respiratory status will improve  Outcome: PROGRESSING AS EXPECTED   Pt on 2-3L o2 to maintain sats >90%

## 2021-01-27 NOTE — ASSESSMENT & PLAN NOTE
-Right fourth through seventh rib fractures on the right, no evidence of pneumothorax.  -Patient is shallow breathing secondary to pain.  -Patient had a oxygen concentrator donated to her today as well as two oxygen tanks, will obtain case management consult to help sort out supplies and arrange follow-up.  -Saturating greater than 90% on 1 to 2 L nasal cannula

## 2021-01-27 NOTE — THERAPY
"Physical Therapy - Screen     Patient Name: Marcia Kcoh  Age:  70 y.o., Sex:  adult  Medical Record #: [id number]  Today's Date: 1/27/2021    Patient reports she is at baseline, and per nurse patient is upself in PPU.  Patient walked to [facility] from [facility] and biggest concern was \"I walked through some (not the best) areas.\"  Patient denies concerns about falls.  PT will not follow.     01/27/21 1159   Prior Living Situation   Prior Services None   Housing / Facility 1 Story House   Steps Into Home 0   Steps In Home 0   Lives with - Patient's Self Care Capacity Alone and Able to Care For Self   Comments friends can help as needed   Prior Level of Functional Mobility   Bed Mobility Independent   Transfer Status Independent   Ambulation Independent   Distance Ambulation (Feet) 150   Assistive Devices Used None   Stairs Independent   Comments patient reports she is at this baseline   Cognition    Cognition / Consciousness WDL   Level of Consciousness Alert   Comments pleasant     "

## 2021-01-27 NOTE — ED TRIAGE NOTES
Patient arrives to the ED via EMS from [place] for c/c of diagnosed rib fractures and dyspnea upon exertion. Patient reports falling 10 days ago and hitting head.   Pt reports being seen here yesterday at [facility] and diagnosed with rib fractures and a SDH. Pt reports leaving AMA, because she wanted Ativan and not narcotics. Pt reports walking to [place] for the same complaint. Pt then transferred back to [facility] for admission.   Pt arrives alert, oriented and able to follow commands

## 2021-01-27 NOTE — DISCHARGE PLANNING
Care Transition Team Assessment    LSW obtained the following information through chart review. Pt's last admission was on 01/25/2021. Bedside assessment completed with information obtained from pt. Pt is independent with all her ADL's. Pt drives and uses the [facility] on [address] for her RX needs. Pt support system consists of her father and friends and neighbors.       Information Source  Orientation : Oriented x 4  Information Given By: Patient  Who is responsible for making decisions for patient? : Patient     Readmission Evaluation  Is this a readmission?: No      Interdisciplinary Discharge Planning  Does Admitting Nurse Feel This Could be a Complex Discharge?: No  Primary Care Physician: Sonali Trammell MD  Lives with - Patient's Self Care Capacity: Alone and Able to Care For Self  Support Systems: Family Member(s), Friends / Neighbors, Parent  Housing / Facility: [address]  Do You Take your Prescribed Medications Regularly: Yes([facility] [place])  Able to Return to Previous ADL's: No  Mobility Issues: No  Prior Services: None  Patient Prefers to be Discharged to:: Home  Assistance Needed: Unknown at this Time  Durable Medical Equipment: Not Applicable     Discharge Preparedness  What are your discharge supports?: Parent(Friends, family, neighbors)  Prior Functional Level: Ambulatory, Drives Self, Independent with Activities of Daily Living, Independent with Medication Management  Difficulity with ADLs: None  Difficulity with IADLs: None     Functional Assesment  Prior Functional Level: Ambulatory, Drives Self, Independent with Activities of Daily Living, Independent with Medication Management     Finances  Financial Barriers to Discharge: No  Prescription Coverage: Yes     Vision / Hearing Impairment  Vision Impairment : (reading glasses)  Hearing Impairment : No     Values / Beliefs / Concerns  Values / Beliefs Concerns : No     Advance Directive  Advance Directive?: (on chart)     Psychological  Assessment  History of Substance Abuse: None(pt denies)  History of Psychiatric Problems: (Depression-takes Wellbutrin)     Discharge Risks or Barriers  Discharge risks or barriers?: Transportation(pt's car at [facility])     Anticipated Discharge Information  Discharge Disposition: Still a Patient (30)  Discharge Address: [address] [facility]

## 2021-01-27 NOTE — PROGRESS NOTES
Patient to be discharged today - patient aware and agreeable to plan. D/c instructions reviewed with patient - ?'s/concerns answered.

## 2021-01-27 NOTE — PROGRESS NOTES
Assumed pt care at 0715.  Received report from night RN.  Assessment completed.  Pt AAOx4.  Pt complains of right rib pain.  Will medicated per MAR when time. Pt ambulates up self.  Pt calls appropriately.  Treaded socks in place, call light within reach and staff numbers provided.  Pt needs met at this time.

## 2021-01-27 NOTE — FACE TO FACE
"Face to Face Note  -  Durable Medical Equipment    Marciano Sam M.D. - NPI: [id number]  I certify that this patient is under my care and that they had a durable medical equipment(DME)face to face encounter by myself that meets the physician DME face-to-face encounter requirements with this patient on:    Date of encounter:   Patient:                    MRN:                       YOB: 2020  Marcia Koch  [id number]  1950     The encounter with the patient was in whole, or in part, for the following medical condition, which is the primary reason for durable medical equipment:  Other - Hypoxia    I certify that, based on my findings, the following durable medical equipment is medically necessary:  Oxygen.    HOME O2 Saturation Measurements:(Values must be present for Home Oxygen orders)  Room air sat at rest: 90  Room air sat with amb: 88  With liters of O2: 2, O2 sat at rest with O2: 98  With Liters of O2: 2, O2 sat with amb with O2 : 95  Is the patient mobile?: Yes    My Clinical findings support the need for the above equipment due to:  Hypoxia    Supporting Symptoms: The patient requires supplemental oxygen, as the following interventions have been tried with limited or no improvement: \"Ambulation with oximetry    If patient feels more short of breath, they can go up to 6 liters per minute and contact healthcare provider.  "

## 2021-01-27 NOTE — H&P
"Hospital Medicine History & Physical Note    Date of Service  1/27/2021    Primary Care Physician  Sonali Trammell M.D.    Consultants  None    Code Status  Full Code    Chief Complaint  Chief Complaint   Patient presents with   • Rib Pain     right sided rib fracture   • Fall     Pt reports GLF 10 days       History of Presenting Illness  70 y.o. female past medical history of hypertension, hyperlipidemia, alcohol abuse, anxiety depression, GERD who presented 1/26/2021 with complaints of acute hypoxic respiratory failure secondary to multiple broken ribs, patient left AGAINST MEDICAL ADVICE yesterday for the broken ribs.    Ms. Koch had a ground-level fall about 10 days prior to admission and was admitted by the trauma service here at [facility].  She was treated with aggressive pulmonary hygiene and multimodal pain management.  She is also found to have a small amount of left parafalcine subdural hemorrhage measuring up to 2 mm in width, nonoperative management and there was no posttraumatic pharmacologic seizure prophylaxis indicated.  It appears the patient left AMA prior to her oxygen being delivered because she \"wanted to go out for some fresh air and call her dad\".  CNA and charge nurse tried to get the patient to stay however she left anyway.  While at home she realized that she did indeed need the oxygen that was recommended for her to have and so she presented to [place] for same complaint and she was subsequently transferred back to [facility] for continued care.  ERP discussed case with trauma surgery who felt that since the trauma was 10 days ago that they did not need to be involved.  Of note patient did have oxygen concentrator donated to her while she was at [place] and comes to [facility] ED with oxygen concentrator and 2 oxygen tanks.  Patient denies any medical changes or events since leaving AMA yesterday.    Review of Systems  Review of Systems   Constitutional: Negative for chills and " "fever.   HENT: Negative for ear discharge and nosebleeds.    Eyes: Negative for photophobia and pain.   Respiratory: Positive for shortness of breath. Negative for cough and sputum production.    Cardiovascular: Positive for chest pain (right sided). Negative for palpitations.   Gastrointestinal: Negative for nausea and vomiting.   Genitourinary: Negative for dysuria and urgency.   Musculoskeletal: Positive for falls and myalgias.   Neurological: Negative for focal weakness and seizures.   Psychiatric/Behavioral: Positive for depression. The patient is nervous/anxious.        Past Medical History   has a past medical history of Alcohol abuse, Alcoholism (HCC), Anxiety, Depression, Hiatus hernia syndrome, Hypertension, Indigestion, Other specified disorder of intestines, Pneumonia, Psychiatric disorder, Sleep apnea, Snoring, Unspecified urinary incontinence, and Urinary bladder disorder.    Surgical History   has a past surgical history that includes gyn surgery (1991); other abdominal surgery (2002); other (2010); other (1956); other (2010); bladder sling female (8/30/2011); teddy by laparoscopy; and abdominal hysterectomy total.     Family History  family history includes Alcohol abuse in her father and mother; Anxiety disorder in her brother; Cancer in her maternal aunt, maternal uncle, mother, and sister; Depression in her brother; Drug abuse in her sister; Hyperlipidemia in her father; Other in her brother.     Social History   reports that she has been smoking cigarettes. She has a 6.25 pack-year smoking history. She has never used smokeless tobacco. She reports current alcohol use of about 3.6 oz of alcohol per week. She reports that she does not use drugs.    Allergies  Allergies   Allergen Reactions   • Codeine Nausea   • Crestor [Rosuvastatin Calcium]      Pt states \"I didn't tolerate\" but describes cough   • Lipitor [Atorvastatin Calcium]      See note, severe L knee pain within one day of taking " medicatoin   • Lisinopril      Cough     • Morphine Nausea       Medications  Prior to Admission Medications   Prescriptions Last Dose Informant Patient Reported? Taking?   ALPRAZolam (XANAX) 1 MG Tab  Patient Yes No   Sig: Take 0.5 mg by mouth 3 times a day as needed for Anxiety.   Cholecalciferol (VITAMIN D3) 2000 UNIT Cap 1/26/2021 at Unknown time Patient Yes No   Sig: Take 2,000 Units by mouth every day.   buPROPion (WELLBUTRIN XL) 300 MG XL tablet 1/26/2021 at Unknown time Patient Yes No   Sig: Take 300 mg by mouth every morning.   citalopram (CELEXA) 40 MG Tab 1/26/2021 at Unknown time Patient Yes No   Sig: Take 40 mg by mouth every day.   gabapentin (NEURONTIN) 800 MG tablet 1/26/2021 at Unknown time Patient Yes No   Sig: Take 800 mg by mouth 2 times a day.   ibuprofen (MOTRIN) 200 MG Tab 1/26/2021 at Unknown time Patient Yes No   Sig: Take 400-600 mg by mouth 3 times a day. Pt reports taking 3 times a day for the last 10 day course   mirtazapine (REMERON) 15 MG Tab 1/26/2021 at Unknown time Patient No No   Sig: TAKE 1 TABLET BY MOUTH AT BEDTIME   Patient taking differently: Take 15 mg by mouth every bedtime.   omega-3 acid ethyl esters (LOVAZA) 1 GM capsule 1/26/2021 at Unknown time Patient No No   Sig: Take 2 Caps by mouth 2 Times a Day for 360 days.   Patient taking differently: Take 2 g by mouth 2 Times a Day. Pt started on 1/23/2021   omeprazole (PRILOSEC) 20 MG delayed-release capsule 1/26/2021 at Unknown time Patient Yes No   Sig: Take 20 mg by mouth every day.   pravastatin (PRAVACHOL) 40 MG tablet 1/26/2021 at Unknown time Patient No No   Sig: Take 1 Tab by mouth every day for 360 days.   Patient taking differently: Take 80 mg by mouth every 48 hours.   propranolol CR (INDERAL LA) 160 MG CAPSULE SR 24 HR capsule 1/26/2021 at Unknown time Patient No No   Sig: Take 1 Cap by mouth every day.      Facility-Administered Medications: None       Physical Exam  Temp:  [36.3 °C (97.4 °F)-37.2 °C (98.9 °F)]  36.8 °C (98.2 °F)  Pulse:  [48-59] 57  Resp:  [13-26] 16  BP: (115-193)/(59-88) 138/73  SpO2:  [91 %-96 %] 91 %    Physical Exam  Vitals signs and nursing note reviewed.   Constitutional:       General: She is not in acute distress.     Appearance: She is not ill-appearing.      Comments: 70-year-old female appears stated age, alert and conversant, able to speak full sentences without becoming breathless   HENT:      Head: Normocephalic and atraumatic.      Nose: Nose normal. No congestion or rhinorrhea.      Mouth/Throat:      Mouth: Mucous membranes are moist.      Pharynx: Oropharynx is clear. No oropharyngeal exudate.   Eyes:      Extraocular Movements: Extraocular movements intact.      Conjunctiva/sclera: Conjunctivae normal.      Pupils: Pupils are equal, round, and reactive to light.   Neck:      Musculoskeletal: Normal range of motion and neck supple. No muscular tenderness.   Cardiovascular:      Rate and Rhythm: Normal rate and regular rhythm.      Pulses: Normal pulses.      Heart sounds: No murmur. No gallop.    Pulmonary:      Effort: Pulmonary effort is normal. No respiratory distress.      Breath sounds: No wheezing or rhonchi.      Comments: Right-sided chest discomfort with deep inspiration  Chest:      Chest wall: Tenderness present.   Abdominal:      General: Bowel sounds are normal.      Palpations: Abdomen is soft.      Tenderness: There is no abdominal tenderness. There is no guarding.   Musculoskeletal: Normal range of motion.         General: Tenderness (right chest wall) present. No swelling.   Skin:     General: Skin is warm and dry.      Capillary Refill: Capillary refill takes less than 2 seconds.   Neurological:      General: No focal deficit present.      Mental Status: She is alert and oriented to person, place, and time. Mental status is at baseline.      Cranial Nerves: No cranial nerve deficit.      Sensory: No sensory deficit.      Motor: No weakness.         Laboratory:  Recent  Labs     01/25/21  1246 01/26/21  0208   WBC 6.2 8.4   RBC 4.28* 4.73   HEMOGLOBIN 14.1 15.4   HEMATOCRIT 42.4 48.9   MCV 99.1* 103.4*   MCH 32.9 32.6   MCHC 33.3* 31.5*   RDW 45.7 49.9   PLATELETCT 200 236   MPV 8.9* 9.7     Recent Labs     01/25/21  1246 01/26/21  0208   SODIUM 139 138   POTASSIUM 4.3 4.0   CHLORIDE 101 100   CO2 27 28   GLUCOSE 84 96   BUN 16 13   CREATININE 0.76 0.62   CALCIUM 8.8 9.0     Recent Labs     01/25/21  1246 01/26/21  0208   ALTSGPT 17 16   ASTSGOT 20 23   ALKPHOSPHAT 122 124   TBILIRUBIN 0.3 0.4   LIPASE 17  --    GLUCOSE 84 96     Recent Labs     01/25/21  1246   APTT 27.5   INR 0.90     No results for input(s): NTPROBNP in the last 72 hours.      No results for input(s): TROPONINT in the last 72 hours.    Imaging:  DX-CHEST-PORTABLE (1 VIEW)   Final Result         1.  Bilateral lower lobe infiltrates and trace left pleural effusion   2.  Cardiomegaly            Assessment/Plan:  I anticipate this patient is appropriate for observation status at this time.    * Acute respiratory failure with hypoxia (HCC)- (present on admission)  Assessment & Plan  -Right fourth through seventh rib fractures on the right, no evidence of pneumothorax.  -Patient is shallow breathing secondary to pain.  -Patient had a oxygen concentrator donated to her today as well as two oxygen tanks, will obtain case management consult to help sort out supplies and arrange follow-up.  -Saturating greater than 90% on 1 to 2 L nasal cannula    Closed fracture of multiple ribs of right side- (present on admission)  Assessment & Plan  Right fourth through seventh rib fractures without evidence of pneumothorax.  Aggressive pulmonary hygiene and multimodal pain management.    Depression, controlled- (present on admission)  Assessment & Plan  Stable, resume home medications bupropion and Celexa

## 2021-01-27 NOTE — DISCHARGE SUMMARY
"Discharge Summary    CHIEF COMPLAINT ON ADMISSION  Chief Complaint   Patient presents with   • Rib Pain     right sided rib fracture   • Fall     Pt reports GLF 10 days       Reason for Admission  EMS     Admission Date  1/26/2021    CODE STATUS  Full Code    HPI & HOSPITAL COURSE  70 y.o. female past medical history of hypertension, hyperlipidemia, alcohol abuse, anxiety depression, GERD who presented 1/26/2021 with complaints of acute hypoxic respiratory failure secondary to multiple broken ribs, patient left AGAINST MEDICAL ADVICE yesterday for the broken ribs.     Ms. Koch had a ground-level fall about 10 days prior to admission and was admitted by the trauma service here at [facility].  She was treated with aggressive pulmonary hygiene and multimodal pain management.  She is also found to have a small amount of left parafalcine subdural hemorrhage measuring up to 2 mm in width, nonoperative management and there was no posttraumatic pharmacologic seizure prophylaxis indicated.  It appears the patient left AMA prior to her oxygen being delivered because she \"wanted to go out for some fresh air and call her dad\".  CNA and charge nurse tried to get the patient to stay however she left anyway.  While at home she realized that she did indeed need the oxygen that was recommended for her to have and so she presented to [place] for same complaint and she was subsequently transferred back to [facility] for continued care.  ERP discussed case with trauma surgery who felt that since the trauma was 10 days ago that they did not need to be involved.  Of note patient did have oxygen concentrator donated to her while she was at [place] and comes to [facility] ED with oxygen concentrator and 2 oxygen tanks.  Patient denies any medical changes or events since leaving AMA yesterday.    Patient has already have home oxygen set up.  She is breathing without any respiratory distress saturating above 90% on 2 1 to 2 L of oxygen " via nasal cannula.  Patient pain is managed with opiates.  Patient was encouraged to use incentive spirometry for prevention of atelectasis and pneumonia.  Patient was educated and counseled to not drive or use heavy machinery while she is taking opiates.  She verbalized understanding and responded that she has many friends and family to help her with transportation.    No notes on file    Therefore, she is discharged in good and stable condition to home with close outpatient follow-up.    The patient recovered much more quickly than anticipated on admission.    Discharge Date  01/27/21    FOLLOW UP ITEMS POST DISCHARGE  F/u with PCP for post acute visit in 1 week    DISCHARGE DIAGNOSES  Principal Problem:    Acute respiratory failure with hypoxia (HCC) POA: Yes  Active Problems:    Closed fracture of multiple ribs of right side POA: Yes    HTN (hypertension) POA: Yes    Depression, controlled POA: Yes  Resolved Problems:    * No resolved hospital problems. *      FOLLOW UP  Future Appointments   Date Time Provider Department Center   2/16/2021  4:50 PM EARLE JUARES MG 1 [facility]   3/29/2021 10:40 AM VASCULAR NURSE PRACTITIONER [facility] None   5/5/2021 11:30 AM LIZETH Escal[place]     No follow-up provider specified.    MEDICATIONS ON DISCHARGE     Medication List      START taking these medications      Instructions   HYDROcodone-acetaminophen 5-325 MG Tabs per tablet  Commonly known as: NORCO   Take 1-2 Tabs by mouth every four hours as needed for up to 5 days.  Dose: 1-2 Tab     lidocaine 5 % Ptch  Commonly known as: LIDODERM   Place 1 Patch on the skin every 24 hours as needed (rib fracture pain).  Dose: 1 Patch     senna-docusate 8.6-50 MG Tabs  Commonly known as: PERICOLACE or SENOKOT S   Take 2 Tabs by mouth 2 Times a Day.  Dose: 2 Tab        CHANGE how you take these medications      Instructions   omega-3 acid ethyl esters 1 GM capsule  What changed: additional instructions  Commonly  "known as: LOVAZA   Take 2 Caps by mouth 2 Times a Day for 360 days.  Dose: 2 g     pravastatin 40 MG tablet  What changed:   · how much to take  · when to take this  Commonly known as: PRAVACHOL   Doctor's comments: Tolerating  Take 1 Tab by mouth every day for 360 days.  Dose: 40 mg        CONTINUE taking these medications      Instructions   ALPRAZolam 1 MG Tabs  Commonly known as: XANAX   Take 0.5 mg by mouth 3 times a day as needed for Anxiety.  Dose: 0.5 mg     buPROPion 300 MG XL tablet  Commonly known as: WELLBUTRIN XL   Take 300 mg by mouth every morning.  Dose: 300 mg     citalopram 40 MG Tabs  Commonly known as: CeleXA   Take 40 mg by mouth every day.  Dose: 40 mg     gabapentin 800 MG tablet  Commonly known as: NEURONTIN   Take 800 mg by mouth 2 times a day.  Dose: 800 mg     ibuprofen 200 MG Tabs  Commonly known as: MOTRIN   Take 400-600 mg by mouth 3 times a day. Pt reports taking 3 times a day for the last 10 day course  Dose: 400-600 mg     omeprazole 20 MG delayed-release capsule  Commonly known as: PRILOSEC   Take 20 mg by mouth every day.  Dose: 20 mg     propranolol  MG Cp24 capsule  Commonly known as: INDERAL LA   Take 1 Cap by mouth every day.  Dose: 160 mg     Vitamin D3 2000 UNIT Caps   Take 2,000 Units by mouth every day.  Dose: 2,000 Units        STOP taking these medications    mirtazapine 15 MG Tabs  Commonly known as: Remeron            Allergies  Allergies   Allergen Reactions   • Codeine Nausea   • Crestor [Rosuvastatin Calcium]      Pt states \"I didn't tolerate\" but describes cough   • Lipitor [Atorvastatin Calcium]      See note, severe L knee pain within one day of taking medicatoin   • Lisinopril      Cough     • Morphine Nausea       DIET  Orders Placed This Encounter   Procedures   • Diet Order Diet: 2 Gram Sodium; Second Modifier: (optional): Cardiac     Standing Status:   Standing     Number of Occurrences:   1     Order Specific Question:   Diet:     Answer:   2 Gram " Sodium [7]     Order Specific Question:   Second Modifier: (optional)     Answer:   Cardiac [6]       ACTIVITY  As tolerated.  Weight bearing as tolerated    CONSULTATIONS  none    PROCEDURES  none    LABORATORY  Lab Results   Component Value Date    SODIUM 138 01/26/2021    POTASSIUM 4.0 01/26/2021    CHLORIDE 100 01/26/2021    CO2 28 01/26/2021    GLUCOSE 96 01/26/2021    BUN 13 01/26/2021    CREATININE 0.62 01/26/2021        Lab Results   Component Value Date    WBC 8.4 01/26/2021    HEMOGLOBIN 15.4 01/26/2021    HEMATOCRIT 48.9 01/26/2021    PLATELETCT 236 01/26/2021        Total time of the discharge process exceeds 32 minutes.

## 2021-01-27 NOTE — ED NOTES
Pt up to doorway requesting to go to bed and asking for oxygen. Pt assisted back to bed, educated to leave her O2 on. Pt also requesting something for pain and insomnia. Pt updated on status on seeing hospitalist, RN to discuss with hospitalist pt's desire to receive medications when they arrive bedside.   Pt educated to call for needs, oriented to call light, call light within reach.

## 2021-01-27 NOTE — ED NOTES
Pt medicated with Tylenol and Robaxin (see MAR).   Pt has incentive spirometer in belongings from previous visit. Pt educated on use, observed her attempt 4 times, max inhale of 1L with RN. Pt encouraged to prefrom incentive spirometer every time a commercial comes on. Call light within reach, no other needs.

## 2021-01-27 NOTE — DISCHARGE PLANNING
"Request received from physician to assist in home oxygen set up. LSW called pt to obtain choice, however, pt reports that prior to leaving [facility] last night, she was set up with \"two oxygen tanks and a concentrator and all the tubing\". LSW reviewed ED notes and pt had them at bedside upon admission last night.    LSW called [facility] to make sure pt is established with them for this oxygen. Spoke to Earnest who reports she is established with oxygen with them and good to go.     LSW updated Dr. Sam and bedside RN. Bedside RN provided Preferred's number by this LSW if pt should have questions regarding oxygen. Also left Preferred's number and concentrator plug-in reminder on D/C instructions.     Case management readiness sign-off.   "

## 2021-01-27 NOTE — ED PROVIDER NOTES
ED Provider Note     1/26/2021  11:44 PM    Means of Arrival: EMS transfer from [facility].  History obtained by: patient  Limitations:none  PCP: Sonali Trammell  CODE STATUS: Full    CHIEF COMPLAINT  Chief Complaint   Patient presents with   • Rib Pain     right sided rib fracture   • Fall     Pt reports GLF 10 days       HPI  Marcia Koch is a 70 y.o. adult who presents from [facility] with hypoxia in setting of recent rib fractures.  Approximately 10 days ago she fell off a ladder onto her right side.  She did not present to the hospital until yesterday when her injuries were discovered.  She was hospitalized at our facility but left AGAINST MEDICAL ADVICE yesterday.  She says she left AGAINST MEDICAL ADVICE because she was not offered anything but controlled substances for pain.  She wanted a nonnarcotic for pain.  She says she left here and walked to [facility].  At [facility] they were unwilling to hospitalize her because of the traumatic nature of her injuries.  Prior to discharge at [facility] she had a oxygen concentrator and 2 oxygen takes donated to her.  She was not shown how to use these.    Recent CT scans show right fourth through seventh rib fractures. Small amount of left parafalcine subdural hemorrhage measuring up to 2 mm in width.       REVIEW OF SYSTEMS  Review of Systems   Constitutional: Negative for chills, fever and malaise/fatigue.   HENT: Negative for congestion and sore throat.    Respiratory: Positive for shortness of breath. Negative for cough.    Cardiovascular: Positive for chest pain.   Gastrointestinal: Negative for abdominal pain, nausea and vomiting.   Genitourinary: Negative for dysuria.   Musculoskeletal: Negative for back pain and neck pain.   Skin: Negative for rash.   Neurological: Negative for dizziness and headaches.   All other systems reviewed and are negative.    See HPI for further details.    PAST MEDICAL HISTORY   has a past medical history of Alcohol  abuse, Alcoholism (HCC), Anxiety, Depression, Hiatus hernia syndrome, Hypertension, Indigestion, Other specified disorder of intestines, Pneumonia, Psychiatric disorder, Sleep apnea, Snoring, Unspecified urinary incontinence, and Urinary bladder disorder.    SOCIAL HISTORY  Social History     Tobacco Use   • Smoking status: Current Every Day Smoker     Packs/day: 0.25     Years: 25.00     Pack years: 6.25     Types: Cigarettes     Last attempt to quit: 2011     Years since quittin.4   • Smokeless tobacco: Never Used   • Tobacco comment: 2 cigarettes per day   Substance and Sexual Activity   • Alcohol use: Yes     Alcohol/week: 3.6 oz     Types: 6 Glasses of wine per week     Frequency: 2-3 times a week     Drinks per session: 1 or 2     Binge frequency: Never     Comment: occassionally. post rehab, 1/2 bottle of wine some days. no drinking now, sober X 17   • Drug use: No   • Sexual activity: Not Currently     Partners: Male     Birth control/protection: Abstinence       SURGICAL HISTORY   has a past surgical history that includes gyn surgery (); other abdominal surgery (); other (); other (); other (); bladder sling female (2011); teddy by laparoscopy; and abdominal hysterectomy total.    CURRENT MEDICATIONS  Home Medications     Reviewed by Nara Alexander R.N. (Registered Nurse) on 21 at 2338  Med List Status: <None>   Medication Last Dose Status   ALPRAZolam (XANAX) 1 MG Tab  Active   buPROPion (WELLBUTRIN XL) 300 MG XL tablet  Active   Cholecalciferol (VITAMIN D3) 2000 UNIT Cap  Active   citalopram (CELEXA) 40 MG Tab  Active   gabapentin (NEURONTIN) 800 MG tablet  Active   ibuprofen (MOTRIN) 200 MG Tab  Active   mirtazapine (REMERON) 15 MG Tab  Active   omega-3 acid ethyl esters (LOVAZA) 1 GM capsule  Active   omeprazole (PRILOSEC) 20 MG delayed-release capsule  Active   pravastatin (PRAVACHOL) 40 MG tablet  Active   propranolol CR (INDERAL LA) 160 MG CAPSULE SR 24 HR  "capsule  Active                ALLERGIES  Allergies   Allergen Reactions   • Codeine Nausea   • Crestor [Rosuvastatin Calcium]      Pt states \"I didn't tolerate\" but describes cough   • Lipitor [Atorvastatin Calcium]      See note, severe L knee pain within one day of taking medicatoin   • Lisinopril      Cough     • Morphine Nausea       PHYSICAL EXAM  VITAL SIGNS: BP (!) 162/72   Pulse (!) 54   Temp 37.2 °C (98.9 °F) (Temporal)   Resp (!) 26   Ht 1.6 m (5' 3\")   Wt 72.6 kg (160 lb)   SpO2 95%   BMI 28.34 kg/m²     Pulse ox interpretation: Pulse ox with a normal waveform saturation 96% on 4 L.  Constitutional: Alert in no apparent distress.  Pleasant 70-year-old woman.  HENT: No signs of trauma, Bilateral external ears normal, Nose normal.   Eyes: Pupils are equal, Conjunctiva normal, Non-icteric.   Neck: Normal range of motion, No tenderness, Supple, No stridor.   Cardiovascular: Regular rate and rhythm, no murmurs. Symmetric distal pulses. No cyanosis of extremities. No peripheral edema of extremities.  Thorax & Lungs: Normal breath sounds, No respiratory distress, No wheezing, right chest wall tenderness.  Abdomen: Soft, No tenderness, No masses, No pulsatile masses. No peritoneal signs.  Skin: Warm, Dry, No erythema, No rash.   Back: No midline bony tenderness, No CVA tenderness.   Musculoskeletal: Good range of motion in all major joints. No tenderness to palpation or major deformities noted.   Neurologic: Alert , Normal motor function, Normal sensory function, No focal deficits noted.   Psychiatric: Affect normal, Judgment normal, Mood normal.   Physical Exam      DIAGNOSTIC STUDIES / PROCEDURES      LABS  Pertinent Labs & Imaging studies reviewed. (See chart for details)    RADIOLOGY  Pertinent Labs & Imaging studies reviewed. (See chart for details)    COURSE & MEDICAL DECISION MAKING  Pertinent Labs & Imaging studies reviewed. (See chart for details)    11:44 PM This is an emergent evaluation of a  " 70 y.o. adult who presents back to our facility because she is having hypoxia continued chest pain from rib fractures.  Repeated chest x-ray shows trace left pleural effusion possible lower lobe infiltrates.  I do not think she has pneumonia or infection.  I was looking more for signs of pneumothorax.  There are none.  Since she is 10 days out from her initial injury, she will not be hospitalized with the trauma service.  Dr. Flynn, hospitalist, agrees to hospitalize her so that she can be set up and educated on home oxygen.  Pain treated with Tylenol and Robaxin here.  Have also ordered for incentive spirometry.    FINAL IMPRESSION    ICD-10-CM   1. Hypoxia  R09.02   2. Closed fracture of multiple ribs of right side, initial encounter  S22.41XA            This dictation was created using voice recognition software. The accuracy of the dictation is limited to the abilities of the software. I expect there may be some errors of grammar and possibly content. The nursing notes were reviewed and certain aspects of this information were incorporated into this note.    Electronically signed by: Nate Park II, M.D., 1/26/2021 11:44 PM

## 2021-01-27 NOTE — DISCHARGE INSTRUCTIONS
Discharge Instructions    Discharged to home by car with relative. Discharged via wheelchair, hospital escort: Yes.  Special equipment needed: oxygen    Be sure to schedule a follow-up appointment with your primary care doctor or any specialists as instructed.     [facility] [phone] for any questions regarding your home oxygen. Make sure to plug in concentrator for proper use.     Discharge Plan:   Influenza Vaccine Indication: Not indicated: Previously immunized this influenza season and > 8 years of age    I understand that a diet low in cholesterol, fat, and sodium is recommended for good health. Unless I have been given specific instructions below for another diet, I accept this instruction as my diet prescription.   Other diet: regular    Special Instructions: None    · Is patient discharged on Warfarin / Coumadin?   No     Depression / Suicide Risk    As you are discharged from this [facility] facility, it is important to learn how to keep safe from harming yourself.    Recognize the warning signs:  · Abrupt changes in personality, positive or negative- including increase in energy   · Giving away possessions  · Change in eating patterns- significant weight changes-  positive or negative  · Change in sleeping patterns- unable to sleep or sleeping all the time   · Unwillingness or inability to communicate  · Depression  · Unusual sadness, discouragement and loneliness  · Talk of wanting to die  · Neglect of personal appearance   · Rebelliousness- reckless behavior  · Withdrawal from people/activities they love  · Confusion- inability to concentrate     If you or a loved one observes any of these behaviors or has concerns about self-harm, here's what you can do:  · Talk about it- your feelings and reasons for harming yourself  · Remove any means that you might use to hurt yourself (examples: pills, rope, extension cords, firearm)  · Get professional help from the community (Mental Health,  Substance Abuse, psychological counseling)  · Do not be alone:Call your Safe Contact- someone whom you trust who will be there for you.  · Call your local CRISIS HOTLINE 617-8245 or 524-255-9266  · Call your local Children's Mobile Crisis Response Team [place] (500) 959-1649 or www.[employer]  · Call the toll free National Suicide Prevention Hotlines   · National Suicide Prevention Lifeline 196-000-IOLS (4365)  · [employer] 800-SUICIDE (080-1482)        Rib Fracture    A rib fracture is a break or crack in one of the bones of the ribs. The ribs are like a cage that goes around your upper chest. A broken or cracked rib is often painful, but most do not cause other problems. Most rib fractures usually heal on their own in 1-3 months.  Follow these instructions at home:  Managing pain, stiffness, and swelling  · If directed, apply ice to the injured area.  ? Put ice in a plastic bag.  ? Place a towel between your skin and the bag.  ? Leave the ice on for 20 minutes, 2-3 times a day.  · Take over-the-counter and prescription medicines only as told by your doctor.  Activity  · Avoid activities that cause pain to the injured area. Protect your injured area.  · Slowly increase activity as told by your doctor.  General instructions  · Do deep breathing as told by your doctor. You may be told to:  ? Take deep breaths many times a day.  ? Cough many times a day while hugging a pillow.  ? Use a device (incentive spirometer) to do deep breathing many times a day.  · Drink enough fluid to keep your pee (urine) clear or pale yellow.  · Do not wear a rib belt or binder. These do not allow you to breathe deeply.  · Keep all follow-up visits as told by your doctor. This is important.  Contact a doctor if:  · You have a fever.  Get help right away if:  · You have trouble breathing.  · You are short of breath.  · You cannot stop coughing.  · You cough up thick or bloody spit (sputum).  · You feel sick to  your stomach (nauseous), throw up (vomit), or have belly (abdominal) pain.  · Your pain gets worse and medicine does not help.  Summary  · A rib fracture is a break or crack in one of the bones of the ribs.  · Apply ice to the injured area and take medicines for pain as told by your doctor.  · Take deep breaths and cough many times a day. Hug a pillow every time you cough.  This information is not intended to replace advice given to you by your health care provider. Make sure you discuss any questions you have with your health care provider.  Document Released: 09/26/2009 Document Revised: 11/30/2018 Document Reviewed: 03/20/2018  Elsevier Patient Education © 2020 Elsevier Inc.

## 2021-01-27 NOTE — ED NOTES
RN back to bedside to offer pain medication and sleep aid, patient snoring. Does not appear to be in distress, even/unlabored respirations present. Call light within reach. Will continue to round hourly.

## 2021-01-28 ENCOUNTER — PATIENT OUTREACH (OUTPATIENT)
Dept: HEALTH INFORMATION MANAGEMENT | Facility: OTHER | Age: 71
End: 2021-01-28

## 2021-02-01 ENCOUNTER — PATIENT OUTREACH (OUTPATIENT)
Dept: HEALTH INFORMATION MANAGEMENT | Facility: OTHER | Age: 71
End: 2021-02-01

## 2021-02-01 DIAGNOSIS — Z78.0 ASYMPTOMATIC MENOPAUSE: ICD-10-CM

## 2021-02-03 ENCOUNTER — HOME HEALTH ADMISSION (OUTPATIENT)
Dept: HOME HEALTH SERVICES | Facility: HOME HEALTHCARE | Age: 71
End: 2021-02-03
Payer: MEDICARE

## 2021-02-05 ENCOUNTER — HOME CARE VISIT (OUTPATIENT)
Dept: HOME HEALTH SERVICES | Facility: HOME HEALTHCARE | Age: 71
End: 2021-02-05
Payer: MEDICARE

## 2021-02-05 VITALS
HEART RATE: 58 BPM | TEMPERATURE: 98.1 F | BODY MASS INDEX: 28.35 KG/M2 | DIASTOLIC BLOOD PRESSURE: 80 MMHG | HEIGHT: 63 IN | WEIGHT: 160 LBS | SYSTOLIC BLOOD PRESSURE: 120 MMHG | RESPIRATION RATE: 18 BRPM | OXYGEN SATURATION: 91 %

## 2021-02-05 PROCEDURE — G0493 RN CARE EA 15 MIN HH/HOSPICE: HCPCS

## 2021-02-05 PROCEDURE — 665001 SOC-HOME HEALTH

## 2021-02-05 ASSESSMENT — FIBROSIS 4 INDEX: FIB4 SCORE: 1.68

## 2021-02-08 ENCOUNTER — ANTICOAGULATION MONITORING (OUTPATIENT)
Dept: MEDICAL GROUP | Facility: PHYSICIAN GROUP | Age: 71
End: 2021-02-08

## 2021-02-08 SDOH — ECONOMIC STABILITY: HOUSING INSECURITY
HOME SAFETY: OXYGEN SAFETY RISK ASSESSMENT PERFORMED. PATIENT DOES HAVE A NO SMOKING SIGN POSTED IN THE HOME. PATIENT DOES HAVE A WORKING FIRE EXTINGUISHER PRESENT IN THE HOME. SMOKE ALARMS ARE PRESENT AND FUNCTIONAL ON EACH LEVEL OF THE HOME. PATIENT DOES HAVE A

## 2021-02-08 SDOH — ECONOMIC STABILITY: HOUSING INSECURITY: EVIDENCE OF SMOKING MATERIAL: 1

## 2021-02-08 SDOH — ECONOMIC STABILITY: HOUSING INSECURITY

## 2021-02-08 SDOH — ECONOMIC STABILITY: HOUSING INSECURITY: HOME SAFETY: E UNDERSTANDING.

## 2021-02-08 ASSESSMENT — ENCOUNTER SYMPTOMS
VOMITING: DENIES
DEBILITATING PAIN: 1
DEPRESSED MOOD: 1
NAUSEA: DENIES

## 2021-02-08 ASSESSMENT — PATIENT HEALTH QUESTIONNAIRE - PHQ9
1. LITTLE INTEREST OR PLEASURE IN DOING THINGS: 00
CLINICAL INTERPRETATION OF PHQ2 SCORE: 0
2. FEELING DOWN, DEPRESSED, IRRITABLE, OR HOPELESS: 00

## 2021-02-08 NOTE — PROGRESS NOTES
"Medication chart review for [facility] services    PCP:  Sonali Trammell M.D.  [address]  [place] [address]  Fax: [phone]    Current medication list     Current Outpatient Medications:   •  Home Care Oxygen, 3 L/min, Inhalation, Continuous  •  HYDROcodone-acetaminophen, 1 Tab, Oral, Q4HRS PRN  •  Doxylamine Succinate, Sleep, (SLEEP AID PO), 1 tablet, Oral, QHS PRN  •  gabapentin, 600 mg, Oral, BID  •  lidocaine, 1 Patch, Transdermal, Q24HRS PRN  •  senna-docusate, 2 Tab, Oral, BID (Patient not taking: Reported on 2/5/2021)  •  Vitamin D3, 2,000 Units, Oral, DAILY  •  omega-3 acid ethyl esters, 2 g, Oral, BID (Patient taking differently: 2 g, Oral, 2 TIMES DAILY, Pt started on 1/23/2021)  •  pravastatin, 40 mg, Oral, DAILY (Patient taking differently: 20 mg, Oral, DAILY)  •  propranolol CR, 160 mg, Oral, DAILY  •  ALPRAZolam, 0.5-1 mg, Oral, BID  •  buPROPion, 300 mg, Oral, QAM  •  citalopram, 40 mg, Oral, DAILY  •  omeprazole, 20 mg, Oral, DAILY    Allergies   Allergen Reactions   • Codeine Nausea   • Crestor [Rosuvastatin Calcium]      Pt states \"I didn't tolerate\" but describes cough   • Lipitor [Atorvastatin Calcium]      See note, severe L knee pain within one day of taking medicatoin   • Lisinopril      Cough     • Morphine Nausea       Medications on discharge summary that are not on their home medication list (or the dosing interval differs from the discharge summary)       senna-docusate 8.6-50 MG Tabs  Commonly known as: PERICOLACE or SENOKOT S    Take 2 Tabs by mouth 2 Times a Day.  Dose: 2 Tab                            pravastatin 40 MG tablet  What changed:   · how much to take  · when to take this  Commonly known as: PRAVACHOL    Doctor's comments: Tolerating  Take 1 Tab by mouth every day for 360 days.  Dose: 40 mg      She is only taking 20 mg once daily                                    gabapentin 800 MG tablet  Commonly known as: NEURONTIN    Take 800 mg by mouth " 2 times a day.  Dose: 800 mg      She is taking 600 mg twice daily      ibuprofen 200 MG Tabs  Commonly known as: MOTRIN    Take 400-600 mg by mouth 3 times a day. Pt reports taking 3 times a day for the last 10 day course  Dose: 400-600 mg              Medications on home medication list not listed on their discharge summary     Doxylamine    Labs / Images Reviewed:     Lab Results   Component Value Date/Time    SODIUM 138 01/26/2021 02:08 AM    POTASSIUM 4.0 01/26/2021 02:08 AM    CHLORIDE 100 01/26/2021 02:08 AM    CO2 28 01/26/2021 02:08 AM    GLUCOSE 96 01/26/2021 02:08 AM    BUN 13 01/26/2021 02:08 AM    CREATININE 0.62 01/26/2021 02:08 AM      Lab Results   Component Value Date/Time    ALKPHOSPHAT 124 01/26/2021 02:08 AM    ASTSGOT 23 01/26/2021 02:08 AM    ALTSGPT 16 01/26/2021 02:08 AM    TBILIRUBIN 0.4 01/26/2021 02:08 AM    ALBUMIN 4.1 01/26/2021 02:08 AM    INR 0.90 01/25/2021 12:46 PM          Potential drug interactions:         Assessment and Plan:   • There are few medications that differ from her discharge summary instructions, unlikely to be clinically significant          Chandu Rasheed, PharmD, MS, BCACP, [facility]  Phone [phone] fax [phone]    This note was created using voice recognition software (Dragon). The accuracy of the dictation is limited by the abilities of the software. I have reviewed the note prior to signing, however some errors in grammar and context are still possible. If you have any questions related to this note please do not hesitate to contact our office.

## 2021-02-09 ENCOUNTER — HOME CARE VISIT (OUTPATIENT)
Dept: HOME HEALTH SERVICES | Facility: HOME HEALTHCARE | Age: 71
End: 2021-02-09
Payer: MEDICARE

## 2021-02-09 ENCOUNTER — TELEPHONE (OUTPATIENT)
Dept: MEDICAL GROUP | Facility: MEDICAL CENTER | Age: 71
End: 2021-02-09

## 2021-02-09 VITALS
HEART RATE: 65 BPM | SYSTOLIC BLOOD PRESSURE: 122 MMHG | DIASTOLIC BLOOD PRESSURE: 80 MMHG | RESPIRATION RATE: 18 BRPM | OXYGEN SATURATION: 98 % | TEMPERATURE: 98.8 F

## 2021-02-09 PROCEDURE — G0493 RN CARE EA 15 MIN HH/HOSPICE: HCPCS

## 2021-02-09 SDOH — ECONOMIC STABILITY: HOUSING INSECURITY: EVIDENCE OF SMOKING MATERIAL: 0

## 2021-02-09 ASSESSMENT — ENCOUNTER SYMPTOMS
NAUSEA: DENIES
SHORTNESS OF BREATH: T
VOMITING: DENIES
MUSCLE WEAKNESS: 1
DEPRESSED MOOD: 1

## 2021-02-09 NOTE — TELEPHONE ENCOUNTER
1. Caller Name: Marcia Koch     Call Back Number: [phone] (home)         How would the patient prefer to be contacted with a response: Vilma message    Pt was contacted to inform her that she was to [facility] with [facility] to follow up about a recent fall and head injury. Pt inquired about medications prescribed at her last ED visit as well.    Thank you.

## 2021-02-10 NOTE — TELEPHONE ENCOUNTER
----- Message from Sonali Trammell M.D. sent at 2/8/2021  8:23 PM PST -----  Regarding: FW: Non-Urgent Medical Question  Contact: [phone]  Please advise patient if she has had recent head trauma now having hallucinations she should go to the emergency room for repeat evaluation which may include expediting imaging.  If she did already go to the ER for this please schedule her a follow-up appointment with me.  Thank you.  ----- Message -----  From: Chaim Resendez Ass't  Sent: 2/8/2021   6:52 PM PST  To: Sonali Trammell M.D.  Subject: FW: Non-Urgent Medical Question                    ----- Message -----  From: Marcia Koch  Sent: 2/8/2021   5:23 PM PST  To: [facility]  Clinical Staff  Subject: Non-Urgent Medical Question                      I forgot to tell you that I am having hallucinations on a regular basis.  I don't know if it is the O2 I have on all night, the Hydrocodone 5-325 or weening off of the Xanax?  I wake up and think there is someone else with me?

## 2021-02-10 NOTE — TELEPHONE ENCOUNTER
Phone Number Called:[phone] (home)   Marcia Koch      Call outcome: Spoke to patient regarding message below.    Message: Spoke with Patient and advised for her to Go to the ER so she may get evaluated.  No recent head trauma but still Hallucinations.  Patient stated she understood and will go to ER this weekend.  Talked about scheduling to come into clinic for a Follow-up. Patient stated she would like that but to do it in a week as She is going through a loss of one of her Friends.  She will contact in week to schedule.    Bart Lomas   Medical Assistant  [address]   [place] [address]  Phone: [phone]  Fax: [phone]

## 2021-02-11 ENCOUNTER — HOME CARE VISIT (OUTPATIENT)
Dept: HOME HEALTH SERVICES | Facility: HOME HEALTHCARE | Age: 71
End: 2021-02-11
Payer: MEDICARE

## 2021-02-12 ENCOUNTER — APPOINTMENT (OUTPATIENT)
Dept: RADIOLOGY | Facility: MEDICAL CENTER | Age: 71
End: 2021-02-12
Attending: EMERGENCY MEDICINE
Payer: MEDICARE

## 2021-02-12 ENCOUNTER — HOSPITAL ENCOUNTER (EMERGENCY)
Facility: MEDICAL CENTER | Age: 71
End: 2021-02-12
Attending: EMERGENCY MEDICINE
Payer: MEDICARE

## 2021-02-12 VITALS
WEIGHT: 158.73 LBS | DIASTOLIC BLOOD PRESSURE: 77 MMHG | SYSTOLIC BLOOD PRESSURE: 167 MMHG | HEART RATE: 59 BPM | TEMPERATURE: 97 F | OXYGEN SATURATION: 93 % | RESPIRATION RATE: 16 BRPM | BODY MASS INDEX: 28.12 KG/M2 | HEIGHT: 63 IN

## 2021-02-12 DIAGNOSIS — R41.0 DELIRIUM: ICD-10-CM

## 2021-02-12 DIAGNOSIS — R44.3 HALLUCINATIONS: ICD-10-CM

## 2021-02-12 DIAGNOSIS — Z79.899 POLYPHARMACY: ICD-10-CM

## 2021-02-12 PROBLEM — J96.11 CHRONIC RESPIRATORY FAILURE WITH HYPOXIA (HCC): Status: ACTIVE | Noted: 2021-01-27

## 2021-02-12 PROBLEM — F10.951: Status: ACTIVE | Noted: 2021-02-12

## 2021-02-12 LAB
ALBUMIN SERPL BCP-MCNC: 3.9 G/DL (ref 3.2–4.9)
ALBUMIN/GLOB SERPL: 1.4 G/DL
ALP SERPL-CCNC: 120 U/L
ALT SERPL-CCNC: 13 U/L (ref 2–50)
ANION GAP SERPL CALC-SCNC: 12 MMOL/L (ref 7–16)
AST SERPL-CCNC: 20 U/L (ref 12–45)
BASOPHILS # BLD AUTO: 1 % (ref 0–1.8)
BASOPHILS # BLD: 0.06 K/UL (ref 0–0.12)
BILIRUB SERPL-MCNC: 0.4 MG/DL (ref 0.1–1.5)
BUN SERPL-MCNC: 10 MG/DL (ref 8–22)
CALCIUM SERPL-MCNC: 9 MG/DL (ref 8.4–10.2)
CHLORIDE SERPL-SCNC: 101 MMOL/L (ref 96–112)
CO2 SERPL-SCNC: 25 MMOL/L (ref 20–33)
CREAT SERPL-MCNC: 0.69 MG/DL (ref 0.5–1.4)
EKG IMPRESSION: NORMAL
EOSINOPHIL # BLD AUTO: 0.14 K/UL
EOSINOPHIL NFR BLD: 2.3 % (ref 0–6.9)
ERYTHROCYTE [DISTWIDTH] IN BLOOD BY AUTOMATED COUNT: 46.6 FL (ref 35.9–50)
ETHANOL BLD-MCNC: <10.1 MG/DL (ref 0–10)
GLOBULIN SER CALC-MCNC: 2.7 G/DL (ref 1.9–3.5)
GLUCOSE SERPL-MCNC: 86 MG/DL (ref 65–99)
HCT VFR BLD AUTO: 44.8 % (ref 42–52)
HGB BLD-MCNC: 14.8 G/DL (ref 14–18)
IMM GRANULOCYTES # BLD AUTO: 0.01 K/UL (ref 0–0.11)
IMM GRANULOCYTES NFR BLD AUTO: 0.2 % (ref 0–0.9)
LACTATE BLD-SCNC: 1.3 MMOL/L (ref 0.5–2)
LYMPHOCYTES # BLD AUTO: 2.34 K/UL (ref 1–4.8)
LYMPHOCYTES NFR BLD: 38.9 % (ref 22–41)
MCH RBC QN AUTO: 32.7 PG (ref 27–33)
MCHC RBC AUTO-ENTMCNC: 33 G/DL (ref 33.7–35.3)
MCV RBC AUTO: 99.1 FL (ref 81.4–97.8)
MONOCYTES # BLD AUTO: 0.58 K/UL (ref 0–0.85)
MONOCYTES NFR BLD AUTO: 9.6 % (ref 0–13.4)
NEUTROPHILS # BLD AUTO: 2.89 K/UL (ref 1.82–7.42)
NEUTROPHILS NFR BLD: 48 % (ref 44–72)
NRBC # BLD AUTO: 0 K/UL
NRBC BLD-RTO: 0 /100 WBC
PLATELET # BLD AUTO: 191 K/UL (ref 164–446)
PMV BLD AUTO: 9.3 FL (ref 9–12.9)
POTASSIUM SERPL-SCNC: 4.1 MMOL/L (ref 3.6–5.5)
PROT SERPL-MCNC: 6.6 G/DL (ref 6–8.2)
RBC # BLD AUTO: 4.52 M/UL (ref 4.7–6.1)
SARS-COV-2 RNA RESP QL NAA+PROBE: NOTDETECTED
SODIUM SERPL-SCNC: 138 MMOL/L (ref 135–145)
SPECIMEN SOURCE: NORMAL
TROPONIN T SERPL-MCNC: 11 NG/L (ref 6–19)
WBC # BLD AUTO: 6 K/UL (ref 4.8–10.8)

## 2021-02-12 PROCEDURE — U0003 INFECTIOUS AGENT DETECTION BY NUCLEIC ACID (DNA OR RNA); SEVERE ACUTE RESPIRATORY SYNDROME CORONAVIRUS 2 (SARS-COV-2) (CORONAVIRUS DISEASE [COVID-19]), AMPLIFIED PROBE TECHNIQUE, MAKING USE OF HIGH THROUGHPUT TECHNOLOGIES AS DESCRIBED BY CMS-2020-01-R: HCPCS

## 2021-02-12 PROCEDURE — 84484 ASSAY OF TROPONIN QUANT: CPT

## 2021-02-12 PROCEDURE — U0005 INFEC AGEN DETEC AMPLI PROBE: HCPCS

## 2021-02-12 PROCEDURE — 83605 ASSAY OF LACTIC ACID: CPT

## 2021-02-12 PROCEDURE — 93005 ELECTROCARDIOGRAM TRACING: CPT | Performed by: EMERGENCY MEDICINE

## 2021-02-12 PROCEDURE — 80053 COMPREHEN METABOLIC PANEL: CPT

## 2021-02-12 PROCEDURE — 82077 ASSAY SPEC XCP UR&BREATH IA: CPT

## 2021-02-12 PROCEDURE — 70450 CT HEAD/BRAIN W/O DYE: CPT | Mod: ME

## 2021-02-12 PROCEDURE — 99406 BEHAV CHNG SMOKING 3-10 MIN: CPT | Performed by: INTERNAL MEDICINE

## 2021-02-12 PROCEDURE — 99284 EMERGENCY DEPT VISIT MOD MDM: CPT | Mod: 25 | Performed by: INTERNAL MEDICINE

## 2021-02-12 PROCEDURE — 85025 COMPLETE CBC W/AUTO DIFF WBC: CPT

## 2021-02-12 PROCEDURE — 99284 EMERGENCY DEPT VISIT MOD MDM: CPT

## 2021-02-12 PROCEDURE — 71045 X-RAY EXAM CHEST 1 VIEW: CPT

## 2021-02-12 RX ORDER — FOLIC ACID 1 MG/1
1 TABLET ORAL DAILY
Status: DISCONTINUED | OUTPATIENT
Start: 2021-02-13 | End: 2021-02-12 | Stop reason: HOSPADM

## 2021-02-12 RX ORDER — BISACODYL 10 MG
10 SUPPOSITORY, RECTAL RECTAL
Status: DISCONTINUED | OUTPATIENT
Start: 2021-02-12 | End: 2021-02-12 | Stop reason: HOSPADM

## 2021-02-12 RX ORDER — ONDANSETRON 2 MG/ML
4 INJECTION INTRAMUSCULAR; INTRAVENOUS EVERY 4 HOURS PRN
Status: DISCONTINUED | OUTPATIENT
Start: 2021-02-12 | End: 2021-02-12 | Stop reason: HOSPADM

## 2021-02-12 RX ORDER — BUPROPION HYDROCHLORIDE 150 MG/1
150 TABLET, EXTENDED RELEASE ORAL 2 TIMES DAILY
Status: DISCONTINUED | OUTPATIENT
Start: 2021-02-13 | End: 2021-02-12 | Stop reason: HOSPADM

## 2021-02-12 RX ORDER — AMOXICILLIN 250 MG
2 CAPSULE ORAL 2 TIMES DAILY
Status: DISCONTINUED | OUTPATIENT
Start: 2021-02-12 | End: 2021-02-12 | Stop reason: HOSPADM

## 2021-02-12 RX ORDER — ONDANSETRON 4 MG/1
4 TABLET, ORALLY DISINTEGRATING ORAL EVERY 4 HOURS PRN
Status: DISCONTINUED | OUTPATIENT
Start: 2021-02-12 | End: 2021-02-12 | Stop reason: HOSPADM

## 2021-02-12 RX ORDER — ACETAMINOPHEN 325 MG/1
650 TABLET ORAL EVERY 6 HOURS PRN
Status: DISCONTINUED | OUTPATIENT
Start: 2021-02-12 | End: 2021-02-12 | Stop reason: HOSPADM

## 2021-02-12 RX ORDER — CITALOPRAM 20 MG/1
40 TABLET ORAL DAILY
Status: DISCONTINUED | OUTPATIENT
Start: 2021-02-13 | End: 2021-02-12 | Stop reason: HOSPADM

## 2021-02-12 RX ORDER — SODIUM CHLORIDE, SODIUM LACTATE, POTASSIUM CHLORIDE, CALCIUM CHLORIDE 600; 310; 30; 20 MG/100ML; MG/100ML; MG/100ML; MG/100ML
INJECTION, SOLUTION INTRAVENOUS CONTINUOUS
Status: DISCONTINUED | OUTPATIENT
Start: 2021-02-12 | End: 2021-02-12 | Stop reason: HOSPADM

## 2021-02-12 RX ORDER — PRAVASTATIN SODIUM 20 MG
40 TABLET ORAL EVERY EVENING
Status: DISCONTINUED | OUTPATIENT
Start: 2021-02-12 | End: 2021-02-12 | Stop reason: HOSPADM

## 2021-02-12 RX ORDER — OMEGA-3-ACID ETHYL ESTERS 1 G/1
2 CAPSULE, LIQUID FILLED ORAL 2 TIMES DAILY
Status: DISCONTINUED | OUTPATIENT
Start: 2021-02-12 | End: 2021-02-12 | Stop reason: HOSPADM

## 2021-02-12 RX ORDER — POLYETHYLENE GLYCOL 3350 17 G/17G
1 POWDER, FOR SOLUTION ORAL
Status: DISCONTINUED | OUTPATIENT
Start: 2021-02-12 | End: 2021-02-12 | Stop reason: HOSPADM

## 2021-02-12 RX ORDER — VITAMIN B COMPLEX
2000 TABLET ORAL DAILY
Status: DISCONTINUED | OUTPATIENT
Start: 2021-02-13 | End: 2021-02-12 | Stop reason: HOSPADM

## 2021-02-12 RX ORDER — GAUZE BANDAGE 2" X 2"
100 BANDAGE TOPICAL DAILY
Status: DISCONTINUED | OUTPATIENT
Start: 2021-02-13 | End: 2021-02-12 | Stop reason: HOSPADM

## 2021-02-12 RX ORDER — OMEPRAZOLE 20 MG/1
20 CAPSULE, DELAYED RELEASE ORAL DAILY
Status: DISCONTINUED | OUTPATIENT
Start: 2021-02-13 | End: 2021-02-12 | Stop reason: HOSPADM

## 2021-02-12 RX ORDER — CLONIDINE HYDROCHLORIDE 0.1 MG/1
0.1 TABLET ORAL
Status: DISCONTINUED | OUTPATIENT
Start: 2021-02-12 | End: 2021-02-12 | Stop reason: HOSPADM

## 2021-02-12 RX ORDER — LIDOCAINE 50 MG/G
1 PATCH TOPICAL
Status: DISCONTINUED | OUTPATIENT
Start: 2021-02-12 | End: 2021-02-12 | Stop reason: HOSPADM

## 2021-02-12 ASSESSMENT — FIBROSIS 4 INDEX: FIB4 SCORE: 1.68

## 2021-02-12 NOTE — ED NOTES
"PT. Reports hallucinations which \"started when I started taking my pain medications after I broke my ribs\". Reports \"hearing my mom in my kitchen, seeing bugs in my bed\".   "

## 2021-02-12 NOTE — ED TRIAGE NOTES
"Pt reports incurring a GLF, with head injury approximately a month ago.  She denies LOC, stating \"but I'm not sure\".  She presents complaining of recurrent H/A, and lightheadedness.  She also states that since the fall she has been hallucinating, believing that \" people are in my house, and I hear my dead mother talking to me\"   Chief Complaint   Patient presents with   • T-5000 FALL   • Headache   • Lightheadedness   • Hallucinations       /78   Pulse 60   Temp 36.1 °C (97 °F) (Temporal)   Resp 18   Ht 1.6 m (5' 3\")   Wt 72 kg (158 lb 11.7 oz)   SpO2 92%   BMI 28.12 kg/m²      "

## 2021-02-12 NOTE — LETTER
"2/13/2021             Marcia Koch  [address] [place] [address]        Dear Marcia (MR#[id number]),    This letter is to inform you that your COVID-19 test result is NEGATIVE.  This means that the virus that causes COVID-19 was not found in your sample.      SARS-CoV-2 Source   Date Value Ref Range Status   02/12/2021 NP Swab  Final     SARS-CoV-2 by PCR   Date Value Ref Range Status   02/12/2021 NotDetected  Final     Comment:     PATIENTS: Important information regarding your results and instructions can  be found at [email or web address]   \"After your  Covid-19 Test\"  [facility] providers: PLEASE REFER TO DE-ESCALATION AND RETESTING PROTOCOL  on [email or web address]  **The TaqPath COVID-19 SARS-CoV-2 test has been made available for use under  the Emergency Use Authorization (EUA) only.         Next steps:  - Stay home while you are feeling sick.  - Monitor your symptoms and contact your healthcare provider if you get worse.  - If you have questions, contact your healthcare provider    When can my home isolation end?  If you were tested because you had close contact (defined below) with someone with COVID-19. You should stay home for 14 days after your close contact with the person.    What counts as close contact?  - You were within 6 feet of someone who has COVID-19 for a total of 15 minutes or more  - You provided care at home to someone who is sick with COVID-19  - You had direct physical contact with the person (hugged or kissed them)  - You shared eating or drinking utensils  - They sneezed, coughed, or somehow got respiratory droplets on you    If you were tested because you were exposed to a household contact with COVID-19, you should still quarantine yourself for a period of 14 days. The 14-day quarantine period begins once your affected household contact is isolated. If you are unable to quarantine yourself from your affected household contact, the 14-day quarantine period " begins when the patient meets criteria to break isolation.    If you were not exposed to a household contact with COVID-19, you may still be contagious while experiencing symptoms, so you should not return to work or regular activities until 24 hours after symptoms fully improve. For example, if you feel back to normal on Tuesday, you should remain isolated until Wednesday.    Sincerely,  The [facility] Care Team

## 2021-02-12 NOTE — ED PROVIDER NOTES
ED Provider Note    CHIEF COMPLAINT  Chief Complaint   Patient presents with   • T-5000 FALL   • Headache   • Lightheadedness   • Hallucinations       HPI  Marciapatrick Koch is a 70 y.o. adult who presents complaining of hallucinations headache and lightheadedness as well as disorientation over the last several weeks.  She states that on January 10 she fell down a 3 step stepladder trying to hang a picture.  She broke 4 ribs and hit her head.  She did end up staying in the hospital.  Her CT head was negative at the time.  The patient states she was discharged home on oxygen.  She still smokes and states that she drinks alcohol daily.  She has been taking Norco for pain.  The patient states that she has been hallucinating seeing rats on her bed.  She is confused as to what month it is.  She states that she feels very dizzy and weak.  She denies any chest pain fevers coughing or sore throat.  She denies vomiting.  She denies any unilateral weakness or numbness.    REVIEW OF SYSTEMS  HEENT:  No ear pain, congestion or sore throat   EYES: no discharge redness or vision changes  CARDIAC: no chest pain, palpitations    PULMONARY: no dyspnea, cough or congestion   GI: no vomiting diarrhea or abdominal pain   : no dysuria, back pain or hematuria   Neuro: Generalized weakness, numbness aphasia or headache  Musculoskeletal: no swelling deformity or pain no joint swelling  Endocrine: no fevers, sweating, weight loss   SKIN: no rash, erythema or contusions     See history of present illness all other systems are negative    PAST MEDICAL HISTORY  Past Medical History:   Diagnosis Date   • Alcohol abuse    • Alcoholism (HCC)    • Anxiety    • Depression    • Hiatus hernia syndrome    • Hypertension    • Indigestion    • Other specified disorder of intestines     diarrhea   • Pneumonia     for 2 days   • Psychiatric disorder     anxiety, depression   • Sleep apnea    • Snoring    • Unspecified urinary incontinence    • Urinary  bladder disorder        FAMILY HISTORY  Family History   Problem Relation Age of Onset   • Anxiety disorder Brother    • Depression Brother    • Other Brother          AIDS   • Alcohol abuse Mother    • Cancer Mother         colon cancer age 70s   • Alcohol abuse Father    • Hyperlipidemia Father    • Cancer Sister         breast   • Drug abuse Sister    • Cancer Maternal Aunt         breast   • Cancer Maternal Uncle         nos   • Stroke Neg Hx    • Heart Attack Neg Hx        SOCIAL HISTORY  Social History     Socioeconomic History   • Marital status:      Spouse name: Not on file   • Number of children: Not on file   • Years of education: Not on file   • Highest education level: Not on file   Occupational History   • Not on file   Tobacco Use   • Smoking status: Current Every Day Smoker     Packs/day: 0.25     Years: 25.00     Pack years: 6.25     Types: Cigarettes     Last attempt to quit: 2011     Years since quittin.4   • Smokeless tobacco: Never Used   Substance and Sexual Activity   • Alcohol use: Yes     Alcohol/week: 3.6 oz     Types: 6 Glasses of wine per week     Comment: Occasionally   • Drug use: No   • Sexual activity: Not Currently     Partners: Male     Birth control/protection: Abstinence   Other Topics Concern   • Not on file   Social History Narrative   • Not on file     Social Determinants of Health     Financial Resource Strain:    • Difficulty of Paying Living Expenses:    Food Insecurity: No Food Insecurity   • Worried About Running Out of Food in the Last Year: Never true   • Ran Out of Food in the Last Year: Never true   Transportation Needs: No Transportation Needs   • Lack of Transportation (Medical): No   • Lack of Transportation (Non-Medical): No   Physical Activity:    • Days of Exercise per Week:    • Minutes of Exercise per Session:    Stress:    • Feeling of Stress :    Social Connections:    • Frequency of Communication with Friends and Family:    • Frequency  of Social Gatherings with Friends and Family:    • Attends Religion Services:    • Active Member of Clubs or Organizations:    • Attends Club or Organization Meetings:    • Marital Status:    Intimate Partner Violence:    • Fear of Current or Ex-Partner:    • Emotionally Abused:    • Physically Abused:    • Sexually Abused:        SURGICAL HISTORY  Past Surgical History:   Procedure Laterality Date   • BLADDER SLING FEMALE  8/30/2011    Performed by RENATE STERLING at SURGERY [facility] ORS   • OTHER  2010    surgery for sleep apnea   • OTHER  2010    big toenails and second toenails removed   • OTHER ABDOMINAL SURGERY  2002    choley   • GYN SURGERY  1991    hyst   • OTHER  1956    tonsillectomy   • ABDOMINAL HYSTERECTOMY TOTAL      Hysterectomy, Total Abdominal   • BRIEN BY LAPAROSCOPY      Cholecystectomy, Laparoscopic       CURRENT MEDICATIONS  Home Medications     Reviewed by Sarah Jameson (Pharmacy Tech) on 02/12/21 at 1628  Med List Status: Complete   Medication Last Dose Status   ALPRAZolam (XANAX) 1 MG Tab 2/12/2021 Active   buPROPion (WELLBUTRIN XL) 300 MG XL tablet 2/12/2021 Active   Cholecalciferol (VITAMIN D3) 2000 UNIT Cap 2/12/2021 Active   citalopram (CELEXA) 40 MG Tab 2/12/2021 Active   Doxylamine Succinate, Sleep, (SLEEP AID PO) 2/11/2021 Active   gabapentin (NEURONTIN) 600 MG tablet 2/12/2021 Active   Home Care Oxygen 2/11/2021 Active   lidocaine (LIDODERM) 5 % Patch 2/10/2021 Active   omega-3 acid ethyl esters (LOVAZA) 1 GM capsule 2/12/2021 Active   omeprazole (PRILOSEC) 20 MG delayed-release capsule 2/12/2021 Active   pravastatin (PRAVACHOL) 40 MG tablet 2/11/2021 Active   propranolol CR (INDERAL LA) 160 MG CAPSULE SR 24 HR capsule 2/12/2021 Active                ALLERGIES  Allergies   Allergen Reactions   • Atorvastatin Myalgia   • Codeine Nausea   • Lisinopril Cough     Cough     • Morphine Nausea   • Rosuvastatin Myalgia       PHYSICAL EXAM  VITAL SIGNS: /80   Pulse (!) 59   " Temp 36.1 °C (97 °F) (Temporal)   Resp 16   Ht 1.6 m (5' 3\")   Wt 72 kg (158 lb 11.7 oz)   SpO2 93%   BMI 28.12 kg/m²   Constitutional: Well developed, Well nourished, No acute distress, Non-toxic appearance.   HEENT: Normocephalic, Atraumatic,  external ears normal, pharynx pink,  Mucous  Membranes moist, No rhinorrhea or mucosal edema  Eyes: PERRL, EOMI, Conjunctiva normal, No discharge.   Neck: Normal range of motion, No tenderness, Supple, No stridor.   Lymphatic: No lymphadenopathy    Cardiovascular: Regular Rate and Rhythm, No murmurs,  rubs, or gallops.   Thorax & Lungs: Lungs clear to auscultation bilaterally, No respiratory distress, No wheezes, rhales or rhonchi, positive right-sided chest wall tenderness.   Abdomen: Bowel sounds normal, Soft, non tender, non distended,  No pulsatile masses., no rebound guarding or peritoneal signs.   Skin: Warm, Dry, No erythema, No rash,   Back:  No CVA tenderness,  No spinal tenderness, bony crepitance step offs or instability.   Extremities: Equal, intact distal pulses, No cyanosis, clubbing or edema,  No tenderness.   Musculoskeletal: Good range of motion in all major joints. No tenderness to palpation or major deformities noted.   Neurologic: Alert & oriented x 2 Cranial nerves II-XII intact, Equal strength and sensation upper and lower extremities bilaterally,  No focal deficits noted.   Psychiatric: Slightly confused no suicidal or homicidal ideation      RADIOLOGY/PROCEDURES  CT-HEAD W/O   Final Result      1. No CT evidence of acute infarct, hemorrhage or mass.   2. Mild global parenchymal atrophy. Chronic small vessel ischemic changes.      DX-CHEST-PORTABLE (1 VIEW)   Final Result      1.  Cardiomegaly.      2.  Bibasilar atelectasis.            COURSE & MEDICAL DECISION MAKING  Pertinent Labs & Imaging studies reviewed. (See chart for details)  Differential diagnosis: Sepsis, UTI, intracranial hemorrhage, polypharmacy, delirium      Results for orders " placed or performed during the hospital encounter of 02/12/21   LACTIC ACID   Result Value Ref Range    Lactic Acid 1.3 0.5 - 2.0 mmol/L   CBC WITH DIFFERENTIAL   Result Value Ref Range    WBC 6.0 4.8 - 10.8 K/uL    RBC 4.52 (L) 4.70 - 6.10 M/uL    Hemoglobin 14.8 14.0 - 18.0 g/dL    Hematocrit 44.8 42.0 - 52.0 %    MCV 99.1 (H) 81.4 - 97.8 fL    MCH 32.7 27.0 - 33.0 pg    MCHC 33.0 (L) 33.7 - 35.3 g/dL    RDW 46.6 35.9 - 50.0 fL    Platelet Count 191 164 - 446 K/uL    MPV 9.3 9.0 - 12.9 fL    Neutrophils-Polys 48.00 44.00 - 72.00 %    Lymphocytes 38.90 22.00 - 41.00 %    Monocytes 9.60 0.00 - 13.40 %    Eosinophils 2.30 0.00 - 6.90 %    Basophils 1.00 0.00 - 1.80 %    Immature Granulocytes 0.20 0.00 - 0.90 %    Nucleated RBC 0.00 /100 WBC    Neutrophils (Absolute) 2.89 1.82 - 7.42 K/uL    Lymphs (Absolute) 2.34 1.00 - 4.80 K/uL    Monos (Absolute) 0.58 0.00 - 0.85 K/uL    Eos (Absolute) 0.14 K/uL    Baso (Absolute) 0.06 0.00 - 0.12 K/uL    Immature Granulocytes (abs) 0.01 0.00 - 0.11 K/uL    NRBC (Absolute) 0.00 K/uL   COMP METABOLIC PANEL   Result Value Ref Range    Sodium 138 135 - 145 mmol/L    Potassium 4.1 3.6 - 5.5 mmol/L    Chloride 101 96 - 112 mmol/L    Co2 25 20 - 33 mmol/L    Anion Gap 12.0 7.0 - 16.0    Glucose 86 65 - 99 mg/dL    Bun 10 8 - 22 mg/dL    Creatinine 0.69 0.50 - 1.40 mg/dL    Calcium 9.0 8.4 - 10.2 mg/dL    AST(SGOT) 20 12 - 45 U/L    ALT(SGPT) 13 2 - 50 U/L    Alkaline Phosphatase 120 U/L    Total Bilirubin 0.4 0.1 - 1.5 mg/dL    Albumin 3.9 3.2 - 4.9 g/dL    Total Protein 6.6 6.0 - 8.2 g/dL    Globulin 2.7 1.9 - 3.5 g/dL    A-G Ratio 1.4 g/dL   TROPONIN   Result Value Ref Range    Troponin T 11 6 - 19 ng/L   SARS-CoV-2 PCR (24 hour In-House): Collect NP swab in [facility]    Specimen: Nasopharyngeal; Respirate   Result Value Ref Range    SARS-CoV-2 Source NP Swab    ESTIMATED GFR   Result Value Ref Range    GFR If African American >60 >60 mL/min/1.73 m 2    GFR If Non  >60 >60  mL/min/1.73 m 2   ETHYL ALCOHOL (BLOOD)   Result Value Ref Range    Diagnostic Alcohol <10.1 0.0 - 10.0 mg/dL   EKG   Result Value Ref Range    Report       [facility] Emergency Dept.    Test Date:  2021  Pt Name:    VIDHI ROSALES               Department: [facility]  MRN:        [id number]                      [facility]   Gender:     Female                       Technician: [id number]  :        [id number]                   Requested By:YVES SMITH  Order #:    [id number]                    Reading MD: YVES SMITH MD    Measurements  Intervals                                Axis  Rate:       51                           P:          70  OH:         177                          QRS:        91  QRSD:       92                           T:          77  QT:         480  QTc:        443    Interpretive Statements  Sinus rhythm  Right axis deviation  Nonspecific T abnrm, anterolateral leads  Minimal ST elevation, inferior leads  Compared to ECG 2012 07:37:36  ST (T wave) deviation now present  Sinus bradycardia no longer present  Electronically Signed On 2021 16:23:15 PST by YVES SMITH MD          The patient's EKG is abnormal however her troponin is 11.  She has pain over her ribs but not in any other part of her chest.  She is confused and I believe that is due to polypharmacy combining her Mason with alcohol.  Because of her delirium she is a fall risk and Akbar care for herself.  I spoke with the hospitalist Dr. Collado who will admit her for medication adjustment and further care.    When I told the patient she should stay she stated that she wanted to go home because she left her fireplace on.  She also states she drove herself here.  I am concerned about her capacity to care for herself at this time and told her that she could not leave and to find a friend to help her treat her fireplace off.  The patient did find a friend to come get her keys to her house to turn the  fireplace off.      4:33 PM  I spoke with the hospitalist Dr. Collado who has accepted the patient for admission.    Patient has had high blood pressure while in the emergency department, felt likely secondary to medical condition. Counseled patient to monitor blood pressure at home and follow up with primary care physician.      4:56 PM the patient is now alert and oriented x3 and wants to be discharged home.  Because of her condition and complaints I am concerned for safety and will sign her out AGAINST MEDICAL ADVICE.  The patient should return immediately if she wants to seek further care.  I recommend highly that she does not drink alcohol and take her narcotics at the same time.  I did explain to her that she could suffer from death and permanent disability if she leaves.    The patient is leaving against medical advice.  I discussed with the patient the risks of leaving without receiving appropriate care to include permanent disability or death.  I have discussed possible alternatives.  The patient is not intoxicated.  The patient is a capable decision maker and understands the risks and benefits of their decision.  While I certainly disagree with the patient's decision, I respect the patient's autonomy and will not keep them here against their will.  They understand that their decision to leave can be reversed at any time and they can return to us at any time for any reason at all.      FINAL IMPRESSION  1. Hallucinations    2. Polypharmacy    3. Delirium           PLAN/DISPOSITION  admitted          Electronically signed by: Katelynn Clinton M.D., 2/12/2021 1:57 PM

## 2021-02-13 NOTE — ASSESSMENT & PLAN NOTE
Due to rib fractures  CoVID PENDING  Continue her O2.  Ordered Ddimer. If elevated do VTE imaging.

## 2021-02-13 NOTE — ED NOTES
Pharmacy Medication Reconciliation      Medication reconciliation updated and complete per pt at bedside  Allergies have been verified and updated   No oral ABX within the last 14 days  Patient home pharmacy:[facility]

## 2021-02-13 NOTE — DISCHARGE SUMMARY
Discharge Summary    CHIEF COMPLAINT ON ADMISSION  Chief Complaint   Patient presents with   • T-5000 FALL   • Headache   • Lightheadedness   • Hallucinations       Reason for Admission  hallucinations, Vertigo, light-hea*     Admission Date  2/12/2021    CODE STATUS  Prior    HPI & HOSPITAL COURSE  This is a 70 y.o. adult here with T-5000 FALL, Headache, Lightheadedness, and Hallucinations  Please review Dr. Collado for patient encounter. notes for further details of history of present illness, past medical/social/family histories, allergies and medications.   70 y.o. adult who presented 2/12/2021 with T-5000 FALL, Headache, Lightheadedness, and Hallucinations  History of hypertension, hyperlipidemia, alcohol abuse, anxiety depression, GERD, tobacco dependence.  Reportedly fell 1/16 hitting her head and side. Only came in to ED, 1/26. Left AGAINST MEDICAL ADVICE 1/26 for broken ribs and hypoxia. She then at that time felt short of breath and realized she needs oxygen. She went to [place] who transferred patient back to [facility]. Readmitted and hospitalized 1/26-27 for fall c/b acute hypoxia from broken ribsto the hospitalist service, Trauma declined getting involved because initial trauma was 10d ago. She was discharged on narcotics and oxygen.  Current alcohol dependence. Lives alone.  Presents with T-5000 FALL, Headache, Lightheadedness, and Hallucinations  Patient drove here to be evaluated  Lives alone  At the ED, afebrile, hemodynamically stable. HYPOXIC but placed on current O2. Altered mentation, wants to go home, high risk AMA, but patient will be admitted.  CT head:  No CT evidence of acute infarct, hemorrhage or mass.  2. Mild global parenchymal atrophy. Chronic small vessel ischemic changes.  CXR:  1.  Cardiomegaly.  2.  Bibasilar atelectasis.  CoVID PENDING  Labs relatively unremarkable.  Hen I saw patient, she wanted to leave, She is coherent and AAOx3. Dr. Clinton had tried to convince her to  stay.  However when I saw her at EDP, she then wanted to leave. She is AAOx4 knowning her nalme, where she is, the date, the President and she answered all questions appropriately. She kept apologizing and mentioned she has to go. She denied harming herself or others. I did say to her that I cannot predict that she can HALLUCINATE again and that could impair her function and can cause morbidity/mortality. I offerred that she will need at least observation, possible MRI and detox if she wants it. She understands the risks and prefers to go home and return if she feels bad. She opted to leave AGAINST MEDICAL ADVICE. I advized her to get thiamine in case this is alcohol related and continue her chornic O2. ED staff informed.    Imaging  CT-HEAD W/O   Final Result      1. No CT evidence of acute infarct, hemorrhage or mass.   2. Mild global parenchymal atrophy. Chronic small vessel ischemic changes.      DX-CHEST-PORTABLE (1 VIEW)   Final Result      1.  Cardiomegaly.      2.  Bibasilar atelectasis.                  Discharge Date  2/12/2021    FOLLOW UP ITEMS POST DISCHARGE    DISCHARGE DIAGNOSES  Principal Problem:    Hallucinosis, alcoholic and narcotic related, recent trauma, rib fractures and hypoxia(HCC) POA: Unknown  Active Problems:    Closed fracture of multiple ribs of right side POA: Yes    Chronic respiratory failure with hypoxia (HCC) POA: Yes    Tobacco dependence POA: Yes  Resolved Problems:    * No resolved hospital problems. *      FOLLOW UP  Future Appointments   Date Time Provider Department Center   [facility]   2/18/2021 [facility] None   2/23/2021 [facility] None   2/25/2021 To Be Determined RWN  TEAM [facility] None   3/3/2021 To Be Determined RWN  TEAM [facility] None   3/10/2021 To Be Determined RWN  TEAM [facility] None   3/17/2021 To Be Determined RWN  TEAM [facility] None   3/24/2021  To Be Determined R[facility] HH TEAM [facility] None   3/24/2021  2:30 PM S MCCARRAN MG 1 [facility]   3/24/2021  2:45 PM S MCCARRAN BD 1 [facility]   3/29/2021 10:40 AM VASCULAR NURSE PRACTITIONER [facility] None   3/31/2021 To Be Determined RWN HH TEAM [facility] None   4/5/2021 To Be Determined RWN HH TEAM [facility] None   5/5/2021 11:30 AM LIZETH Escalona M.D.  [address]  [address]  [facility] [address] [phone]    Call in 2 days  for recheck      MEDICATIONS ON DISCHARGE    Allergies  Allergies   Allergen Reactions   • Atorvastatin Myalgia   • Codeine Nausea   • Lisinopril Cough     Cough     • Morphine Nausea   • Rosuvastatin Myalgia       DIET    ACTIVITY      CONSULTATIONS      PROCEDURES  CT-CHEST (THORAX) W/O    Result Date: 1/25/2021 1/25/2021 12:17 PM HISTORY/REASON FOR EXAM:  Rib fracture suspected, traumatic. Fall. Right rib pain TECHNIQUE/EXAM DESCRIPTION: CT scan of the chest without contrast. Noncontrast helical scanning of the chest was obtained from the lung apices through the adrenal glands. Low dose optimization technique was utilized for this CT exam including automated exposure control and adjustment of the mA and/or kV according to patient size. COMPARISON: None. FINDINGS: There is scarring in the right middle lobe lingula. There is bilateral lower lobe atelectasis or scarring. No pneumothorax identified. There are right anterior lateral fourth, fifth, sixth, seventh rib fractures. There is trace right pleural fluid. There is no mediastinal, hilar, or axillary adenopathy. There is calcified plaque in the aorta and coronary arteries. No mediastinal hematoma. No pericardial effusion. The adrenal glands are normal.  The visualized portions of the liver, spleen, pancreas, and kidneys are unremarkable in noncontrast CT appearance. There is no upper abdominal adenopathy.     Right fourth through seventh rib fractures without evidence of  pneumothorax. Right lower lobe atelectasis with trace right pleural fluid.     CT-HEAD W/O    Result Date: 2/12/2021   CT HEAD WITHOUT CONTRAST 2/12/2021 2:32 PM HISTORY/REASON FOR EXAM:  Ground-level fall, altered mental status TECHNIQUE/EXAM DESCRIPTION AND NUMBER OF VIEWS: CT of the head without contrast. The study was performed on a helical multidetector CT scanner. Contiguous 2.5 mm axial sections were obtained from the skull base through the vertex. Up to date radiation dose reduction adjustments have been utilized to meet ALARA standards for radiation dose reduction. COMPARISON:  1/25/2021 FINDINGS: Lateral ventricles are normal in size and symmetric. Mild global parenchymal atrophy. Chronic small vessel ischemic changes. No significant mass effect or midline shift. Basal cisterns are patent. No evidence for intracranial hemorrhage. Calvaria are intact. Visualized orbits are unremarkable. Visualized mastoid air cells are clear. No significant sinus disease in the visualized paranasal sinuses.     1. No CT evidence of acute infarct, hemorrhage or mass. 2. Mild global parenchymal atrophy. Chronic small vessel ischemic changes.    CT-HEAD W/O    Result Date: 1/25/2021 1/25/2021 12:16 PM HISTORY/REASON FOR EXAM:  Head trauma, minor (Age > 65y). TECHNIQUE/EXAM DESCRIPTION AND NUMBER OF VIEWS: CT of the head without contrast. The study was performed on a helical multidetector CT scanner. Contiguous axial sections were obtained from the skull base through the vertex. Up to date radiation dose reduction adjustments have been utilized to meet ALARA standards for radiation dose reduction. COMPARISON:  None available FINDINGS: There is evidence of scalp hematoma near the vertex. No underlying skull fracture identified. There is some increased density along the left side of the anterior falx most consistent with subdural blood measuring up to 2 mm in width. There is no mass effect or midline shift. No parenchymal  hemorrhage identified. There is decreased attenuation in the perivascular white matter consistent with small vessel ischemic disease. Paranasal sinuses in the field of view are unremarkable. Mastoids in the field of view are unremarkable.     Small amount of left parafalcine subdural hemorrhage measuring up to 2 mm in width. Scalp hematoma without evidence of skull fracture. This was discussed with Dr. Gansert at 12:37 PM.    DX-CHEST-PORTABLE (1 VIEW)    Result Date: 2/12/2021 2/12/2021 2:20 PM HISTORY/REASON FOR EXAM: Sepsis TECHNIQUE/EXAM DESCRIPTION AND NUMBER OF VIEWS: Single portable view of the chest. COMPARISON: 1/27/2021 FINDINGS: There is bibasilar atelectasis. There is no pleural effusion. The heart is enlarged.     1.  Cardiomegaly. 2.  Bibasilar atelectasis.    DX-CHEST-PORTABLE (1 VIEW)    Result Date: 1/27/2021 1/27/2021 12:26 AM HISTORY/REASON FOR EXAM: Shortness of Breath; rib fractures TECHNIQUE/EXAM DESCRIPTION:  Single AP view of the chest. COMPARISON: Yesterday FINDINGS: Overlying cardiac leads are present. Cardiomegaly is observed. The mediastinal contour appears within normal limits.  Bilateral perihilar interstitial prominence and bronchial wall cuffing is noted. The lungs appear well expanded bilaterally.  Hazy opacities in the bilateral lung bases are seen. Blunting of the left costophrenic angle is seen suggesting trace left effusion. The bony structures appear age-appropriate.     1.  Bilateral lower lobe infiltrates and trace left pleural effusion 2.  Cardiomegaly    DX-CHEST-PORTABLE (1 VIEW)    Result Date: 1/26/2021 1/26/2021 6:17 AM HISTORY/REASON FOR EXAM: Rib fractures; T-5000 FALL TECHNIQUE/EXAM DESCRIPTION:  Single AP view of the chest. COMPARISON: April 19, 2012 FINDINGS: Cardiomegaly is observed. The mediastinal contour appears within normal limits.  Bilateral perihilar interstitial prominence and bronchial wall cuffing is noted. The lungs appear well expanded  bilaterally.  Hazy interstitial bilateral pulmonary opacities are seen. Blunting of the left costophrenic angle is seen suggesting trace left effusion. The bony structures appear age-appropriate.     1.  Pulmonary edema and/or infiltrates, greatest in the lung bases. 2.  Small left pleural effusion 3.  Cardiomegaly      LABORATORY  Lab Results   Component Value Date    SODIUM 138 02/12/2021    POTASSIUM 4.1 02/12/2021    CHLORIDE 101 02/12/2021    CO2 25 02/12/2021    GLUCOSE 86 02/12/2021    BUN 10 02/12/2021    CREATININE 0.69 02/12/2021        Lab Results   Component Value Date    WBC 6.0 02/12/2021    HEMOGLOBIN 14.8 02/12/2021    HEMATOCRIT 44.8 02/12/2021    PLATELETCT 191 02/12/2021        Total time of the discharge process exceeds 35 minutes.

## 2021-02-13 NOTE — ASSESSMENT & PLAN NOTE
Her primary provider who had suggested she be seen at ED suggested MRI? For now most likely due to alcohol and narcotics.  Treat with detox bag (thiamine), observe for withdrawal, hold narcotics.  If still confused despite holding narcotics and treating alcohol related issues, can order MRI and EEG   CoVID PENDING

## 2021-02-13 NOTE — H&P
Hospital Medicine History & Physical Note    Date of Service  2/12/2021    Primary Care Physician  Sonali Trammell M.D.    Consultants      Code Status  Prior    Chief Complaint  Chief Complaint   Patient presents with   • T-5000 FALL   • Headache   • Lightheadedness   • Hallucinations       History of Presenting Illness  70 y.o. adult who presented 2/12/2021 with T-5000 FALL, Headache, Lightheadedness, and Hallucinations  History of hypertension, hyperlipidemia, alcohol abuse, anxiety depression, GERD, tobacco dependence.  Reportedly fell 1/16 hitting her head and side. Only came in to ED, 1/26. Left AGAINST MEDICAL ADVICE 1/26 for broken ribs and hypoxia. She then at that time felt short of breath and realized she needs oxygen. She went to [place] who transferred patient back to [facility]. Readmitted and hospitalized 1/26-27 for fall c/b acute hypoxia from broken ribsto the hospitalist service, Trauma declined getting involved because initial trauma was 10d ago. She was discharged on narcotics and oxygen.  Current alcohol dependence. Lives alone.  Presents with T-5000 FALL, Headache, Lightheadedness, and Hallucinations  Patient drove here to be evaluated  Lives alone  At the ED, afebrile, hemodynamically stable. HYPOXIC but placed on current O2. Altered mentation, wants to go home, high risk AMA, but patient will be admitted.  CT head:  No CT evidence of acute infarct, hemorrhage or mass.  2. Mild global parenchymal atrophy. Chronic small vessel ischemic changes.  CXR:  1.  Cardiomegaly.  2.  Bibasilar atelectasis.  CoVID PENDING  Labs relatively unremarkable.  Hen I saw patient, she wanted to leave, She is coherent and AAOx3. Dr. Clinton had tried to convince her to stay.    Review of Systems  Review of Systems   Unable to perform ROS: Psychiatric disorder   Unreliable.    Past Medical History   has a past medical history of Alcohol abuse, Alcoholism (HCC), Anxiety, Depression, Hiatus hernia syndrome,  "Hypertension, Indigestion, Other specified disorder of intestines, Pneumonia, Psychiatric disorder, Sleep apnea, Snoring, Unspecified urinary incontinence, and Urinary bladder disorder.    Surgical History   has a past surgical history that includes gyn surgery (1991); other abdominal surgery (2002); other (2010); other (1956); other (2010); bladder sling female (8/30/2011); teddy by laparoscopy; and abdominal hysterectomy total.     Family History  family history includes Alcohol abuse in her father and mother; Anxiety disorder in her brother; Cancer in her maternal aunt, maternal uncle, mother, and sister; Depression in her brother; Drug abuse in her sister; Hyperlipidemia in her father; Other in her brother.     Social History   reports that she has been smoking cigarettes. She has a 6.25 pack-year smoking history. She has never used smokeless tobacco. She reports current alcohol use of about 3.6 oz of alcohol per week. She reports that she does not use drugs.    Allergies  Allergies   Allergen Reactions   • Codeine Nausea   • Crestor [Rosuvastatin Calcium]      Pt states \"I didn't tolerate\" but describes cough   • Lipitor [Atorvastatin Calcium]      See note, severe L knee pain within one day of taking medicatoin   • Lisinopril      Cough     • Morphine Nausea       Medications  Prior to Admission Medications   Prescriptions Last Dose Informant Patient Reported? Taking?   ALPRAZolam (XANAX) 1 MG Tab  Patient Yes No   Sig: Take 0.5-1 mg by mouth 2 Times a Day. ONE TABLET BY MOUTH TWICE A DAY AND HALF TABLET MIDDAY   Cholecalciferol (VITAMIN D3) 2000 UNIT Cap  Patient Yes No   Sig: Take 2,000 Units by mouth every day.   Doxylamine Succinate, Sleep, (SLEEP AID PO)   Yes No   Sig: Take 1 tablet by mouth at bedtime as needed.   HYDROcodone-acetaminophen (NORCO) 5-325 MG Tab per tablet   Yes No   Sig: Take 1 Tab by mouth every four hours as needed.   Home Care Oxygen   Yes No   Sig: Inhale 3 L/min continuous. Oxygen " dose range: 3 L/min  Respiratory route via: Nasal Cannula   Oxygen supplier: preferred      Indications: hypoxia / shortness of breath   buPROPion (WELLBUTRIN XL) 300 MG XL tablet  Patient Yes No   Sig: Take 300 mg by mouth every morning.   citalopram (CELEXA) 40 MG Tab  Patient Yes No   Sig: Take 40 mg by mouth every day.   gabapentin (NEURONTIN) 600 MG tablet   Yes No   Sig: Take 600 mg by mouth 2 times a day.   lidocaine (LIDODERM) 5 % Patch   No No   Sig: Place 1 Patch on the skin every 24 hours as needed (rib fracture pain).   omega-3 acid ethyl esters (LOVAZA) 1 GM capsule  Patient No No   Sig: Take 2 Caps by mouth 2 Times a Day for 360 days.   Patient taking differently: Take 2 g by mouth 2 Times a Day. Pt started on 1/23/2021   omeprazole (PRILOSEC) 20 MG delayed-release capsule  Patient Yes No   Sig: Take 20 mg by mouth every day.   pravastatin (PRAVACHOL) 40 MG tablet  Patient No No   Sig: Take 1 Tab by mouth every day for 360 days.   Patient taking differently: Take 20 mg by mouth every day.   propranolol CR (INDERAL LA) 160 MG CAPSULE SR 24 HR capsule   No No   Sig: TAKE ONE CAPSULE BY MOUTH DAILY      Facility-Administered Medications: None       Physical Exam  Temp:  [36.1 °C (97 °F)] 36.1 °C (97 °F)  Pulse:  [54-60] 54  Resp:  [18] 18  BP: (136-144)/(71-78) 142/71  SpO2:  [92 %-93 %] 92 %    Physical Exam  Vitals and nursing note reviewed.   HENT:      Head: Normocephalic and atraumatic.      Right Ear: External ear normal.      Left Ear: External ear normal.      Nose: Nose normal.      Mouth/Throat:      Mouth: Mucous membranes are moist.   Eyes:      General: No scleral icterus.     Conjunctiva/sclera: Conjunctivae normal.   Cardiovascular:      Rate and Rhythm: Normal rate and regular rhythm.      Heart sounds: No murmur. No friction rub. No gallop.    Pulmonary:      Effort: Pulmonary effort is normal.      Breath sounds: Normal breath sounds.   Abdominal:      General: Abdomen is flat. Bowel  sounds are normal. There is no distension.      Palpations: Abdomen is soft.      Tenderness: There is no abdominal tenderness. There is no guarding.   Musculoskeletal:         General: Normal range of motion.      Cervical back: Normal range of motion and neck supple.   Skin:     General: Skin is warm.   Neurological:      Mental Status: She is alert and oriented to person, place, and time. Mental status is at baseline.      Comments: Per EDP can be confused at times. On my interview she is currently coherent    Psychiatric:         Mood and Affect: Mood normal.         Behavior: Behavior normal.         Thought Content: Thought content normal.         Judgment: Judgment normal.      Comments: Odd behavior  Anxious         Laboratory:  Recent Labs     02/12/21  1411   WBC 6.0   RBC 4.52*   HEMOGLOBIN 14.8   HEMATOCRIT 44.8   MCV 99.1*   MCH 32.7   MCHC 33.0*   RDW 46.6   PLATELETCT 191   MPV 9.3     Recent Labs     02/12/21  1411   SODIUM 138   POTASSIUM 4.1   CHLORIDE 101   CO2 25   GLUCOSE 86   BUN 10   CREATININE 0.69   CALCIUM 9.0     Recent Labs     02/12/21  1411   ALTSGPT 13   ASTSGOT 20   ALKPHOSPHAT 120   TBILIRUBIN 0.4   GLUCOSE 86         No results for input(s): NTPROBNP in the last 72 hours.      Recent Labs     02/12/21  1411   TROPONINT 11       Imaging:  CT-HEAD W/O   Final Result      1. No CT evidence of acute infarct, hemorrhage or mass.   2. Mild global parenchymal atrophy. Chronic small vessel ischemic changes.      DX-CHEST-PORTABLE (1 VIEW)   Final Result      1.  Cardiomegaly.      2.  Bibasilar atelectasis.            Assessment/Plan:  I anticipate this patient is appropriate for observation status at this time.    * Hallucinosis, alcoholic and narcotic related, recent trauma, rib fractures and hypoxia(HCC)  Assessment & Plan  Her primary provider who had suggested she be seen at ED suggested MRI? For now most likely due to alcohol and narcotics.  Treat with detox bag (thiamine), observe  for withdrawal, hold narcotics.  If still confused despite holding narcotics and treating alcohol related issues, can order MRI and EEG   CoVID PENDING    Chronic respiratory failure with hypoxia (HCC)- (present on admission)  Assessment & Plan  Due to rib fractures  CoVID PENDING  Continue her O2.  Ordered Ddimer. If elevated do VTE imaging.    Closed fracture of multiple ribs of right side- (present on admission)  Assessment & Plan  Recent history of    Tobacco dependence- (present on admission)  Assessment & Plan  I spent 3 minutes, discussing tobacco dependence and cardiac as well as pulmonary risk. Smoking cessation instructions. Code 66038      Lovenox SQ

## 2021-02-13 NOTE — ASSESSMENT & PLAN NOTE
I spent 3 minutes, discussing tobacco dependence and cardiac as well as pulmonary risk. Smoking cessation instructions. Code 63963

## 2021-02-13 NOTE — ED NOTES
Pt signed AMA form, L AC IV removed with tip intact and dressing placed. Ambulated out to ER lobby, steady on her feet. GCS 15, A&Ox4.

## 2021-02-16 ENCOUNTER — HOME CARE VISIT (OUTPATIENT)
Dept: HOME HEALTH SERVICES | Facility: HOME HEALTHCARE | Age: 71
End: 2021-02-16
Payer: MEDICARE

## 2021-02-16 VITALS
OXYGEN SATURATION: 92 % | RESPIRATION RATE: 18 BRPM | HEART RATE: 62 BPM | SYSTOLIC BLOOD PRESSURE: 112 MMHG | DIASTOLIC BLOOD PRESSURE: 70 MMHG | TEMPERATURE: 98.7 F

## 2021-02-16 PROCEDURE — G0493 RN CARE EA 15 MIN HH/HOSPICE: HCPCS

## 2021-02-16 ASSESSMENT — ENCOUNTER SYMPTOMS
VOMITING: DENIES
NAUSEA: DENIES

## 2021-02-16 ASSESSMENT — ACTIVITIES OF DAILY LIVING (ADL): OASIS_M1830: 05

## 2021-02-17 ENCOUNTER — HOME CARE VISIT (OUTPATIENT)
Dept: HOME HEALTH SERVICES | Facility: HOME HEALTHCARE | Age: 71
End: 2021-02-17
Payer: MEDICARE

## 2021-02-23 ENCOUNTER — HOME CARE VISIT (OUTPATIENT)
Dept: HOME HEALTH SERVICES | Facility: HOME HEALTHCARE | Age: 71
End: 2021-02-23
Payer: MEDICARE

## 2021-02-23 VITALS
TEMPERATURE: 97.9 F | RESPIRATION RATE: 18 BRPM | OXYGEN SATURATION: 90 % | DIASTOLIC BLOOD PRESSURE: 80 MMHG | HEART RATE: 63 BPM | SYSTOLIC BLOOD PRESSURE: 125 MMHG

## 2021-02-23 PROCEDURE — G0493 RN CARE EA 15 MIN HH/HOSPICE: HCPCS

## 2021-02-23 ASSESSMENT — ACTIVITIES OF DAILY LIVING (ADL)
HOME_HEALTH_OASIS: 00
OASIS_M1830: 00

## 2021-02-23 ASSESSMENT — PATIENT HEALTH QUESTIONNAIRE - PHQ9: CLINICAL INTERPRETATION OF PHQ2 SCORE: 0

## 2021-03-10 ENCOUNTER — APPOINTMENT (OUTPATIENT)
Dept: RADIOLOGY | Facility: MEDICAL CENTER | Age: 71
End: 2021-03-10
Attending: EMERGENCY MEDICINE
Payer: MEDICARE

## 2021-03-10 ENCOUNTER — TELEPHONE (OUTPATIENT)
Dept: VASCULAR LAB | Facility: MEDICAL CENTER | Age: 71
End: 2021-03-10

## 2021-03-10 ENCOUNTER — PATIENT MESSAGE (OUTPATIENT)
Dept: MEDICAL GROUP | Facility: MEDICAL CENTER | Age: 71
End: 2021-03-10

## 2021-03-10 ENCOUNTER — HOSPITAL ENCOUNTER (EMERGENCY)
Facility: MEDICAL CENTER | Age: 71
End: 2021-03-10
Attending: EMERGENCY MEDICINE
Payer: MEDICARE

## 2021-03-10 VITALS
OXYGEN SATURATION: 91 % | DIASTOLIC BLOOD PRESSURE: 84 MMHG | TEMPERATURE: 97.2 F | HEIGHT: 63 IN | RESPIRATION RATE: 15 BRPM | SYSTOLIC BLOOD PRESSURE: 144 MMHG | WEIGHT: 155.87 LBS | HEART RATE: 58 BPM | BODY MASS INDEX: 27.62 KG/M2

## 2021-03-10 DIAGNOSIS — Z78.9 ALCOHOL USE: ICD-10-CM

## 2021-03-10 DIAGNOSIS — R53.81 DEBILITY: ICD-10-CM

## 2021-03-10 DIAGNOSIS — S09.90XA CLOSED HEAD INJURY, INITIAL ENCOUNTER: ICD-10-CM

## 2021-03-10 DIAGNOSIS — F41.8 SITUATIONAL ANXIETY: ICD-10-CM

## 2021-03-10 DIAGNOSIS — R55 SYNCOPE, UNSPECIFIED SYNCOPE TYPE: ICD-10-CM

## 2021-03-10 DIAGNOSIS — S80.02XA CONTUSION OF LEFT KNEE, INITIAL ENCOUNTER: ICD-10-CM

## 2021-03-10 LAB
ALBUMIN SERPL BCP-MCNC: 4.4 G/DL (ref 3.2–4.9)
ALBUMIN/GLOB SERPL: 1.6 G/DL
ALP SERPL-CCNC: 124 U/L
ALT SERPL-CCNC: 14 U/L (ref 2–50)
ANION GAP SERPL CALC-SCNC: 12 MMOL/L (ref 7–16)
AST SERPL-CCNC: 18 U/L (ref 12–45)
BASOPHILS # BLD AUTO: 0.8 % (ref 0–1.8)
BASOPHILS # BLD: 0.06 K/UL (ref 0–0.12)
BILIRUB SERPL-MCNC: 0.4 MG/DL (ref 0.1–1.5)
BUN SERPL-MCNC: 9 MG/DL (ref 8–22)
CALCIUM SERPL-MCNC: 9.1 MG/DL (ref 8.4–10.2)
CHLORIDE SERPL-SCNC: 100 MMOL/L (ref 96–112)
CO2 SERPL-SCNC: 25 MMOL/L (ref 20–33)
CREAT SERPL-MCNC: 0.73 MG/DL (ref 0.5–1.4)
D DIMER PPP IA.FEU-MCNC: 0.49 UG/ML (FEU) (ref 0–0.5)
EKG IMPRESSION: NORMAL
EOSINOPHIL # BLD AUTO: 0.09 K/UL
EOSINOPHIL NFR BLD: 1.2 % (ref 0–6.9)
ERYTHROCYTE [DISTWIDTH] IN BLOOD BY AUTOMATED COUNT: 44.5 FL (ref 35.9–50)
ETHANOL BLD-MCNC: 21.2 MG/DL (ref 0–10)
GLOBULIN SER CALC-MCNC: 2.7 G/DL (ref 1.9–3.5)
GLUCOSE SERPL-MCNC: 87 MG/DL (ref 65–99)
HCT VFR BLD AUTO: 50.3 % (ref 42–52)
HGB BLD-MCNC: 16.6 G/DL (ref 14–18)
IMM GRANULOCYTES # BLD AUTO: 0.01 K/UL (ref 0–0.11)
IMM GRANULOCYTES NFR BLD AUTO: 0.1 % (ref 0–0.9)
LIPASE SERPL-CCNC: 12 U/L (ref 7–58)
LYMPHOCYTES # BLD AUTO: 2.9 K/UL (ref 1–4.8)
LYMPHOCYTES NFR BLD: 38.3 % (ref 22–41)
MCH RBC QN AUTO: 32.3 PG (ref 27–33)
MCHC RBC AUTO-ENTMCNC: 33 G/DL (ref 33.7–35.3)
MCV RBC AUTO: 97.9 FL (ref 81.4–97.8)
MONOCYTES # BLD AUTO: 0.54 K/UL (ref 0–0.85)
MONOCYTES NFR BLD AUTO: 7.1 % (ref 0–13.4)
NEUTROPHILS # BLD AUTO: 3.98 K/UL (ref 1.82–7.42)
NEUTROPHILS NFR BLD: 52.5 % (ref 44–72)
NRBC # BLD AUTO: 0 K/UL
NRBC BLD-RTO: 0 /100 WBC
PLATELET # BLD AUTO: 212 K/UL (ref 164–446)
PMV BLD AUTO: 9.1 FL (ref 9–12.9)
POTASSIUM SERPL-SCNC: 4.2 MMOL/L (ref 3.6–5.5)
PROT SERPL-MCNC: 7.1 G/DL (ref 6–8.2)
RBC # BLD AUTO: 5.14 M/UL (ref 4.7–6.1)
SARS-COV-2 RNA RESP QL NAA+PROBE: NOTDETECTED
SODIUM SERPL-SCNC: 137 MMOL/L (ref 135–145)
SPECIMEN SOURCE: NORMAL
TROPONIN T SERPL-MCNC: 9 NG/L (ref 6–19)
WBC # BLD AUTO: 7.6 K/UL (ref 4.8–10.8)

## 2021-03-10 PROCEDURE — 85025 COMPLETE CBC W/AUTO DIFF WBC: CPT

## 2021-03-10 PROCEDURE — 70450 CT HEAD/BRAIN W/O DYE: CPT | Mod: MG

## 2021-03-10 PROCEDURE — 80053 COMPREHEN METABOLIC PANEL: CPT

## 2021-03-10 PROCEDURE — 71045 X-RAY EXAM CHEST 1 VIEW: CPT

## 2021-03-10 PROCEDURE — 84484 ASSAY OF TROPONIN QUANT: CPT

## 2021-03-10 PROCEDURE — U0003 INFECTIOUS AGENT DETECTION BY NUCLEIC ACID (DNA OR RNA); SEVERE ACUTE RESPIRATORY SYNDROME CORONAVIRUS 2 (SARS-COV-2) (CORONAVIRUS DISEASE [COVID-19]), AMPLIFIED PROBE TECHNIQUE, MAKING USE OF HIGH THROUGHPUT TECHNOLOGIES AS DESCRIBED BY CMS-2020-01-R: HCPCS

## 2021-03-10 PROCEDURE — 36415 COLL VENOUS BLD VENIPUNCTURE: CPT

## 2021-03-10 PROCEDURE — U0005 INFEC AGEN DETEC AMPLI PROBE: HCPCS

## 2021-03-10 PROCEDURE — 83690 ASSAY OF LIPASE: CPT

## 2021-03-10 PROCEDURE — 93005 ELECTROCARDIOGRAM TRACING: CPT | Performed by: EMERGENCY MEDICINE

## 2021-03-10 PROCEDURE — 85379 FIBRIN DEGRADATION QUANT: CPT

## 2021-03-10 PROCEDURE — 93971 EXTREMITY STUDY: CPT | Mod: LT

## 2021-03-10 PROCEDURE — 99284 EMERGENCY DEPT VISIT MOD MDM: CPT

## 2021-03-10 PROCEDURE — 82077 ASSAY SPEC XCP UR&BREATH IA: CPT

## 2021-03-10 ASSESSMENT — FIBROSIS 4 INDEX: FIB4 SCORE: 2

## 2021-03-10 NOTE — TELEPHONE ENCOUNTER
Tried reaching pt for the second time to schedule abpm. Was supposed to get it done prior too 03/29 appt but pt has not called to set up appt.

## 2021-03-10 NOTE — ED PROVIDER NOTES
"ED Provider Note    CHIEF COMPLAINT  Chief Complaint   Patient presents with   • Fall     Pt slipped and fell getting out of car today, states was sent to get \"checked out\"   • Leg Pain     LLE       HPI  Marcia Sharyn Koch is a 70 y.o. adult who presents to the emergency department for evaluation of a syncopal episode.  The patient states yesterday or the day before she was seen in her couch and she fainted.  She fell forward striking her head.  She denies any headache or neck pain.  She thinks she was out briefly.  No one witnessed this.  She denies any dizziness, weakness, chest pain, shortness of breath or any prodromal symptoms.  Since then she has not had any syncopal episodes.  The patient also complained of some leg pain to triage although she denies any leg pain to me.      The patient is a bit of a poor historian.  She denies any leg pain but when prompted she does admit to some left leg pain.  She states she did fall getting out of her car.  She cannot articulate any details to this.  She states she feels fine.  States she was told to come in by her doctor that is why she is here.  She denies any other acute concerns or complaints.    REVIEW OF SYSTEMS  See HPI for further details. All other systems are negative.    PAST MEDICAL HISTORY  Past Medical History:   Diagnosis Date   • Alcohol abuse    • Alcoholism (HCC)    • Anxiety    • Depression    • Hiatus hernia syndrome    • Hypertension    • Indigestion    • Other specified disorder of intestines     diarrhea   • Pneumonia     for 2 days   • Psychiatric disorder     anxiety, depression   • Sleep apnea    • Snoring    • Unspecified urinary incontinence    • Urinary bladder disorder        FAMILY HISTORY  Family History   Problem Relation Age of Onset   • Anxiety disorder Brother    • Depression Brother    • Other Brother          AIDS   • Alcohol abuse Mother    • Cancer Mother         colon cancer age 70s   • Alcohol abuse Father    • Hyperlipidemia " Father    • Cancer Sister         breast   • Drug abuse Sister    • Cancer Maternal Aunt         breast   • Cancer Maternal Uncle         nos   • Stroke Neg Hx    • Heart Attack Neg Hx        SOCIAL HISTORY  Social History     Socioeconomic History   • Marital status:      Spouse name: Not on file   • Number of children: Not on file   • Years of education: Not on file   • Highest education level: Not on file   Occupational History   • Not on file   Tobacco Use   • Smoking status: Current Every Day Smoker     Packs/day: 0.25     Years: 25.00     Pack years: 6.25     Types: Cigarettes     Last attempt to quit: 2011     Years since quittin.5   • Smokeless tobacco: Never Used   Substance and Sexual Activity   • Alcohol use: Yes     Alcohol/week: 3.6 oz     Types: 6 Glasses of wine per week   • Drug use: No   • Sexual activity: Not Currently     Partners: Male     Birth control/protection: Abstinence   Other Topics Concern   • Not on file   Social History Narrative   • Not on file     Social Determinants of Health     Financial Resource Strain:    • Difficulty of Paying Living Expenses:    Food Insecurity: No Food Insecurity   • Worried About Running Out of Food in the Last Year: Never true   • Ran Out of Food in the Last Year: Never true   Transportation Needs: No Transportation Needs   • Lack of Transportation (Medical): No   • Lack of Transportation (Non-Medical): No   Physical Activity:    • Days of Exercise per Week:    • Minutes of Exercise per Session:    Stress:    • Feeling of Stress :    Social Connections:    • Frequency of Communication with Friends and Family:    • Frequency of Social Gatherings with Friends and Family:    • Attends Oriental orthodox Services:    • Active Member of Clubs or Organizations:    • Attends Club or Organization Meetings:    • Marital Status:    Intimate Partner Violence:    • Fear of Current or Ex-Partner:    • Emotionally Abused:    • Physically Abused:    • Sexually  "Abused:        SURGICAL HISTORY  Past Surgical History:   Procedure Laterality Date   • BLADDER SLING FEMALE  8/30/2011    Performed by RENATE STERLING at SURGERY [facility] ORS   • OTHER  2010    surgery for sleep apnea   • OTHER  2010    big toenails and second toenails removed   • OTHER ABDOMINAL SURGERY  2002    choley   • GYN SURGERY  1991    hyst   • OTHER  1956    tonsillectomy   • ABDOMINAL HYSTERECTOMY TOTAL      Hysterectomy, Total Abdominal   • BRIEN BY LAPAROSCOPY      Cholecystectomy, Laparoscopic       CURRENT MEDICATIONS  Home Medications     Reviewed by Sarah Tucker (Pharmacy Tech) on 03/10/21 at 1507  Med List Status: Complete   Medication Last Dose Status   ALPRAZolam (XANAX) 1 MG Tab 3/9/2021 Active   buPROPion (WELLBUTRIN XL) 300 MG XL tablet 3/10/2021 Active   citalopram (CELEXA) 40 MG Tab 3/9/2021 Active   Doxylamine Succinate, Sleep, (SLEEP AID PO) 3/9/2021 Active   gabapentin (NEURONTIN) 600 MG tablet 3/10/2021 Active   lidocaine (LIDODERM) 5 % Patch Not Taking Active   omega-3 acid ethyl esters (LOVAZA) 1 GM capsule Not Taking Active   pravastatin (PRAVACHOL) 40 MG tablet 3/9/2021 Active   propranolol CR (INDERAL LA) 160 MG CAPSULE SR 24 HR capsule 3/10/2021 Active                ALLERGIES  Allergies   Allergen Reactions   • Atorvastatin Myalgia   • Codeine Nausea   • Lisinopril Cough     Cough     • Morphine Nausea   • Rosuvastatin Myalgia       PHYSICAL EXAM  VITAL SIGNS: BP (!) 170/92   Pulse (!) 56   Temp 36.2 °C (97.2 °F)   Resp 15   Ht 1.6 m (5' 3\")   Wt 70.7 kg (155 lb 13.8 oz)   SpO2 92%   BMI 27.61 kg/m²      Constitutional: Awake alert nontoxic no acute distress.  HENT: Normocephalic, contusion on her forehead.  No signs of trauma.  Eyes: PERRL, EOMI, Conjunctiva normal, No discharge.   Neck: Normal range of motion, No tenderness,  Cardiovascular: Normal heart rate, Normal rhythm, No murmurs, No rubs  Thorax & Lungs: Normal breath sounds, No respiratory " distress,  Abdomen: Bowel sounds normal, Soft, No tenderness,  Skin: Warm, Dry, No erythema, No rash.   Back: No tenderness, No CVA tenderness.   Musculoskeletal: Good range of motion in all major joints.  Slight asymmetric swelling in her left leg.  Good pulses good range of motion with no deformity.  The bruise on the back of both hands.  No focal tenderness  Neurologic: Alert, No focal deficits noted.   Psychiatric: Affect somewhat slow to respond.      Results for orders placed or performed during the hospital encounter of 03/10/21   CBC WITH DIFFERENTIAL   Result Value Ref Range    WBC 7.6 4.8 - 10.8 K/uL    RBC 5.14 4.70 - 6.10 M/uL    Hemoglobin 16.6 14.0 - 18.0 g/dL    Hematocrit 50.3 42.0 - 52.0 %    MCV 97.9 (H) 81.4 - 97.8 fL    MCH 32.3 27.0 - 33.0 pg    MCHC 33.0 (L) 33.7 - 35.3 g/dL    RDW 44.5 35.9 - 50.0 fL    Platelet Count 212 164 - 446 K/uL    MPV 9.1 9.0 - 12.9 fL    Neutrophils-Polys 52.50 44.00 - 72.00 %    Lymphocytes 38.30 22.00 - 41.00 %    Monocytes 7.10 0.00 - 13.40 %    Eosinophils 1.20 0.00 - 6.90 %    Basophils 0.80 0.00 - 1.80 %    Immature Granulocytes 0.10 0.00 - 0.90 %    Nucleated RBC 0.00 /100 WBC    Neutrophils (Absolute) 3.98 1.82 - 7.42 K/uL    Lymphs (Absolute) 2.90 1.00 - 4.80 K/uL    Monos (Absolute) 0.54 0.00 - 0.85 K/uL    Eos (Absolute) 0.09 K/uL    Baso (Absolute) 0.06 0.00 - 0.12 K/uL    Immature Granulocytes (abs) 0.01 0.00 - 0.11 K/uL    NRBC (Absolute) 0.00 K/uL   COMP METABOLIC PANEL   Result Value Ref Range    Sodium 137 135 - 145 mmol/L    Potassium 4.2 3.6 - 5.5 mmol/L    Chloride 100 96 - 112 mmol/L    Co2 25 20 - 33 mmol/L    Anion Gap 12.0 7.0 - 16.0    Glucose 87 65 - 99 mg/dL    Bun 9 8 - 22 mg/dL    Creatinine 0.73 0.50 - 1.40 mg/dL    Calcium 9.1 8.4 - 10.2 mg/dL    AST(SGOT) 18 12 - 45 U/L    ALT(SGPT) 14 2 - 50 U/L    Alkaline Phosphatase 124 U/L    Total Bilirubin 0.4 0.1 - 1.5 mg/dL    Albumin 4.4 3.2 - 4.9 g/dL    Total Protein 7.1 6.0 - 8.2 g/dL     Globulin 2.7 1.9 - 3.5 g/dL    A-G Ratio 1.6 g/dL   LIPASE   Result Value Ref Range    Lipase 12 7 - 58 U/L   TROPONIN   Result Value Ref Range    Troponin T 9 6 - 19 ng/L   D-DIMER   Result Value Ref Range    D-Dimer Screen 0.49 0.00 - 0.50 ug/mL (FEU)   SARS-CoV-2 PCR (24 hour In-House): Collect NP swab in VTM    Specimen: Respirate   Result Value Ref Range    SARS-CoV-2 Source NP Swab    ETHYL ALCOHOL (BLOOD)   Result Value Ref Range    Diagnostic Alcohol 21.2 (H) 0.0 - 10.0 mg/dL   ESTIMATED GFR   Result Value Ref Range    GFR If African American >60 >60 mL/min/1.73 m 2    GFR If Non African American >60 >60 mL/min/1.73 m 2   EKG (NOW)   Result Value Ref Range    Report       [facility] Emergency Dept.    Test Date:  2021-03-10  Pt Name:    VIDHI ROSALES               Department: [facility]  MRN:        [id number]                      [facility]  Gender:     Female                       Technician: ELIZABETH  :        1950                   Requested By:BRYAN LYNCH  Order #:    [id number]                    Reading MD: BRYAN LYNCH. [facility]    Measurements  Intervals                                Axis  Rate:       56                           P:          70  WY:         153                          QRS:        89  QRSD:       121                          T:          64  QT:         497  QTc:        480    Interpretive Statements  Sinus rhythm  LAE, consider biatrial enlargement  Nonspecific intraventricular conduction delay  Compared to ECG 2021 14:19:03  Intraventricular conduction delay now present  Right-axis deviation no longer present  ST (T wave) deviation still present  No other significant change  Electronicall y Signed On 3- 14:46:22 PST by BRYAN LYNCH. AMD          RADIOLOGY/PROCEDURES  CT-HEAD W/O   Final Result      No acute intracranial abnormality is identified.      Mild atrophy      There are periventricular and subcortical white matter changes  present.  This finding is nonspecific and could be from previous small vessel ischemia, demyelination, or gliosis.      Mild posterior right parietal scalp swelling.         US-EXTREMITY VENOUS LOWER UNILAT LEFT   Final Result      DX-CHEST-PORTABLE (1 VIEW)   Final Result      1.  Cardiomegaly.      2.  Minimal linear bibasilar atelectasis.            COURSE & MEDICAL DECISION MAKING  Pertinent Labs & Imaging studies reviewed. (See chart for details)      The patient presents emergency department with a couple of different complaints.  Her main complaint to the triage nurse was that she fell getting out of the car.  For me her main complaint was a syncopal episode.  The patient has an obvious sign of closed head who with a contusion on her forehead.  Head CT was obtained this is negative.  She has no neck pain no neck tenderness and although her work-up did show some alcoholic her sensorium is clear enough we can realign her exam and avoid a neck CT.    The patient is evaluated for syncope with EKG, labs for syncope.  Her EKG not significantly changed.  Her CBC CMP troponin and D-dimer were unremarkable.    The patient is not anemic shows no other electrolyte abnormality and she has no evidence of cardiac arrhythmia.  I cannot exclude a cardiac etiology for her syncope at this time.  Is concerned about a PE because of recent hospitalization for her rib fractures.  D-dimer was negative.  She has some swelling in her left leg she reported she did ultrasound this is negative.    Assessment she is a slightly more developed a bruise on her left knee that she did not have before so ordered an x-ray.    I explained the patient initially when I saw her on repeat assessment she requires hospitalization for a syncopal event and especially syncope without prodrome is concerning for cardiac etiology.  She refuses to stay.    On the right heart appears abnormal and x-ray infection is further work-up.  And she still refuses to  stay.  I have explained her that she could have a life-threatening arrhythmia which could cause death or permanent neurologic deficit or permanent disability.  She says she understands but does not want to stay.  She states she has to go comfort a friend who lost a loved 1 and she will return tomorrow for a work-up.  I explained her that she could have a significant decline as a result of this including death or injury she understands this and she will sign out AGAINST MEDICAL ADVICE.  I explained to her that she can return at any time she like to complete her evaluation to explain it she should return for recheck if she is willing to stay.  She states she will return tomorrow.    Order an x-ray of her left knee because she did fall.  She has a stable knee with no joint line tenderness and tenderness and abrasion around the knee and some ecchymosis just below the joint line.  She refused x-ray and signed out AMA and is walked out of the emergency department.    The patient synovitis medicalize.  She was noted for discharge tracings or follow-up instructions.  She left the emergency department to sign the AMA form.  She is understands that she can return    FINAL IMPRESSION  1. Syncope, unspecified syncope type     2. Closed head injury, initial encounter     3. Contusion of left knee, initial encounter     4. Alcohol use     5. Situational anxiety     6.  Signed out AGAINST MEDICAL ADVICE         Electronically signed by: Jono Frazier M.D., 3/10/2021 2:42 PM

## 2021-03-10 NOTE — ED TRIAGE NOTES
"Chief Complaint   Patient presents with   • Fall     Pt slipped and fell getting out of car today, states was sent to get \"checked out\"   • Leg Pain     LLE     BP (!) 170/92   Pulse (!) 56   Temp 36.2 °C (97.2 °F)   Resp 15   Ht 1.6 m (5' 3\")   Wt 70.7 kg (155 lb 13.8 oz)   SpO2 92%   BMI 27.61 kg/m²     Pt ambulated to ED via self.  Pt states slipped and fell while getting out of car today landing on LLE, currently denies c/o pain.  Throughout Triage, pt stated had a syncopal event two days ago while at home sitting on the couch.  Pt stated \"it was only very shortly, I recovered pretty fast.\"    Covid Screen Negative.  Pt has not received the Covid Vaccines.    "

## 2021-03-11 NOTE — ED NOTES
Pt just walked out of her room stating that she does not want to stay for xray. Pt signed AMA paper. Pt A&O x4 at this time. Pt ambulated out of ER

## 2021-03-11 NOTE — ED NOTES
ERP updated that pt would like to speak with him about admit plan. ERP states that he will speak with pt once her reviews her head CT.

## 2021-03-11 NOTE — ED NOTES
ERP spoke with PT, due to knew bruise to left knee pt is agreeable to stay till xray complete and then she will be leaving against medical advice.

## 2021-03-15 ENCOUNTER — TELEPHONE (OUTPATIENT)
Dept: MEDICAL GROUP | Facility: MEDICAL CENTER | Age: 71
End: 2021-03-15

## 2021-03-15 NOTE — TELEPHONE ENCOUNTER
Caller Name: Marcia Koch  Call Back Number: [phone] (home)   How would the patient prefer to be contacted with a response: Phone call do NOT leave a detailed message    Patient sent MyChart message on 3/13 about a recent fall that she doesn't remember. Dr. Trammell advised her to go to the ER. Patient never followed up. Patient showed up in clinic today, I went out to speak with her. She had a virtual visit today with Dr. GARCIA but she canceled it. She has no memory of canceling appointment. I asked her how she was doing since her last fall, she showed me her hands which looked bruised and a soft ball size bump/bruise on her forehead. I told her she would need to go to the ER. She denied said she left the ER previously. I asked her to sit and wait for me to go talk to Dr. Trammell. Spoke with Manager. Both still wanted her to go to ER or sign an AMA. Went to go talk to patient and she left.    Called pt, did not leave detailed vm, told her our suggestion still stands. To call back to discuss appointment options.

## 2021-03-24 ENCOUNTER — APPOINTMENT (OUTPATIENT)
Dept: RADIOLOGY | Facility: MEDICAL CENTER | Age: 71
End: 2021-03-24
Attending: INTERNAL MEDICINE
Payer: MEDICARE

## 2021-03-24 ENCOUNTER — TELEPHONE (OUTPATIENT)
Dept: VASCULAR LAB | Facility: MEDICAL CENTER | Age: 71
End: 2021-03-24

## 2021-03-24 NOTE — TELEPHONE ENCOUNTER
Pt wants to reschedule APRN appt on 03/29 to a later date, after her ABPM appt.    LVM for pt to call back.

## 2021-03-29 ENCOUNTER — APPOINTMENT (OUTPATIENT)
Dept: VASCULAR LAB | Facility: MEDICAL CENTER | Age: 71
End: 2021-03-29
Payer: MEDICARE

## 2021-04-05 ENCOUNTER — NON-PROVIDER VISIT (OUTPATIENT)
Dept: VASCULAR LAB | Facility: MEDICAL CENTER | Age: 71
End: 2021-04-05
Attending: FAMILY MEDICINE
Payer: MEDICARE

## 2021-04-05 PROCEDURE — 93788 AMBL BP MNTR W/SW A/R: CPT

## 2021-04-05 PROCEDURE — 93786 AMBL BP MNTR W/SW REC ONLY: CPT

## 2021-04-05 NOTE — NON-PROVIDER
Placed ABPM on pt at 1040. Told pt she can take off monitor tomorrow at 1040 and drop it off at the .

## 2021-04-06 ENCOUNTER — TELEPHONE (OUTPATIENT)
Dept: VASCULAR LAB | Facility: MEDICAL CENTER | Age: 71
End: 2021-04-06

## 2021-04-06 NOTE — TELEPHONE ENCOUNTER
LM trying to return pt call   ----- Message from Radha Mueller sent at 4/6/2021  8:35 AM PDT -----  Regarding: Patient Call - ABPM  Jaxson Martinez,     Pt called yesterday with questions about her BP cuff. She said that she fell asleep outside after she got it put on and when she woke up it was deflated. She's not sure what to do. She asked if you wanted her to make a new appt.    Can you please call pt?    Thanks,     Radha

## 2021-04-07 ENCOUNTER — APPOINTMENT (OUTPATIENT)
Dept: MEDICAL GROUP | Facility: MEDICAL CENTER | Age: 71
End: 2021-04-07
Payer: MEDICARE

## 2021-04-07 ENCOUNTER — TELEPHONE (OUTPATIENT)
Dept: VASCULAR LAB | Facility: MEDICAL CENTER | Age: 71
End: 2021-04-07

## 2021-04-12 ENCOUNTER — TELEPHONE (OUTPATIENT)
Dept: VASCULAR LAB | Facility: MEDICAL CENTER | Age: 71
End: 2021-04-12

## 2021-04-18 ENCOUNTER — APPOINTMENT (OUTPATIENT)
Dept: RADIOLOGY | Facility: MEDICAL CENTER | Age: 71
End: 2021-04-18
Attending: EMERGENCY MEDICINE
Payer: MEDICARE

## 2021-04-18 ENCOUNTER — HOSPITAL ENCOUNTER (EMERGENCY)
Facility: MEDICAL CENTER | Age: 71
End: 2021-04-18
Attending: EMERGENCY MEDICINE
Payer: MEDICARE

## 2021-04-18 VITALS
WEIGHT: 152.12 LBS | RESPIRATION RATE: 20 BRPM | HEART RATE: 66 BPM | OXYGEN SATURATION: 92 % | TEMPERATURE: 97 F | DIASTOLIC BLOOD PRESSURE: 84 MMHG | SYSTOLIC BLOOD PRESSURE: 142 MMHG | HEIGHT: 63 IN | BODY MASS INDEX: 26.95 KG/M2
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
## 2021-04-18 DIAGNOSIS — S63.501A SPRAIN OF RIGHT WRIST, INITIAL ENCOUNTER: ICD-10-CM

## 2021-04-18 DIAGNOSIS — T14.8XXA ABRASION: ICD-10-CM

## 2021-04-18 PROCEDURE — 99283 EMERGENCY DEPT VISIT LOW MDM: CPT

## 2021-04-18 PROCEDURE — 73110 X-RAY EXAM OF WRIST: CPT | Mod: RT

## 2021-04-18 ASSESSMENT — FIBROSIS 4 INDEX: FIB4 SCORE: 1.57

## 2021-04-18 NOTE — DISCHARGE INSTRUCTIONS
Take the dressings off within the next 5 days make sure it stays clean and dry otherwise.    Return for any swelling, redness, or worsening pain

## 2021-04-18 NOTE — ED PROVIDER NOTES
ED Provider Note    ED Provider    Means of arrival: Private vehicle  History obtained from: Patient  History limited by: None    CHIEF COMPLAINT  Chief Complaint   Patient presents with    T-5000 FALL    Arm Injury    Hand Injury       HPI  Marcia Koch is a 70 y.o. adult who presents with complaints of a ground-level fall occurred yesterday about 5 in the evening.  She was walking outside of a restaurant slipped on some grease and fell and hit her head.  She had no loss of consciousness, she had no nausea no vomiting since the event.  No neck pain no chest pain no abdominal pain.  Her primary concerns are right wrist pain.  She has some lacerations there and she cleaned them up and they now have Steri-Strips, gauze and Tegaderm on them.    She has no numbness or tingling of the wrist.    REVIEW OF SYSTEMS  See HPI for further details.     PAST MEDICAL HISTORY   has a past medical history of Alcohol abuse, Alcoholism (HCC), Anxiety, Depression, Hiatus hernia syndrome, Hypertension, Indigestion, Other specified disorder of intestines, Pneumonia, Psychiatric disorder, Sleep apnea, Snoring, Unspecified urinary incontinence, and Urinary bladder disorder.    SOCIAL HISTORY  Social History     Tobacco Use    Smoking status: Current Every Day Smoker     Packs/day: 0.25     Years: 25.00     Pack years: 6.25     Types: Cigarettes     Last attempt to quit: 2011     Years since quittin.6    Smokeless tobacco: Never Used   Substance and Sexual Activity    Alcohol use: Yes     Alcohol/week: 3.6 oz     Types: 6 Glasses of wine per week     Comment: Occasionally    Drug use: No    Sexual activity: Not Currently     Partners: Male     Birth control/protection: Abstinence       SURGICAL HISTORY   has a past surgical history that includes gyn surgery (); other abdominal surgery (); other (); other (); other (); bladder sling female (2011); teddy by laparoscopy; and abdominal hysterectomy  "total.    CURRENT MEDICATIONS  Home Medications    **Home medications have not yet been reviewed for this encounter**         ALLERGIES  Allergies   Allergen Reactions    Atorvastatin Myalgia    Codeine Nausea    Lisinopril Cough     Cough      Morphine Nausea    Rosuvastatin Myalgia       PHYSICAL EXAM  VITAL SIGNS: /84   Pulse 66   Temp 36.1 °C (97 °F) (Temporal)   Resp 20   Ht 1.6 m (5' 3\")   Wt 69 kg (152 lb 1.9 oz)   SpO2 92%   BMI 26.95 kg/m²   Constitutional: Alert in no apparent distress.  HENT: Normocephalic, Atraumatic, Mucous membranes are moist  Eyes:  Conjunctiva normal, non-icteric.   Heart: no peripheral cyanosis  Lungs: respirations are even and unlabored  Skin: Warm, Dry,normal color  Neurologic: Alert, Grossly non-focal.   Psychiatric: Affect normal, Judgment normal, Mood normal, Appears appropriate and not intoxicated.   Right wrist has no swelling or deformity.  There is mild tenderness at the distal radius.  The area has Steri-Strips, gauze and Tegaderm in place.  There is no signs of surrounding erythema.  Range of motion of the hands and fingers are normal        MEDICAL DECISION MAKING  This is a 70 y.o. adult who presents with a fall as described.  She has very good wound care already completed I see no signs of cellulitis there is no signs of tendon injury.  Taking out the Steri-Strips and a Tegaderm runs a risk of reopening these wounds I do not believe this is necessary there is no signs of infection no signs of tendon injury.  X-rays were done to evaluate for any fractures.    She has good sensation strength and range of motion of the extremities I do not suspect vascular injury neurological injury or tendon injury    DIAGNOSTIC STUDIES / PROCEDURES    LABS      RADIOLOGY  DX-WRIST-COMPLETE 3+ RIGHT   Final Result      No evidence of fracture or dislocation.          COURSE  Pertinent Labs & Imaging studies reviewed. (See chart for details)    9:22 AM - Patient seen and " examined at bedside. Discussed plan of care,     Patient presented to the emergency room with right wrist injury primarily.  She did hit her head did not lose consciousness there was no need for CT.  She had good wound care done at home it was clean, she has Steri-Strips and dressings on this.  Removal of these will open these wounds up again.  Wounds are greater than 12 hours old and will typically not get sutured.  There is no signs of vascular injury no signs of tendon or nerve injury.  X-ray shows no fracture.    Low back to explain the x-ray findings to the patient the patient left prior to second evaluation.      Discharged in stable condition    FINAL IMPRESSION  1. Fall, initial encounter    2. Sprain of right wrist, initial encounter    3. Abrasion        The note accurately reflects work and decisions made by me.  eKshawn Quiroga D.O.  4/18/2021  1:32 PM

## 2021-04-23 ENCOUNTER — OFFICE VISIT (OUTPATIENT)
Dept: URGENT CARE | Facility: CLINIC | Age: 71
End: 2021-04-23
Payer: MEDICARE

## 2021-04-23 VITALS
BODY MASS INDEX: 27.46 KG/M2 | SYSTOLIC BLOOD PRESSURE: 114 MMHG | WEIGHT: 155 LBS | RESPIRATION RATE: 15 BRPM | HEIGHT: 63 IN | DIASTOLIC BLOOD PRESSURE: 70 MMHG | TEMPERATURE: 97.5 F | HEART RATE: 56 BPM | OXYGEN SATURATION: 93 %

## 2021-04-23 DIAGNOSIS — S61.411A LACERATION OF RIGHT HAND, FOREIGN BODY PRESENCE UNSPECIFIED, INITIAL ENCOUNTER: ICD-10-CM

## 2021-04-23 DIAGNOSIS — S41.111A LACERATION OF RIGHT UPPER EXTREMITY, INITIAL ENCOUNTER: ICD-10-CM

## 2021-04-23 DIAGNOSIS — Z72.0 TOBACCO USE: ICD-10-CM

## 2021-04-23 PROCEDURE — 99213 OFFICE O/P EST LOW 20 MIN: CPT | Mod: 25 | Performed by: NURSE PRACTITIONER

## 2021-04-23 PROCEDURE — 99406 BEHAV CHNG SMOKING 3-10 MIN: CPT | Performed by: NURSE PRACTITIONER

## 2021-04-23 RX ORDER — PRAVASTATIN SODIUM 20 MG
TABLET ORAL
COMMUNITY
Start: 2021-03-11 | End: 2022-02-11

## 2021-04-23 ASSESSMENT — FIBROSIS 4 INDEX: FIB4 SCORE: 1.57

## 2021-04-25 ASSESSMENT — ENCOUNTER SYMPTOMS
FEVER: 0
TREMORS: 0
FOCAL WEAKNESS: 0
SPEECH CHANGE: 0
SENSORY CHANGE: 0
VOMITING: 0
TINGLING: 0
NAUSEA: 0

## 2021-04-25 ASSESSMENT — LIFESTYLE VARIABLES: SUBSTANCE_ABUSE: 0

## 2021-04-25 NOTE — PROGRESS NOTES
"Marcia Koch is a 70 y.o. adult who presents for Fall (x6 days ago. On Cement. Slipped at resturant. Cut on (R) Hand. (R) Knee )      HPI there is a new problem.  Marcia is a 70-year-old female presents with a fall 6 days ago.  She fell on cemented she was leaving a restaurant.  She had a cut on her right hand and her right knee.  She did not seek treatment for the cut.  Her right hand was Steri-Stripped.  She thinks it might be getting better but states that \"I do not know how to take care of her wound\".  She is here to have wound evaluation.  She does not know when her tetanus shot was given to her last.  She denies fevers, chills, joint pain.  No other aggravating alleviating factors.    Review of Systems   Constitutional: Negative for fever and malaise/fatigue.   Gastrointestinal: Negative for nausea and vomiting.   Musculoskeletal: Negative for joint pain.   Neurological: Negative for tingling, tremors, sensory change, speech change and focal weakness.   Psychiatric/Behavioral: Negative for substance abuse.       Allergies   Allergen Reactions   • Atorvastatin Myalgia   • Codeine Nausea   • Lisinopril Cough     Cough     • Morphine Nausea   • Rosuvastatin Myalgia     Past Medical History:   Diagnosis Date   • Alcohol abuse    • Alcoholism (HCC)    • Anxiety    • Depression    • Hiatus hernia syndrome    • Hypertension    • Indigestion    • Other specified disorder of intestines     diarrhea   • Pneumonia     for 2 days   • Psychiatric disorder     anxiety, depression   • Sleep apnea    • Snoring    • Unspecified urinary incontinence    • Urinary bladder disorder      Past Surgical History:   Procedure Laterality Date   • BLADDER SLING FEMALE  8/30/2011    Performed by RENATE STERLING at SURGERY [facility]   • OTHER  2010    surgery for sleep apnea   • OTHER  2010    big toenails and second toenails removed   • OTHER ABDOMINAL SURGERY  2002    choley   • GYN SURGERY  1991    hyst   • OTHER  1956    " tonsillectomy   • ABDOMINAL HYSTERECTOMY TOTAL      Hysterectomy, Total Abdominal   • BRIEN BY LAPAROSCOPY      Cholecystectomy, Laparoscopic     Family History   Problem Relation Age of Onset   • Anxiety disorder Brother    • Depression Brother    • Other Brother          AIDS   • Alcohol abuse Mother    • Cancer Mother         colon cancer age 70s   • Alcohol abuse Father    • Hyperlipidemia Father    • Cancer Sister         breast   • Drug abuse Sister    • Cancer Maternal Aunt         breast   • Cancer Maternal Uncle         nos   • Stroke Neg Hx    • Heart Attack Neg Hx      Social History     Tobacco Use   • Smoking status: Current Every Day Smoker     Packs/day: 0.25     Years: 25.00     Pack years: 6.25     Types: Cigarettes     Last attempt to quit: 2011     Years since quittin.6   • Smokeless tobacco: Never Used   Substance Use Topics   • Alcohol use: Not Currently     Alcohol/week: 3.6 oz     Types: 6 Glasses of wine per week     Comment: Occasionally     Patient Active Problem List   Diagnosis   • HTN (hypertension)   • Bradycardia   • Dyslipidemia   • Depression, controlled   • Alcohol use disorder, moderate, dependence (HCC)   • Moderate sedative, hypnotic, or anxiolytic use disorder   • Anxiety   • History of alcohol abuse   • Tobacco dependence   • Polycythemia   • Impaired fasting glucose   • Closed fracture of multiple ribs of right side   • Alcohol use   • Screening examination for infectious disease   • Subdural hematoma (HCC)   • Trauma   • Tobacco use   • GERD (gastroesophageal reflux disease)   • Hypoxia   • Chronic respiratory failure with hypoxia (HCC)   • Hallucinosis, alcoholic and narcotic related, recent trauma, rib fractures and hypoxia(HCC)     Current Outpatient Medications on File Prior to Visit   Medication Sig Dispense Refill   • gabapentin (NEURONTIN) 600 MG tablet Take 600 mg by mouth 2 times a day.     • ALPRAZolam (XANAX) 1 MG Tab Take 0.5 mg by mouth 2 times a day  "as needed for Anxiety. 0.5 tablet = 0.5 mg     • buPROPion (WELLBUTRIN XL) 300 MG XL tablet Take 300 mg by mouth every morning.     • citalopram (CELEXA) 40 MG Tab Take 40 mg by mouth every evening.     • pravastatin (PRAVACHOL) 20 MG Tab      • Doxylamine Succinate, Sleep, (SLEEP AID PO) Take 1 tablet by mouth every bedtime.       No current facility-administered medications on file prior to visit.          Objective:     /70 (BP Location: Left arm, Patient Position: Sitting, BP Cuff Size: Adult)   Pulse (!) 56   Temp 36.4 °C (97.5 °F) (Temporal)   Resp 15   Ht 1.6 m (5' 3\")   Wt 70.3 kg (155 lb)   SpO2 93%   BMI 27.46 kg/m²     Physical Exam  Vitals and nursing note reviewed.   Constitutional:       Appearance: Normal appearance.   Cardiovascular:      Rate and Rhythm: Normal rate and regular rhythm.      Pulses: Normal pulses.      Heart sounds: Normal heart sounds.   Pulmonary:      Effort: Pulmonary effort is normal.      Breath sounds: Normal breath sounds.   Skin:     General: Skin is warm and dry.      Capillary Refill: Capillary refill takes less than 2 seconds.          Neurological:      Mental Status: She is alert and oriented to person, place, and time.   Psychiatric:         Mood and Affect: Mood normal.         Behavior: Behavior normal.         Thought Content: Thought content normal.     Review of web eyes he shows that her last tetanus shot was given September 26, 2020.    Assessment /Associated Orders:      1. Laceration of right hand, foreign body presence unspecified, initial encounter     2. Laceration of right upper extremity, initial encounter     3. Tobacco use             Medical Decision Making:    Pt is clinically stable at today's acute urgent care visit.  No acute distress noted. Appropriate for outpatient management at this time.     Patient understands is too late to close these wounds with sutures and they will have to close by secondary intent.  Tdap is current   The " patient is alerted to watch for any signs of infection (redness, pus, pain, increased swelling or fever) and call if such occurs. Home wound care instructions are provided.   Do not use OTC antibiotic ointments over steri-strips. Keep them clean and dry until they fall off. Do not pull off.     > 3 minutes was spent in educating pt of  health risks  associated with tobacco, nicotine, and vaping. Verbalized understanding.     Advised the patient to follow-up with the primary care provider for recheck, reevaluation, and consideration of further management if necessary.   Discussed management options (risks,benefits, and alternatives to treatment). Pt expresses understanding and the treatment plan was agreed upon. Questions were encouraged and answered       Return to urgent care prn if new or worsening sx or if there is no improvement in condition prn . Red flags discussed and indications to immediately call 911 or present to the Emergency Department.     I personally reviewed prior external notes and test results pertinent to today's visit.  I have independently reviewed and interpreted all diagnostics ordered during this urgent care acute visit.   Time spent evaluating this patient was at least 20 minutes and includes preparing for visit, counseling/education, exam and evaluation, obtaining history, independent interpretation, ordering lab/test/procedures,medication management and documentation.

## 2021-05-05 RX ORDER — BUPROPION HYDROCHLORIDE 300 MG/1
300 TABLET ORAL EVERY MORNING
Qty: 30 TABLET | Refills: 0 | Status: SHIPPED | OUTPATIENT
Start: 2021-05-05 | End: 2021-06-07

## 2021-05-05 NOTE — TELEPHONE ENCOUNTER
Received request via: Pharmacy    Was the patient seen in the last year in this department? Yes    Does the patient have an active prescription (recently filled or refills available) for medication(s) requested? No     Requested Prescriptions     Pending Prescriptions Disp Refills   • buPROPion (WELLBUTRIN XL) 300 MG XL tablet 30 tablet      Sig: Take 1 tablet by mouth every morning.

## 2021-05-10 RX ORDER — GABAPENTIN 600 MG/1
600 TABLET ORAL 2 TIMES DAILY
Qty: 180 TABLET | Refills: 3 | Status: SHIPPED | OUTPATIENT
Start: 2021-05-10 | End: 2021-07-19 | Stop reason: SDUPTHER

## 2021-05-10 NOTE — PROCEDURE: EXCISION
Surgeon (Optional): Terese
Biopsy Photograph Reviewed: Yes
Previous Accession (Optional): [id number]
Size Of Lesion In Cm: 3.1
X Size Of Lesion In Cm (Optional): 0
Quality 431: Preventive Care And Screening: Unhealthy Alcohol Use - Screening: Patient screened for unhealthy alcohol use using a single question and scores less than 2 times per year
Size Of Margin In Cm: 0.2
Excision Method: Elliptical
Anesthesia Volume In Cc: 15
Did You Provide Opioid Counseling: No
Repair Type: Complex
Intermediate / Complex Repair - Final Wound Length In Cm: 6.9
Width Of Defect Perpendicular To Closure In Cm (Required): 2.1
Distance Of Undermining In Cm (Required): 2.4
Quality 226: Preventive Care And Screening: Tobacco Use: Screening And Cessation Intervention: Patient screened for tobacco use and is an ex/non-smoker
Undermining Type: Entire Wound
Debridement Text: The wound edges were debrided prior to proceeding with the closure to facilitate wound healing.
Quality 110: Preventive Care And Screening: Influenza Immunization: Influenza immunization was not ordered or administered, reason not given
Helical Rim Text: The closure involved the helical rim.
Quality 130: Documentation Of Current Medications In The Medical Record: Current Medications Documented
Vermilion Border Text: The closure involved the vermilion border.
Nostril Rim Text: The closure involved the nostril rim.
Retention Suture Text: Retention sutures were placed to support the closure and prevent dehiscence.
Detail Level: Detailed
Lab: [id number]
Lab Facility: [id number]
Graft Donor Site Bandage (Optional-Leave Blank If You Don't Want In Note): Steri-strips and a pressure bandage were applied to the donor site.
Epidermal Closure Graft Donor Site (Optional): simple interrupted
Billing Type: Third-Party Bill
Quality 131: Pain Assessment And Follow-Up: Pain assessment using a standardized tool is documented as negative, no follow-up plan required
Excision Depth: adipose tissue
Scalpel Size: 15 blade
Anesthesia Type: 1% lidocaine with epinephrine
Additional Anesthesia Volume In Cc: 6
Hemostasis: Electrocautery
Estimated Blood Loss (Cc): minimal
Detail Level: Detailed
Repair Anesthesia Method: local infiltration
Anesthesia Volume In Cc: 12
Deep Sutures: 4-0 Vicryl
Dermal Closure: buried vertical mattress
Epidermal Sutures: 5-0 Vicryl Rapide
Additional Epidermal Sutures: 5-0 Caprosyn
Epidermal Closure: running subcuticular
Wound Care: Bacitracin
Dressing: dry sterile dressing
Positioning (Leave Blank If You Do Not Want): The patient was placed in a comfortable position exposing the surgical site.
Pre-Excision Curettage Text (Leave Blank If You Do Not Want): Prior to drawing the surgical margin the visible lesion was removed with electrodesiccation and curettage to clearly define the lesion size.
Complex Repair Preamble Text (Leave Blank If You Do Not Want): Extensive wide undermining was performed.
Intermediate Repair Preamble Text (Leave Blank If You Do Not Want): Undermining was performed with blunt dissection.
Curvilinear Excision Additional Text (Leave Blank If You Do Not Want): The margin was drawn around the clinically apparent lesion.  A curvilinear shape was then drawn on the skin incorporating the lesion and margins.  Incisions were then made along these lines to the appropriate tissue plane and the lesion was extirpated.
Fusiform Excision Additional Text (Leave Blank If You Do Not Want): The margin was drawn around the clinically apparent lesion.  A fusiform shape was then drawn on the skin incorporating the lesion and margins.  Incisions were then made along these lines to the appropriate tissue plane and the lesion was extirpated.
Elliptical Excision Additional Text (Leave Blank If You Do Not Want): The margin was drawn around the clinically apparent lesion.  An elliptical shape was then drawn on the skin incorporating the lesion and margins.  Incisions were then made along these lines to the appropriate tissue plane and the lesion was extirpated.
Saucerization Excision Additional Text (Leave Blank If You Do Not Want): The margin was drawn around the clinically apparent lesion.  Incisions were then made along these lines, in a tangential fashion, to the appropriate tissue plane and the lesion was extirpated.
Slit Excision Additional Text (Leave Blank If You Do Not Want): A linear line was drawn on the skin overlying the lesion. An incision was made slowly until the lesion was visualized.  Once visualized, the lesion was removed with blunt dissection.
Excisional Biopsy Additional Text (Leave Blank If You Do Not Want): The margin was drawn around the clinically apparent lesion. An elliptical shape was then drawn on the skin incorporating the lesion and margins.  Incisions were then made along these lines to the appropriate tissue plane and the lesion was extirpated.
Perilesional Excision Additional Text (Leave Blank If You Do Not Want): The margin was drawn around the clinically apparent lesion. Incisions were then made along these lines to the appropriate tissue plane and the lesion was extirpated.
Repair Performed By Another Provider Text (Leave Blank If You Do Not Want): After the tissue was excised the defect was repaired by another provider.
No Repair - Repaired With Adjacent Surgical Defect Text (Leave Blank If You Do Not Want): After the excision the defect was repaired concurrently with another surgical defect which was in close approximation.
Advancement Flap (Single) Text: The defect edges were debeveled with a #15 scalpel blade.  Given the location of the defect and the proximity to free margins a single advancement flap was deemed most appropriate.  Using a sterile surgical marker, an appropriate advancement flap was drawn incorporating the defect and placing the expected incisions within the relaxed skin tension lines where possible.    The area thus outlined was incised deep to adipose tissue with a #15 scalpel blade.  The skin margins were undermined to an appropriate distance in all directions utilizing iris scissors.
Advancement Flap (Double) Text: The defect edges were debeveled with a #15 scalpel blade.  Given the location of the defect and the proximity to free margins a double advancement flap was deemed most appropriate.  Using a sterile surgical marker, the appropriate advancement flaps were drawn incorporating the defect and placing the expected incisions within the relaxed skin tension lines where possible.    The area thus outlined was incised deep to adipose tissue with a #15 scalpel blade.  The skin margins were undermined to an appropriate distance in all directions utilizing iris scissors.
Burow's Advancement Flap Text: The defect edges were debeveled with a #15 scalpel blade.  Given the location of the defect and the proximity to free margins a Burow's advancement flap was deemed most appropriate.  Using a sterile surgical marker, the appropriate advancement flap was drawn incorporating the defect and placing the expected incisions within the relaxed skin tension lines where possible.    The area thus outlined was incised deep to adipose tissue with a #15 scalpel blade.  The skin margins were undermined to an appropriate distance in all directions utilizing iris scissors.
Chonodrocutaneous Helical Advancement Flap Text: The defect edges were debeveled with a #15 scalpel blade.  Given the location of the defect and the proximity to free margins a chondrocutaneous helical advancement flap was deemed most appropriate.  Using a sterile surgical marker, the appropriate advancement flap was drawn incorporating the defect and placing the expected incisions within the relaxed skin tension lines where possible.    The area thus outlined was incised deep to adipose tissue with a #15 scalpel blade.  The skin margins were undermined to an appropriate distance in all directions utilizing iris scissors.
Crescentic Advancement Flap Text: The defect edges were debeveled with a #15 scalpel blade.  Given the location of the defect and the proximity to free margins a crescentic advancement flap was deemed most appropriate.  Using a sterile surgical marker, the appropriate advancement flap was drawn incorporating the defect and placing the expected incisions within the relaxed skin tension lines where possible.    The area thus outlined was incised deep to adipose tissue with a #15 scalpel blade.  The skin margins were undermined to an appropriate distance in all directions utilizing iris scissors.
A-T Advancement Flap Text: The defect edges were debeveled with a #15 scalpel blade.  Given the location of the defect, shape of the defect and the proximity to free margins an A-T advancement flap was deemed most appropriate.  Using a sterile surgical marker, an appropriate advancement flap was drawn incorporating the defect and placing the expected incisions within the relaxed skin tension lines where possible.    The area thus outlined was incised deep to adipose tissue with a #15 scalpel blade.  The skin margins were undermined to an appropriate distance in all directions utilizing iris scissors.
O-T Advancement Flap Text: The defect edges were debeveled with a #15 scalpel blade.  Given the location of the defect, shape of the defect and the proximity to free margins an O-T advancement flap was deemed most appropriate.  Using a sterile surgical marker, an appropriate advancement flap was drawn incorporating the defect and placing the expected incisions within the relaxed skin tension lines where possible.    The area thus outlined was incised deep to adipose tissue with a #15 scalpel blade.  The skin margins were undermined to an appropriate distance in all directions utilizing iris scissors.
O-L Flap Text: The defect edges were debeveled with a #15 scalpel blade.  Given the location of the defect, shape of the defect and the proximity to free margins an O-L flap was deemed most appropriate.  Using a sterile surgical marker, an appropriate advancement flap was drawn incorporating the defect and placing the expected incisions within the relaxed skin tension lines where possible.    The area thus outlined was incised deep to adipose tissue with a #15 scalpel blade.  The skin margins were undermined to an appropriate distance in all directions utilizing iris scissors.
O-Z Flap Text: The defect edges were debeveled with a #15 scalpel blade.  Given the location of the defect, shape of the defect and the proximity to free margins an O-Z flap was deemed most appropriate.  Using a sterile surgical marker, an appropriate transposition flap was drawn incorporating the defect and placing the expected incisions within the relaxed skin tension lines where possible. The area thus outlined was incised deep to adipose tissue with a #15 scalpel blade.  The skin margins were undermined to an appropriate distance in all directions utilizing iris scissors.
Double O-Z Flap Text: The defect edges were debeveled with a #15 scalpel blade.  Given the location of the defect, shape of the defect and the proximity to free margins a Double O-Z flap was deemed most appropriate.  Using a sterile surgical marker, an appropriate transposition flap was drawn incorporating the defect and placing the expected incisions within the relaxed skin tension lines where possible. The area thus outlined was incised deep to adipose tissue with a #15 scalpel blade.  The skin margins were undermined to an appropriate distance in all directions utilizing iris scissors.
V-Y Flap Text: The defect edges were debeveled with a #15 scalpel blade.  Given the location of the defect, shape of the defect and the proximity to free margins a V-Y flap was deemed most appropriate.  Using a sterile surgical marker, an appropriate advancement flap was drawn incorporating the defect and placing the expected incisions within the relaxed skin tension lines where possible.    The area thus outlined was incised deep to adipose tissue with a #15 scalpel blade.  The skin margins were undermined to an appropriate distance in all directions utilizing iris scissors.
Advancement-Rotation Flap Text: The defect edges were debeveled with a #15 scalpel blade.  Given the location of the defect, shape of the defect and the proximity to free margins an advancement-rotation flap was deemed most appropriate.  Using a sterile surgical marker, an appropriate flap was drawn incorporating the defect and placing the expected incisions within the relaxed skin tension lines where possible. The area thus outlined was incised deep to adipose tissue with a #15 scalpel blade.  The skin margins were undermined to an appropriate distance in all directions utilizing iris scissors.
Mercedes Flap Text: The defect edges were debeveled with a #15 scalpel blade.  Given the location of the defect, shape of the defect and the proximity to free margins a Mercedes flap was deemed most appropriate.  Using a sterile surgical marker, an appropriate advancement flap was drawn incorporating the defect and placing the expected incisions within the relaxed skin tension lines where possible. The area thus outlined was incised deep to adipose tissue with a #15 scalpel blade.  The skin margins were undermined to an appropriate distance in all directions utilizing iris scissors.
Modified Advancement Flap Text: The defect edges were debeveled with a #15 scalpel blade.  Given the location of the defect, shape of the defect and the proximity to free margins a modified advancement flap was deemed most appropriate.  Using a sterile surgical marker, an appropriate advancement flap was drawn incorporating the defect and placing the expected incisions within the relaxed skin tension lines where possible.    The area thus outlined was incised deep to adipose tissue with a #15 scalpel blade.  The skin margins were undermined to an appropriate distance in all directions utilizing iris scissors.
Mucosal Advancement Flap Text: Given the location of the defect, shape of the defect and the proximity to free margins a mucosal advancement flap was deemed most appropriate. Incisions were made with a 15 blade scalpel in the appropriate fashion along the cutaneous vermilion border and the mucosal lip. The remaining actinically damaged mucosal tissue was excised.  The mucosal advancement flap was then elevated to the gingival sulcus with care taken to preserve the neurovascular structures and advanced into the primary defect. Care was taken to ensure that precise realignment of the vermilion border was achieved.
Peng Advancement Flap Text: The defect edges were debeveled with a #15 scalpel blade.  Given the location of the defect, shape of the defect and the proximity to free margins a Peng advancement flap was deemed most appropriate.  Using a sterile surgical marker, an appropriate advancement flap was drawn incorporating the defect and placing the expected incisions within the relaxed skin tension lines where possible. The area thus outlined was incised deep to adipose tissue with a #15 scalpel blade.  The skin margins were undermined to an appropriate distance in all directions utilizing iris scissors.
Hatchet Flap Text: The defect edges were debeveled with a #15 scalpel blade.  Given the location of the defect, shape of the defect and the proximity to free margins a hatchet flap was deemed most appropriate.  Using a sterile surgical marker, an appropriate hatchet flap was drawn incorporating the defect and placing the expected incisions within the relaxed skin tension lines where possible.    The area thus outlined was incised deep to adipose tissue with a #15 scalpel blade.  The skin margins were undermined to an appropriate distance in all directions utilizing iris scissors.
Rotation Flap Text: The defect edges were debeveled with a #15 scalpel blade.  Given the location of the defect, shape of the defect and the proximity to free margins a rotation flap was deemed most appropriate.  Using a sterile surgical marker, an appropriate rotation flap was drawn incorporating the defect and placing the expected incisions within the relaxed skin tension lines where possible.    The area thus outlined was incised deep to adipose tissue with a #15 scalpel blade.  The skin margins were undermined to an appropriate distance in all directions utilizing iris scissors.
Spiral Flap Text: The defect edges were debeveled with a #15 scalpel blade.  Given the location of the defect, shape of the defect and the proximity to free margins a spiral flap was deemed most appropriate.  Using a sterile surgical marker, an appropriate rotation flap was drawn incorporating the defect and placing the expected incisions within the relaxed skin tension lines where possible. The area thus outlined was incised deep to adipose tissue with a #15 scalpel blade.  The skin margins were undermined to an appropriate distance in all directions utilizing iris scissors.
Star Wedge Flap Text: The defect edges were debeveled with a #15 scalpel blade.  Given the location of the defect, shape of the defect and the proximity to free margins a star wedge flap was deemed most appropriate.  Using a sterile surgical marker, an appropriate rotation flap was drawn incorporating the defect and placing the expected incisions within the relaxed skin tension lines where possible. The area thus outlined was incised deep to adipose tissue with a #15 scalpel blade.  The skin margins were undermined to an appropriate distance in all directions utilizing iris scissors.
Transposition Flap Text: The defect edges were debeveled with a #15 scalpel blade.  Given the location of the defect and the proximity to free margins a transposition flap was deemed most appropriate.  Using a sterile surgical marker, an appropriate transposition flap was drawn incorporating the defect.    The area thus outlined was incised deep to adipose tissue with a #15 scalpel blade.  The skin margins were undermined to an appropriate distance in all directions utilizing iris scissors.
Muscle Hinge Flap Text: The defect edges were debeveled with a #15 scalpel blade.  Given the size, depth and location of the defect and the proximity to free margins a muscle hinge flap was deemed most appropriate.  Using a sterile surgical marker, an appropriate hinge flap was drawn incorporating the defect. The area thus outlined was incised with a #15 scalpel blade.  The skin margins were undermined to an appropriate distance in all directions utilizing iris scissors.
Melolabial Transposition Flap Text: The defect edges were debeveled with a #15 scalpel blade.  Given the location of the defect and the proximity to free margins a melolabial flap was deemed most appropriate.  Using a sterile surgical marker, an appropriate melolabial transposition flap was drawn incorporating the defect.    The area thus outlined was incised deep to adipose tissue with a #15 scalpel blade.  The skin margins were undermined to an appropriate distance in all directions utilizing iris scissors.
Rhombic Flap Text: The defect edges were debeveled with a #15 scalpel blade.  Given the location of the defect and the proximity to free margins a rhombic flap was deemed most appropriate.  Using a sterile surgical marker, an appropriate rhombic flap was drawn incorporating the defect.    The area thus outlined was incised deep to adipose tissue with a #15 scalpel blade.  The skin margins were undermined to an appropriate distance in all directions utilizing iris scissors.
Rhomboid Transposition Flap Text: The defect edges were debeveled with a #15 scalpel blade.  Given the location of the defect and the proximity to free margins a rhomboid transposition flap was deemed most appropriate.  Using a sterile surgical marker, an appropriate rhomboid flap was drawn incorporating the defect.    The area thus outlined was incised deep to adipose tissue with a #15 scalpel blade.  The skin margins were undermined to an appropriate distance in all directions utilizing iris scissors.
Bi-Rhombic Flap Text: The defect edges were debeveled with a #15 scalpel blade.  Given the location of the defect and the proximity to free margins a bi-rhombic flap was deemed most appropriate.  Using a sterile surgical marker, an appropriate rhombic flap was drawn incorporating the defect. The area thus outlined was incised deep to adipose tissue with a #15 scalpel blade.  The skin margins were undermined to an appropriate distance in all directions utilizing iris scissors.
Helical Rim Advancement Flap Text: The defect edges were debeveled with a #15 blade scalpel.  Given the location of the defect and the proximity to free margins (helical rim) a double helical rim advancement flap was deemed most appropriate.  Using a sterile surgical marker, the appropriate advancement flaps were drawn incorporating the defect and placing the expected incisions between the helical rim and antihelix where possible.  The area thus outlined was incised through and through with a #15 scalpel blade.  With a skin hook and iris scissors, the flaps were gently and sharply undermined and freed up.
Bilateral Helical Rim Advancement Flap Text: The defect edges were debeveled with a #15 blade scalpel.  Given the location of the defect and the proximity to free margins (helical rim) a bilateral helical rim advancement flap was deemed most appropriate.  Using a sterile surgical marker, the appropriate advancement flaps were drawn incorporating the defect and placing the expected incisions between the helical rim and antihelix where possible.  The area thus outlined was incised through and through with a #15 scalpel blade.  With a skin hook and iris scissors, the flaps were gently and sharply undermined and freed up.
Ear Star Wedge Flap Text: The defect edges were debeveled with a #15 blade scalpel.  Given the location of the defect and the proximity to free margins (helical rim) an ear star wedge flap was deemed most appropriate.  Using a sterile surgical marker, the appropriate flap was drawn incorporating the defect and placing the expected incisions between the helical rim and antihelix where possible.  The area thus outlined was incised through and through with a #15 scalpel blade.
Banner Transposition Flap Text: The defect edges were debeveled with a #15 scalpel blade.  Given the location of the defect and the proximity to free margins a Banner transposition flap was deemed most appropriate.  Using a sterile surgical marker, an appropriate flap drawn around the defect. The area thus outlined was incised deep to adipose tissue with a #15 scalpel blade.  The skin margins were undermined to an appropriate distance in all directions utilizing iris scissors.
Bilobed Flap Text: The defect edges were debeveled with a #15 scalpel blade.  Given the location of the defect and the proximity to free margins a bilobe flap was deemed most appropriate.  Using a sterile surgical marker, an appropriate bilobe flap drawn around the defect.    The area thus outlined was incised deep to adipose tissue with a #15 scalpel blade.  The skin margins were undermined to an appropriate distance in all directions utilizing iris scissors.
Bilobed Transposition Flap Text: The defect edges were debeveled with a #15 scalpel blade.  Given the location of the defect and the proximity to free margins a bilobed transposition flap was deemed most appropriate.  Using a sterile surgical marker, an appropriate bilobe flap drawn around the defect.    The area thus outlined was incised deep to adipose tissue with a #15 scalpel blade.  The skin margins were undermined to an appropriate distance in all directions utilizing iris scissors.
Trilobed Flap Text: The defect edges were debeveled with a #15 scalpel blade.  Given the location of the defect and the proximity to free margins a trilobed flap was deemed most appropriate.  Using a sterile surgical marker, an appropriate trilobed flap drawn around the defect.    The area thus outlined was incised deep to adipose tissue with a #15 scalpel blade.  The skin margins were undermined to an appropriate distance in all directions utilizing iris scissors.
Dorsal Nasal Flap Text: The defect edges were debeveled with a #15 scalpel blade.  Given the location of the defect and the proximity to free margins a dorsal nasal flap was deemed most appropriate.  Using a sterile surgical marker, an appropriate dorsal nasal flap was drawn around the defect.    The area thus outlined was incised deep to adipose tissue with a #15 scalpel blade.  The skin margins were undermined to an appropriate distance in all directions utilizing iris scissors.
Island Pedicle Flap Text: The defect edges were debeveled with a #15 scalpel blade.  Given the location of the defect, shape of the defect and the proximity to free margins an island pedicle advancement flap was deemed most appropriate.  Using a sterile surgical marker, an appropriate advancement flap was drawn incorporating the defect, outlining the appropriate donor tissue and placing the expected incisions within the relaxed skin tension lines where possible.    The area thus outlined was incised deep to adipose tissue with a #15 scalpel blade.  The skin margins were undermined to an appropriate distance in all directions around the primary defect and laterally outward around the island pedicle utilizing iris scissors.  There was minimal undermining beneath the pedicle flap.
Island Pedicle Flap With Canthal Suspension Text: The defect edges were debeveled with a #15 scalpel blade.  Given the location of the defect, shape of the defect and the proximity to free margins an island pedicle advancement flap was deemed most appropriate.  Using a sterile surgical marker, an appropriate advancement flap was drawn incorporating the defect, outlining the appropriate donor tissue and placing the expected incisions within the relaxed skin tension lines where possible. The area thus outlined was incised deep to adipose tissue with a #15 scalpel blade.  The skin margins were undermined to an appropriate distance in all directions around the primary defect and laterally outward around the island pedicle utilizing iris scissors.  There was minimal undermining beneath the pedicle flap. A suspension suture was placed in the canthal tendon to prevent tension and prevent ectropion.
Alar Island Pedicle Flap Text: The defect edges were debeveled with a #15 scalpel blade.  Given the location of the defect, shape of the defect and the proximity to the alar rim an island pedicle advancement flap was deemed most appropriate.  Using a sterile surgical marker, an appropriate advancement flap was drawn incorporating the defect, outlining the appropriate donor tissue and placing the expected incisions within the nasal ala running parallel to the alar rim. The area thus outlined was incised with a #15 scalpel blade.  The skin margins were undermined minimally to an appropriate distance in all directions around the primary defect and laterally outward around the island pedicle utilizing iris scissors.  There was minimal undermining beneath the pedicle flap.
Double Island Pedicle Flap Text: The defect edges were debeveled with a #15 scalpel blade.  Given the location of the defect, shape of the defect and the proximity to free margins a double island pedicle advancement flap was deemed most appropriate.  Using a sterile surgical marker, an appropriate advancement flap was drawn incorporating the defect, outlining the appropriate donor tissue and placing the expected incisions within the relaxed skin tension lines where possible.    The area thus outlined was incised deep to adipose tissue with a #15 scalpel blade.  The skin margins were undermined to an appropriate distance in all directions around the primary defect and laterally outward around the island pedicle utilizing iris scissors.  There was minimal undermining beneath the pedicle flap.
Island Pedicle Flap-Requiring Vessel Identification Text: The defect edges were debeveled with a #15 scalpel blade.  Given the location of the defect, shape of the defect and the proximity to free margins an island pedicle advancement flap was deemed most appropriate.  Using a sterile surgical marker, an appropriate advancement flap was drawn, based on the axial vessel mentioned above, incorporating the defect, outlining the appropriate donor tissue and placing the expected incisions within the relaxed skin tension lines where possible.    The area thus outlined was incised deep to adipose tissue with a #15 scalpel blade.  The skin margins were undermined to an appropriate distance in all directions around the primary defect and laterally outward around the island pedicle utilizing iris scissors.  There was minimal undermining beneath the pedicle flap.
Keystone Flap Text: The defect edges were debeveled with a #15 scalpel blade.  Given the location of the defect, shape of the defect a keystone flap was deemed most appropriate.  Using a sterile surgical marker, an appropriate keystone flap was drawn incorporating the defect, outlining the appropriate donor tissue and placing the expected incisions within the relaxed skin tension lines where possible. The area thus outlined was incised deep to adipose tissue with a #15 scalpel blade.  The skin margins were undermined to an appropriate distance in all directions around the primary defect and laterally outward around the flap utilizing iris scissors.
O-T Plasty Text: The defect edges were debeveled with a #15 scalpel blade.  Given the location of the defect, shape of the defect and the proximity to free margins an O-T plasty was deemed most appropriate.  Using a sterile surgical marker, an appropriate O-T plasty was drawn incorporating the defect and placing the expected incisions within the relaxed skin tension lines where possible.    The area thus outlined was incised deep to adipose tissue with a #15 scalpel blade.  The skin margins were undermined to an appropriate distance in all directions utilizing iris scissors.
O-Z Plasty Text: The defect edges were debeveled with a #15 scalpel blade.  Given the location of the defect, shape of the defect and the proximity to free margins an O-Z plasty (double transposition flap) was deemed most appropriate.  Using a sterile surgical marker, the appropriate transposition flaps were drawn incorporating the defect and placing the expected incisions within the relaxed skin tension lines where possible.    The area thus outlined was incised deep to adipose tissue with a #15 scalpel blade.  The skin margins were undermined to an appropriate distance in all directions utilizing iris scissors.  Hemostasis was achieved with electrocautery.  The flaps were then transposed into place, one clockwise and the other counterclockwise, and anchored with interrupted buried subcutaneous sutures.
Double O-Z Plasty Text: The defect edges were debeveled with a #15 scalpel blade.  Given the location of the defect, shape of the defect and the proximity to free margins a Double O-Z plasty (double transposition flap) was deemed most appropriate.  Using a sterile surgical marker, the appropriate transposition flaps were drawn incorporating the defect and placing the expected incisions within the relaxed skin tension lines where possible. The area thus outlined was incised deep to adipose tissue with a #15 scalpel blade.  The skin margins were undermined to an appropriate distance in all directions utilizing iris scissors.  Hemostasis was achieved with electrocautery.  The flaps were then transposed into place, one clockwise and the other counterclockwise, and anchored with interrupted buried subcutaneous sutures.
S Plasty Text: Given the location and shape of the defect, and the orientation of relaxed skin tension lines, an S-plasty was deemed most appropriate for repair.  Using a sterile surgical marker, the appropriate outline of the S-plasty was drawn, incorporating the defect and placing the expected incisions within the relaxed skin tension lines where possible.  The area thus outlined was incised deep to adipose tissue with a #15 scalpel blade.  The skin margins were undermined to an appropriate distance in all directions utilizing iris scissors. The skin flaps were advanced over the defect.  The opposing margins were then approximated with interrupted buried subcutaneous sutures.
V-Y Plasty Text: The defect edges were debeveled with a #15 scalpel blade.  Given the location of the defect, shape of the defect and the proximity to free margins an V-Y advancement flap was deemed most appropriate.  Using a sterile surgical marker, an appropriate advancement flap was drawn incorporating the defect and placing the expected incisions within the relaxed skin tension lines where possible.    The area thus outlined was incised deep to adipose tissue with a #15 scalpel blade.  The skin margins were undermined to an appropriate distance in all directions utilizing iris scissors.
H Plasty Text: Given the location of the defect, shape of the defect and the proximity to free margins a H-plasty was deemed most appropriate for repair.  Using a sterile surgical marker, the appropriate advancement arms of the H-plasty were drawn incorporating the defect and placing the expected incisions within the relaxed skin tension lines where possible. The area thus outlined was incised deep to adipose tissue with a #15 scalpel blade. The skin margins were undermined to an appropriate distance in all directions utilizing iris scissors.  The opposing advancement arms were then advanced into place in opposite direction and anchored with interrupted buried subcutaneous sutures.
W Plasty Text: The lesion was extirpated to the level of the fat with a #15 scalpel blade.  Given the location of the defect, shape of the defect and the proximity to free margins a W-plasty was deemed most appropriate for repair.  Using a sterile surgical marker, the appropriate transposition arms of the W-plasty were drawn incorporating the defect and placing the expected incisions within the relaxed skin tension lines where possible.    The area thus outlined was incised deep to adipose tissue with a #15 scalpel blade.  The skin margins were undermined to an appropriate distance in all directions utilizing iris scissors.  The opposing transposition arms were then transposed into place in opposite direction and anchored with interrupted buried subcutaneous sutures.
Z Plasty Text: The lesion was extirpated to the level of the fat with a #15 scalpel blade.  Given the location of the defect, shape of the defect and the proximity to free margins a Z-plasty was deemed most appropriate for repair.  Using a sterile surgical marker, the appropriate transposition arms of the Z-plasty were drawn incorporating the defect and placing the expected incisions within the relaxed skin tension lines where possible.    The area thus outlined was incised deep to adipose tissue with a #15 scalpel blade.  The skin margins were undermined to an appropriate distance in all directions utilizing iris scissors.  The opposing transposition arms were then transposed into place in opposite direction and anchored with interrupted buried subcutaneous sutures.
Cheek Interpolation Flap Text: A decision was made to reconstruct the defect utilizing an interpolation axial flap and a staged reconstruction.  A telfa template was made of the defect.  This telfa template was then used to outline the Cheek Interpolation flap.  The donor area for the pedicle flap was then injected with anesthesia.  The flap was excised through the skin and subcutaneous tissue down to the layer of the underlying musculature.  The interpolation flap was carefully excised within this deep plane to maintain its blood supply.  The edges of the donor site were undermined.   The donor site was closed in a primary fashion.  The pedicle was then rotated into position and sutured.  Once the tube was sutured into place, adequate blood supply was confirmed with blanching and refill.  The pedicle was then wrapped with xeroform gauze and dressed appropriately with a telfa and gauze bandage to ensure continued blood supply and protect the attached pedicle.
Cheek-To-Nose Interpolation Flap Text: A decision was made to reconstruct the defect utilizing an interpolation axial flap and a staged reconstruction.  A telfa template was made of the defect.  This telfa template was then used to outline the Cheek-To-Nose Interpolation flap.  The donor area for the pedicle flap was then injected with anesthesia.  The flap was excised through the skin and subcutaneous tissue down to the layer of the underlying musculature.  The interpolation flap was carefully excised within this deep plane to maintain its blood supply.  The edges of the donor site were undermined.   The donor site was closed in a primary fashion.  The pedicle was then rotated into position and sutured.  Once the tube was sutured into place, adequate blood supply was confirmed with blanching and refill.  The pedicle was then wrapped with xeroform gauze and dressed appropriately with a telfa and gauze bandage to ensure continued blood supply and protect the attached pedicle.
Interpolation Flap Text: A decision was made to reconstruct the defect utilizing an interpolation axial flap and a staged reconstruction.  A telfa template was made of the defect.  This telfa template was then used to outline the interpolation flap.  The donor area for the pedicle flap was then injected with anesthesia.  The flap was excised through the skin and subcutaneous tissue down to the layer of the underlying musculature.  The interpolation flap was carefully excised within this deep plane to maintain its blood supply.  The edges of the donor site were undermined.   The donor site was closed in a primary fashion.  The pedicle was then rotated into position and sutured.  Once the tube was sutured into place, adequate blood supply was confirmed with blanching and refill.  The pedicle was then wrapped with xeroform gauze and dressed appropriately with a telfa and gauze bandage to ensure continued blood supply and protect the attached pedicle.
Melolabial Interpolation Flap Text: A decision was made to reconstruct the defect utilizing an interpolation axial flap and a staged reconstruction.  A telfa template was made of the defect.  This telfa template was then used to outline the melolabial interpolation flap.  The donor area for the pedicle flap was then injected with anesthesia.  The flap was excised through the skin and subcutaneous tissue down to the layer of the underlying musculature.  The pedicle flap was carefully excised within this deep plane to maintain its blood supply.  The edges of the donor site were undermined.   The donor site was closed in a primary fashion.  The pedicle was then rotated into position and sutured.  Once the tube was sutured into place, adequate blood supply was confirmed with blanching and refill.  The pedicle was then wrapped with xeroform gauze and dressed appropriately with a telfa and gauze bandage to ensure continued blood supply and protect the attached pedicle.
Mastoid Interpolation Flap Text: A decision was made to reconstruct the defect utilizing an interpolation axial flap and a staged reconstruction.  A telfa template was made of the defect.  This telfa template was then used to outline the mastoid interpolation flap.  The donor area for the pedicle flap was then injected with anesthesia.  The flap was excised through the skin and subcutaneous tissue down to the layer of the underlying musculature.  The pedicle flap was carefully excised within this deep plane to maintain its blood supply.  The edges of the donor site were undermined.   The donor site was closed in a primary fashion.  The pedicle was then rotated into position and sutured.  Once the tube was sutured into place, adequate blood supply was confirmed with blanching and refill.  The pedicle was then wrapped with xeroform gauze and dressed appropriately with a telfa and gauze bandage to ensure continued blood supply and protect the attached pedicle.
Posterior Auricular Interpolation Flap Text: A decision was made to reconstruct the defect utilizing an interpolation axial flap and a staged reconstruction.  A telfa template was made of the defect.  This telfa template was then used to outline the posterior auricular interpolation flap.  The donor area for the pedicle flap was then injected with anesthesia.  The flap was excised through the skin and subcutaneous tissue down to the layer of the underlying musculature.  The pedicle flap was carefully excised within this deep plane to maintain its blood supply.  The edges of the donor site were undermined.   The donor site was closed in a primary fashion.  The pedicle was then rotated into position and sutured.  Once the tube was sutured into place, adequate blood supply was confirmed with blanching and refill.  The pedicle was then wrapped with xeroform gauze and dressed appropriately with a telfa and gauze bandage to ensure continued blood supply and protect the attached pedicle.
Paramedian Forehead Flap Text: A decision was made to reconstruct the defect utilizing an interpolation axial flap and a staged reconstruction.  A telfa template was made of the defect.  This telfa template was then used to outline the paramedian forehead pedicle flap.  The donor area for the pedicle flap was then injected with anesthesia.  The flap was excised through the skin and subcutaneous tissue down to the layer of the underlying musculature.  The pedicle flap was carefully excised within this deep plane to maintain its blood supply.  The edges of the donor site were undermined.   The donor site was closed in a primary fashion.  The pedicle was then rotated into position and sutured.  Once the tube was sutured into place, adequate blood supply was confirmed with blanching and refill.  The pedicle was then wrapped with xeroform gauze and dressed appropriately with a telfa and gauze bandage to ensure continued blood supply and protect the attached pedicle.
Lip Wedge Excision Repair Text: Given the location of the defect and the proximity to free margins a full thickness wedge repair was deemed most appropriate.  Using a sterile surgical marker, the appropriate repair was drawn incorporating the defect and placing the expected incisions perpendicular to the vermilion border.  The vermilion border was also meticulously outlined to ensure appropriate reapproximation during the repair.  The area thus outlined was incised through and through with a #15 scalpel blade.  The muscularis and dermis were reaproximated with deep sutures following hemostasis. Care was taken to realign the vermilion border before proceeding with the superficial closure.  Once the vermilion was realigned the superfical and mucosal closure was finished.
Ftsg Text: The defect edges were debeveled with a #15 scalpel blade.  Given the location of the defect, shape of the defect and the proximity to free margins a full thickness skin graft was deemed most appropriate.  Using a sterile surgical marker, the primary defect shape was transferred to the donor site. The area thus outlined was incised deep to adipose tissue with a #15 scalpel blade.  The harvested graft was then trimmed of adipose tissue until only dermis and epidermis was left.  The skin margins of the secondary defect were undermined to an appropriate distance in all directions utilizing iris scissors.  The secondary defect was closed with interrupted buried subcutaneous sutures.  The skin edges were then re-apposed with running  sutures.  The skin graft was then placed in the primary defect and oriented appropriately.
Split-Thickness Skin Graft Text: The defect edges were debeveled with a #15 scalpel blade.  Given the location of the defect, shape of the defect and the proximity to free margins a split thickness skin graft was deemed most appropriate.  Using a sterile surgical marker, the primary defect shape was transferred to the donor site. The split thickness graft was then harvested.  The skin graft was then placed in the primary defect and oriented appropriately.
Burow's Graft Text: The defect edges were debeveled with a #15 scalpel blade.  Given the location of the defect, shape of the defect, the proximity to free margins and the presence of a standing cone deformity a Burow's skin graft was deemed most appropriate. The standing cone was removed and this tissue was then trimmed to the shape of the primary defect. The adipose tissue was also removed until only dermis and epidermis were left.  The skin margins of the secondary defect were undermined to an appropriate distance in all directions utilizing iris scissors.  The secondary defect was closed with interrupted buried subcutaneous sutures.  The skin edges were then re-apposed with running  sutures.  The skin graft was then placed in the primary defect and oriented appropriately.
Cartilage Graft Text: The defect edges were debeveled with a #15 scalpel blade.  Given the location of the defect, shape of the defect, the fact the defect involved a full thickness cartilage defect a cartilage graft was deemed most appropriate.  An appropriate donor site was identified, cleansed, and anesthetized. The cartilage graft was then harvested and transferred to the recipient site, oriented appropriately and then sutured into place.  The secondary defect was then repaired using a primary closure.
Composite Graft Text: The defect edges were debeveled with a #15 scalpel blade.  Given the location of the defect, shape of the defect, the proximity to free margins and the fact the defect was full thickness a composite graft was deemed most appropriate.  The defect was outline and then transferred to the donor site.  A full thickness graft was then excised from the donor site. The graft was then placed in the primary defect, oriented appropriately and then sutured into place.  The secondary defect was then repaired using a primary closure.
Epidermal Autograft Text: The defect edges were debeveled with a #15 scalpel blade.  Given the location of the defect, shape of the defect and the proximity to free margins an epidermal autograft was deemed most appropriate.  Using a sterile surgical marker, the primary defect shape was transferred to the donor site. The epidermal graft was then harvested.  The skin graft was then placed in the primary defect and oriented appropriately.
Dermal Autograft Text: The defect edges were debeveled with a #15 scalpel blade.  Given the location of the defect, shape of the defect and the proximity to free margins a dermal autograft was deemed most appropriate.  Using a sterile surgical marker, the primary defect shape was transferred to the donor site. The area thus outlined was incised deep to adipose tissue with a #15 scalpel blade.  The harvested graft was then trimmed of adipose and epidermal tissue until only dermis was left.  The skin graft was then placed in the primary defect and oriented appropriately.
Skin Substitute Text: The defect edges were debeveled with a #15 scalpel blade.  Given the location of the defect, shape of the defect and the proximity to free margins a skin substitute graft was deemed most appropriate.  The graft material was trimmed to fit the size of the defect. The graft was then placed in the primary defect and oriented appropriately.
Tissue Cultured Epidermal Autograft Text: The defect edges were debeveled with a #15 scalpel blade.  Given the location of the defect, shape of the defect and the proximity to free margins a tissue cultured epidermal autograft was deemed most appropriate.  The graft was then trimmed to fit the size of the defect.  The graft was then placed in the primary defect and oriented appropriately.
Xenograft Text: The defect edges were debeveled with a #15 scalpel blade.  Given the location of the defect, shape of the defect and the proximity to free margins a xenograft was deemed most appropriate.  The graft was then trimmed to fit the size of the defect.  The graft was then placed in the primary defect and oriented appropriately.
Purse String (Intermediate) Text: Given the location of the defect and the characteristics of the surrounding skin a purse string intermediate closure was deemed most appropriate.  Undermining was performed circumfirentially around the surgical defect.  A purse string suture was then placed and tightened.
Purse String (Simple) Text: Given the location of the defect and the characteristics of the surrounding skin a purse string simple closure was deemed most appropriate.  Undermining was performed circumferentially around the surgical defect.  A purse string suture was then placed and tightened.
Partial Purse String (Intermediate) Text: Given the location of the defect and the characteristics of the surrounding skin an intermediate purse string closure was deemed most appropriate.  Undermining was performed circumferentially around the surgical defect.  A purse string suture was then placed and tightened. Wound tension of the circular defect prevented complete closure of the wound.
Partial Purse String (Simple) Text: Given the location of the defect and the characteristics of the surrounding skin a simple purse string closure was deemed most appropriate.  Undermining was performed circumferentially around the surgical defect.  A purse string suture was then placed and tightened. Wound tension of the circular defect prevented complete closure of the wound.
Complex Repair And Single Advancement Flap Text: The defect edges were debeveled with a #15 scalpel blade.  The primary defect was closed partially with a complex linear closure.  Given the location of the remaining defect, shape of the defect and the proximity to free margins a single advancement flap was deemed most appropriate for complete closure of the defect.  Using a sterile surgical marker, an appropriate advancement flap was drawn incorporating the defect and placing the expected incisions within the relaxed skin tension lines where possible.    The area thus outlined was incised deep to adipose tissue with a #15 scalpel blade.  The skin margins were undermined to an appropriate distance in all directions utilizing iris scissors.
Complex Repair And Double Advancement Flap Text: The defect edges were debeveled with a #15 scalpel blade.  The primary defect was closed partially with a complex linear closure.  Given the location of the remaining defect, shape of the defect and the proximity to free margins a double advancement flap was deemed most appropriate for complete closure of the defect.  Using a sterile surgical marker, an appropriate advancement flap was drawn incorporating the defect and placing the expected incisions within the relaxed skin tension lines where possible.    The area thus outlined was incised deep to adipose tissue with a #15 scalpel blade.  The skin margins were undermined to an appropriate distance in all directions utilizing iris scissors.
Complex Repair And Modified Advancement Flap Text: The defect edges were debeveled with a #15 scalpel blade.  The primary defect was closed partially with a complex linear closure.  Given the location of the remaining defect, shape of the defect and the proximity to free margins a modified advancement flap was deemed most appropriate for complete closure of the defect.  Using a sterile surgical marker, an appropriate advancement flap was drawn incorporating the defect and placing the expected incisions within the relaxed skin tension lines where possible.    The area thus outlined was incised deep to adipose tissue with a #15 scalpel blade.  The skin margins were undermined to an appropriate distance in all directions utilizing iris scissors.
Complex Repair And A-T Advancement Flap Text: The defect edges were debeveled with a #15 scalpel blade.  The primary defect was closed partially with a complex linear closure.  Given the location of the remaining defect, shape of the defect and the proximity to free margins an A-T advancement flap was deemed most appropriate for complete closure of the defect.  Using a sterile surgical marker, an appropriate advancement flap was drawn incorporating the defect and placing the expected incisions within the relaxed skin tension lines where possible.    The area thus outlined was incised deep to adipose tissue with a #15 scalpel blade.  The skin margins were undermined to an appropriate distance in all directions utilizing iris scissors.
Complex Repair And O-T Advancement Flap Text: The defect edges were debeveled with a #15 scalpel blade.  The primary defect was closed partially with a complex linear closure.  Given the location of the remaining defect, shape of the defect and the proximity to free margins an O-T advancement flap was deemed most appropriate for complete closure of the defect.  Using a sterile surgical marker, an appropriate advancement flap was drawn incorporating the defect and placing the expected incisions within the relaxed skin tension lines where possible.    The area thus outlined was incised deep to adipose tissue with a #15 scalpel blade.  The skin margins were undermined to an appropriate distance in all directions utilizing iris scissors.
Complex Repair And O-L Flap Text: The defect edges were debeveled with a #15 scalpel blade.  The primary defect was closed partially with a complex linear closure.  Given the location of the remaining defect, shape of the defect and the proximity to free margins an O-L flap was deemed most appropriate for complete closure of the defect.  Using a sterile surgical marker, an appropriate flap was drawn incorporating the defect and placing the expected incisions within the relaxed skin tension lines where possible.    The area thus outlined was incised deep to adipose tissue with a #15 scalpel blade.  The skin margins were undermined to an appropriate distance in all directions utilizing iris scissors.
Complex Repair And Bilobe Flap Text: The defect edges were debeveled with a #15 scalpel blade.  The primary defect was closed partially with a complex linear closure.  Given the location of the remaining defect, shape of the defect and the proximity to free margins a bilobe flap was deemed most appropriate for complete closure of the defect.  Using a sterile surgical marker, an appropriate advancement flap was drawn incorporating the defect and placing the expected incisions within the relaxed skin tension lines where possible.    The area thus outlined was incised deep to adipose tissue with a #15 scalpel blade.  The skin margins were undermined to an appropriate distance in all directions utilizing iris scissors.
Complex Repair And Melolabial Flap Text: The defect edges were debeveled with a #15 scalpel blade.  The primary defect was closed partially with a complex linear closure.  Given the location of the remaining defect, shape of the defect and the proximity to free margins a melolabial flap was deemed most appropriate for complete closure of the defect.  Using a sterile surgical marker, an appropriate advancement flap was drawn incorporating the defect and placing the expected incisions within the relaxed skin tension lines where possible.    The area thus outlined was incised deep to adipose tissue with a #15 scalpel blade.  The skin margins were undermined to an appropriate distance in all directions utilizing iris scissors.
Complex Repair And Rotation Flap Text: The defect edges were debeveled with a #15 scalpel blade.  The primary defect was closed partially with a complex linear closure.  Given the location of the remaining defect, shape of the defect and the proximity to free margins a rotation flap was deemed most appropriate for complete closure of the defect.  Using a sterile surgical marker, an appropriate advancement flap was drawn incorporating the defect and placing the expected incisions within the relaxed skin tension lines where possible.    The area thus outlined was incised deep to adipose tissue with a #15 scalpel blade.  The skin margins were undermined to an appropriate distance in all directions utilizing iris scissors.
Complex Repair And Rhombic Flap Text: The defect edges were debeveled with a #15 scalpel blade.  The primary defect was closed partially with a complex linear closure.  Given the location of the remaining defect, shape of the defect and the proximity to free margins a rhombic flap was deemed most appropriate for complete closure of the defect.  Using a sterile surgical marker, an appropriate advancement flap was drawn incorporating the defect and placing the expected incisions within the relaxed skin tension lines where possible.    The area thus outlined was incised deep to adipose tissue with a #15 scalpel blade.  The skin margins were undermined to an appropriate distance in all directions utilizing iris scissors.
Complex Repair And Transposition Flap Text: The defect edges were debeveled with a #15 scalpel blade.  The primary defect was closed partially with a complex linear closure.  Given the location of the remaining defect, shape of the defect and the proximity to free margins a transposition flap was deemed most appropriate for complete closure of the defect.  Using a sterile surgical marker, an appropriate advancement flap was drawn incorporating the defect and placing the expected incisions within the relaxed skin tension lines where possible.    The area thus outlined was incised deep to adipose tissue with a #15 scalpel blade.  The skin margins were undermined to an appropriate distance in all directions utilizing iris scissors.
Complex Repair And V-Y Plasty Text: The defect edges were debeveled with a #15 scalpel blade.  The primary defect was closed partially with a complex linear closure.  Given the location of the remaining defect, shape of the defect and the proximity to free margins a V-Y plasty was deemed most appropriate for complete closure of the defect.  Using a sterile surgical marker, an appropriate advancement flap was drawn incorporating the defect and placing the expected incisions within the relaxed skin tension lines where possible.    The area thus outlined was incised deep to adipose tissue with a #15 scalpel blade.  The skin margins were undermined to an appropriate distance in all directions utilizing iris scissors.
Complex Repair And M Plasty Text: The defect edges were debeveled with a #15 scalpel blade.  The primary defect was closed partially with a complex linear closure.  Given the location of the remaining defect, shape of the defect and the proximity to free margins an M plasty was deemed most appropriate for complete closure of the defect.  Using a sterile surgical marker, an appropriate advancement flap was drawn incorporating the defect and placing the expected incisions within the relaxed skin tension lines where possible.    The area thus outlined was incised deep to adipose tissue with a #15 scalpel blade.  The skin margins were undermined to an appropriate distance in all directions utilizing iris scissors.
Complex Repair And Double M Plasty Text: The defect edges were debeveled with a #15 scalpel blade.  The primary defect was closed partially with a complex linear closure.  Given the location of the remaining defect, shape of the defect and the proximity to free margins a double M plasty was deemed most appropriate for complete closure of the defect.  Using a sterile surgical marker, an appropriate advancement flap was drawn incorporating the defect and placing the expected incisions within the relaxed skin tension lines where possible.    The area thus outlined was incised deep to adipose tissue with a #15 scalpel blade.  The skin margins were undermined to an appropriate distance in all directions utilizing iris scissors.
Complex Repair And W Plasty Text: The defect edges were debeveled with a #15 scalpel blade.  The primary defect was closed partially with a complex linear closure.  Given the location of the remaining defect, shape of the defect and the proximity to free margins a W plasty was deemed most appropriate for complete closure of the defect.  Using a sterile surgical marker, an appropriate advancement flap was drawn incorporating the defect and placing the expected incisions within the relaxed skin tension lines where possible.    The area thus outlined was incised deep to adipose tissue with a #15 scalpel blade.  The skin margins were undermined to an appropriate distance in all directions utilizing iris scissors.
Complex Repair And Z Plasty Text: The defect edges were debeveled with a #15 scalpel blade.  The primary defect was closed partially with a complex linear closure.  Given the location of the remaining defect, shape of the defect and the proximity to free margins a Z plasty was deemed most appropriate for complete closure of the defect.  Using a sterile surgical marker, an appropriate advancement flap was drawn incorporating the defect and placing the expected incisions within the relaxed skin tension lines where possible.    The area thus outlined was incised deep to adipose tissue with a #15 scalpel blade.  The skin margins were undermined to an appropriate distance in all directions utilizing iris scissors.
Complex Repair And Dorsal Nasal Flap Text: The defect edges were debeveled with a #15 scalpel blade.  The primary defect was closed partially with a complex linear closure.  Given the location of the remaining defect, shape of the defect and the proximity to free margins a dorsal nasal flap was deemed most appropriate for complete closure of the defect.  Using a sterile surgical marker, an appropriate flap was drawn incorporating the defect and placing the expected incisions within the relaxed skin tension lines where possible.    The area thus outlined was incised deep to adipose tissue with a #15 scalpel blade.  The skin margins were undermined to an appropriate distance in all directions utilizing iris scissors.
Complex Repair And Ftsg Text: The defect edges were debeveled with a #15 scalpel blade.  The primary defect was closed partially with a complex linear closure.  Given the location of the defect, shape of the defect and the proximity to free margins a full thickness skin graft was deemed most appropriate to repair the remaining defect.  The graft was trimmed to fit the size of the remaining defect.  The graft was then placed in the primary defect, oriented appropriately, and sutured into place.
Complex Repair And Burow's Graft Text: The defect edges were debeveled with a #15 scalpel blade.  The primary defect was closed partially with a complex linear closure.  Given the location of the defect, shape of the defect, the proximity to free margins and the presence of a standing cone deformity a Burow's graft was deemed most appropriate to repair the remaining defect.  The graft was trimmed to fit the size of the remaining defect.  The graft was then placed in the primary defect, oriented appropriately, and sutured into place.
Complex Repair And Split-Thickness Skin Graft Text: The defect edges were debeveled with a #15 scalpel blade.  The primary defect was closed partially with a complex linear closure.  Given the location of the defect, shape of the defect and the proximity to free margins a split thickness skin graft was deemed most appropriate to repair the remaining defect.  The graft was trimmed to fit the size of the remaining defect.  The graft was then placed in the primary defect, oriented appropriately, and sutured into place.
Complex Repair And Epidermal Autograft Text: The defect edges were debeveled with a #15 scalpel blade.  The primary defect was closed partially with a complex linear closure.  Given the location of the defect, shape of the defect and the proximity to free margins an epidermal autograft was deemed most appropriate to repair the remaining defect.  The graft was trimmed to fit the size of the remaining defect.  The graft was then placed in the primary defect, oriented appropriately, and sutured into place.
Complex Repair And Dermal Autograft Text: The defect edges were debeveled with a #15 scalpel blade.  The primary defect was closed partially with a complex linear closure.  Given the location of the defect, shape of the defect and the proximity to free margins an dermal autograft was deemed most appropriate to repair the remaining defect.  The graft was trimmed to fit the size of the remaining defect.  The graft was then placed in the primary defect, oriented appropriately, and sutured into place.
Complex Repair And Tissue Cultured Epidermal Autograft Text: The defect edges were debeveled with a #15 scalpel blade.  The primary defect was closed partially with a complex linear closure.  Given the location of the defect, shape of the defect and the proximity to free margins an tissue cultured epidermal autograft was deemed most appropriate to repair the remaining defect.  The graft was trimmed to fit the size of the remaining defect.  The graft was then placed in the primary defect, oriented appropriately, and sutured into place.
Complex Repair And Xenograft Text: The defect edges were debeveled with a #15 scalpel blade.  The primary defect was closed partially with a complex linear closure.  Given the location of the defect, shape of the defect and the proximity to free margins a xenograft was deemed most appropriate to repair the remaining defect.  The graft was trimmed to fit the size of the remaining defect.  The graft was then placed in the primary defect, oriented appropriately, and sutured into place.
Complex Repair And Skin Substitute Graft Text: The defect edges were debeveled with a #15 scalpel blade.  The primary defect was closed partially with a complex linear closure.  Given the location of the remaining defect, shape of the defect and the proximity to free margins a skin substitute graft was deemed most appropriate to repair the remaining defect.  The graft was trimmed to fit the size of the remaining defect.  The graft was then placed in the primary defect, oriented appropriately, and sutured into place.
Path Notes (To The Dermatopathologist): Please check margins.
Consent was obtained from the patient. The risks and benefits to therapy were discussed in detail. Specifically, the risks of infection, scarring, bleeding, prolonged wound healing, incomplete removal, allergy to anesthesia, nerve injury and recurrence were addressed. Prior to the procedure, the treatment site was clearly identified and confirmed by the patient. All components of Universal Protocol/PAUSE Rule completed.
Post-Care Instructions: I reviewed with the patient in detail post-care instructions:\\n1. Apply bacitracin over the steri-strips.  \\n2. Cut non-stick pad (Telfa) to cover the steri-strips\\n3. Apply tape (hypafix) over the non-stick pad\\n4. Change once per day for 5 days\\n5. Shower with bandage on, change bandage after shower\\n\\nPatient is not to engage in any heavy lifting, exercise, hot tub, or swimming for the next 14 days. Should the patient develop any fevers, chills, bleeding, severe pain patient will contact the office immediately.
Home Suture Removal Text: Patient was provided a home suture removal kit and will remove their sutures at home.  If they have any questions or difficulties they will call the office.
Where Do You Want The Question To Include Opioid Counseling Located?: Case Summary Tab
Information: Selecting Yes will display possible errors in your note based on the variables you have selected. This validation is only offered as a suggestion for you. PLEASE NOTE THAT THE VALIDATION TEXT WILL BE REMOVED WHEN YOU FINALIZE YOUR NOTE. IF YOU WANT TO FAX A PRELIMINARY NOTE YOU WILL NEED TO TOGGLE THIS TO 'NO' IF YOU DO NOT WANT IT IN YOUR FAXED NOTE.

## 2021-05-10 NOTE — TELEPHONE ENCOUNTER
Received request via: Patient    Was the patient seen in the last year in this department? Yes    Does the patient have an active prescription (recently filled or refills available) for medication(s) requested? No     Requested Prescriptions     Pending Prescriptions Disp Refills   • gabapentin (NEURONTIN) 600 MG tablet       Sig: Take 1 tablet by mouth 2 times a day.       pt LVM requesting refill on rx stating original was only filled for 10 days.

## 2021-05-12 ENCOUNTER — NON-PROVIDER VISIT (OUTPATIENT)
Dept: VASCULAR LAB | Facility: MEDICAL CENTER | Age: 71
End: 2021-05-12
Attending: INTERNAL MEDICINE
Payer: MEDICARE

## 2021-05-12 DIAGNOSIS — I10 ESSENTIAL HYPERTENSION: ICD-10-CM

## 2021-05-12 PROCEDURE — 93790 AMBL BP MNTR W/SW I&R: CPT | Performed by: INTERNAL MEDICINE

## 2021-05-12 RX ORDER — LOSARTAN POTASSIUM 25 MG/1
25 TABLET ORAL DAILY
Qty: 30 TABLET | Refills: 11 | Status: SHIPPED | OUTPATIENT
Start: 2021-05-12 | End: 2021-06-11 | Stop reason: SDUPTHER

## 2021-05-12 NOTE — PROGRESS NOTES
Pt here today for ABPM monitor.  She states her blood pressure has been high at home for the past few days 180-190/80-90.  In office 199/96, then 166/93, HR 64.  Pt has appt on Friday to discuss.  Will order losartan as she will likely need an agent for BP control.    Pao Beth [facility]

## 2021-05-13 NOTE — PROGRESS NOTES
Ambulatory blood pressure monitor results reviewed  Full report under media tab  Reasonable data acquisition    Mean daytime: 126/71 consistent with overall well-controlled mean out of office blood pressure    Nocturnal dip: Appears blunted    There is significant lability and a gap with no readings in the afternoon for unclear reasons    Clinical correlation needed.  We will discuss with patient follow-up visit    Michael Bloch, MD  Vascular Care    C

## 2021-05-14 ENCOUNTER — OFFICE VISIT (OUTPATIENT)
Dept: VASCULAR LAB | Facility: MEDICAL CENTER | Age: 71
End: 2021-05-14
Attending: INTERNAL MEDICINE
Payer: MEDICARE

## 2021-05-14 VITALS
SYSTOLIC BLOOD PRESSURE: 123 MMHG | HEIGHT: 63 IN | WEIGHT: 151 LBS | DIASTOLIC BLOOD PRESSURE: 70 MMHG | BODY MASS INDEX: 26.75 KG/M2 | HEART RATE: 60 BPM

## 2021-05-14 DIAGNOSIS — I10 ESSENTIAL HYPERTENSION: ICD-10-CM

## 2021-05-14 DIAGNOSIS — E78.5 DYSLIPIDEMIA: ICD-10-CM

## 2021-05-14 DIAGNOSIS — E78.1 HYPERTRIGLYCERIDEMIA: ICD-10-CM

## 2021-05-14 PROCEDURE — 99212 OFFICE O/P EST SF 10 MIN: CPT

## 2021-05-14 PROCEDURE — 99214 OFFICE O/P EST MOD 30 MIN: CPT | Performed by: NURSE PRACTITIONER

## 2021-05-14 RX ORDER — PROPRANOLOL HYDROCHLORIDE 160 MG/1
160 CAPSULE, EXTENDED RELEASE ORAL
COMMUNITY
End: 2022-03-30

## 2021-05-14 RX ORDER — MIRTAZAPINE 15 MG/1
15 TABLET, FILM COATED ORAL NIGHTLY
COMMUNITY
End: 2022-02-11

## 2021-05-14 ASSESSMENT — ENCOUNTER SYMPTOMS
PALPITATIONS: 0
FEVER: 0
TREMORS: 0
FOCAL WEAKNESS: 0
SEIZURES: 0
MYALGIAS: 0
SHORTNESS OF BREATH: 0
DIZZINESS: 0
NERVOUS/ANXIOUS: 0
BRUISES/BLEEDS EASILY: 0
DOUBLE VISION: 0
HEADACHES: 0
WHEEZING: 0
CHILLS: 0
FALLS: 0
WEIGHT LOSS: 1
BLURRED VISION: 0
WEAKNESS: 0

## 2021-05-14 ASSESSMENT — FIBROSIS 4 INDEX: FIB4 SCORE: 1.57

## 2021-05-14 NOTE — PROGRESS NOTES
"[facility] - Follow-up   Date of Service: 5/14/21    Marcia Koch has been referred for evaluation and management of dyslipidemia    Referral Source: Sonali Trammell M.D.     HPI    Interval hx/concerns: never started Lovaza; unclear if taking pravastatin- she will check when she gets home.  Has had some BP spikes recently; started on losartan 25mg earlier this wk by APN.  Had 24abpm just prior that showed daytime avg of 126/71.  She notes she stopped taking daily Xanax ~20 days ago.  Did taper somewhat, then quit \"cold turkey.\"  Still smoking, down to 4cig/day.  Did not like patch.    History of ASCVD: No  Other Established (non-atherosclerotic) Vascular Disease, if Present: None  Age at Initial Diagnosis of Dyslipidemia: at least 2016     Current Prescription Lipid Lowering Medications - including dose:   Statin: pravastatin 20mg   Non-Statin: None  Current Lipid Lowering and Related Supplements:   None  Any Current Side Effects Potentially Related to Lipid Lowering therapy?   No  Current Adherence to Lipid Lowering Therapies   Partial/unclear  Previously Attempted Interventions for Lipids - including outcome  Statin: crestor, lipitor     Outcome: cough, knee pain - respectively   Non-Statin: None    Outcome: N/A  Any Previous History of Statin Intolerance?   Yes, Details: crestor, lipitor  Baseline Lipids Prior to Treatment:      Ref. Range 8/21/2020 12:13   Cholesterol,Tot Latest Ref Range: 100 - 199 mg/dL 322 (H)   Triglycerides Latest Ref Range: 0 - 149 mg/dL 363 (H)   HDL Latest Ref Range: >=40 mg/dL 43   LDL Latest Ref Range: <100 mg/dL 206 (H)     Other Pertinent History:     Anticoagulation/antiplats: No    Tobacco:   reports that she has been smoking cigarettes. She has a 6.25 pack-year smoking history. She has never used smokeless tobacco.   Down to 4cigs/day.  Is motivated to quit.  Did not like patches; made her skin itch and felt like \"too much nicotine.\"  Previously quit for 9 yrs " by using NRT patches     HTN:  Current HTN concerns: spikes recently in 180-190s sys.  Had normal BPs in md offices  ADRs: No  HTN sx:  No current blurred or changed vision, chest pain, shortness of breath, headache, nausea, dizziness/vertigo   Home BP log:na  24h ABPM completed: 2021 daytime avg 126/71  Adherence to current HTN meds: compliant all of the time     Anticoagulation/antiplats: None     T2D:  Lab Results   Component Value Date    HBA1C 5.6 10/26/2020    no current symtptoms or meds.     Other:  Hypothyroidism:  none reported   Liver disease: none reported   Renal disease/nephrotic syndrome:  none reported   Vitamin D deficiency: none reported   Alcohol abuse:  Prior heavy etoh use, currently reports reduced and <1/2 bottle wine/day.  Denies prior withdrawals, sz, DTs.      CURRENT MEDICATIONS:   Current Outpatient Medications:   •  propranolol CR, Take 160 mg by mouth., Taking  •  OMEPRAZOLE PO, 20 mg, Oral, DAILY, Taking  •  mirtazapine, 15 mg, Oral, Nightly, Taking  •  losartan, 25 mg, Oral, DAILY, Taking  •  gabapentin, 600 mg, Oral, BID, Taking  •  buPROPion, 300 mg, Oral, QAM, Taking  •  pravastatin, , Taking  •  citalopram, 40 mg, Oral, Q EVENING, Taking  •  Doxylamine Succinate, Sleep, (SLEEP AID PO), 1 tablet, Oral, QHS (Patient not taking: Reported on 2021), Not Taking    ALLERGIES: Atorvastatin, Codeine, Lisinopril, Morphine, and Rosuvastatin    SOCIAL HISTORY   Social History     Tobacco Use   • Smoking status: Current Every Day Smoker     Packs/day: 0.25     Years: 25.00     Pack years: 6.25     Types: Cigarettes     Last attempt to quit: 2011     Years since quittin.7   • Smokeless tobacco: Never Used   Vaping Use   • Vaping Use: Never used   Substance Use Topics   • Alcohol use: Not Currently     Alcohol/week: 3.6 oz     Types: 6 Glasses of wine per week     Comment: Occasionally   • Drug use: No     Change in weight: down 12lbs  BMI Readings from Last 5 Encounters:  "  05/14/21 26.75 kg/m²   04/23/21 27.46 kg/m²   04/18/21 26.95 kg/m²   03/10/21 27.61 kg/m²   02/12/21 28.12 kg/m²   ]  Exercise habits: no regular exercise program  Diet: low sodium, lean meats, lots of veggies    Review of Systems   Constitutional: Positive for weight loss. Negative for chills, fever and malaise/fatigue.   Eyes: Negative for blurred vision and double vision.   Respiratory: Negative for shortness of breath and wheezing.    Cardiovascular: Negative for chest pain, palpitations and leg swelling.   Musculoskeletal: Negative for falls, joint pain and myalgias.   Skin: Positive for itching (with nicotine patch).   Neurological: Negative for dizziness, tremors, focal weakness, seizures, weakness and headaches.   Endo/Heme/Allergies: Does not bruise/bleed easily.   Psychiatric/Behavioral: The patient is not nervous/anxious.      Vitals:    05/14/21 1419   BP: 123/70   BP Location: Left arm   Patient Position: Sitting   BP Cuff Size: Adult   Pulse: 60   Weight: 68.5 kg (151 lb)   Height: 1.6 m (5' 3\")      BP Readings from Last 5 Encounters:   05/14/21 123/70   04/23/21 114/70   04/18/21 142/84   03/10/21 144/84   02/23/21 125/80     Physical Exam  Constitutional:       General: She is not in acute distress.     Appearance: She is well-developed. She is not diaphoretic.   HENT:      Head: Normocephalic.   Cardiovascular:      Rate and Rhythm: Normal rate and regular rhythm.      Heart sounds: Normal heart sounds. No murmur heard.     Pulmonary:      Effort: Pulmonary effort is normal. No respiratory distress.      Breath sounds: Normal breath sounds. No wheezing.   Musculoskeletal:         General: Normal range of motion.   Skin:     General: Skin is warm and dry.   Neurological:      Mental Status: She is alert and oriented to person, place, and time.   Psychiatric:         Mood and Affect: Mood normal.         Behavior: Behavior normal.       DATA REVIEW:  Most Recent Lipid Panel:   Lab Results "   Component Value Date    CHOLSTRLTOT 220 (H) 01/15/2021    TRIGLYCERIDE 345 (H) 01/15/2021    HDL 46 01/15/2021     (H) 01/15/2021     Results for VIDHI ROSALES (MRN [id number]) as of 1/21/2021 10:40   Ref. Range 1/15/2021 16:10   LDL Direct Latest Ref Range: 0 - 129 mg/dL 137 (H)   Lipoprotein A Latest Ref Range: <=29 mg/dL 15     Other Pertinent Blood Work:   Lab Results   Component Value Date    SODIUM 137 03/10/2021    POTASSIUM 4.2 03/10/2021    CHLORIDE 100 03/10/2021    CO2 25 03/10/2021    ANION 12.0 03/10/2021    GLUCOSE 87 03/10/2021    BUN 9 03/10/2021    CREATININE 0.73 03/10/2021    CALCIUM 9.1 03/10/2021    ASTSGOT 18 03/10/2021    ALTSGPT 14 03/10/2021    ALKPHOSPHAT 124 03/10/2021    TBILIRUBIN 0.4 03/10/2021    ALBUMIN 4.4 03/10/2021    AGRATIO 1.6 03/10/2021    LIPOPROTA 15 01/15/2021    CPKTOTAL 44 01/15/2021      Ref. Range 2/13/2018 14:20   TSH Latest Ref Range: 0.380 - 5.330 uIU/mL 2.530      Ref. Range 2/13/2018 14:20   25-Hydroxy   Vitamin D 25 Latest Ref Range: 30 - 100 ng/mL 20 (L)   Folate -Folic Acid Latest Ref Range: >4.0 ng/mL 21.1   Homocysteine Latest Ref Range: <11.00 umol/L 9.58   Vitamin B12 -True Cobalamin Latest Ref Range: 211 - 911 pg/mL 305      Ref. Range 1/15/2021 16:10   25-Hydroxy   Vitamin D 25 Latest Ref Range: 30 - 100 ng/mL 77     VASCULAR IMAGING:     Last EKG:     ASSESSMENT AND PLAN  1. Essential hypertension  Comp Metabolic Panel    MICROALBUMIN CREAT RATIO URINE   2. Dyslipidemia  Lipid Profile   3. Hypertriglyceridemia  Lipid Profile        Patient Type, check all that apply:   Primary Prevention    Established Atherosclerotic Cardiovascular Disease (ASCVD)  No    Other Established (non-atherosclerotic) Vascular Disease, if Present:  None    Evidence of Heterozygous Familial Hypercholesterolemia (FH):   possible but less likely (LDLc >190 baseline, no other baseline fhx or pmhx of ascvd)  Phenotypic FH but likely polygenic     ACC/AHA Indication for  Statin Therapy, callum all that apply:  Baseline Calculated ASCVD risk >7.5%: Indication for Moderate intensity statin    Calculated Risk for ASCVD, if applicable    N/A, LDLc >190     Other Significant Risk Markers, if any, callum all that apply   None   - continue to monitor direct LDL-C and apoB due to hypertrig     Goal LDL-C and nonHDL-C based on Clinic Protocol  NLA/ACC/AHA risk category: High:  3+ major ASCVD risk factors, LDL-C >189  Tx goal: non-HDL <130, LDL-C <100 (optional: apoB<90)   At goal: not previously; due for labwork  - update labs once fasting    Lifestyle Recommendations From Today’s Visit:      Smoking:  Action stage, prior successful quit for 9yrs     reports that she has been smoking cigarettes. She has a 6.25 pack-year smoking history. She has never used smokeless tobacco.   Has reduced to 4 cigs/day   Provided strong recommendation for complete cessation and informed this is the primary contributor to the majority of all ASCVD and cancer-related conditions and can result in significant morbidity and early mortality.   - reviewed resources for cessation including tobacco cessation clinic visit, pharmacotx meds, quit lines  - face-to-face counseling - 6min  - start NRT (gum/lozenge) to replace 1 cig/day, with gradual decrease q2wks, until goal of 0cig/day  - review at every visit      Physical Activity: encouraged healthy daily activity, even if just walking    Weight Management and Nutrition: Dietary plan was discussed with patient at this visit including DASH, low sodium.   Continue slow, steady wt loss!    Eating Plan: Concentrate on  Low sat/Trans fat and DASH/Med Style diet   - reduce etoh consumption     Lipid lowering medication management:     Statin Therapy Recommendations from Today’s Visit:   - intol to atorva, rosuva  - verify taking pravastatin 20mg qhs; would likely need 40mg qhs dose in future  - check labs    Non-Statin Medications Recommendations from Today’s Visit:   - add  "zetia as next agent   - never started lovaza 2g BID; can re-initiate after labs    Indication for PCSK9 Inhibitor, if applicable:  Not currently indicated    Supplements Recommended at this visit:   1) encouraged cholest off   2) continue vit D3 reduce 2,000 units     Recommendations for Other Cardiovascular Risk Factors, callum all that apply:     1) Blood Pressure Management:  ACC/AHA (2017) goal <130/80  Home BP at goal: yes  Office BP at goal: yes  24h ABPM: 5/2021: daytime avg 126/71   Echo: normal   UACR: n/a   Plan:   Monitoring:   - start/continue home BP monitoring, reviewed correct technique:  Yes, and gave log  - order 24h ABPM:  1/21/21   Medications: continue propranolol 160mg ER daily   Continue losartan 25mg daily at present; unclear if this is needed?  Pt states it, \"makes her feel so much better.\"  Watch BP log closely; call for sustained highs/lows    2) Glycemic Status: Prediabetic, IFG only  Lab Results   Component Value Date    HBA1C 5.6 10/26/2020   prior high 5.7   Plan:  - continue healthy diet, weight reduction, daily physical activity   - consider metformin up to 850mg BID to slow or offset progression to T2D as per DPP trial   - monitor labs Q 6-12mo     3) Antithrombotic therapy: None    Other Issues:    1) psychiatric disorders on multiple meds  - defer mgmt to psychiatry     Studies Ordered at Todays Visit: None   Blood Work Ordered At Today’s visit: lipids, CMP, urine for MA    Follow-up: 6wks to review labs, intensify lipid lowering therapy,  and review BP    BENITEZ Quezada   [facility]   [facility]   [phone]     CC:  Dr. Trammell  "

## 2021-05-19 ENCOUNTER — APPOINTMENT (OUTPATIENT)
Dept: MEDICAL GROUP | Facility: MEDICAL CENTER | Age: 71
End: 2021-05-19
Payer: MEDICARE

## 2021-05-28 ENCOUNTER — APPOINTMENT (OUTPATIENT)
Dept: RADIOLOGY | Facility: MEDICAL CENTER | Age: 71
End: 2021-05-28
Attending: INTERNAL MEDICINE
Payer: MEDICARE

## 2021-06-07 RX ORDER — BUPROPION HYDROCHLORIDE 300 MG/1
TABLET ORAL
Qty: 100 TABLET | Refills: 1 | Status: SHIPPED | OUTPATIENT
Start: 2021-06-07 | End: 2021-07-19 | Stop reason: SDUPTHER

## 2021-06-11 DIAGNOSIS — I10 ESSENTIAL HYPERTENSION: ICD-10-CM

## 2021-06-11 RX ORDER — LOSARTAN POTASSIUM 25 MG/1
25 TABLET ORAL DAILY
Qty: 90 TABLET | Refills: 1 | Status: SHIPPED | OUTPATIENT
Start: 2021-06-11 | End: 2021-07-19 | Stop reason: SDUPTHER

## 2021-06-11 NOTE — TELEPHONE ENCOUNTER
Looks like pharmacy might need a 90-day supply, losartan was recently filled 1 month ago.  We will send in a 90-day +1 refill of losartan per protocol

## 2021-07-19 RX ORDER — GABAPENTIN 600 MG/1
600 TABLET ORAL 2 TIMES DAILY
Qty: 180 TABLET | Refills: 3 | Status: SHIPPED | OUTPATIENT
Start: 2021-07-19 | End: 2021-08-20 | Stop reason: SDUPTHER

## 2021-07-19 RX ORDER — BUPROPION HYDROCHLORIDE 300 MG/1
300 TABLET ORAL EVERY MORNING
Qty: 100 TABLET | Refills: 1 | Status: SHIPPED | OUTPATIENT
Start: 2021-07-19 | End: 2022-08-30

## 2021-07-21 NOTE — ED NOTES
Pt wants to leave against medical advice, ERP aware. ERP to speak with pt.    · Modify activity as instructed.  Continue to clean and dress the incision   · Follow RICE protocol: Rest, Ice (not directly on the skin) and Elevate   · Take antiinflammatory medication as directed, (if tolerated)  · Take Prilosec or Pepcid while taking any antiinflammatory (if tolerated) medication  · Maintain proper nutrition   · Take the Augmentin antibiotic as ordered  · Band aids provided  · If you are a current smoker, quit or limit smoking  · Please follow up as instructed or sooner as needed

## 2021-08-23 RX ORDER — GABAPENTIN 600 MG/1
600 TABLET ORAL 2 TIMES DAILY
Qty: 180 TABLET | Refills: 3 | Status: SHIPPED | OUTPATIENT
Start: 2021-08-23 | End: 2022-08-30

## 2021-10-13 PROCEDURE — 93005 ELECTROCARDIOGRAM TRACING: CPT

## 2021-10-13 PROCEDURE — 93005 ELECTROCARDIOGRAM TRACING: CPT | Performed by: EMERGENCY MEDICINE

## 2021-10-13 PROCEDURE — 99284 EMERGENCY DEPT VISIT MOD MDM: CPT

## 2021-10-13 RX ORDER — ZOLPIDEM TARTRATE 5 MG/1
2.5-5 TABLET ORAL NIGHTLY PRN
COMMUNITY

## 2021-10-13 ASSESSMENT — FIBROSIS 4 INDEX: FIB4 SCORE: 1.59

## 2021-10-14 ENCOUNTER — HOSPITAL ENCOUNTER (EMERGENCY)
Facility: MEDICAL CENTER | Age: 71
End: 2021-10-14
Attending: EMERGENCY MEDICINE
Payer: MEDICARE

## 2021-10-14 ENCOUNTER — APPOINTMENT (OUTPATIENT)
Dept: RADIOLOGY | Facility: MEDICAL CENTER | Age: 71
End: 2021-10-14
Attending: EMERGENCY MEDICINE
Payer: MEDICARE

## 2021-10-14 VITALS
HEART RATE: 63 BPM | BODY MASS INDEX: 25.51 KG/M2 | OXYGEN SATURATION: 93 % | HEIGHT: 63 IN | SYSTOLIC BLOOD PRESSURE: 160 MMHG | TEMPERATURE: 97.4 F | RESPIRATION RATE: 17 BRPM | DIASTOLIC BLOOD PRESSURE: 79 MMHG | WEIGHT: 143.96 LBS

## 2021-10-14 DIAGNOSIS — J18.9 PNEUMONIA OF LEFT LOWER LOBE DUE TO INFECTIOUS ORGANISM: ICD-10-CM

## 2021-10-14 DIAGNOSIS — R07.9 ACUTE CHEST PAIN: ICD-10-CM

## 2021-10-14 DIAGNOSIS — F41.9 ANXIETY: ICD-10-CM

## 2021-10-14 LAB
ANION GAP SERPL CALC-SCNC: 16 MMOL/L (ref 7–16)
BASOPHILS # BLD AUTO: 0.9 % (ref 0–1.8)
BASOPHILS # BLD: 0.07 K/UL (ref 0–0.12)
BUN SERPL-MCNC: 3 MG/DL (ref 8–22)
CALCIUM SERPL-MCNC: 9.6 MG/DL (ref 8.4–10.2)
CHLORIDE SERPL-SCNC: 94 MMOL/L (ref 96–112)
CO2 SERPL-SCNC: 24 MMOL/L (ref 20–33)
CREAT SERPL-MCNC: 0.43 MG/DL (ref 0.5–1.4)
EKG IMPRESSION: NORMAL
EOSINOPHIL # BLD AUTO: 0.12 K/UL
EOSINOPHIL NFR BLD: 1.5 % (ref 0–6.9)
ERYTHROCYTE [DISTWIDTH] IN BLOOD BY AUTOMATED COUNT: 44.1 FL (ref 35.9–50)
GLUCOSE SERPL-MCNC: 99 MG/DL (ref 65–99)
HCT VFR BLD AUTO: 45.7 % (ref 42–52)
HGB BLD-MCNC: 16 G/DL (ref 14–18)
IMM GRANULOCYTES # BLD AUTO: 0.03 K/UL (ref 0–0.11)
IMM GRANULOCYTES NFR BLD AUTO: 0.4 % (ref 0–0.9)
LYMPHOCYTES # BLD AUTO: 2.97 K/UL (ref 1–4.8)
LYMPHOCYTES NFR BLD: 36.5 % (ref 22–41)
MCH RBC QN AUTO: 34 PG (ref 27–33)
MCHC RBC AUTO-ENTMCNC: 35 G/DL (ref 33.7–35.3)
MCV RBC AUTO: 97.2 FL (ref 81.4–97.8)
MONOCYTES # BLD AUTO: 0.66 K/UL (ref 0–0.85)
MONOCYTES NFR BLD AUTO: 8.1 % (ref 0–13.4)
NEUTROPHILS # BLD AUTO: 4.28 K/UL (ref 1.82–7.42)
NEUTROPHILS NFR BLD: 52.6 % (ref 44–72)
NRBC # BLD AUTO: 0 K/UL
NRBC BLD-RTO: 0 /100 WBC
PLATELET # BLD AUTO: 158 K/UL (ref 164–446)
PMV BLD AUTO: 10.3 FL (ref 9–12.9)
POTASSIUM SERPL-SCNC: 4.1 MMOL/L (ref 3.6–5.5)
RBC # BLD AUTO: 4.7 M/UL (ref 4.7–6.1)
SODIUM SERPL-SCNC: 134 MMOL/L (ref 135–145)
TROPONIN T SERPL-MCNC: 6 NG/L (ref 6–19)
WBC # BLD AUTO: 8.1 K/UL (ref 4.8–10.8)

## 2021-10-14 PROCEDURE — 71045 X-RAY EXAM CHEST 1 VIEW: CPT

## 2021-10-14 PROCEDURE — 85025 COMPLETE CBC W/AUTO DIFF WBC: CPT

## 2021-10-14 PROCEDURE — 700102 HCHG RX REV CODE 250 W/ 637 OVERRIDE(OP): Performed by: EMERGENCY MEDICINE

## 2021-10-14 PROCEDURE — A9270 NON-COVERED ITEM OR SERVICE: HCPCS | Performed by: EMERGENCY MEDICINE

## 2021-10-14 PROCEDURE — 80048 BASIC METABOLIC PNL TOTAL CA: CPT

## 2021-10-14 PROCEDURE — 84484 ASSAY OF TROPONIN QUANT: CPT

## 2021-10-14 RX ORDER — LORAZEPAM 1 MG/1
1 TABLET ORAL ONCE
Status: COMPLETED | OUTPATIENT
Start: 2021-10-14 | End: 2021-10-14

## 2021-10-14 RX ORDER — AZITHROMYCIN 250 MG/1
TABLET, FILM COATED ORAL
Qty: 6 TABLET | Refills: 0 | Status: SHIPPED | OUTPATIENT
Start: 2021-10-14 | End: 2022-02-11

## 2021-10-14 RX ADMIN — LORAZEPAM 1 MG: 1 TABLET ORAL at 03:26

## 2021-10-14 NOTE — ED NOTES
Patient discharged home in stable condition  AVS provided with recommended follow up and home care instructions and education information  Prescription for Zithromax provided at this time  Patient verbalized understanding  Ambulatory at time of discharge

## 2021-10-14 NOTE — ED NOTES
Patient aware that she is advised against driving after taking Ativan  Patient verbalized understanding and agreeable to order a cab upon discharge

## 2021-10-14 NOTE — ED PROVIDER NOTES
ED Provider Note    ER Provider Note         CHIEF COMPLAINT  Chief Complaint   Patient presents with   • Panic Attack   • Shortness of Breath     started yesterday   • Chest Pain     piercing chest pain for 1 month       HPI  Marcia Koch is a 71 y.o. adult who presents to the Emergency Department who is very stressed given the fact that she is concerned her father may die.  She is also worried about living alone.  She has been having pain for the past month.  She says the pain does not radiate.  There is some shortness of breath that started yesterday.  She says mainly she feels very anxious.  She denies any nausea and vomiting.  She denies any fevers or chills.  There is no report of cough.  Denies any falls.  She denies any suicidal ideation.  She says anxiety is hard to sleep.  She has been taking medication but has not helped.    REVIEW OF SYSTEMS  See HPI for further details. All other systems are negative.     PAST MEDICAL HISTORY   has a past medical history of Alcohol abuse, Alcoholism (HCC), Anxiety, Depression, Hiatus hernia syndrome, High cholesterol, Hypertension, Indigestion, Other specified disorder of intestines, Pneumonia, Psychiatric disorder, Sleep apnea, Snoring, Unspecified urinary incontinence, and Urinary bladder disorder.    SURGICAL HISTORY   has a past surgical history that includes bladder sling female (8/30/2011); teddy by laparoscopy; other abdominal surgery (2002); other (2010); other (1956); other (2010); gyn surgery (1991); and abdominal hysterectomy total.    SOCIAL HISTORY  Social History     Tobacco Use   • Smoking status: Current Every Day Smoker     Packs/day: 0.25     Years: 25.00     Pack years: 6.25     Types: Cigarettes     Last attempt to quit: 8/26/2011     Years since quitting: 10.1   • Smokeless tobacco: Never Used   Vaping Use   • Vaping Use: Never used   Substance Use Topics   • Alcohol use: Yes     Alcohol/week: 3.6 oz     Types: 6 Glasses of wine per week      "Comment: Occasionally   • Drug use: No      Social History     Substance and Sexual Activity   Drug Use No       FAMILY HISTORY  Family History   Problem Relation Age of Onset   • Anxiety disorder Brother    • Depression Brother    • Other Brother          AIDS   • Alcohol abuse Mother    • Cancer Mother         colon cancer age 70s   • Alcohol abuse Father    • Hyperlipidemia Father    • Cancer Sister         breast   • Drug abuse Sister    • Cancer Maternal Aunt         breast   • Cancer Maternal Uncle         nos   • Stroke Neg Hx    • Heart Attack Neg Hx        CURRENT MEDICATIONS  Home Medications     Reviewed by Ilana Almazan R.N. (Registered Nurse) on 10/13/21 at 2301  Med List Status: Partial   Medication Last Dose Status   buPROPion (WELLBUTRIN XL) 300 MG XL tablet 10/13/2021 Active   citalopram (CELEXA) 40 MG Tab Not Taking Active   Doxylamine Succinate, Sleep, (SLEEP AID PO)  Active   gabapentin (NEURONTIN) 600 MG tablet 10/13/2021 Active   losartan (COZAAR) 25 MG Tab Not Taking Active   mirtazapine (REMERON) 15 MG Tab Not Taking Active   OMEPRAZOLE PO Not Taking Active   pravastatin (PRAVACHOL) 20 MG Tab Not Taking Active   propranolol CR (INDERAL LA) 160 MG CAPSULE SR 24 HR capsule 10/13/2021 Active   zolpidem (AMBIEN) 5 MG Tab 10/12/2021 Active                ALLERGIES  Allergies   Allergen Reactions   • Atorvastatin Myalgia   • Codeine Nausea   • Lisinopril Cough     Cough     • Morphine Nausea   • Rosuvastatin Myalgia       PHYSICAL EXAM  VITAL SIGNS: /79   Pulse 63   Temp 36.3 °C (97.4 °F) (Temporal)   Resp 17   Ht 1.6 m (5' 3\")   Wt 65.3 kg (143 lb 15.4 oz)   SpO2 93%   BMI 25.50 kg/m²      Constitutional: Alert and anxious  HENT: No signs of trauma, Bilateral external ears normal, Nose normal.   Eyes: Pupils are equal, Conjunctiva normal, Non-icteric.   Neck: Normal range of motion, No tenderness, Supple, No stridor.   Lymphatic: No lymphadenopathy noted.   Cardiovascular: " Regular rate and rhythm, nondisplaced PMI  Thorax & Lungs: No respiratory distress,  No chest tenderness.   Abdomen: Bowel sounds normal, Soft, No tenderness, No masses, No pulsatile masses. No peritoneal signs.  Skin: Warm, Dry, No erythema, No rash.   Back: No bony tenderness, No CVA tenderness.   Extremities: Intact distal pulses, No edema, No tenderness, No cyanosis.  Musculoskeletal: Good range of motion in all major joints. No tenderness to palpation or major deformities noted.   Neurologic: Alert , Normal motor function, Normal sensory function, No focal deficits noted.   Psychiatric: Very anxious with no suicidal ideation.    DIAGNOSTIC STUDIES / PROCEDURES    EKG Interpretation:  Interpreted by me  Sinus bradycardia rate of 59 with no ST elevation, depression and T wave inversions noted in leads V1 and V2 as well as aVL which is seen on prior.  This is a nonischemic EKG.       LABS  Labs Reviewed   CBC WITH DIFFERENTIAL - Abnormal; Notable for the following components:       Result Value    MCH 34.0 (*)     Platelet Count 158 (*)     All other components within normal limits   BASIC METABOLIC PANEL - Abnormal; Notable for the following components:    Sodium 134 (*)     Chloride 94 (*)     Bun 3 (*)     Creatinine 0.43 (*)     All other components within normal limits   TROPONIN   ESTIMATED GFR       All labs reviewed by me.    RADIOLOGY  DX-CHEST-PORTABLE (1 VIEW)   Final Result      Hazy left basilar parenchymal opacity.           The radiologist's interpretation of all radiological studies have been reviewed by me.    COURSE & MEDICAL DECISION MAKING  Pertinent Labs & Imaging studies reviewed. (See chart for details)    This is a 71 y.o. adult that presents with being very anxious.  She did have an episode of chest pain or shortness of breath therefore I will get an x-ray to evaluate for pneumonia and pneumothorax as well as get a single troponin and EKG to evaluate for myocardial ischemia.  If this is  predominantly anxiety however..     3:19 AM - Patient seen and examined at bedside.     Patient was found to have a low heart score.  At this time the patient is an x-ray there is still consolidation.  I will err on the side of safety and give the patient antibiotics.  I did speak to her about seeing a therapist for her anxiety.  I will give her strict return precautions and follow-up.          FINAL IMPRESSION  1. Acute chest pain    2. Anxiety    3. Pneumonia of left lower lobe due to infectious organism              Electronically signed by: Charli Brennan M.D., 10/14/2021

## 2022-02-03 ENCOUNTER — HOSPITAL ENCOUNTER (OUTPATIENT)
Dept: LAB | Facility: MEDICAL CENTER | Age: 72
End: 2022-02-03
Attending: STUDENT IN AN ORGANIZED HEALTH CARE EDUCATION/TRAINING PROGRAM
Payer: MEDICARE

## 2022-02-03 LAB
ALBUMIN SERPL BCP-MCNC: 4.2 G/DL (ref 3.2–4.9)
ALBUMIN/GLOB SERPL: 2 G/DL
ALP SERPL-CCNC: 127 U/L
ALT SERPL-CCNC: 30 U/L (ref 2–50)
ANION GAP SERPL CALC-SCNC: 14 MMOL/L (ref 7–16)
AST SERPL-CCNC: 97 U/L (ref 12–45)
BASOPHILS # BLD AUTO: 1.1 % (ref 0–1.8)
BASOPHILS # BLD: 0.08 K/UL (ref 0–0.12)
BILIRUB SERPL-MCNC: 0.7 MG/DL (ref 0.1–1.5)
BUN SERPL-MCNC: 8 MG/DL (ref 8–22)
CALCIUM SERPL-MCNC: 8.8 MG/DL (ref 8.5–10.5)
CHLORIDE SERPL-SCNC: 97 MMOL/L (ref 96–112)
CO2 SERPL-SCNC: 25 MMOL/L (ref 20–33)
CREAT SERPL-MCNC: 0.49 MG/DL (ref 0.5–1.4)
EOSINOPHIL # BLD AUTO: 0.06 K/UL
EOSINOPHIL NFR BLD: 0.8 % (ref 0–6.9)
ERYTHROCYTE [DISTWIDTH] IN BLOOD BY AUTOMATED COUNT: 48.5 FL (ref 35.9–50)
EST. AVERAGE GLUCOSE BLD GHB EST-MCNC: 103 MG/DL
FASTING STATUS PATIENT QL REPORTED: NORMAL
FERRITIN SERPL-MCNC: 1963 NG/ML
FOLATE SERPL-MCNC: <2 NG/ML
GLOBULIN SER CALC-MCNC: 2.1 G/DL (ref 1.9–3.5)
GLUCOSE SERPL-MCNC: 98 MG/DL (ref 65–99)
HBA1C MFR BLD: 5.2 % (ref 4–5.6)
HCT VFR BLD AUTO: 46.3 % (ref 42–52)
HGB BLD-MCNC: 16.3 G/DL (ref 14–18)
IMM GRANULOCYTES # BLD AUTO: 0.02 K/UL (ref 0–0.11)
IMM GRANULOCYTES NFR BLD AUTO: 0.3 % (ref 0–0.9)
LYMPHOCYTES # BLD AUTO: 2.73 K/UL (ref 1–4.8)
LYMPHOCYTES NFR BLD: 37 % (ref 22–41)
MAGNESIUM SERPL-MCNC: 1.7 MG/DL (ref 1.5–2.5)
MCH RBC QN AUTO: 35.4 PG (ref 27–33)
MCHC RBC AUTO-ENTMCNC: 35.2 G/DL (ref 33.6–35)
MCV RBC AUTO: 100.4 FL (ref 81.4–97.8)
MONOCYTES # BLD AUTO: 0.61 K/UL (ref 0–0.85)
MONOCYTES NFR BLD AUTO: 8.3 % (ref 0–13.4)
NEUTROPHILS # BLD AUTO: 3.87 K/UL (ref 1.82–7.42)
NEUTROPHILS NFR BLD: 52.5 % (ref 44–72)
NRBC # BLD AUTO: 0 K/UL
NRBC BLD-RTO: 0 /100 WBC
PLATELET # BLD AUTO: 226 K/UL (ref 164–446)
PMV BLD AUTO: 9.3 FL (ref 9–12.9)
POTASSIUM SERPL-SCNC: 4.2 MMOL/L (ref 3.6–5.5)
PROT SERPL-MCNC: 6.3 G/DL (ref 6–8.2)
RBC # BLD AUTO: 4.61 M/UL (ref 4.7–6.1)
SODIUM SERPL-SCNC: 136 MMOL/L (ref 135–145)
TSH SERPL DL<=0.005 MIU/L-ACNC: 3.15 UIU/ML (ref 0.38–5.33)
VIT B12 SERPL-MCNC: 595 PG/ML (ref 211–911)
WBC # BLD AUTO: 7.4 K/UL (ref 4.8–10.8)

## 2022-02-03 PROCEDURE — 83735 ASSAY OF MAGNESIUM: CPT

## 2022-02-03 PROCEDURE — 84443 ASSAY THYROID STIM HORMONE: CPT

## 2022-02-03 PROCEDURE — 82607 VITAMIN B-12: CPT

## 2022-02-03 PROCEDURE — 36415 COLL VENOUS BLD VENIPUNCTURE: CPT

## 2022-02-03 PROCEDURE — 85025 COMPLETE CBC W/AUTO DIFF WBC: CPT

## 2022-02-03 PROCEDURE — 83036 HEMOGLOBIN GLYCOSYLATED A1C: CPT

## 2022-02-03 PROCEDURE — 82746 ASSAY OF FOLIC ACID SERUM: CPT

## 2022-02-03 PROCEDURE — 82728 ASSAY OF FERRITIN: CPT

## 2022-02-03 PROCEDURE — 80053 COMPREHEN METABOLIC PANEL: CPT

## 2022-02-08 ENCOUNTER — HOSPITAL ENCOUNTER (OUTPATIENT)
Facility: MEDICAL CENTER | Age: 72
End: 2022-02-08
Attending: FAMILY MEDICINE
Payer: MEDICARE

## 2022-02-08 ENCOUNTER — OFFICE VISIT (OUTPATIENT)
Dept: URGENT CARE | Facility: CLINIC | Age: 72
End: 2022-02-08
Payer: MEDICARE

## 2022-02-08 VITALS
SYSTOLIC BLOOD PRESSURE: 124 MMHG | HEART RATE: 60 BPM | WEIGHT: 140 LBS | HEIGHT: 63 IN | DIASTOLIC BLOOD PRESSURE: 72 MMHG | RESPIRATION RATE: 20 BRPM | TEMPERATURE: 98.4 F | OXYGEN SATURATION: 96 % | BODY MASS INDEX: 24.8 KG/M2

## 2022-02-08 DIAGNOSIS — M54.50 ACUTE LOW BACK PAIN, UNSPECIFIED BACK PAIN LATERALITY, UNSPECIFIED WHETHER SCIATICA PRESENT: ICD-10-CM

## 2022-02-08 LAB
APPEARANCE UR: CLEAR
BILIRUB UR STRIP-MCNC: NORMAL MG/DL
COLOR UR AUTO: NORMAL
GLUCOSE UR STRIP.AUTO-MCNC: NORMAL MG/DL
KETONES UR STRIP.AUTO-MCNC: NORMAL MG/DL
LEUKOCYTE ESTERASE UR QL STRIP.AUTO: NORMAL
NITRITE UR QL STRIP.AUTO: NORMAL
PH UR STRIP.AUTO: 5 [PH] (ref 5–8)
PROT UR QL STRIP: NORMAL MG/DL
RBC UR QL AUTO: NORMAL
SP GR UR STRIP.AUTO: 1.02
UROBILINOGEN UR STRIP-MCNC: 0.2 MG/DL

## 2022-02-08 PROCEDURE — 87086 URINE CULTURE/COLONY COUNT: CPT

## 2022-02-08 PROCEDURE — 99213 OFFICE O/P EST LOW 20 MIN: CPT | Performed by: FAMILY MEDICINE

## 2022-02-08 PROCEDURE — 81002 URINALYSIS NONAUTO W/O SCOPE: CPT | Performed by: FAMILY MEDICINE

## 2022-02-08 RX ORDER — FOLIC ACID 1 MG/1
1 TABLET ORAL DAILY
COMMUNITY
Start: 2022-02-04

## 2022-02-08 RX ORDER — IBUPROFEN 200 MG
200 TABLET ORAL EVERY 6 HOURS PRN
COMMUNITY
End: 2022-02-11

## 2022-02-08 ASSESSMENT — PAIN SCALES - GENERAL: PAINLEVEL: 9=SEVERE PAIN

## 2022-02-08 ASSESSMENT — FIBROSIS 4 INDEX: FIB4 SCORE: 5.49

## 2022-02-09 DIAGNOSIS — M54.50 ACUTE LOW BACK PAIN, UNSPECIFIED BACK PAIN LATERALITY, UNSPECIFIED WHETHER SCIATICA PRESENT: ICD-10-CM

## 2022-02-11 LAB
BACTERIA UR CULT: NORMAL
SIGNIFICANT IND 70042: NORMAL
SITE SITE: NORMAL
SOURCE SOURCE: NORMAL

## 2022-02-12 NOTE — PROGRESS NOTES
"Chief Complaint   Patient presents with   • Back Pain     lower Rt side of back, worsening today, hx of kidney infection/stones x 1 day             Back Pain  This is a new problem. The current episode started in the past 2 days. The problem occurs constantly. The problem is unchanged. The pain is present in the lumbar spine. The quality of the pain is described as aching. The pain does not radiate. The pain is moderate. The symptoms are aggravated by position. Pertinent negatives include no bladder incontinence, bowel incontinence, dysuria, fever, headaches, numbness or weakness. Treatments tried: motrin.  The treatment provided no relief.     Social History     Tobacco Use   • Smoking status: Former Smoker     Packs/day: 0.25     Years: 25.00     Pack years: 6.25     Types: Cigarettes     Quit date: 8/26/2011     Years since quitting: 10.4   • Smokeless tobacco: Never Used   Vaping Use   • Vaping Use: Never used   Substance Use Topics   • Alcohol use: Yes     Alcohol/week: 3.6 oz     Types: 6 Glasses of wine per week     Comment: Occasionally   • Drug use: No       Family hx was reviewed - no pertinent past family hx      Past medical history was unremarkable and not pertinent to current issue    Review of Systems   Constitutional: Negative for fever.   Gastrointestinal: Negative for bowel incontinence.   Genitourinary: Negative for bladder incontinence, dysuria, urgency and frequency.   Musculoskeletal: Positive for back pain.   Neurological: Negative for weakness, numbness and headaches.   All other systems reviewed and are negative.         Objective:     /72 (BP Location: Left arm, Patient Position: Sitting, BP Cuff Size: Adult)   Pulse 60   Temp 36.9 °C (98.4 °F) (Temporal)   Resp 20   Ht 1.6 m (5' 3\")   Wt 63.5 kg (140 lb)   SpO2 96%     Physical Exam   Constitutional: pt is oriented to person, place, and time. Pt appears well-developed and well-nourished. No distress.   HENT:   Head: " Normocephalic and atraumatic.   Eyes: EOM are normal. Pupils are equal, round, and reactive to light. No scleral icterus.   Neck: Normal range of motion. Neck supple. No thyromegaly present.   Cardiovascular: Normal rate, regular rhythm and normal heart sounds.    Pulmonary/Chest: Effort normal and breath sounds normal. No respiratory distress.  no wheezes.   no rales.  no tenderness.   Musculoskeletal: pt exhibits no edema.        Right knee: Normal.        Left knee: Normal.               Lumbar back: pt exhibits decreased range of motion, spasm and tenderness . Pt exhibits no bony tenderness, no swelling, no edema, no deformity  .   Neurological: patient is alert and oriented to person, place, and time. Patient has normal reflexes. No cranial nerve deficit. Patient exhibits normal muscle tone.   Reflex Scores:       Patellar reflexes are 2+ on the right side and 2+ on the left side.  STRAIGHT LEG RAISE, TIMA NEGATIVE BILAT  Skin: Skin is warm and dry. No rash noted. No erythema.   Psychiatric: patient has a normal mood and affect.  behavior is normal.   Nursing note and vitals reviewed.              Assessment/Plan:             1. Acute low back pain, unspecified back pain laterality, unspecified whether sciatica present   UA unremarkable.   Urine sent for cx  - diclofenac sodium (VOLTAREN) 1 % Gel; Apply 4 g topically every 8 hours as needed.  Dispense: 100 g; Refill: 0            Follow up in one week if no improvement, sooner if symptoms worsen.

## 2022-03-08 ENCOUNTER — HOSPITAL ENCOUNTER (OUTPATIENT)
Dept: RADIOLOGY | Facility: MEDICAL CENTER | Age: 72
End: 2022-03-08
Attending: STUDENT IN AN ORGANIZED HEALTH CARE EDUCATION/TRAINING PROGRAM
Payer: MEDICARE

## 2022-03-08 DIAGNOSIS — Z12.31 VISIT FOR SCREENING MAMMOGRAM: ICD-10-CM

## 2022-03-08 DIAGNOSIS — N95.9 UNSPECIFIED MENOPAUSAL AND PERIMENOPAUSAL DISORDER: ICD-10-CM

## 2022-03-08 PROCEDURE — 77080 DXA BONE DENSITY AXIAL: CPT

## 2022-03-08 PROCEDURE — 77063 BREAST TOMOSYNTHESIS BI: CPT

## 2022-03-10 ENCOUNTER — APPOINTMENT (RX ONLY)
Dept: URBAN - METROPOLITAN AREA CLINIC 4 | Facility: CLINIC | Age: 72
Setting detail: DERMATOLOGY
End: 2022-03-10

## 2022-03-10 DIAGNOSIS — L81.4 OTHER MELANIN HYPERPIGMENTATION: ICD-10-CM

## 2022-03-10 DIAGNOSIS — L82.1 OTHER SEBORRHEIC KERATOSIS: ICD-10-CM

## 2022-03-10 DIAGNOSIS — D22 MELANOCYTIC NEVI: ICD-10-CM

## 2022-03-10 DIAGNOSIS — D18.0 HEMANGIOMA: ICD-10-CM

## 2022-03-10 DIAGNOSIS — Z86.007 PERSONAL HISTORY OF IN-SITU NEOPLASM OF SKIN: ICD-10-CM

## 2022-03-10 DIAGNOSIS — Z85.828 PERSONAL HISTORY OF OTHER MALIGNANT NEOPLASM OF SKIN: ICD-10-CM

## 2022-03-10 DIAGNOSIS — L57.0 ACTINIC KERATOSIS: ICD-10-CM

## 2022-03-10 DIAGNOSIS — Z71.89 OTHER SPECIFIED COUNSELING: ICD-10-CM

## 2022-03-10 PROBLEM — D22.71 MELANOCYTIC NEVI OF RIGHT LOWER LIMB, INCLUDING HIP: Status: ACTIVE | Noted: 2022-03-10

## 2022-03-10 PROBLEM — D22.61 MELANOCYTIC NEVI OF RIGHT UPPER LIMB, INCLUDING SHOULDER: Status: ACTIVE | Noted: 2022-03-10

## 2022-03-10 PROBLEM — D22.72 MELANOCYTIC NEVI OF LEFT LOWER LIMB, INCLUDING HIP: Status: ACTIVE | Noted: 2022-03-10

## 2022-03-10 PROBLEM — D18.01 HEMANGIOMA OF SKIN AND SUBCUTANEOUS TISSUE: Status: ACTIVE | Noted: 2022-03-10

## 2022-03-10 PROBLEM — D22.39 MELANOCYTIC NEVI OF OTHER PARTS OF FACE: Status: ACTIVE | Noted: 2022-03-10

## 2022-03-10 PROBLEM — D22.5 MELANOCYTIC NEVI OF TRUNK: Status: ACTIVE | Noted: 2022-03-10

## 2022-03-10 PROBLEM — D22.62 MELANOCYTIC NEVI OF LEFT UPPER LIMB, INCLUDING SHOULDER: Status: ACTIVE | Noted: 2022-03-10

## 2022-03-10 PROCEDURE — 17003 DESTRUCT PREMALG LES 2-14: CPT

## 2022-03-10 PROCEDURE — 17000 DESTRUCT PREMALG LESION: CPT

## 2022-03-10 PROCEDURE — 99213 OFFICE O/P EST LOW 20 MIN: CPT | Mod: 25

## 2022-03-10 PROCEDURE — ? COUNSELING

## 2022-03-10 PROCEDURE — ? LIQUID NITROGEN

## 2022-03-10 PROCEDURE — ? SUNSCREEN TREATMENT REGIMEN

## 2022-03-10 ASSESSMENT — LOCATION ZONE DERM
LOCATION ZONE: NOSE
LOCATION ZONE: TRUNK
LOCATION ZONE: ARM
LOCATION ZONE: LEG
LOCATION ZONE: FACE

## 2022-03-10 ASSESSMENT — LOCATION DETAILED DESCRIPTION DERM
LOCATION DETAILED: RIGHT CENTRAL MALAR CHEEK
LOCATION DETAILED: RIGHT ANTERIOR PROXIMAL THIGH
LOCATION DETAILED: LEFT RIB CAGE
LOCATION DETAILED: LEFT NASAL DORSUM
LOCATION DETAILED: LEFT MEDIAL UPPER BACK
LOCATION DETAILED: RIGHT DISTAL PRETIBIAL REGION
LOCATION DETAILED: EPIGASTRIC SKIN
LOCATION DETAILED: LEFT SUPERIOR MEDIAL UPPER BACK
LOCATION DETAILED: LEFT MEDIAL MALAR CHEEK
LOCATION DETAILED: RIGHT PROXIMAL POSTERIOR UPPER ARM
LOCATION DETAILED: INFERIOR THORACIC SPINE
LOCATION DETAILED: LEFT ANTERIOR DISTAL UPPER ARM
LOCATION DETAILED: LEFT MEDIAL BREAST 10-11:00 REGION
LOCATION DETAILED: LEFT ANTERIOR PROXIMAL THIGH
LOCATION DETAILED: LEFT AXILLARY TAIL OF BREAST
LOCATION DETAILED: LEFT MEDIAL DISTAL PRETIBIAL REGION
LOCATION DETAILED: LEFT PROXIMAL PRETIBIAL REGION
LOCATION DETAILED: SUPERIOR THORACIC SPINE
LOCATION DETAILED: LEFT PROXIMAL POSTERIOR UPPER ARM
LOCATION DETAILED: RIGHT INFERIOR FOREHEAD
LOCATION DETAILED: LEFT INFERIOR CENTRAL MALAR CHEEK
LOCATION DETAILED: LEFT INFERIOR MEDIAL FOREHEAD
LOCATION DETAILED: RIGHT PROXIMAL RADIAL DORSAL FOREARM
LOCATION DETAILED: RIGHT INFERIOR CENTRAL MALAR CHEEK
LOCATION DETAILED: MIDDLE STERNUM
LOCATION DETAILED: RIGHT PROXIMAL PRETIBIAL REGION
LOCATION DETAILED: LOWER STERNUM
LOCATION DETAILED: RIGHT ANTERIOR DISTAL UPPER ARM

## 2022-03-10 ASSESSMENT — LOCATION SIMPLE DESCRIPTION DERM
LOCATION SIMPLE: RIGHT THIGH
LOCATION SIMPLE: NOSE
LOCATION SIMPLE: LEFT THIGH
LOCATION SIMPLE: LEFT PRETIBIAL REGION
LOCATION SIMPLE: LEFT UPPER ARM
LOCATION SIMPLE: LEFT BREAST
LOCATION SIMPLE: RIGHT POSTERIOR UPPER ARM
LOCATION SIMPLE: RIGHT FOREHEAD
LOCATION SIMPLE: LEFT CHEEK
LOCATION SIMPLE: LEFT UPPER BACK
LOCATION SIMPLE: LEFT FOREHEAD
LOCATION SIMPLE: ABDOMEN
LOCATION SIMPLE: RIGHT UPPER ARM
LOCATION SIMPLE: CHEST
LOCATION SIMPLE: RIGHT FOREARM
LOCATION SIMPLE: RIGHT CHEEK
LOCATION SIMPLE: RIGHT PRETIBIAL REGION
LOCATION SIMPLE: UPPER BACK
LOCATION SIMPLE: LEFT POSTERIOR UPPER ARM

## 2022-03-30 ENCOUNTER — OFFICE VISIT (OUTPATIENT)
Dept: BEHAVIORAL HEALTH | Facility: CLINIC | Age: 72
End: 2022-03-30
Payer: MEDICARE

## 2022-03-30 VITALS — BODY MASS INDEX: 24.27 KG/M2 | HEIGHT: 63 IN | WEIGHT: 137 LBS

## 2022-03-30 DIAGNOSIS — F33.0 MAJOR DEPRESSIVE DISORDER, RECURRENT EPISODE, MILD (HCC): ICD-10-CM

## 2022-03-30 DIAGNOSIS — F41.1 GAD (GENERALIZED ANXIETY DISORDER): ICD-10-CM

## 2022-03-30 DIAGNOSIS — F10.20 ALCOHOL USE DISORDER, MODERATE, DEPENDENCE (HCC): ICD-10-CM

## 2022-03-30 PROBLEM — F10.90 ALCOHOL USE: Status: RESOLVED | Noted: 2021-01-25 | Resolved: 2022-03-30

## 2022-03-30 PROBLEM — F41.9 ANXIETY: Status: RESOLVED | Noted: 2017-05-11 | Resolved: 2022-03-30

## 2022-03-30 PROBLEM — Z11.9 SCREENING EXAMINATION FOR INFECTIOUS DISEASE: Status: RESOLVED | Noted: 2021-01-25 | Resolved: 2022-03-30

## 2022-03-30 PROBLEM — Z72.0 TOBACCO USE: Status: RESOLVED | Noted: 2021-01-25 | Resolved: 2022-03-30

## 2022-03-30 PROBLEM — Z78.9 ALCOHOL USE: Status: RESOLVED | Noted: 2021-01-25 | Resolved: 2022-03-30

## 2022-03-30 PROBLEM — F10.11 HISTORY OF ALCOHOL ABUSE: Status: RESOLVED | Noted: 2020-08-12 | Resolved: 2022-03-30

## 2022-03-30 PROCEDURE — 99204 OFFICE O/P NEW MOD 45 MIN: CPT | Performed by: PSYCHIATRY & NEUROLOGY

## 2022-03-30 PROCEDURE — 90833 PSYTX W PT W E/M 30 MIN: CPT | Performed by: PSYCHIATRY & NEUROLOGY

## 2022-03-30 RX ORDER — PRAVASTATIN SODIUM 40 MG
40 TABLET ORAL NIGHTLY
COMMUNITY

## 2022-03-30 RX ORDER — CLONAZEPAM 0.5 MG/1
TABLET ORAL 3 TIMES DAILY
COMMUNITY
End: 2022-08-30

## 2022-03-30 RX ORDER — PROPRANOLOL HYDROCHLORIDE 80 MG/1
80 TABLET ORAL 2 TIMES DAILY
COMMUNITY

## 2022-03-30 ASSESSMENT — ANXIETY QUESTIONNAIRES
6. BECOMING EASILY ANNOYED OR IRRITABLE: NEARLY EVERY DAY
1. FEELING NERVOUS, ANXIOUS, OR ON EDGE: NEARLY EVERY DAY
GAD7 TOTAL SCORE: 21
2. NOT BEING ABLE TO STOP OR CONTROL WORRYING: NEARLY EVERY DAY
3. WORRYING TOO MUCH ABOUT DIFFERENT THINGS: NEARLY EVERY DAY
5. BEING SO RESTLESS THAT IT IS HARD TO SIT STILL: NEARLY EVERY DAY
IF YOU CHECKED OFF ANY PROBLEMS ON THIS QUESTIONNAIRE, HOW DIFFICULT HAVE THESE PROBLEMS MADE IT FOR YOU TO DO YOUR WORK, TAKE CARE OF THINGS AT HOME, OR GET ALONG WITH OTHER PEOPLE: VERY DIFFICULT
7. FEELING AFRAID AS IF SOMETHING AWFUL MIGHT HAPPEN: NEARLY EVERY DAY
4. TROUBLE RELAXING: NEARLY EVERY DAY

## 2022-03-30 ASSESSMENT — PATIENT HEALTH QUESTIONNAIRE - PHQ9
5. POOR APPETITE OR OVEREATING: 1 - SEVERAL DAYS
SUM OF ALL RESPONSES TO PHQ QUESTIONS 1-9: 9
CLINICAL INTERPRETATION OF PHQ2 SCORE: 2

## 2022-03-30 ASSESSMENT — FIBROSIS 4 INDEX: FIB4 SCORE: 5.49

## 2022-03-30 NOTE — PROGRESS NOTES
INITIAL PSYCHIATRY EVALUATION      Chief Complaint   Patient presents with   • Drug / Alcohol Assessment   • Depression   • Anxiety       History Of Present Illness:  Marcia Koch is a 71 y.o. adult with history of  referred by Sharmin Mcdermott P.A.-C for evaluation of alcohol dependence.  She has struggled with alcohol addiction for about 50 years.  She was sober for 5 years up until the pandemic and has relapsed on alcohol in 2020.  She was drinking about 2 bottles of wine but recently has started cutting back on her alcohol use.  She is currently having 2-3 shots of vodka daily.  She has been seeing her PCP every 10 days who is prescribing her Klonopin 0.5 mg 3 times daily which she feels is helping her taper off alcohol.  She has been on Wellbutrin for 10+ years and is not sure what it helps her with.  She was having a hard time with her memory, was unable to tell what medications she is taking currently.  She also mentions that she has taken several medications for anxiety and depression in the past but unable to recall any medication other than Xanax or Zoloft.  She reports addiction to Xanax in the past as well.  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  She has a difficult relationship with her younger sister who lives over an hour away in [place].  She helps her father with transportation to appointments and grocery store.  She sees him on a daily basis as he lives a mile away.  She feels that if she has any help taking care of him she might be feeling a lot better.  She has not talked about her struggles with her father even though they are close.  She mentions that even when alcoholism not in the picture she struggles with anxiety and depression.  She is a big worrier and worries excessively about a lot of things in her life.  She has struggled  with severe depression in the past but currently feels that anxiety is a bigger struggle than depression.  She mentioned that when she is depressed that she is sad, has anhedonia, changes in energy, sleep and appetite that can last for weeks.  She recalls that the 5 years she was sober she still has struggles with anxiety and depression.  She denies symptoms consistent with bipolar mood disorder or psychotic disorder.  She denies any balance problems or falls.  Denies having thoughts of wanting to hurt herself.    Current psychiatric medications - Wellbutrin  mg daily (x 10+ years), Klonopin 0.5 mg three times daily, Ambine 5 mg     Past Psychiatric History:  She denies history of inpatient psychiatric hospitalization or suicide attempt.  She has been to inpatient rehab and detox for different times.  She saw CYNDY Burch at [facility] twice in April and May 2017, was diagnosed with moderate alcohol use disorder, moderate recurrent major depressive disorder, unspecified anxiety disorder, moderate sedative, hypnotic or anxiolytic use disorder  Previous medication trials (per patient and chart review) - Prozac 20 mg, Zoloft, Celexa 20 mg, Viibryd 40 mg, Remeron 15 mg, Elavil 10 mg, Xanax 6 mg, Propranolol 20 mg TID, Neurontin 600 mg BID     Current Safety/Relapse Assessment:       Suicidal: Low       Homicidal: Low       Self-Harm: Low       Relapse: Not applicable       Crisis Safety Plan: Not Indicated    Past Medical/Surgical History:  Past Medical History:   Diagnosis Date   • Alcohol abuse    • Alcoholism (HCC)    • Anxiety    • Depression    • Hiatus hernia syndrome    • High cholesterol    • Hypertension    • Indigestion    • Other specified disorder of intestines     diarrhea   • Pneumonia     for 2 days   • Psychiatric disorder     anxiety, depression   • Sleep apnea    • Snoring    • Unspecified urinary incontinence    • Urinary bladder disorder      Past Surgical History:   Procedure Laterality  Date   • BLADDER SLING FEMALE  2011    Performed by RENATE STERLING at SURGERY [facility] ORS   • OTHER      surgery for sleep apnea   • OTHER      big toenails and second toenails removed   • OTHER ABDOMINAL SURGERY      choley   • GYN SURGERY      hyst   • OTHER      tonsillectomy   • ABDOMINAL HYSTERECTOMY TOTAL      Hysterectomy, Total Abdominal   • BRIEN BY LAPAROSCOPY      Cholecystectomy, Laparoscopic       Family Psychiatric History:  Father - alcohol addiction  Mother () - alcohol addiction  Sister - cannabis addiction    Substance Use/Addiction History:  Alcohol - She has been drinking alcohol for 50+ years.  She was sober for 5 years from  to  while she was going to AA meetings.  She was drinking about 2 bottles of wine but slowly has cut down on her quantity and is now drinking 2-3 shots of vodka every day.    Nicotine - Smokes nicotine daily  Cannabis - Denies  Illicit drugs - Denies     History of inpatient/outpatient rehab treatment: She has been to inpatient rehab 4 different times and has done outpatient rehab as well.  She was doing weekly AA meetings and she was sober for 5 years.    Social History:  She is single,  and  x 1, lives alone in [place], father in 91 yo and lives independently in [place], younger sister lives in [place], [place] but they have a difficult relationship.    Allergies:  Atorvastatin, Codeine, Lisinopril, Morphine, and Rosuvastatin    Medications:  Current Outpatient Medications   Medication Sig Dispense Refill   • propranolol (INDERAL) 80 MG Tab Take 80 mg by mouth 2 times a day.     • pravastatin (PRAVACHOL) 20 MG Tab Take 20 mg by mouth every evening.     • clonazePAM (KLONOPIN) 0.5 MG Tab Take  by mouth 3 times a day.     • folic acid (FOLVITE) 1 MG Tab      • zolpidem (AMBIEN) 5 MG Tab Take 5 mg by mouth at bedtime as needed for Sleep.     • gabapentin (NEURONTIN) 600 MG tablet Take 1 Tablet by mouth 2 times a day.  "180 Tablet 3   • buPROPion (WELLBUTRIN XL) 300 MG XL tablet Take 1 tablet by mouth every morning. 100 tablet 1     No current facility-administered medications for this visit.       Review of Symptoms:  Constitutional - Positive for fatigue  Psychiatric - Positive for anxiety, depression, excessive alcohol use    Physical Examination:  Vital signs: Ht 1.6 m (5' 3\")   Wt 62.1 kg (137 lb)   BMI 24.27 kg/m²     Musculoskeletal: Normal gait. No abnormal movements.     Mental Status Evaluation:   General: Elderly female with mask, teary-eyed, dressed in casual attire, good grooming and hygiene, in no apparent distress, calm and cooperative, good eye contact, no psychomotor agitation or retardation  Orientation: Alert and oriented to person, place and time  Recent and remote memory: Intact  Attention span and concentration: Intact  Speech: Spontaneous, normal rate, rhythm and tone  Thought Process: Linear, logical and goal directed  Thought Content: Denies suicidal or homicidal ideations, intent or plan  Perception: No delusions noted  Associations: Intact  Language: Appropriate  Fund of knowledge and vocabulary: Adequate  Mood: \"okay\"  Affect: Dysphoric, mood congruent  Insight: Good  Judgment: Good    Depression screening:  Depression Screen (PHQ-2/PHQ-9) 2/5/2021 2/23/2021 3/30/2022   PHQ-2 Total Score - - -   PHQ-2 Total Score 0 0 2   PHQ-9 Total Score - - -   PHQ-9 Total Score - - 9     Interpretation of PHQ-9 Total Score   Score Severity   1-4 No Depression   5-9 Mild Depression   10-14 Moderate Depression   15-19 Moderately Severe Depression   20-27 Severe Depression    Anxiety screening:  MICHEAL 7 3/30/2022   MICHEAL-7 Total Score 21     Interpretation of MIHCEAL 7 Total Score   Score Severity:  0-4 No Anxiety   5-9 Mild Anxiety  10-14 Moderate Anxiety  15-21 Severe Anxiety    Medical Records/Labs/Diagnostic Tests Reviewed:  [place] PDMP records - appropriate refills, no abuse suspected    Impression:  1.  Alcohol use disorder, " moderate - improving  2.  Generalized anxiety disorder - worsening  3.  Major depressive disorder, recurrent, mild - stable    Plan:  1.  Discussed inpatient detox/rehab and outpatient rehab as options for alcohol use disorder.  She is not interested in inpatient rehab and would like to think about outpatient rehab at [facility] before committing to it.  I let her know that combination of alcohol, Klonopin and Ambien is high risk and is not recommended.  She sees her PCP every 10 days who is prescribing Klonopin and Ambien.  She plans on discontinuing Klonopin once she is completely off alcohol.  2.  Continue Wellbutrin  mg in the morning for anxiety and depression.  Discussed augmenting Wellbutrin with another SSRI or SNRI medication and she would like to wait until her next appointment.   3.  Referral placed for individual psychotherapy  4.  Provided supportive psychotherapy (> 16 minutes).  Discussed how being the primary caretaker of her father is contributing to anxiety and depression to the point where she has been using alcohol as a coping mechanism.  Encouraged her to have a discussion with her father how this is affecting her health so that he can talk to his other daughter and see if she can help in more.  Discussed how alcohol is probably worsening her depression and anxiety.    Return to clinic in 2 months or sooner if symptoms worsen    The proposed treatment plan was discussed with the patient who was provided the opportunity to ask questions and make suggestions regarding alternative treatment. Patient verbalized understanding and expressed agreement with the plan.     Thank you for allowing me to participate in the care of this patient.    Janina Bolaños M.D.  03/30/22    CC:   Sharmin Mcdermott P.A.-C.    This note was created using voice recognition software (Dragon). The accuracy of the dictation is limited by the abilities of the software. I have reviewed the note prior to signing, however some  errors in grammar and context are still possible. If you have any questions related to this note please do not hesitate to contact our office.

## 2022-04-12 ENCOUNTER — HOSPITAL ENCOUNTER (EMERGENCY)
Facility: MEDICAL CENTER | Age: 72
End: 2022-04-12
Attending: EMERGENCY MEDICINE
Payer: MEDICARE

## 2022-04-12 VITALS
SYSTOLIC BLOOD PRESSURE: 155 MMHG | OXYGEN SATURATION: 94 % | WEIGHT: 138.45 LBS | TEMPERATURE: 97.8 F | BODY MASS INDEX: 24.53 KG/M2 | HEIGHT: 63 IN | DIASTOLIC BLOOD PRESSURE: 109 MMHG | RESPIRATION RATE: 22 BRPM | HEART RATE: 65 BPM

## 2022-04-12 DIAGNOSIS — E87.6 HYPOKALEMIA: ICD-10-CM

## 2022-04-12 DIAGNOSIS — F10.930 ALCOHOL WITHDRAWAL SYNDROME WITHOUT COMPLICATION (HCC): Primary | ICD-10-CM

## 2022-04-12 DIAGNOSIS — E83.51 HYPOCALCEMIA: ICD-10-CM

## 2022-04-12 LAB
ALBUMIN SERPL BCP-MCNC: 3.8 G/DL (ref 3.2–4.9)
ALBUMIN/GLOB SERPL: 1.7 G/DL
ALP SERPL-CCNC: 113 U/L (ref 30–99)
ALT SERPL-CCNC: 12 U/L (ref 2–50)
ANION GAP SERPL CALC-SCNC: 12 MMOL/L (ref 7–16)
ANION GAP SERPL CALC-SCNC: 13 MMOL/L (ref 7–16)
ANION GAP SERPL CALC-SCNC: 14 MMOL/L (ref 7–16)
AST SERPL-CCNC: 27 U/L (ref 12–45)
BASOPHILS # BLD AUTO: 0.7 % (ref 0–1.8)
BASOPHILS # BLD: 0.06 K/UL (ref 0–0.12)
BILIRUB SERPL-MCNC: 1.3 MG/DL (ref 0.1–1.5)
BUN SERPL-MCNC: 3 MG/DL (ref 8–22)
BUN SERPL-MCNC: 3 MG/DL (ref 8–22)
BUN SERPL-MCNC: 4 MG/DL (ref 8–22)
CALCIUM SERPL-MCNC: 6.7 MG/DL (ref 8.4–10.2)
CALCIUM SERPL-MCNC: 7 MG/DL (ref 8.4–10.2)
CALCIUM SERPL-MCNC: 7.2 MG/DL (ref 8.4–10.2)
CHLORIDE SERPL-SCNC: 100 MMOL/L (ref 96–112)
CHLORIDE SERPL-SCNC: 100 MMOL/L (ref 96–112)
CHLORIDE SERPL-SCNC: 99 MMOL/L (ref 96–112)
CO2 SERPL-SCNC: 26 MMOL/L (ref 20–33)
CREAT SERPL-MCNC: 0.43 MG/DL (ref 0.5–1.4)
CREAT SERPL-MCNC: 0.47 MG/DL (ref 0.5–1.4)
CREAT SERPL-MCNC: 0.56 MG/DL (ref 0.5–1.4)
EOSINOPHIL # BLD AUTO: 0.06 K/UL (ref 0–0.51)
EOSINOPHIL NFR BLD: 0.7 % (ref 0–6.9)
ERYTHROCYTE [DISTWIDTH] IN BLOOD BY AUTOMATED COUNT: 42 FL (ref 35.9–50)
GFR SERPLBLD CREATININE-BSD FMLA CKD-EPI: 102 ML/MIN/1.73 M 2
GFR SERPLBLD CREATININE-BSD FMLA CKD-EPI: 104 ML/MIN/1.73 M 2
GFR SERPLBLD CREATININE-BSD FMLA CKD-EPI: 98 ML/MIN/1.73 M 2
GLOBULIN SER CALC-MCNC: 2.2 G/DL (ref 1.9–3.5)
GLUCOSE SERPL-MCNC: 128 MG/DL (ref 65–99)
GLUCOSE SERPL-MCNC: 139 MG/DL (ref 65–99)
GLUCOSE SERPL-MCNC: 140 MG/DL (ref 65–99)
HCT VFR BLD AUTO: 42.3 % (ref 37–47)
HGB BLD-MCNC: 15.1 G/DL (ref 12–16)
IMM GRANULOCYTES # BLD AUTO: 0.03 K/UL (ref 0–0.11)
IMM GRANULOCYTES NFR BLD AUTO: 0.3 % (ref 0–0.9)
LYMPHOCYTES # BLD AUTO: 1.97 K/UL (ref 1–4.8)
LYMPHOCYTES NFR BLD: 22.7 % (ref 22–41)
MCH RBC QN AUTO: 33.9 PG (ref 27–33)
MCHC RBC AUTO-ENTMCNC: 35.7 G/DL (ref 33.6–35)
MCV RBC AUTO: 94.8 FL (ref 81.4–97.8)
MONOCYTES # BLD AUTO: 0.68 K/UL (ref 0–0.85)
MONOCYTES NFR BLD AUTO: 7.8 % (ref 0–13.4)
NEUTROPHILS # BLD AUTO: 5.88 K/UL (ref 2–7.15)
NEUTROPHILS NFR BLD: 67.8 % (ref 44–72)
NRBC # BLD AUTO: 0 K/UL
NRBC BLD-RTO: 0 /100 WBC
PLATELET # BLD AUTO: 202 K/UL (ref 164–446)
PMV BLD AUTO: 9.3 FL (ref 9–12.9)
POTASSIUM SERPL-SCNC: 2.4 MMOL/L (ref 3.6–5.5)
POTASSIUM SERPL-SCNC: 2.5 MMOL/L (ref 3.6–5.5)
POTASSIUM SERPL-SCNC: 2.9 MMOL/L (ref 3.6–5.5)
PROT SERPL-MCNC: 6 G/DL (ref 6–8.2)
RBC # BLD AUTO: 4.46 M/UL (ref 4.2–5.4)
SODIUM SERPL-SCNC: 138 MMOL/L (ref 135–145)
SODIUM SERPL-SCNC: 139 MMOL/L (ref 135–145)
SODIUM SERPL-SCNC: 139 MMOL/L (ref 135–145)
WBC # BLD AUTO: 8.7 K/UL (ref 4.8–10.8)

## 2022-04-12 PROCEDURE — 96375 TX/PRO/DX INJ NEW DRUG ADDON: CPT

## 2022-04-12 PROCEDURE — 700105 HCHG RX REV CODE 258: Performed by: EMERGENCY MEDICINE

## 2022-04-12 PROCEDURE — 700102 HCHG RX REV CODE 250 W/ 637 OVERRIDE(OP): Performed by: EMERGENCY MEDICINE

## 2022-04-12 PROCEDURE — A9270 NON-COVERED ITEM OR SERVICE: HCPCS | Performed by: EMERGENCY MEDICINE

## 2022-04-12 PROCEDURE — 96365 THER/PROPH/DIAG IV INF INIT: CPT

## 2022-04-12 PROCEDURE — 700111 HCHG RX REV CODE 636 W/ 250 OVERRIDE (IP): Performed by: EMERGENCY MEDICINE

## 2022-04-12 PROCEDURE — 99284 EMERGENCY DEPT VISIT MOD MDM: CPT

## 2022-04-12 PROCEDURE — 96366 THER/PROPH/DIAG IV INF ADDON: CPT

## 2022-04-12 PROCEDURE — 85025 COMPLETE CBC W/AUTO DIFF WBC: CPT

## 2022-04-12 PROCEDURE — 80053 COMPREHEN METABOLIC PANEL: CPT

## 2022-04-12 PROCEDURE — 96367 TX/PROPH/DG ADDL SEQ IV INF: CPT

## 2022-04-12 PROCEDURE — 80048 BASIC METABOLIC PNL TOTAL CA: CPT

## 2022-04-12 RX ORDER — GABAPENTIN 100 MG/1
100 CAPSULE ORAL ONCE
Status: DISCONTINUED | OUTPATIENT
Start: 2022-04-12 | End: 2022-04-12

## 2022-04-12 RX ORDER — MAGNESIUM SULFATE HEPTAHYDRATE 40 MG/ML
2 INJECTION, SOLUTION INTRAVENOUS ONCE
Status: COMPLETED | OUTPATIENT
Start: 2022-04-12 | End: 2022-04-12

## 2022-04-12 RX ORDER — POTASSIUM CHLORIDE 7.45 MG/ML
10 INJECTION INTRAVENOUS
Status: COMPLETED | OUTPATIENT
Start: 2022-04-12 | End: 2022-04-12

## 2022-04-12 RX ORDER — ONDANSETRON 4 MG/1
4 TABLET, ORALLY DISINTEGRATING ORAL ONCE
Status: COMPLETED | OUTPATIENT
Start: 2022-04-12 | End: 2022-04-12

## 2022-04-12 RX ORDER — METOCLOPRAMIDE HYDROCHLORIDE 5 MG/ML
5 INJECTION INTRAMUSCULAR; INTRAVENOUS ONCE
Status: COMPLETED | OUTPATIENT
Start: 2022-04-12 | End: 2022-04-12

## 2022-04-12 RX ORDER — CALCIUM CARBONATE 500 MG/1
1000 TABLET, CHEWABLE ORAL ONCE
Status: COMPLETED | OUTPATIENT
Start: 2022-04-12 | End: 2022-04-12

## 2022-04-12 RX ORDER — POTASSIUM CHLORIDE 20 MEQ/1
60 TABLET, EXTENDED RELEASE ORAL ONCE
Status: COMPLETED | OUTPATIENT
Start: 2022-04-12 | End: 2022-04-12

## 2022-04-12 RX ORDER — GABAPENTIN 300 MG/1
300 CAPSULE ORAL ONCE
Status: COMPLETED | OUTPATIENT
Start: 2022-04-12 | End: 2022-04-12

## 2022-04-12 RX ORDER — LORAZEPAM 2 MG/ML
0.5 INJECTION INTRAMUSCULAR ONCE
Status: COMPLETED | OUTPATIENT
Start: 2022-04-12 | End: 2022-04-12

## 2022-04-12 RX ORDER — SODIUM CHLORIDE 9 MG/ML
1000 INJECTION, SOLUTION INTRAVENOUS ONCE
Status: COMPLETED | OUTPATIENT
Start: 2022-04-12 | End: 2022-04-12

## 2022-04-12 RX ADMIN — SODIUM CHLORIDE 1000 ML: 9 INJECTION, SOLUTION INTRAVENOUS at 02:15

## 2022-04-12 RX ADMIN — ONDANSETRON 4 MG: 4 TABLET, ORALLY DISINTEGRATING ORAL at 05:14

## 2022-04-12 RX ADMIN — POTASSIUM CHLORIDE 10 MEQ: 7.46 INJECTION, SOLUTION INTRAVENOUS at 06:00

## 2022-04-12 RX ADMIN — GABAPENTIN 300 MG: 300 CAPSULE ORAL at 02:08

## 2022-04-12 RX ADMIN — LORAZEPAM 0.5 MG: 2 INJECTION INTRAMUSCULAR; INTRAVENOUS at 06:00

## 2022-04-12 RX ADMIN — POTASSIUM CHLORIDE 10 MEQ: 7.46 INJECTION, SOLUTION INTRAVENOUS at 07:00

## 2022-04-12 RX ADMIN — POTASSIUM CHLORIDE 60 MEQ: 20 TABLET, EXTENDED RELEASE ORAL at 03:01

## 2022-04-12 RX ADMIN — CALCIUM CARBONATE (ANTACID) CHEW TAB 500 MG 1000 MG: 500 CHEW TAB at 03:36

## 2022-04-12 RX ADMIN — METOCLOPRAMIDE 5 MG: 5 INJECTION, SOLUTION INTRAMUSCULAR; INTRAVENOUS at 02:06

## 2022-04-12 RX ADMIN — MAGNESIUM SULFATE 2 G: 2 INJECTION INTRAVENOUS at 03:08

## 2022-04-12 ASSESSMENT — ENCOUNTER SYMPTOMS
NAUSEA: 1
FEVER: 0
CHILLS: 0
VOMITING: 1
ABDOMINAL PAIN: 0
TREMORS: 1

## 2022-04-12 ASSESSMENT — FIBROSIS 4 INDEX: FIB4 SCORE: 5.49

## 2022-04-12 NOTE — ED NOTES
IV continues to infuse on pump.  Repeat lab work drawn.  Pt denies pain.  Sitting up in chair.  Call light within reach.

## 2022-04-12 NOTE — ED NOTES
PT supplemented with po KCL and IV Mg refer to MAR  Resting comfortably in bed no s/s of acute distress.

## 2022-04-12 NOTE — ED NOTES
Pt is sleeping in bed no s/s of acute distress breathing is even & unlabored IVF infusing to right AC

## 2022-04-12 NOTE — DISCHARGE INSTRUCTIONS
Please call your primary care doctor today to get in for discussion of alcohol cessation plan    Drink plenty of fluids daily and return if feeling shaky and overwhelmed

## 2022-04-12 NOTE — ED PROVIDER NOTES
"ED Provider Note    Patient:Marcia Koch  Patient : 1950  Patient MRN: [id number]  Patient PCP: Sharmin Mcdermott P.A.-C.    Admit Date: 2022  Discharge Date and Time: 22 8:17 AM  Discharge Diagnosis: Alcohol withdrawal, hypokalemia  Discharge Attending: Peter Mena M.D.  Discharge Service: ED Observation    ED Course  Marcia is a 71 y.o. female who was evaluated at [facility] for alcohol withdrawal and continued alcoholism issues.  She was seen by Dr. Santa and given Ativan and potassium for correction of hypokalemia with an original potassium of 2.4.  Repeat labs on signout were pending.  Patient reports that she is feeling better and her potassium returned at 2.9 and she would like to go home.    Discharge Exam:  BP (!) 186/105   Pulse 63   Temp 36.8 °C (98.2 °F) (Temporal)   Resp 20   Ht 1.6 m (5' 3\")   Wt 62.8 kg (138 lb 7.2 oz)   SpO2 94%   BMI 24.53 kg/m² .    Constitutional: Awake and alert. Nontoxic, appears older than stated age  HENT:  Grossly normal  Eyes: Grossly normal  Neck: Normal range of motion  Cardiovascular: Normal heart rate   Thorax & Lungs: No respiratory distress  Abdomen: Nontender  Skin:  No pathologic rash.   Extremities: Well perfused  Neurologic, normal with no tremors  Psychiatric: Affect normal calm, no tremors    Labs  Results for orders placed or performed during the hospital encounter of 22   CBC WITH DIFFERENTIAL   Result Value Ref Range    WBC 8.7 4.8 - 10.8 K/uL    RBC 4.46 4.20 - 5.40 M/uL    Hemoglobin 15.1 12.0 - 16.0 g/dL    Hematocrit 42.3 37.0 - 47.0 %    MCV 94.8 81.4 - 97.8 fL    MCH 33.9 (H) 27.0 - 33.0 pg    MCHC 35.7 (H) 33.6 - 35.0 g/dL    RDW 42.0 35.9 - 50.0 fL    Platelet Count 202 164 - 446 K/uL    MPV 9.3 9.0 - 12.9 fL    Neutrophils-Polys 67.80 44.00 - 72.00 %    Lymphocytes 22.70 22.00 - 41.00 %    Monocytes 7.80 0.00 - 13.40 %    Eosinophils 0.70 0.00 - 6.90 %    Basophils 0.70 0.00 - 1.80 %    " Immature Granulocytes 0.30 0.00 - 0.90 %    Nucleated RBC 0.00 /100 WBC    Neutrophils (Absolute) 5.88 2.00 - 7.15 K/uL    Lymphs (Absolute) 1.97 1.00 - 4.80 K/uL    Monos (Absolute) 0.68 0.00 - 0.85 K/uL    Eos (Absolute) 0.06 0.00 - 0.51 K/uL    Baso (Absolute) 0.06 0.00 - 0.12 K/uL    Immature Granulocytes (abs) 0.03 0.00 - 0.11 K/uL    NRBC (Absolute) 0.00 K/uL   COMP METABOLIC PANEL   Result Value Ref Range    Sodium 139 135 - 145 mmol/L    Potassium 2.5 (LL) 3.6 - 5.5 mmol/L    Chloride 99 96 - 112 mmol/L    Co2 26 20 - 33 mmol/L    Anion Gap 14.0 7.0 - 16.0    Glucose 139 (H) 65 - 99 mg/dL    Bun 4 (L) 8 - 22 mg/dL    Creatinine 0.56 0.50 - 1.40 mg/dL    Calcium 7.2 (L) 8.4 - 10.2 mg/dL    AST(SGOT) 27 12 - 45 U/L    ALT(SGPT) 12 2 - 50 U/L    Alkaline Phosphatase 113 (H) 30 - 99 U/L    Total Bilirubin 1.3 0.1 - 1.5 mg/dL    Albumin 3.8 3.2 - 4.9 g/dL    Total Protein 6.0 6.0 - 8.2 g/dL    Globulin 2.2 1.9 - 3.5 g/dL    A-G Ratio 1.7 g/dL   ESTIMATED GFR   Result Value Ref Range    GFR (CKD-EPI) 98 >60 mL/min/1.73 m 2   BMP   Result Value Ref Range    Sodium 139 135 - 145 mmol/L    Potassium 2.4 (LL) 3.6 - 5.5 mmol/L    Chloride 100 96 - 112 mmol/L    Co2 26 20 - 33 mmol/L    Glucose 140 (H) 65 - 99 mg/dL    Bun 3 (L) 8 - 22 mg/dL    Creatinine 0.43 (L) 0.50 - 1.40 mg/dL    Calcium 6.7 (LL) 8.4 - 10.2 mg/dL    Anion Gap 13.0 7.0 - 16.0   ESTIMATED GFR   Result Value Ref Range    GFR (CKD-EPI) 104 >60 mL/min/1.73 m 2   BMP   Result Value Ref Range    Sodium 138 135 - 145 mmol/L    Potassium 2.9 (L) 3.6 - 5.5 mmol/L    Chloride 100 96 - 112 mmol/L    Co2 26 20 - 33 mmol/L    Glucose 128 (H) 65 - 99 mg/dL    Bun 3 (L) 8 - 22 mg/dL    Creatinine 0.47 (L) 0.50 - 1.40 mg/dL    Calcium 7.0 (L) 8.4 - 10.2 mg/dL    Anion Gap 12.0 7.0 - 16.0   ESTIMATED GFR   Result Value Ref Range    GFR (CKD-EPI) 102 >60 mL/min/1.73 m 2     Potassium is improved to 2.9 and calcium has improved to 7.0 after magnesium therapy.  Patient  is tolerating fluids well and does not feel nauseous    The patient will continue to hydrate at home and see RADHA Mcdermott as soon as possible.  She will contact RADHA Mcdermott to arrange a follow-up appointment and return if any significant change in symptoms.  I have asked her to get a ride home as she should not be driving on medications received    Medications:   New Prescriptions    No medications on file     1. Alcohol withdrawal syndrome without complication (HCC)     2. Hypokalemia     3. Hypocalcemia         Discharge Condition: Stable    Electronically signed by: Peter Mena M.D., 4/12/2022 8:17 AM

## 2022-04-12 NOTE — ED PROVIDER NOTES
ED Provider Note    Scribed for FREDDIE Akins II* by Yolanda Luis. 4/12/2022  1:44 AM    Means of Arrival: walk-in  History obtained by: patient  Limitations: none    CHIEF COMPLAINT  Chief Complaint   Patient presents with   • ETOH Withdrawal     Last drink was 4/11/22. Pt states she has been tapering down for the last 3 weeks       HPI  Marcia Koch is a 71 y.o. female who presents to the Emergency Department for evaluation of acute vomiting onset tonight. Denies states that yesterday she had her last drink was yesterday (1 glass of wine). She has symptoms of mild nausea, vomiting, and mild tremors. No seizure. No hallucinations. She took an antinausea medication with no alleviation. She has been taking ambient to help her sleep. She denies abdominal pain, dysuria, fever or chills. No exacerbating factors were reported. She has a surgical history of a hysterectomy. She has been working with CHRISTA Mcdermott to stop drinking. Her last dose of clonazepam was last week.     REVIEW OF SYSTEMS  Review of Systems   Constitutional: Negative for chills and fever.   Gastrointestinal: Positive for nausea and vomiting. Negative for abdominal pain.   Genitourinary: Negative.    Neurological: Positive for tremors.   All other systems reviewed and are negative.    See HPI for further details.    PAST MEDICAL HISTORY   has a past medical history of Alcohol abuse, Alcoholism (HCC), Anxiety, Depression, Hiatus hernia syndrome, High cholesterol, Hypertension, Indigestion, Other specified disorder of intestines, Pneumonia, Psychiatric disorder, Sleep apnea, Snoring, Unspecified urinary incontinence, and Urinary bladder disorder.    SOCIAL HISTORY  Social History     Tobacco Use   • Smoking status: Former Smoker     Packs/day: 0.25     Years: 25.00     Pack years: 6.25     Types: Cigarettes     Quit date: 8/26/2011     Years since quitting: 10.6   • Smokeless tobacco: Never Used   Vaping Use   • Vaping Use: Never  "used   Substance and Sexual Activity   • Alcohol use: Yes     Comment: 2-3 shots of vodka daily   • Drug use: No   • Sexual activity: Not Currently     Partners: Male     Birth control/protection: Abstinence       SURGICAL HISTORY   has a past surgical history that includes bladder sling female (8/30/2011); teddy by laparoscopy; other abdominal surgery (2002); other (2010); other (1956); other (2010); gyn surgery (1991); and abdominal hysterectomy total.    CURRENT MEDICATIONS  Home Medications    **Home medications have not yet been reviewed for this encounter**         ALLERGIES  Allergies   Allergen Reactions   • Atorvastatin Myalgia   • Codeine Nausea   • Lisinopril Cough     Cough     • Morphine Nausea   • Rosuvastatin Myalgia       PHYSICAL EXAM  VITAL SIGNS: BP (!) 179/112   Pulse 68   Temp 36.6 °C (97.9 °F) (Oral)   Resp 16   Ht 1.6 m (5' 3\")   Wt 62.8 kg (138 lb 7.2 oz)   SpO2 94%   BMI 24.53 kg/m²     Constitutional: Alert in no apparent distress. Pleasant well nourished 71 y.o. woman  HENT: No signs of trauma, Bilateral external ears normal, Nose normal. Dry mucus membranes.  Eyes: Pupils are equal, Conjunctiva normal, Non-icteric.   Neck: Normal range of motion, No tenderness, Supple, No stridor.   Cardiovascular: Regular rate and rhythm, no murmurs. Symmetric distal pulses. No cyanosis of extremities. No peripheral edema of extremities.  Thorax & Lungs: Normal breath sounds, No respiratory distress, No wheezing, No chest tenderness.   Abdomen: Soft, No tenderness, No masses, No pulsatile masses. No peritoneal signs.  Skin: Warm, Dry, No erythema, No rash.   Back: No midline bony tenderness, No CVA tenderness.   Musculoskeletal: Good range of motion in all major joints. No tenderness to palpation or major deformities noted.   Neurologic: Alert , Normal motor function, Normal sensory function. Minimal tremor.   Psychiatric: Affect normal, Judgment normal, Mood normal.     DIAGNOSTIC STUDIES / " PROCEDURES    LABS  Pertinent Labs & Imaging studies reviewed. (See chart for details)  Labs Reviewed   CBC WITH DIFFERENTIAL - Abnormal; Notable for the following components:       Result Value    MCH 33.9 (*)     MCHC 35.7 (*)     All other components within normal limits   COMP METABOLIC PANEL - Abnormal; Notable for the following components:    Potassium 2.5 (*)     Glucose 139 (*)     Bun 4 (*)     Calcium 7.2 (*)     Alkaline Phosphatase 113 (*)     All other components within normal limits   BASIC METABOLIC PANEL - Abnormal; Notable for the following components:    Potassium 2.4 (*)     Glucose 140 (*)     Bun 3 (*)     Creatinine 0.43 (*)     Calcium 6.7 (*)     All other components within normal limits   ESTIMATED GFR   ESTIMATED GFR   BASIC METABOLIC PANEL     COURSE & MEDICAL DECISION MAKING  Pertinent Labs & Imaging studies reviewed. (See chart for details)    1:44 AM This is a 71 y.o. female who presents with nausea and vomiting likely due to alcohol withdrawal. No symptoms of bowel obstruction. No abdominal tenderness to suggestion infection or surgical emergency. Denies UTI symptoms or recent illness. Ordered for CMP and CBC with diff to evaluate hydration status, electrolyte abnormalities, blood counts. Patient will be treated with Reglan 5 mg for nausea and gabapentin 300 mg capsule for her mild alcohol withdrawal symptoms. She will be given IV fluids for her vomiting.     HYDRATION: Based on the patient's presentation of Acute Vomiting the patient was given IV fluids. IV Hydration was used because oral hydration was not adequate alone. Upon recheck following hydration, the patient was feeling improved, nausea improved, heart rate normal.     3:53 AM  Potassium 2.5. She has been given 60meq KCL PO and magnesium IV to help with potassium absorption.  She is also given 1000 mg of calcium carbonate.  She has moderate hypertension. She continues to request a valium tablet to take home. We will not be  dispensing controlled medications home. Will also not give benzodiazepine here because she is driving home. She refuses to take cab.     5:33 AM  Repeat BMP shows potassium 2.4 and calcium of 6.7.  I have ordered potassium chloride to be given through IV.  10 mEq/h for the next 2 hours.  Will repeat BMP at 7:30 AM.  She will be placed into ED observation status.    ADMITTED TO ED OBSERVATION AT 5:35 AM FOR therapeutic intensity.  Awaiting replenishment of potassium chloride.  Also treating mild alcohol withdrawal.  She is receiving low-dose of IV Ativan now.  I will sign out to my colleague, Dr. Mena who will continue care while she is in ED observation.      Working Diagnosis  1. Alcohol withdrawal syndrome without complication (HCC)    2. Hypokalemia    3. Hypocalcemia          Yolanda JOHNSON (Olya), am scribing for, and in the presence of, GRACE Akins II.    Electronically signed by: Yolanda Luis (Olya), 4/12/2022    INate II, M* personally performed the services described in this documentation, as scribed by Yolanda Luis in my presence, and it is both accurate and complete.    The note accurately reflects work and decisions made by me.  Nate Park II, M.D.  4/12/2022  4:03 AM

## 2022-04-14 ENCOUNTER — HOSPITAL ENCOUNTER (OUTPATIENT)
Dept: LAB | Facility: MEDICAL CENTER | Age: 72
End: 2022-04-14
Attending: STUDENT IN AN ORGANIZED HEALTH CARE EDUCATION/TRAINING PROGRAM
Payer: MEDICARE

## 2022-04-14 LAB
ALBUMIN SERPL BCP-MCNC: 4 G/DL (ref 3.2–4.9)
ALBUMIN/GLOB SERPL: 1.8 G/DL
ALP SERPL-CCNC: 101 U/L (ref 30–99)
ALT SERPL-CCNC: 17 U/L (ref 2–50)
ANION GAP SERPL CALC-SCNC: 17 MMOL/L (ref 7–16)
AST SERPL-CCNC: 40 U/L (ref 12–45)
BILIRUB SERPL-MCNC: 0.6 MG/DL (ref 0.1–1.5)
BUN SERPL-MCNC: 3 MG/DL (ref 8–22)
CALCIUM SERPL-MCNC: 7.6 MG/DL (ref 8.5–10.5)
CHLORIDE SERPL-SCNC: 98 MMOL/L (ref 96–112)
CO2 SERPL-SCNC: 25 MMOL/L (ref 20–33)
CREAT SERPL-MCNC: 0.7 MG/DL (ref 0.5–1.4)
FASTING STATUS PATIENT QL REPORTED: NORMAL
FOLATE SERPL-MCNC: >40 NG/ML
GFR SERPLBLD CREATININE-BSD FMLA CKD-EPI: 93 ML/MIN/1.73 M 2
GLOBULIN SER CALC-MCNC: 2.2 G/DL (ref 1.9–3.5)
GLUCOSE SERPL-MCNC: 88 MG/DL (ref 65–99)
MAGNESIUM SERPL-MCNC: 2 MG/DL (ref 1.5–2.5)
POTASSIUM SERPL-SCNC: 2.8 MMOL/L (ref 3.6–5.5)
PROT SERPL-MCNC: 6.2 G/DL (ref 6–8.2)
SODIUM SERPL-SCNC: 140 MMOL/L (ref 135–145)

## 2022-04-14 PROCEDURE — 36415 COLL VENOUS BLD VENIPUNCTURE: CPT

## 2022-04-14 PROCEDURE — 82746 ASSAY OF FOLIC ACID SERUM: CPT

## 2022-04-14 PROCEDURE — 80053 COMPREHEN METABOLIC PANEL: CPT

## 2022-04-14 PROCEDURE — 83735 ASSAY OF MAGNESIUM: CPT

## 2022-04-14 PROCEDURE — 84425 ASSAY OF VITAMIN B-1: CPT

## 2022-04-20 LAB — VIT B1 BLD-MCNC: 105 NMOL/L (ref 70–180)

## 2022-05-11 ENCOUNTER — HOSPITAL ENCOUNTER (OUTPATIENT)
Dept: LAB | Facility: MEDICAL CENTER | Age: 72
End: 2022-05-11
Attending: STUDENT IN AN ORGANIZED HEALTH CARE EDUCATION/TRAINING PROGRAM
Payer: MEDICARE

## 2022-05-11 LAB
ALBUMIN SERPL BCP-MCNC: 3.8 G/DL (ref 3.2–4.9)
ALBUMIN/GLOB SERPL: 1.5 G/DL
ALP SERPL-CCNC: 93 U/L (ref 30–99)
ALT SERPL-CCNC: 6 U/L (ref 2–50)
ANION GAP SERPL CALC-SCNC: 12 MMOL/L (ref 7–16)
AST SERPL-CCNC: 19 U/L (ref 12–45)
BASOPHILS # BLD AUTO: 0.5 % (ref 0–1.8)
BASOPHILS # BLD: 0.03 K/UL (ref 0–0.12)
BILIRUB SERPL-MCNC: 0.3 MG/DL (ref 0.1–1.5)
BUN SERPL-MCNC: 9 MG/DL (ref 8–22)
CALCIUM SERPL-MCNC: 9.3 MG/DL (ref 8.5–10.5)
CHLORIDE SERPL-SCNC: 102 MMOL/L (ref 96–112)
CO2 SERPL-SCNC: 24 MMOL/L (ref 20–33)
CREAT SERPL-MCNC: 0.6 MG/DL (ref 0.5–1.4)
EOSINOPHIL # BLD AUTO: 0.09 K/UL (ref 0–0.51)
EOSINOPHIL NFR BLD: 1.4 % (ref 0–6.9)
ERYTHROCYTE [DISTWIDTH] IN BLOOD BY AUTOMATED COUNT: 43.7 FL (ref 35.9–50)
FASTING STATUS PATIENT QL REPORTED: NORMAL
GFR SERPLBLD CREATININE-BSD FMLA CKD-EPI: 96 ML/MIN/1.73 M 2
GLOBULIN SER CALC-MCNC: 2.5 G/DL (ref 1.9–3.5)
GLUCOSE SERPL-MCNC: 97 MG/DL (ref 65–99)
HCT VFR BLD AUTO: 41.6 % (ref 37–47)
HGB BLD-MCNC: 14.1 G/DL (ref 12–16)
IMM GRANULOCYTES # BLD AUTO: 0.02 K/UL (ref 0–0.11)
IMM GRANULOCYTES NFR BLD AUTO: 0.3 % (ref 0–0.9)
LYMPHOCYTES # BLD AUTO: 2.45 K/UL (ref 1–4.8)
LYMPHOCYTES NFR BLD: 37.3 % (ref 22–41)
MAGNESIUM SERPL-MCNC: 1.4 MG/DL (ref 1.5–2.5)
MCH RBC QN AUTO: 33.4 PG (ref 27–33)
MCHC RBC AUTO-ENTMCNC: 33.9 G/DL (ref 33.6–35)
MCV RBC AUTO: 98.6 FL (ref 81.4–97.8)
MONOCYTES # BLD AUTO: 0.49 K/UL (ref 0–0.85)
MONOCYTES NFR BLD AUTO: 7.5 % (ref 0–13.4)
NEUTROPHILS # BLD AUTO: 3.49 K/UL (ref 2–7.15)
NEUTROPHILS NFR BLD: 53 % (ref 44–72)
NRBC # BLD AUTO: 0 K/UL
NRBC BLD-RTO: 0 /100 WBC
PLATELET # BLD AUTO: 181 K/UL (ref 164–446)
PMV BLD AUTO: 10.2 FL (ref 9–12.9)
POTASSIUM SERPL-SCNC: 4.1 MMOL/L (ref 3.6–5.5)
PROT SERPL-MCNC: 6.3 G/DL (ref 6–8.2)
RBC # BLD AUTO: 4.22 M/UL (ref 4.2–5.4)
SODIUM SERPL-SCNC: 138 MMOL/L (ref 135–145)
WBC # BLD AUTO: 6.6 K/UL (ref 4.8–10.8)

## 2022-05-11 PROCEDURE — 85025 COMPLETE CBC W/AUTO DIFF WBC: CPT

## 2022-05-11 PROCEDURE — 82746 ASSAY OF FOLIC ACID SERUM: CPT

## 2022-05-11 PROCEDURE — 82607 VITAMIN B-12: CPT

## 2022-05-11 PROCEDURE — 36415 COLL VENOUS BLD VENIPUNCTURE: CPT

## 2022-05-11 PROCEDURE — 80053 COMPREHEN METABOLIC PANEL: CPT

## 2022-05-11 PROCEDURE — 83735 ASSAY OF MAGNESIUM: CPT

## 2022-05-11 PROCEDURE — 82728 ASSAY OF FERRITIN: CPT

## 2022-05-12 LAB
FERRITIN SERPL-MCNC: 635 NG/ML (ref 10–291)
FOLATE SERPL-MCNC: 37.1 NG/ML
VIT B12 SERPL-MCNC: 398 PG/ML (ref 211–911)

## 2022-05-27 NOTE — BH THERAPY
Behavioral Health  Group Therapy Progress Note    Name: Marcia Koch  MRN: [id number]  Date: 4/17/2017    Attendance: Attended   Attendance Duration (min): Greater than 60  Number of Participants: 5     Program/Group: Intensive Outpatient Program  Topics Covered: Relapse Prevention  Participation: Active verbal participation, Attentive  Affect/Mood Range: Normal range  Affect/Mood Display: Congruent w/content  Cognition: Alert  Evidence of Imminent Suicide Risk: No  Evidence of imminent homicide risk: No  Therapeutic Interventions: Relapse prevention  Progress Toward Treatment Goal: Moderate improvement    Marcia shared her weekend with the group. She reported attending a [employer] gathering where they were serving alcohol. She denies having any triggers although she reports that she set boundaries with friends that wanted to know about her recovery. 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     Treatment Plan: - Transition toward termination     Stefany Disla L.C.S.W.              PT/OT

## 2022-08-30 ENCOUNTER — APPOINTMENT (OUTPATIENT)
Dept: RADIOLOGY | Facility: MEDICAL CENTER | Age: 72
DRG: 894 | End: 2022-08-30
Attending: INTERNAL MEDICINE
Payer: MEDICARE

## 2022-08-30 ENCOUNTER — HOSPITAL ENCOUNTER (INPATIENT)
Facility: MEDICAL CENTER | Age: 72
LOS: 3 days | DRG: 894 | End: 2022-09-02
Attending: EMERGENCY MEDICINE | Admitting: INTERNAL MEDICINE
Payer: MEDICARE

## 2022-08-30 DIAGNOSIS — F10.139 ALCOHOL ABUSE WITH WITHDRAWAL (HCC): ICD-10-CM

## 2022-08-30 PROBLEM — E83.39 HYPOPHOSPHATEMIA: Status: ACTIVE | Noted: 2022-08-30

## 2022-08-30 PROBLEM — E87.6 HYPOKALEMIA: Status: ACTIVE | Noted: 2022-08-30

## 2022-08-30 PROBLEM — E83.51 HYPOCALCEMIA: Status: ACTIVE | Noted: 2022-08-30

## 2022-08-30 PROBLEM — E87.29 HIGH ANION GAP METABOLIC ACIDOSIS: Status: ACTIVE | Noted: 2022-08-30

## 2022-08-30 LAB
ALBUMIN SERPL BCP-MCNC: 3.4 G/DL (ref 3.2–4.9)
ALBUMIN SERPL BCP-MCNC: 4.1 G/DL (ref 3.2–4.9)
ALBUMIN/GLOB SERPL: 1.5 G/DL
ALBUMIN/GLOB SERPL: 1.6 G/DL
ALP SERPL-CCNC: 112 U/L (ref 30–99)
ALP SERPL-CCNC: 99 U/L (ref 30–99)
ALT SERPL-CCNC: 16 U/L (ref 2–50)
ALT SERPL-CCNC: 18 U/L (ref 2–50)
AMPHET UR QL SCN: NEGATIVE
ANION GAP SERPL CALC-SCNC: 27 MMOL/L (ref 7–16)
ANION GAP SERPL CALC-SCNC: 33 MMOL/L (ref 7–16)
APPEARANCE UR: CLEAR
AST SERPL-CCNC: 40 U/L (ref 12–45)
AST SERPL-CCNC: 40 U/L (ref 12–45)
B-OH-BUTYR SERPL-MCNC: 4.55 MMOL/L (ref 0.02–0.27)
BARBITURATES UR QL SCN: NEGATIVE
BASOPHILS # BLD AUTO: 0.6 % (ref 0–1.8)
BASOPHILS # BLD: 0.06 K/UL (ref 0–0.12)
BENZODIAZ UR QL SCN: NEGATIVE
BILIRUB SERPL-MCNC: 0.8 MG/DL (ref 0.1–1.5)
BILIRUB SERPL-MCNC: 1.2 MG/DL (ref 0.1–1.5)
BILIRUB UR QL STRIP.AUTO: NEGATIVE
BUN SERPL-MCNC: 7 MG/DL (ref 8–22)
BUN SERPL-MCNC: 8 MG/DL (ref 8–22)
BZE UR QL SCN: NEGATIVE
CALCIUM SERPL-MCNC: 7.1 MG/DL (ref 8.4–10.2)
CALCIUM SERPL-MCNC: 7.5 MG/DL (ref 8.4–10.2)
CANNABINOIDS UR QL SCN: NEGATIVE
CHLORIDE SERPL-SCNC: 96 MMOL/L (ref 96–112)
CHLORIDE SERPL-SCNC: 96 MMOL/L (ref 96–112)
CO2 SERPL-SCNC: 10 MMOL/L (ref 20–33)
CO2 SERPL-SCNC: 13 MMOL/L (ref 20–33)
COLOR UR: YELLOW
CREAT SERPL-MCNC: 0.43 MG/DL (ref 0.5–1.4)
CREAT SERPL-MCNC: 0.44 MG/DL (ref 0.5–1.4)
EKG IMPRESSION: NORMAL
EOSINOPHIL # BLD AUTO: 0 K/UL (ref 0–0.51)
EOSINOPHIL NFR BLD: 0 % (ref 0–6.9)
ERYTHROCYTE [DISTWIDTH] IN BLOOD BY AUTOMATED COUNT: 54.2 FL (ref 35.9–50)
ETHANOL BLD-MCNC: 78.6 MG/DL
GFR SERPLBLD CREATININE-BSD FMLA CKD-EPI: 103 ML/MIN/1.73 M 2
GFR SERPLBLD CREATININE-BSD FMLA CKD-EPI: 104 ML/MIN/1.73 M 2
GLOBULIN SER CALC-MCNC: 2.3 G/DL (ref 1.9–3.5)
GLOBULIN SER CALC-MCNC: 2.6 G/DL (ref 1.9–3.5)
GLUCOSE SERPL-MCNC: 111 MG/DL (ref 65–99)
GLUCOSE SERPL-MCNC: 60 MG/DL (ref 65–99)
GLUCOSE UR STRIP.AUTO-MCNC: NEGATIVE MG/DL
HCT VFR BLD AUTO: 37.1 % (ref 37–47)
HGB BLD-MCNC: 12.7 G/DL (ref 12–16)
IMM GRANULOCYTES # BLD AUTO: 0.09 K/UL (ref 0–0.11)
IMM GRANULOCYTES NFR BLD AUTO: 0.9 % (ref 0–0.9)
KETONES UR STRIP.AUTO-MCNC: >=80 MG/DL
LACTATE BLD-SCNC: 1.3 MMOL/L (ref 0.5–2)
LACTATE BLD-SCNC: 4.1 MMOL/L (ref 0.5–2)
LACTATE BLD-SCNC: 7.6 MMOL/L (ref 0.5–2)
LEUKOCYTE ESTERASE UR QL STRIP.AUTO: NEGATIVE
LYMPHOCYTES # BLD AUTO: 1.09 K/UL (ref 1–4.8)
LYMPHOCYTES NFR BLD: 10.7 % (ref 22–41)
MAGNESIUM SERPL-MCNC: 1.5 MG/DL (ref 1.5–2.5)
MCH RBC QN AUTO: 34.2 PG (ref 27–33)
MCHC RBC AUTO-ENTMCNC: 34.2 G/DL (ref 33.6–35)
MCV RBC AUTO: 100 FL (ref 81.4–97.8)
METHADONE UR QL SCN: NEGATIVE
MICRO URNS: ABNORMAL
MONOCYTES # BLD AUTO: 0.43 K/UL (ref 0–0.85)
MONOCYTES NFR BLD AUTO: 4.2 % (ref 0–13.4)
NEUTROPHILS # BLD AUTO: 8.51 K/UL (ref 2–7.15)
NEUTROPHILS NFR BLD: 83.6 % (ref 44–72)
NITRITE UR QL STRIP.AUTO: NEGATIVE
NRBC # BLD AUTO: 0.04 K/UL
NRBC BLD-RTO: 0.4 /100 WBC
OPIATES UR QL SCN: NEGATIVE
OXYCODONE UR QL SCN: NEGATIVE
PCP UR QL SCN: NEGATIVE
PH UR STRIP.AUTO: 5.5 [PH] (ref 5–8)
PHOSPHATE SERPL-MCNC: 2.4 MG/DL (ref 2.5–4.5)
PLATELET # BLD AUTO: 191 K/UL (ref 164–446)
PMV BLD AUTO: 9.4 FL (ref 9–12.9)
POTASSIUM SERPL-SCNC: 2.8 MMOL/L (ref 3.6–5.5)
POTASSIUM SERPL-SCNC: 2.8 MMOL/L (ref 3.6–5.5)
PROPOXYPH UR QL SCN: NEGATIVE
PROT SERPL-MCNC: 5.7 G/DL (ref 6–8.2)
PROT SERPL-MCNC: 6.7 G/DL (ref 6–8.2)
PROT UR QL STRIP: NEGATIVE MG/DL
RBC # BLD AUTO: 3.71 M/UL (ref 4.2–5.4)
RBC UR QL AUTO: NEGATIVE
SODIUM SERPL-SCNC: 136 MMOL/L (ref 135–145)
SODIUM SERPL-SCNC: 139 MMOL/L (ref 135–145)
SP GR UR STRIP.AUTO: >=1.03
WBC # BLD AUTO: 10.2 K/UL (ref 4.8–10.8)

## 2022-08-30 PROCEDURE — 36415 COLL VENOUS BLD VENIPUNCTURE: CPT

## 2022-08-30 PROCEDURE — 700102 HCHG RX REV CODE 250 W/ 637 OVERRIDE(OP): Performed by: INTERNAL MEDICINE

## 2022-08-30 PROCEDURE — 83735 ASSAY OF MAGNESIUM: CPT

## 2022-08-30 PROCEDURE — 96376 TX/PRO/DX INJ SAME DRUG ADON: CPT

## 2022-08-30 PROCEDURE — A9270 NON-COVERED ITEM OR SERVICE: HCPCS | Performed by: EMERGENCY MEDICINE

## 2022-08-30 PROCEDURE — 85025 COMPLETE CBC W/AUTO DIFF WBC: CPT

## 2022-08-30 PROCEDURE — 82077 ASSAY SPEC XCP UR&BREATH IA: CPT

## 2022-08-30 PROCEDURE — 700105 HCHG RX REV CODE 258: Performed by: EMERGENCY MEDICINE

## 2022-08-30 PROCEDURE — 99223 1ST HOSP IP/OBS HIGH 75: CPT | Mod: 25,AI | Performed by: INTERNAL MEDICINE

## 2022-08-30 PROCEDURE — 700102 HCHG RX REV CODE 250 W/ 637 OVERRIDE(OP): Performed by: EMERGENCY MEDICINE

## 2022-08-30 PROCEDURE — 82010 KETONE BODYS QUAN: CPT

## 2022-08-30 PROCEDURE — 84100 ASSAY OF PHOSPHORUS: CPT

## 2022-08-30 PROCEDURE — 770020 HCHG ROOM/CARE - TELE (206)

## 2022-08-30 PROCEDURE — 93005 ELECTROCARDIOGRAM TRACING: CPT | Performed by: EMERGENCY MEDICINE

## 2022-08-30 PROCEDURE — 700111 HCHG RX REV CODE 636 W/ 250 OVERRIDE (IP): Performed by: INTERNAL MEDICINE

## 2022-08-30 PROCEDURE — 94760 N-INVAS EAR/PLS OXIMETRY 1: CPT

## 2022-08-30 PROCEDURE — 81003 URINALYSIS AUTO W/O SCOPE: CPT

## 2022-08-30 PROCEDURE — 99285 EMERGENCY DEPT VISIT HI MDM: CPT

## 2022-08-30 PROCEDURE — 80307 DRUG TEST PRSMV CHEM ANLYZR: CPT

## 2022-08-30 PROCEDURE — 99406 BEHAV CHNG SMOKING 3-10 MIN: CPT | Performed by: INTERNAL MEDICINE

## 2022-08-30 PROCEDURE — A9270 NON-COVERED ITEM OR SERVICE: HCPCS | Performed by: INTERNAL MEDICINE

## 2022-08-30 PROCEDURE — 700111 HCHG RX REV CODE 636 W/ 250 OVERRIDE (IP): Performed by: EMERGENCY MEDICINE

## 2022-08-30 PROCEDURE — 83605 ASSAY OF LACTIC ACID: CPT

## 2022-08-30 PROCEDURE — 80053 COMPREHEN METABOLIC PANEL: CPT

## 2022-08-30 PROCEDURE — 700105 HCHG RX REV CODE 258: Performed by: INTERNAL MEDICINE

## 2022-08-30 PROCEDURE — 96374 THER/PROPH/DIAG INJ IV PUSH: CPT

## 2022-08-30 RX ORDER — LORAZEPAM 1 MG/1
2 TABLET ORAL
Status: DISCONTINUED | OUTPATIENT
Start: 2022-08-30 | End: 2022-09-02 | Stop reason: HOSPADM

## 2022-08-30 RX ORDER — PROPRANOLOL HYDROCHLORIDE 20 MG/1
80 TABLET ORAL 2 TIMES DAILY
Status: DISCONTINUED | OUTPATIENT
Start: 2022-08-30 | End: 2022-09-02 | Stop reason: HOSPADM

## 2022-08-30 RX ORDER — LORAZEPAM 1 MG/1
3 TABLET ORAL
Status: DISCONTINUED | OUTPATIENT
Start: 2022-08-30 | End: 2022-09-02 | Stop reason: HOSPADM

## 2022-08-30 RX ORDER — DIAZEPAM 5 MG/ML
5 INJECTION, SOLUTION INTRAMUSCULAR; INTRAVENOUS ONCE
Status: COMPLETED | OUTPATIENT
Start: 2022-08-30 | End: 2022-08-30

## 2022-08-30 RX ORDER — LABETALOL HYDROCHLORIDE 5 MG/ML
10 INJECTION, SOLUTION INTRAVENOUS EVERY 4 HOURS PRN
Status: DISCONTINUED | OUTPATIENT
Start: 2022-08-30 | End: 2022-09-02 | Stop reason: HOSPADM

## 2022-08-30 RX ORDER — LORAZEPAM 1 MG/1
1 TABLET ORAL EVERY 4 HOURS PRN
Status: DISCONTINUED | OUTPATIENT
Start: 2022-08-30 | End: 2022-09-02 | Stop reason: HOSPADM

## 2022-08-30 RX ORDER — MIRTAZAPINE 15 MG/1
15 TABLET, ORALLY DISINTEGRATING ORAL NIGHTLY
Status: DISCONTINUED | OUTPATIENT
Start: 2022-08-30 | End: 2022-09-02 | Stop reason: HOSPADM

## 2022-08-30 RX ORDER — POLYETHYLENE GLYCOL 3350 17 G/17G
1 POWDER, FOR SOLUTION ORAL
Status: DISCONTINUED | OUTPATIENT
Start: 2022-08-30 | End: 2022-09-02 | Stop reason: HOSPADM

## 2022-08-30 RX ORDER — MAGNESIUM SULFATE HEPTAHYDRATE 40 MG/ML
2 INJECTION, SOLUTION INTRAVENOUS ONCE
Status: COMPLETED | OUTPATIENT
Start: 2022-08-30 | End: 2022-08-30

## 2022-08-30 RX ORDER — LORAZEPAM 2 MG/ML
2 INJECTION INTRAMUSCULAR
Status: DISCONTINUED | OUTPATIENT
Start: 2022-08-30 | End: 2022-09-02 | Stop reason: HOSPADM

## 2022-08-30 RX ORDER — PRAVASTATIN SODIUM 20 MG
40 TABLET ORAL NIGHTLY
Status: DISCONTINUED | OUTPATIENT
Start: 2022-08-30 | End: 2022-09-02 | Stop reason: HOSPADM

## 2022-08-30 RX ORDER — OMEPRAZOLE 20 MG/1
20 CAPSULE, DELAYED RELEASE ORAL DAILY
Status: DISCONTINUED | OUTPATIENT
Start: 2022-08-30 | End: 2022-09-02 | Stop reason: HOSPADM

## 2022-08-30 RX ORDER — POTASSIUM CHLORIDE 20 MEQ/1
40 TABLET, EXTENDED RELEASE ORAL 3 TIMES DAILY
Status: COMPLETED | OUTPATIENT
Start: 2022-08-30 | End: 2022-08-30

## 2022-08-30 RX ORDER — SODIUM CHLORIDE, SODIUM LACTATE, POTASSIUM CHLORIDE, CALCIUM CHLORIDE 600; 310; 30; 20 MG/100ML; MG/100ML; MG/100ML; MG/100ML
1000 INJECTION, SOLUTION INTRAVENOUS ONCE
Status: COMPLETED | OUTPATIENT
Start: 2022-08-30 | End: 2022-08-30

## 2022-08-30 RX ORDER — ONDANSETRON 4 MG/1
4 TABLET, ORALLY DISINTEGRATING ORAL EVERY 4 HOURS PRN
Status: DISCONTINUED | OUTPATIENT
Start: 2022-08-30 | End: 2022-09-02 | Stop reason: HOSPADM

## 2022-08-30 RX ORDER — GABAPENTIN 300 MG/1
600 CAPSULE ORAL 2 TIMES DAILY
Status: DISCONTINUED | OUTPATIENT
Start: 2022-08-30 | End: 2022-09-02 | Stop reason: HOSPADM

## 2022-08-30 RX ORDER — POTASSIUM CHLORIDE 20 MEQ/1
40 TABLET, EXTENDED RELEASE ORAL ONCE
Status: COMPLETED | OUTPATIENT
Start: 2022-08-30 | End: 2022-08-30

## 2022-08-30 RX ORDER — ENOXAPARIN SODIUM 100 MG/ML
40 INJECTION SUBCUTANEOUS DAILY
Status: DISCONTINUED | OUTPATIENT
Start: 2022-08-30 | End: 2022-08-31

## 2022-08-30 RX ORDER — QUETIAPINE FUMARATE 100 MG/1
200 TABLET, FILM COATED ORAL
Status: DISCONTINUED | OUTPATIENT
Start: 2022-08-30 | End: 2022-09-02 | Stop reason: HOSPADM

## 2022-08-30 RX ORDER — DEXTROSE AND SODIUM CHLORIDE 5; .45 G/100ML; G/100ML
INJECTION, SOLUTION INTRAVENOUS CONTINUOUS
Status: DISCONTINUED | OUTPATIENT
Start: 2022-08-30 | End: 2022-08-31

## 2022-08-30 RX ORDER — BISACODYL 10 MG
10 SUPPOSITORY, RECTAL RECTAL
Status: DISCONTINUED | OUTPATIENT
Start: 2022-08-30 | End: 2022-09-02 | Stop reason: HOSPADM

## 2022-08-30 RX ORDER — OMEPRAZOLE 20 MG/1
20 CAPSULE, DELAYED RELEASE ORAL DAILY
COMMUNITY

## 2022-08-30 RX ORDER — GABAPENTIN 600 MG/1
600 TABLET ORAL 2 TIMES DAILY PRN
COMMUNITY

## 2022-08-30 RX ORDER — NICOTINE 21 MG/24HR
21 PATCH, TRANSDERMAL 24 HOURS TRANSDERMAL
Status: DISCONTINUED | OUTPATIENT
Start: 2022-08-30 | End: 2022-09-02 | Stop reason: HOSPADM

## 2022-08-30 RX ORDER — LORAZEPAM 1 MG/1
4 TABLET ORAL
Status: DISCONTINUED | OUTPATIENT
Start: 2022-08-30 | End: 2022-09-02 | Stop reason: HOSPADM

## 2022-08-30 RX ORDER — MIRTAZAPINE 15 MG/1
15 TABLET, ORALLY DISINTEGRATING ORAL NIGHTLY
COMMUNITY

## 2022-08-30 RX ORDER — BUPROPION HYDROCHLORIDE 150 MG/1
150 TABLET, EXTENDED RELEASE ORAL DAILY
Status: DISCONTINUED | OUTPATIENT
Start: 2022-08-30 | End: 2022-09-02 | Stop reason: HOSPADM

## 2022-08-30 RX ORDER — FOLIC ACID 1 MG/1
1 TABLET ORAL DAILY
Status: COMPLETED | OUTPATIENT
Start: 2022-08-30 | End: 2022-09-02

## 2022-08-30 RX ORDER — CALCIUM CARBONATE 500 MG/1
500 TABLET, CHEWABLE ORAL 3 TIMES DAILY
Status: DISCONTINUED | OUTPATIENT
Start: 2022-08-30 | End: 2022-09-02 | Stop reason: HOSPADM

## 2022-08-30 RX ORDER — GAUZE BANDAGE 2" X 2"
100 BANDAGE TOPICAL DAILY
Status: COMPLETED | OUTPATIENT
Start: 2022-08-30 | End: 2022-09-02

## 2022-08-30 RX ORDER — LORAZEPAM 0.5 MG/1
0.5 TABLET ORAL EVERY 4 HOURS PRN
Status: DISCONTINUED | OUTPATIENT
Start: 2022-08-30 | End: 2022-09-02 | Stop reason: HOSPADM

## 2022-08-30 RX ORDER — DIAZEPAM 5 MG/ML
3 INJECTION, SOLUTION INTRAMUSCULAR; INTRAVENOUS ONCE
Status: COMPLETED | OUTPATIENT
Start: 2022-08-30 | End: 2022-08-30

## 2022-08-30 RX ORDER — AMOXICILLIN 250 MG
2 CAPSULE ORAL 2 TIMES DAILY
Status: DISCONTINUED | OUTPATIENT
Start: 2022-08-30 | End: 2022-09-02 | Stop reason: HOSPADM

## 2022-08-30 RX ORDER — ACETAMINOPHEN 325 MG/1
650 TABLET ORAL EVERY 6 HOURS PRN
Status: DISCONTINUED | OUTPATIENT
Start: 2022-08-30 | End: 2022-09-02 | Stop reason: HOSPADM

## 2022-08-30 RX ORDER — CALCIUM CARBONATE 500 MG/1
500 TABLET, CHEWABLE ORAL ONCE
Status: COMPLETED | OUTPATIENT
Start: 2022-08-30 | End: 2022-08-30

## 2022-08-30 RX ORDER — DIAZEPAM 5 MG/ML
INJECTION, SOLUTION INTRAMUSCULAR; INTRAVENOUS
Status: COMPLETED
Start: 2022-08-30 | End: 2022-08-30

## 2022-08-30 RX ORDER — NAPROXEN SODIUM 220 MG
440 TABLET ORAL 2 TIMES DAILY PRN
COMMUNITY

## 2022-08-30 RX ORDER — BUPROPION HYDROCHLORIDE 150 MG/1
150 TABLET ORAL EVERY MORNING
COMMUNITY

## 2022-08-30 RX ORDER — VALSARTAN 40 MG/1
40 TABLET ORAL DAILY
COMMUNITY

## 2022-08-30 RX ORDER — DIAZEPAM 5 MG/ML
5 INJECTION, SOLUTION INTRAMUSCULAR; INTRAVENOUS
Status: DISCONTINUED | OUTPATIENT
Start: 2022-08-30 | End: 2022-08-30

## 2022-08-30 RX ORDER — QUETIAPINE FUMARATE 200 MG/1
200 TABLET, FILM COATED ORAL
COMMUNITY

## 2022-08-30 RX ORDER — VALSARTAN 80 MG/1
40 TABLET ORAL DAILY
Status: DISCONTINUED | OUTPATIENT
Start: 2022-08-30 | End: 2022-09-02 | Stop reason: HOSPADM

## 2022-08-30 RX ORDER — ONDANSETRON 2 MG/ML
4 INJECTION INTRAMUSCULAR; INTRAVENOUS EVERY 4 HOURS PRN
Status: DISCONTINUED | OUTPATIENT
Start: 2022-08-30 | End: 2022-09-02 | Stop reason: HOSPADM

## 2022-08-30 RX ADMIN — PROPRANOLOL HYDROCHLORIDE 80 MG: 20 TABLET ORAL at 23:00

## 2022-08-30 RX ADMIN — FOLIC ACID 1 MG: 1 TABLET ORAL at 12:46

## 2022-08-30 RX ADMIN — LORAZEPAM 1 MG: 1 TABLET ORAL at 13:23

## 2022-08-30 RX ADMIN — PROPRANOLOL HYDROCHLORIDE 80 MG: 20 TABLET ORAL at 12:45

## 2022-08-30 RX ADMIN — DIAZEPAM 3 MG: 5 INJECTION, SOLUTION INTRAMUSCULAR; INTRAVENOUS at 08:51

## 2022-08-30 RX ADMIN — QUETIAPINE FUMARATE 200 MG: 100 TABLET ORAL at 20:05

## 2022-08-30 RX ADMIN — SODIUM CHLORIDE, POTASSIUM CHLORIDE, SODIUM LACTATE AND CALCIUM CHLORIDE 1000 ML: 600; 310; 30; 20 INJECTION, SOLUTION INTRAVENOUS at 08:51

## 2022-08-30 RX ADMIN — DIAZEPAM 5 MG: 5 INJECTION, SOLUTION INTRAMUSCULAR; INTRAVENOUS at 09:48

## 2022-08-30 RX ADMIN — LORAZEPAM 1 MG: 1 TABLET ORAL at 17:54

## 2022-08-30 RX ADMIN — GABAPENTIN 600 MG: 300 CAPSULE ORAL at 21:18

## 2022-08-30 RX ADMIN — DIBASIC SODIUM PHOSPHATE, MONOBASIC POTASSIUM PHOSPHATE AND MONOBASIC SODIUM PHOSPHATE 250 MG: 852; 155; 130 TABLET ORAL at 16:20

## 2022-08-30 RX ADMIN — NICOTINE TRANSDERMAL SYSTEM 21 MG: 21 PATCH, EXTENDED RELEASE TRANSDERMAL at 12:43

## 2022-08-30 RX ADMIN — LORAZEPAM 1 MG: 1 TABLET ORAL at 21:18

## 2022-08-30 RX ADMIN — OMEPRAZOLE 20 MG: 20 CAPSULE, DELAYED RELEASE ORAL at 12:46

## 2022-08-30 RX ADMIN — CALCIUM CARBONATE (ANTACID) CHEW TAB 500 MG 500 MG: 500 CHEW TAB at 09:32

## 2022-08-30 RX ADMIN — THIAMINE HCL TAB 100 MG 100 MG: 100 TAB at 12:45

## 2022-08-30 RX ADMIN — POTASSIUM CHLORIDE 40 MEQ: 1500 TABLET, EXTENDED RELEASE ORAL at 13:23

## 2022-08-30 RX ADMIN — VALSARTAN 40 MG: 80 TABLET ORAL at 12:44

## 2022-08-30 RX ADMIN — MIRTAZAPINE 15 MG: 15 TABLET, ORALLY DISINTEGRATING ORAL at 20:04

## 2022-08-30 RX ADMIN — MAGNESIUM SULFATE 2 G: 2 INJECTION INTRAVENOUS at 15:58

## 2022-08-30 RX ADMIN — DIBASIC SODIUM PHOSPHATE, MONOBASIC POTASSIUM PHOSPHATE AND MONOBASIC SODIUM PHOSPHATE 250 MG: 852; 155; 130 TABLET ORAL at 12:45

## 2022-08-30 RX ADMIN — ONDANSETRON 4 MG: 2 INJECTION INTRAMUSCULAR; INTRAVENOUS at 17:35

## 2022-08-30 RX ADMIN — LORAZEPAM 1 MG: 1 TABLET ORAL at 16:20

## 2022-08-30 RX ADMIN — POTASSIUM CHLORIDE 40 MEQ: 1500 TABLET, EXTENDED RELEASE ORAL at 17:35

## 2022-08-30 RX ADMIN — LORAZEPAM 1 MG: 1 TABLET ORAL at 11:50

## 2022-08-30 RX ADMIN — BUPROPION HYDROCHLORIDE 150 MG: 150 TABLET, EXTENDED RELEASE ORAL at 12:46

## 2022-08-30 RX ADMIN — THERA TABS 1 TABLET: TAB at 12:45

## 2022-08-30 RX ADMIN — CALCIUM CARBONATE (ANTACID) CHEW TAB 500 MG 500 MG: 500 CHEW TAB at 17:35

## 2022-08-30 RX ADMIN — GABAPENTIN 600 MG: 300 CAPSULE ORAL at 12:46

## 2022-08-30 RX ADMIN — POTASSIUM CHLORIDE 40 MEQ: 1500 TABLET, EXTENDED RELEASE ORAL at 09:33

## 2022-08-30 RX ADMIN — PRAVASTATIN SODIUM 40 MG: 20 TABLET ORAL at 20:04

## 2022-08-30 RX ADMIN — DIBASIC SODIUM PHOSPHATE, MONOBASIC POTASSIUM PHOSPHATE AND MONOBASIC SODIUM PHOSPHATE 250 MG: 852; 155; 130 TABLET ORAL at 21:18

## 2022-08-30 RX ADMIN — DEXTROSE AND SODIUM CHLORIDE: 5; 450 INJECTION, SOLUTION INTRAVENOUS at 12:43

## 2022-08-30 RX ADMIN — CALCIUM CARBONATE (ANTACID) CHEW TAB 500 MG 500 MG: 500 CHEW TAB at 12:46

## 2022-08-30 RX ADMIN — ENOXAPARIN SODIUM 40 MG: 40 INJECTION SUBCUTANEOUS at 17:34

## 2022-08-30 ASSESSMENT — LIFESTYLE VARIABLES
NAUSEA AND VOMITING: NO NAUSEA AND NO VOMITING
ANXIETY: MODERATELY ANXIOUS OR GUARDED, SO ANXIETY IS INFERRED
HEADACHE, FULLNESS IN HEAD: NOT PRESENT
AUDITORY DISTURBANCES: NOT PRESENT
AGITATION: NORMAL ACTIVITY
SUBSTANCE_ABUSE: 1
VISUAL DISTURBANCES: NOT PRESENT
AUDITORY DISTURBANCES: NOT PRESENT
EVER FELT BAD OR GUILTY ABOUT YOUR DRINKING: YES
VISUAL DISTURBANCES: NOT PRESENT
HEADACHE, FULLNESS IN HEAD: NOT PRESENT
AUDITORY DISTURBANCES: NOT PRESENT
VISUAL DISTURBANCES: VERY MILD SENSITIVITY
TOTAL SCORE: 4
NAUSEA AND VOMITING: NO NAUSEA AND NO VOMITING
PAROXYSMAL SWEATS: NO SWEAT VISIBLE
ANXIETY: *
HAVE YOU EVER FELT YOU SHOULD CUT DOWN ON YOUR DRINKING: YES
HOW MANY TIMES IN THE PAST YEAR HAVE YOU HAD 5 OR MORE DRINKS IN A DAY: 1
NAUSEA AND VOMITING: NO NAUSEA AND NO VOMITING
TREMOR: *
AGITATION: NORMAL ACTIVITY
AGITATION: NORMAL ACTIVITY
PAROXYSMAL SWEATS: NO SWEAT VISIBLE
TOTAL SCORE: 9
VISUAL DISTURBANCES: NOT PRESENT
AUDITORY DISTURBANCES: NOT PRESENT
HEADACHE, FULLNESS IN HEAD: NOT PRESENT
VISUAL DISTURBANCES: NOT PRESENT
HAVE PEOPLE ANNOYED YOU BY CRITICIZING YOUR DRINKING: YES
AVERAGE NUMBER OF DAYS PER WEEK YOU HAVE A DRINK CONTAINING ALCOHOL: 7
TOTAL SCORE: 10
TOTAL SCORE: 10
TREMOR: MODERATE TREMOR WITH ARMS EXTENDED
TREMOR: *
ANXIETY: *
TOTAL SCORE: 9
ALCOHOL_USE: YES
HEADACHE, FULLNESS IN HEAD: NOT PRESENT
AGITATION: NORMAL ACTIVITY
TOTAL SCORE: 4
ORIENTATION AND CLOUDING OF SENSORIUM: ORIENTED AND CAN DO SERIAL ADDITIONS
HEADACHE, FULLNESS IN HEAD: NOT PRESENT
CONSUMPTION TOTAL: POSITIVE
PAROXYSMAL SWEATS: NO SWEAT VISIBLE
PAROXYSMAL SWEATS: NO SWEAT VISIBLE
HEADACHE, FULLNESS IN HEAD: NOT PRESENT
ON A TYPICAL DAY WHEN YOU DRINK ALCOHOL HOW MANY DRINKS DO YOU HAVE: 3
NAUSEA AND VOMITING: NO NAUSEA AND NO VOMITING
TREMOR: MODERATE TREMOR WITH ARMS EXTENDED
ORIENTATION AND CLOUDING OF SENSORIUM: ORIENTED AND CAN DO SERIAL ADDITIONS
ORIENTATION AND CLOUDING OF SENSORIUM: ORIENTED AND CAN DO SERIAL ADDITIONS
TOTAL SCORE: 10
AUDITORY DISTURBANCES: NOT PRESENT
VISUAL DISTURBANCES: NOT PRESENT
NAUSEA AND VOMITING: INTERMITTENT NAUSEA WITH DRY HEAVES
ANXIETY: *
TOTAL SCORE: 4
TREMOR: *
TOTAL SCORE: 9
EVER HAD A DRINK FIRST THING IN THE MORNING TO STEADY YOUR NERVES TO GET RID OF A HANGOVER: YES
TREMOR: *
ORIENTATION AND CLOUDING OF SENSORIUM: ORIENTED AND CAN DO SERIAL ADDITIONS
ORIENTATION AND CLOUDING OF SENSORIUM: ORIENTED AND CAN DO SERIAL ADDITIONS
PAROXYSMAL SWEATS: NO SWEAT VISIBLE
AGITATION: NORMAL ACTIVITY
ANXIETY: MODERATELY ANXIOUS OR GUARDED, SO ANXIETY IS INFERRED
ANXIETY: *
PAROXYSMAL SWEATS: NO SWEAT VISIBLE
ORIENTATION AND CLOUDING OF SENSORIUM: ORIENTED AND CAN DO SERIAL ADDITIONS
AGITATION: SOMEWHAT MORE THAN NORMAL ACTIVITY
AUDITORY DISTURBANCES: NOT PRESENT
NAUSEA AND VOMITING: NO NAUSEA AND NO VOMITING

## 2022-08-30 ASSESSMENT — COGNITIVE AND FUNCTIONAL STATUS - GENERAL
SUGGESTED CMS G CODE MODIFIER MOBILITY: CI
SUGGESTED CMS G CODE MODIFIER DAILY ACTIVITY: CH
MOBILITY SCORE: 23
WALKING IN HOSPITAL ROOM: A LITTLE
DAILY ACTIVITIY SCORE: 24

## 2022-08-30 ASSESSMENT — ENCOUNTER SYMPTOMS
NERVOUS/ANXIOUS: 1
LOSS OF CONSCIOUSNESS: 0
TREMORS: 1
FEVER: 0
MYALGIAS: 0
SEIZURES: 0
DEPRESSION: 0
FOCAL WEAKNESS: 0
DIAPHORESIS: 0
VOMITING: 0
CHILLS: 1
HEADACHES: 0
SHORTNESS OF BREATH: 1
ABDOMINAL PAIN: 0
DIZZINESS: 0
NAUSEA: 0

## 2022-08-30 ASSESSMENT — PATIENT HEALTH QUESTIONNAIRE - PHQ9
6. FEELING BAD ABOUT YOURSELF - OR THAT YOU ARE A FAILURE OR HAVE LET YOURSELF OR YOUR FAMILY DOWN: NEARLY EVERY DAY
4. FEELING TIRED OR HAVING LITTLE ENERGY: NEARLY EVERY DAY
8. MOVING OR SPEAKING SO SLOWLY THAT OTHER PEOPLE COULD HAVE NOTICED. OR THE OPPOSITE, BEING SO FIGETY OR RESTLESS THAT YOU HAVE BEEN MOVING AROUND A LOT MORE THAN USUAL: NOT AT ALL
SUM OF ALL RESPONSES TO PHQ QUESTIONS 1-9: 19
3. TROUBLE FALLING OR STAYING ASLEEP OR SLEEPING TOO MUCH: NEARLY EVERY DAY
7. TROUBLE CONCENTRATING ON THINGS, SUCH AS READING THE NEWSPAPER OR WATCHING TELEVISION: NOT AT ALL
5. POOR APPETITE OR OVEREATING: NEARLY EVERY DAY
SUM OF ALL RESPONSES TO PHQ9 QUESTIONS 1 AND 2: 4
2. FEELING DOWN, DEPRESSED, IRRITABLE, OR HOPELESS: NEARLY EVERY DAY
9. THOUGHTS THAT YOU WOULD BE BETTER OFF DEAD, OR OF HURTING YOURSELF: NEARLY EVERY DAY
1. LITTLE INTEREST OR PLEASURE IN DOING THINGS: SEVERAL DAYS

## 2022-08-30 ASSESSMENT — PAIN DESCRIPTION - PAIN TYPE
TYPE: ACUTE PAIN
TYPE: ACUTE PAIN

## 2022-08-30 ASSESSMENT — FIBROSIS 4 INDEX
FIB4 SCORE: 3.04
FIB4 SCORE: 3.72

## 2022-08-30 NOTE — CARE PLAN
The patient is Watcher - Medium risk of patient condition declining or worsening    Shift Goals  Clinical Goals: CIWA, safety/prevent falls  Patient Goals: feel better    Progress made toward(s) clinical / shift goals:  CIWA scored and medicated per MAR. Pt provided with falls education, fall precautions in place.    Patient is not progressing towards the following goals: n/a

## 2022-08-30 NOTE — ASSESSMENT & PLAN NOTE
- Improving withdrawal symptoms.  Last drink was on 8/28.  Currently day 5 from last drink.  -Continue alcohol withdrawal protocol with CIWA.  Continue scheduled Librium 25 mg every 6 hours, will taper with improvement after day 5 from last drink.  Monitor closely for worsening symptoms, as may need higher level of care and Precedex drip if she goes into delirium tremens.  -Continue thiamine, multivitamin, and folate.  -Continue alcohol cessation counseling.

## 2022-08-30 NOTE — ED TRIAGE NOTES
"Chief Complaint   Patient presents with    Anxiety     PT BIB EMS for panic attack. \"I just want a sedative to calm me down\". Pt drinks everyday has not had a drink today. Pt also states \"I have not slept in a couple days\"     Pt anxious on gurney, pt in no acute distress, pt provided call light, instructed to call if needing any assistance, instructed not to get up by self, kristian in lowest position   "

## 2022-08-30 NOTE — ASSESSMENT & PLAN NOTE
-Suspect due to alcohol ketoacidosis versus starvation ketosis.  Lactate has normalized anion gap is closed and bicarbonate level has normalized as well.  -Off IV fluids.  Continue to trend.  -Encourage oral intake.

## 2022-08-30 NOTE — PROGRESS NOTES
Pt arrived to unit via gurney. Ambulated from gurney to bed, xstandby assist. Tele monitor applied, vitals taken. Pt assessed. A&O x4. Admit profile and med rec complete. Discussed POC with pt, including frequent CIWA scoring, IVF, anxiolytics, fall risk/precautions. Communication board filled out. Questions and concerns addressed, verbalized understanding. Fall precautions in place. Pt demonstrates ability to use call light appropriately. Pt left in lowest position. Bed locked and low, bed alarm on.

## 2022-08-30 NOTE — ED NOTES
Med rec updated and complete, per pt and [facility] [phone]  Allergies reviewed, per pt  Pt was not sure all the names and strengths of her medications, called [facility] @ [phone] to verify all medications    Pt reports that she thinks that it been about 2 days since she took her medications.

## 2022-08-30 NOTE — ASSESSMENT & PLAN NOTE
-Likely due to poor p.o. intake  -High risk for refeeding syndrome.  Magnesium was also low.  -replaced.  Potassium has normalized.  Repeat BMP in the morning, and replace as needed.  Replace magnesium.

## 2022-08-30 NOTE — ED PROVIDER NOTES
"ED Provider Note    CHIEF COMPLAINT  Chief Complaint   Patient presents with    Anxiety     PT BIB EMS for panic attack. \"I just want a sedative to calm me down\". Pt drinks everyday has not had a drink today. Pt also states \"I have not slept in a couple days\"       HPI  Marcia Koch is a 72 y.o. female who presents to the emergency department with complaint of anxiety.  She states that she has not slept in the last 3 to 4 days, she is having extreme anxiety and panic attack.  Denies loss of sensation or strength arms or legs, suicidal homicidal ideation, fever, shakes, chills, sweats.  Her last alcohol drink was last night and she states that she is a daily drinker.  She does have history of anxiety in the past but states that it is becoming more frequent and she is not taking medications for it currently.    REVIEW OF SYSTEMS  Positives as above. Pertinent negatives include suicidal ideation homicidal ideation, fever, nausea, vomiting, recent trauma  All other 10 review of systems are negative    PAST MEDICAL HISTORY  Past Medical History:   Diagnosis Date    Alcohol abuse     Alcoholism (HCC)     Anxiety     Depression     Hiatus hernia syndrome     High cholesterol     Hypertension     Indigestion     Other specified disorder of intestines     diarrhea    Pneumonia     for 2 days    Psychiatric disorder     anxiety, depression    Sleep apnea     Snoring     Unspecified urinary incontinence     Urinary bladder disorder        FAMILY HISTORY  Noncontributory    SOCIAL HISTORY  Social History     Socioeconomic History    Marital status:    Tobacco Use    Smoking status: Every Day     Packs/day: 1.00     Years: 25.00     Pack years: 25.00     Types: Cigarettes     Last attempt to quit: 2011     Years since quittin.0    Smokeless tobacco: Never   Vaping Use    Vaping Use: Never used   Substance and Sexual Activity    Alcohol use: Yes     Comment: 2-3 shots of vodka daily    Drug use: No    " "Sexual activity: Not Currently     Partners: Male     Birth control/protection: Abstinence       SURGICAL HISTORY  Past Surgical History:   Procedure Laterality Date    BLADDER SLING FEMALE  8/30/2011    Performed by RENATE STERLING at [facility]    OTHER  2010    surgery for sleep apnea    OTHER  2010    big toenails and second toenails removed    OTHER ABDOMINAL SURGERY  2002    choley    GYN SURGERY  1991    hyst    OTHER  1956    tonsillectomy    ABDOMINAL HYSTERECTOMY TOTAL      Hysterectomy, Total Abdominal    BRIEN BY LAPAROSCOPY      Cholecystectomy, Laparoscopic       CURRENT MEDICATIONS  Home Medications       Reviewed by Sarah Sanderson (Pharmacy Tech) on 08/30/22 at 1057  Med List Status: Complete     Medication Last Dose Status   buPROPion (WELLBUTRIN XL) 150 MG XL tablet > 2 day Active   folic acid (FOLVITE) 1 MG Tab > 2 days Active   gabapentin (NEURONTIN) 600 MG tablet > 2 weeks Active   mirtazapine (REMERON) 15 MG TABLET DISPERSIBLE > 2 nights Active   naproxen (ALEVE) 220 MG tablet >5 days Active   omeprazole (PRILOSEC) 20 MG delayed-release capsule >2 days Active   pravastatin (PRAVACHOL) 40 MG tablet > 2 nights Active   propranolol (INDERAL) 80 MG Tab > 2 days Active   QUEtiapine (SEROQUEL) 200 MG Tab > 2 nights Active   valsartan (DIOVAN) 40 MG Tab > 2 days Active   zolpidem (AMBIEN) 5 MG Tab > 3 weeks Active                    ALLERGIES  Allergies   Allergen Reactions    Atorvastatin Myalgia    Codeine Nausea    Lisinopril Cough     Cough      Morphine Nausea    Rosuvastatin Myalgia       PHYSICAL EXAM  VITAL SIGNS: BP (!) 166/81   Pulse 97   Temp 36.9 °C (98.5 °F) (Temporal)   Resp 20   Ht 1.6 m (5' 3\")   Wt 63 kg (139 lb)   SpO2 97%   BMI 24.62 kg/m²      Constitutional: Well developed, Well nourished, slight acute distress, Non-toxic appearance.   Eyes: PERRLA, EOMI, Conjunctiva normal, No discharge.   Cardiovascular: Normal heart rate, Normal rhythm, No " murmurs, No rubs, No gallops, and intact distal pulses.   Thorax & Lungs:  No respiratory distress, no rales, no rhonchi, No wheezing, No chest wall tenderness.   Abdomen: Bowel sounds normal, Soft, No tenderness, No guarding, No rebound, No pulsatile masses.   Skin: Warm, Dry, abrasion right lateral forearm, no erythema, No rash.   Extremities: Full range of motion, no deformity, no edema.  Neurologic: Alert & oriented x 3, tremulous upper extremities, strength and sensation intact throughout  Psychiatric: Anxious affect, pressured speech      LABORATORY/ECG  Results for orders placed or performed during the hospital encounter of 08/30/22   CBC w/ Differential   Result Value Ref Range    WBC 10.2 4.8 - 10.8 K/uL    RBC 3.71 (L) 4.20 - 5.40 M/uL    Hemoglobin 12.7 12.0 - 16.0 g/dL    Hematocrit 37.1 37.0 - 47.0 %    .0 (H) 81.4 - 97.8 fL    MCH 34.2 (H) 27.0 - 33.0 pg    MCHC 34.2 33.6 - 35.0 g/dL    RDW 54.2 (H) 35.9 - 50.0 fL    Platelet Count 191 164 - 446 K/uL    MPV 9.4 9.0 - 12.9 fL    Neutrophils-Polys 83.60 (H) 44.00 - 72.00 %    Lymphocytes 10.70 (L) 22.00 - 41.00 %    Monocytes 4.20 0.00 - 13.40 %    Eosinophils 0.00 0.00 - 6.90 %    Basophils 0.60 0.00 - 1.80 %    Immature Granulocytes 0.90 0.00 - 0.90 %    Nucleated RBC 0.40 /100 WBC    Neutrophils (Absolute) 8.51 (H) 2.00 - 7.15 K/uL    Lymphs (Absolute) 1.09 1.00 - 4.80 K/uL    Monos (Absolute) 0.43 0.00 - 0.85 K/uL    Eos (Absolute) 0.00 0.00 - 0.51 K/uL    Baso (Absolute) 0.06 0.00 - 0.12 K/uL    Immature Granulocytes (abs) 0.09 0.00 - 0.11 K/uL    NRBC (Absolute) 0.04 K/uL   Complete Metabolic Panel (CMP)   Result Value Ref Range    Sodium 139 135 - 145 mmol/L    Potassium 2.8 (L) 3.6 - 5.5 mmol/L    Chloride 96 96 - 112 mmol/L    Co2 10 (L) 20 - 33 mmol/L    Anion Gap 33.0 (H) 7.0 - 16.0    Glucose 60 (L) 65 - 99 mg/dL    Bun 8 8 - 22 mg/dL    Creatinine 0.44 (L) 0.50 - 1.40 mg/dL    Calcium 7.5 (L) 8.4 - 10.2 mg/dL    AST(SGOT) 40 12 - 45  U/L    ALT(SGPT) 18 2 - 50 U/L    Alkaline Phosphatase 112 (H) 30 - 99 U/L    Total Bilirubin 1.2 0.1 - 1.5 mg/dL    Albumin 4.1 3.2 - 4.9 g/dL    Total Protein 6.7 6.0 - 8.2 g/dL    Globulin 2.6 1.9 - 3.5 g/dL    A-G Ratio 1.6 g/dL   Magnesium   Result Value Ref Range    Magnesium 1.5 1.5 - 2.5 mg/dL   Phosphorus   Result Value Ref Range    Phosphorus 2.4 (L) 2.5 - 4.5 mg/dL   ESTIMATED GFR   Result Value Ref Range    GFR (CKD-EPI) 103 >60 mL/min/1.73 m 2   LACTIC ACID   Result Value Ref Range    Lactic Acid BGLAC 7.6 (HH) 0.5 - 2.0 mmol/L   EKG   Result Value Ref Range    Report       [facility] Emergency Dept.    Test Date:  2022  Pt Name:    VIDHI ROSALES               Department[facility]  MRN:        [id number]                      [facility]  Gender:     Female                       Technician: [id number]  :        1950                   Requested By:SADIE ARMENDARIZ  Order #:    [id number]                    Reading MD: SADIE ARMENDARIZ DO    Measurements  Intervals                                Axis  Rate:       87                           P:          76  VA:         140                          QRS:        92  QRSD:       94                           T:          85  QT:         423  QTc:        509    Interpretive Statements  Sinus rhythm  Right axis deviation  Prolonged QT interval  Compared to ECG 10/13/2021 23:07:38  Prolonged QT interval now present  Sinus bradycardia no longer present  Possible ischemia no longer present  ST (T wave) deviation no longer present  Electronically Signed On 2022 11:10:5 9 PDT by SADIE ARMENDARIZ DO             COURSE & MEDICAL DECISION MAKING  Pertinent Labs & Imaging studies reviewed. (See chart for details)  Is a pleasant 72-year-old female presents with anxiety, alcohol withdrawal, alcohol starvation ketosis.  Here in the emergency department, the patient had no evidence of significant hemic instability  although she was extremely anxious, tremulous.  Her blood sugar was 60 therefore she received food, she had hypokalemia, hypophosphatemia therefore she received K-Phos as well as 4 mg of potassium orally and for her low calcium she received Tums.  She received 2 L normal saline and 3 mg of Valium.  Reevaluation she was continuing to have profound tremulousness and alcohol withdrawal.  She received another 5 mg of Valium.  The patient was able to eat food here yet I do not believe she is going to make it home secondary to her alcohol withdrawal symptoms and electrolyte derangement.  She have a lactic acidosis of 7.6 and we have secondary to her starvation ketosis and not DKA.  Should continue to eat food, will replete electrolytes as needed, give her IV fluids.  Dr. Flores was gracious To hospitalize this patient for further evaluation and management of her condition.    FINAL IMPRESSION  Alcohol withdrawal  Starvation ketosis  Lactic acidosis  Hypoglycemia  Hypokalemia  Hypophosphatemia  Hypercalcemia           Electronically signed by: Ryan Siegel D.O., 8/30/2022 8:30 AM

## 2022-08-30 NOTE — ASSESSMENT & PLAN NOTE
- Maintaining good control.  - Continue home propanolol, valsartan.  -Monitor blood pressure trend closely, with as needed IV labetalol for significant hypertension.

## 2022-08-30 NOTE — ASSESSMENT & PLAN NOTE
-Smokes 1/2 to 1 pack/day  -Counseled on smoking cessation.  Continue nicotine replacement therapy, along with bupropion.

## 2022-08-30 NOTE — DISCHARGE PLANNING
Met with pt. She was observed to have tremors.     Pt said she lives alone and normally, independent with ADLs and IADLs. Her house is one level without any steps. Her parents help her if needed and she has a friend that can assist her as well.     Pt will need substance abuse resources prior to discharge. PCP is Sharmin MCDANIEL and uses [facility] at [facility].       Care Transition Team Assessment    Information Source  Orientation Level: Oriented X4  Information Given By: Patient  Who is responsible for making decisions for patient? : Patient    Readmission Evaluation  Is this a readmission?: No    Elopement Risk  Legal Hold: No    Interdisciplinary Discharge Planning  Does Admitting Nurse Feel This Could be a Complex Discharge?: No  Primary Care Physician: Sharmin MCDANIEL  Lives with - Patient's Self Care Capacity: Alone and Able to Care For Self  Patient or legal guardian wants to designate a caregiver: No  Support Systems: Parent, Friends / Neighbors  Housing / Facility: [address]  Do You Take your Prescribed Medications Regularly: Yes  Able to Return to Previous ADL's: Yes  Mobility Issues: No  Prior Services: None, Home-Independent  Patient Prefers to be Discharged to:: Home  Assistance Needed: No  Durable Medical Equipment: Not Applicable    Discharge Preparedness  What is your plan after discharge?: Home with help  What are your discharge supports?: Parent  Prior Functional Level: Ambulatory, Drives Self, Independent with Activities of Daily Living, Independent with Medication Management  Difficulity with ADLs: None  Difficulity with IADLs: None    Functional Assesment  Prior Functional Level: Ambulatory, Drives Self, Independent with Activities of Daily Living, Independent with Medication Management    Finances  Financial Barriers to Discharge: No  Prescription Coverage: Yes    Vision / Hearing Impairment  Vision Impairment : No  Hearing Impairment : No    Values / Beliefs /  Concerns  Values / Beliefs Concerns : No    Advance Directive  Advance Directive?: Living Will    Domestic Abuse  Have you ever been the victim of abuse or violence?: No    Discharge Risks or Barriers  Discharge risks or barriers?: No  Patient risk factors: Other (comment)    Anticipated Discharge Information  Discharge Disposition: Discharged to home/self care (01)

## 2022-08-30 NOTE — PROGRESS NOTES
4 Eyes Skin Assessment Completed by DEWEY Tripp and DEWEY Goncalves.    Head WDL  Ears WDL  Nose WDL  Mouth WDL  Neck WDL  Breast/Chest WDL  Shoulder Blades WDL  Spine WDL  (R) Arm/Elbow/Hand Abrasion and Scab  (L) Arm/Elbow/Hand Scab  Abdomen WDL  Groin WDL  Scrotum/Coccyx/Buttocks WDL  (R) Leg Redness and Scab  (L) Leg Redness, Scab, and Swelling  (R) Heel/Foot/Toe Edema  (L) Heel/Foot/Toe Edema      Devices In Places Tele Box      Interventions In Place Pillows    Possible Skin Injury No    Pictures Uploaded Into Epic N/A  Wound Consult Placed N/A  RN Wound Prevention Protocol Ordered No

## 2022-08-30 NOTE — H&P
"Hospital Medicine History & Physical Note    Date of Service  8/30/2022    Primary Care Physician  Sharmin Mcdermott P.A.-C.    Consultants  N/A      Code Status  Full Code    Chief Complaint  Chief Complaint   Patient presents with    Anxiety     PT BIB EMS for panic attack. \"I just want a sedative to calm me down\". Pt drinks everyday has not had a drink today. Pt also states \"I have not slept in a couple days\"       History of Presenting Illness  Marcia Koch is a 72 y.o. female who presented 8/30/2022 with anxiety.  Patient reports history of alcohol use usually drinks about 2 cocktails and a glass of wine most days of the week.  She denies previous history of alcohol withdrawal however she came in today for anxiety and tremors.  She reports that she has not slept in a couple of days and her last drink was yesterday.  Patient also reports that she has not eaten in the last 3 or 4 days reports that she has not been hungry.  Patient lives alone and helps take care of her father who is blind.  Reports normally she is able to walk on her own, however required wheelchair when coming to the ER.  Patient denies any previous ER admissions for alcohol withdrawal or previous seizures.  Patient also reports that she is on medications for hypertension which she has not taken for the last 3 to 4 days.    In the ER patient hypertensive otherwise vital stable on room air.  Patient extremely agitated and tremulous given multiple rounds of IV Valium without improvement.  Labs significant for elevated MCV, hypokalemia, hypophosphatemia, hypocalcemia, anion gap metabolic acidosis. Patient admitted and started on CIWA for alcohol withdrawal.     I discussed the plan of care with patient and bedside RN.    Review of Systems  Review of Systems   Constitutional:  Positive for chills. Negative for diaphoresis, fever and malaise/fatigue.   HENT:  Negative for hearing loss.    Respiratory:  Positive for shortness of breath.  "   Cardiovascular:  Negative for chest pain.   Gastrointestinal:  Negative for abdominal pain, nausea and vomiting.   Genitourinary:  Negative for dysuria.   Musculoskeletal:  Negative for myalgias.   Skin:  Negative for rash.   Neurological:  Positive for tremors. Negative for dizziness, focal weakness, seizures, loss of consciousness and headaches.   Psychiatric/Behavioral:  Positive for substance abuse. Negative for depression. The patient is nervous/anxious.    All other systems reviewed and are negative.    Past Medical History   has a past medical history of Alcohol abuse, Alcoholism (HCC), Anxiety, Depression, Hiatus hernia syndrome, High cholesterol, Hypertension, Indigestion, Other specified disorder of intestines, Pneumonia, Psychiatric disorder, Sleep apnea, Snoring, Unspecified urinary incontinence, and Urinary bladder disorder.    Surgical History   has a past surgical history that includes bladder sling female (8/30/2011); teddy by laparoscopy; other abdominal surgery (2002); other (2010); other (1956); other (2010); gyn surgery (1991); and abdominal hysterectomy total.     Family History  family history includes Alcohol abuse in her father and mother; Anxiety disorder in her brother; Cancer in her maternal aunt, maternal uncle, mother, and sister; Depression in her brother; Drug abuse in her sister; Hyperlipidemia in her father; Other in her brother and father.   Family history reviewed with patient. There is no family history that is pertinent to the chief complaint.     Social History   reports that she has been smoking cigarettes. She has a 25.00 pack-year smoking history. She has never used smokeless tobacco. She reports current alcohol use. She reports that she does not use drugs.    Allergies  Allergies   Allergen Reactions    Atorvastatin Myalgia    Codeine Nausea    Lisinopril Cough     Cough      Morphine Nausea    Rosuvastatin Myalgia       Medications  Prior to Admission Medications    Prescriptions Last Dose Informant Patient Reported? Taking?   buPROPion (WELLBUTRIN XL) 300 MG XL tablet   No No   Sig: Take 1 tablet by mouth every morning.   clonazePAM (KLONOPIN) 0.5 MG Tab   Yes No   Sig: Take  by mouth 3 times a day.   folic acid (FOLVITE) 1 MG Tab   Yes No   gabapentin (NEURONTIN) 600 MG tablet   No No   Sig: Take 1 Tablet by mouth 2 times a day.   pravastatin (PRAVACHOL) 20 MG Tab   Yes No   Sig: Take 20 mg by mouth every evening.   propranolol (INDERAL) 80 MG Tab   Yes No   Sig: Take 80 mg by mouth 2 times a day.   zolpidem (AMBIEN) 5 MG Tab   Yes No   Sig: Take 5 mg by mouth at bedtime as needed for Sleep.      Facility-Administered Medications: None       Physical Exam  Temp:  [36.9 °C (98.5 °F)] 36.9 °C (98.5 °F)  Pulse:  [97] 97  Resp:  [20] 20  BP: (166)/(81) 166/81  SpO2:  [97 %] 97 %  Blood Pressure : (!) 166/81   Temperature: 36.9 °C (98.5 °F)   Pulse: 97   Respiration: 20   Pulse Oximetry: 97 %       Physical Exam  Vitals and nursing note reviewed.   Constitutional:       General: She is in acute distress (very anxious, tremulous).   HENT:      Head: Normocephalic and atraumatic.      Right Ear: External ear normal.      Left Ear: External ear normal.      Nose: Nose normal.      Mouth/Throat:      Mouth: Mucous membranes are dry.      Pharynx: Oropharynx is clear.   Eyes:      General: No scleral icterus.     Extraocular Movements: Extraocular movements intact.      Conjunctiva/sclera: Conjunctivae normal.      Pupils: Pupils are equal, round, and reactive to light.   Cardiovascular:      Rate and Rhythm: Regular rhythm. Tachycardia present.      Pulses: Normal pulses.      Heart sounds: Normal heart sounds.   Pulmonary:      Effort: Pulmonary effort is normal. No respiratory distress.      Breath sounds: Normal breath sounds. No wheezing or rales.   Abdominal:      General: Abdomen is flat. There is no distension.      Palpations: Abdomen is soft.      Tenderness: There is no  abdominal tenderness.   Musculoskeletal:         General: No signs of injury. Normal range of motion.      Cervical back: Normal range of motion and neck supple.      Right lower leg: No edema.      Left lower leg: No edema.   Skin:     General: Skin is warm and dry.      Coloration: Skin is not jaundiced.   Neurological:      General: No focal deficit present.      Mental Status: She is alert and oriented to person, place, and time.      Cranial Nerves: No cranial nerve deficit.   Psychiatric:         Mood and Affect: Mood is anxious.         Behavior: Behavior is agitated.         Cognition and Memory: Cognition normal.       Laboratory:  Recent Labs     08/30/22  0821   WBC 10.2   RBC 3.71*   HEMOGLOBIN 12.7   HEMATOCRIT 37.1   .0*   MCH 34.2*   MCHC 34.2   RDW 54.2*   PLATELETCT 191   MPV 9.4     Recent Labs     08/30/22  0821   SODIUM 139   POTASSIUM 2.8*   CHLORIDE 96   CO2 10*   GLUCOSE 60*   BUN 8   CREATININE 0.44*   CALCIUM 7.5*     Recent Labs     08/30/22  0821   ALTSGPT 18   ASTSGOT 40   ALKPHOSPHAT 112*   TBILIRUBIN 1.2   GLUCOSE 60*         No results for input(s): NTPROBNP in the last 72 hours.      No results for input(s): TROPONINT in the last 72 hours.    Imaging:  No orders to display       EKG:  I have personally reviewed the images and compared with prior images.    Assessment/Plan:  Justification for Admission Status  I anticipate this patient will require at least two midnights for appropriate medical management, necessitating inpatient admission because alcohol withdrawal with electrolyte and metabolic derangements requiring telemetry monitoring.    Patient will need a  bed on Telemetry service .  The need is secondary to alcohol withdrawal.    * Alcohol abuse with withdrawal (HCC)- (present on admission)  Assessment & Plan  Give IV valium in ER still tremulous   WA protocol  IV fluid hydration  Thiamine, multivitamin, folate  Cessation counseling performed  PT/OT      High anion  gap metabolic acidosis- (present on admission)  Assessment & Plan  Suspect due to alcohol ketoacidosis versus starvation ketosis  Lactic acid and beta hydroxybutyrate pending  Glucose within normal limits  Patient reports that she has not eaten in the last 3 to 5 days  Trend BMP  Start IVF with D5    Hypocalcemia- (present on admission)  Assessment & Plan  Start calcium carbonate 3 times daily  Continue to monitor    Hypophosphatemia- (present on admission)  Assessment & Plan  Likely due to poor p.o. intake  Replace as needed    Hypokalemia- (present on admission)  Assessment & Plan  Likely due to poor p.o. intake  High risk for refeeding syndrome   replace as needed    Major depressive disorder, recurrent episode, mild (HCC)- (present on admission)  Assessment & Plan  Continue home Seroquel, Remeron, and bupropion    Tobacco dependence- (present on admission)  Assessment & Plan  Smokes 1/2 to 1 pack/day  Tobacco cessation counseling performed, 5 minutes total   Start nicotine replacement therapy, continue buproprion      Dyslipidemia- (present on admission)  Assessment & Plan  Continue home statin    Primary hypertension  Assessment & Plan  Continue home propanolol, valsartan  As needed antihypertensives        VTE prophylaxis: enoxaparin ppx

## 2022-08-30 NOTE — ED NOTES
tech from Lab called with critical result of lactic 7.6 at 1104. Critical lab result read back to tech.   Dr. Flores  notified of critical lab result at 1105.  Critical lab result read back by Dr. Flores.

## 2022-08-31 ENCOUNTER — APPOINTMENT (OUTPATIENT)
Dept: RADIOLOGY | Facility: MEDICAL CENTER | Age: 72
DRG: 894 | End: 2022-08-31
Attending: INTERNAL MEDICINE
Payer: MEDICARE

## 2022-08-31 ENCOUNTER — APPOINTMENT (OUTPATIENT)
Dept: CARDIOLOGY | Facility: MEDICAL CENTER | Age: 72
DRG: 894 | End: 2022-08-31
Attending: INTERNAL MEDICINE
Payer: MEDICARE

## 2022-08-31 PROBLEM — I82.401 ACUTE DEEP VEIN THROMBOSIS (DVT) OF RIGHT LOWER EXTREMITY (HCC): Status: ACTIVE | Noted: 2022-08-31

## 2022-08-31 PROBLEM — R60.0 BILATERAL LOWER EXTREMITY EDEMA: Status: ACTIVE | Noted: 2022-08-31

## 2022-08-31 LAB
ALBUMIN SERPL BCP-MCNC: 3 G/DL (ref 3.2–4.9)
ALBUMIN/GLOB SERPL: 1.5 G/DL
ALP SERPL-CCNC: 77 U/L (ref 30–99)
ALT SERPL-CCNC: 14 U/L (ref 2–50)
ANION GAP SERPL CALC-SCNC: 7 MMOL/L (ref 7–16)
AST SERPL-CCNC: 25 U/L (ref 12–45)
BILIRUB SERPL-MCNC: 0.9 MG/DL (ref 0.1–1.5)
BUN SERPL-MCNC: 6 MG/DL (ref 8–22)
CALCIUM SERPL-MCNC: 7.3 MG/DL (ref 8.4–10.2)
CHLORIDE SERPL-SCNC: 102 MMOL/L (ref 96–112)
CO2 SERPL-SCNC: 24 MMOL/L (ref 20–33)
CREAT SERPL-MCNC: 0.37 MG/DL (ref 0.5–1.4)
ERYTHROCYTE [DISTWIDTH] IN BLOOD BY AUTOMATED COUNT: 57.8 FL (ref 35.9–50)
GFR SERPLBLD CREATININE-BSD FMLA CKD-EPI: 108 ML/MIN/1.73 M 2
GLOBULIN SER CALC-MCNC: 2 G/DL (ref 1.9–3.5)
GLUCOSE SERPL-MCNC: 116 MG/DL (ref 65–99)
HCT VFR BLD AUTO: 28.8 % (ref 37–47)
HGB BLD-MCNC: 9.5 G/DL (ref 12–16)
LACTATE BLD-SCNC: 1.2 MMOL/L (ref 0.5–2)
LV EJECT FRACT  99904: 60
LV EJECT FRACT MOD 2C 99903: 73.77
LV EJECT FRACT MOD 4C 99902: 46.25
LV EJECT FRACT MOD BP 99901: 59.77
MAGNESIUM SERPL-MCNC: 1.7 MG/DL (ref 1.5–2.5)
MCH RBC QN AUTO: 34.5 PG (ref 27–33)
MCHC RBC AUTO-ENTMCNC: 33 G/DL (ref 33.6–35)
MCV RBC AUTO: 104.7 FL (ref 81.4–97.8)
NT-PROBNP SERPL IA-MCNC: 1122 PG/ML (ref 0–125)
PHOSPHATE SERPL-MCNC: 2.2 MG/DL (ref 2.5–4.5)
PLATELET # BLD AUTO: 128 K/UL (ref 164–446)
PMV BLD AUTO: 9.6 FL (ref 9–12.9)
POTASSIUM SERPL-SCNC: 4.3 MMOL/L (ref 3.6–5.5)
PROT SERPL-MCNC: 5 G/DL (ref 6–8.2)
RBC # BLD AUTO: 2.75 M/UL (ref 4.2–5.4)
SODIUM SERPL-SCNC: 133 MMOL/L (ref 135–145)
WBC # BLD AUTO: 4 K/UL (ref 4.8–10.8)

## 2022-08-31 PROCEDURE — A9270 NON-COVERED ITEM OR SERVICE: HCPCS | Performed by: INTERNAL MEDICINE

## 2022-08-31 PROCEDURE — A9270 NON-COVERED ITEM OR SERVICE: HCPCS | Performed by: EMERGENCY MEDICINE

## 2022-08-31 PROCEDURE — 770020 HCHG ROOM/CARE - TELE (206)

## 2022-08-31 PROCEDURE — 93306 TTE W/DOPPLER COMPLETE: CPT | Mod: 26 | Performed by: INTERNAL MEDICINE

## 2022-08-31 PROCEDURE — 700102 HCHG RX REV CODE 250 W/ 637 OVERRIDE(OP): Performed by: EMERGENCY MEDICINE

## 2022-08-31 PROCEDURE — 85027 COMPLETE CBC AUTOMATED: CPT

## 2022-08-31 PROCEDURE — 83605 ASSAY OF LACTIC ACID: CPT

## 2022-08-31 PROCEDURE — 700102 HCHG RX REV CODE 250 W/ 637 OVERRIDE(OP): Performed by: INTERNAL MEDICINE

## 2022-08-31 PROCEDURE — 80053 COMPREHEN METABOLIC PANEL: CPT

## 2022-08-31 PROCEDURE — 97165 OT EVAL LOW COMPLEX 30 MIN: CPT

## 2022-08-31 PROCEDURE — 97161 PT EVAL LOW COMPLEX 20 MIN: CPT

## 2022-08-31 PROCEDURE — 93306 TTE W/DOPPLER COMPLETE: CPT

## 2022-08-31 PROCEDURE — 94760 N-INVAS EAR/PLS OXIMETRY 1: CPT

## 2022-08-31 PROCEDURE — 84100 ASSAY OF PHOSPHORUS: CPT

## 2022-08-31 PROCEDURE — 700105 HCHG RX REV CODE 258: Performed by: INTERNAL MEDICINE

## 2022-08-31 PROCEDURE — 83880 ASSAY OF NATRIURETIC PEPTIDE: CPT

## 2022-08-31 PROCEDURE — 99233 SBSQ HOSP IP/OBS HIGH 50: CPT | Performed by: INTERNAL MEDICINE

## 2022-08-31 PROCEDURE — 93970 EXTREMITY STUDY: CPT

## 2022-08-31 PROCEDURE — 36415 COLL VENOUS BLD VENIPUNCTURE: CPT

## 2022-08-31 PROCEDURE — 83735 ASSAY OF MAGNESIUM: CPT

## 2022-08-31 RX ORDER — CHLORDIAZEPOXIDE HYDROCHLORIDE 25 MG/1
25 CAPSULE, GELATIN COATED ORAL EVERY 6 HOURS
Status: DISCONTINUED | OUTPATIENT
Start: 2022-08-31 | End: 2022-09-02 | Stop reason: HOSPADM

## 2022-08-31 RX ADMIN — PROPRANOLOL HYDROCHLORIDE 80 MG: 20 TABLET ORAL at 12:06

## 2022-08-31 RX ADMIN — RIVAROXABAN 15 MG: 15 TABLET, FILM COATED ORAL at 18:06

## 2022-08-31 RX ADMIN — CHLORDIAZEPOXIDE HYDROCHLORIDE 25 MG: 25 CAPSULE ORAL at 12:08

## 2022-08-31 RX ADMIN — PRAVASTATIN SODIUM 40 MG: 20 TABLET ORAL at 20:10

## 2022-08-31 RX ADMIN — MIRTAZAPINE 15 MG: 15 TABLET, ORALLY DISINTEGRATING ORAL at 20:10

## 2022-08-31 RX ADMIN — CHLORDIAZEPOXIDE HYDROCHLORIDE 25 MG: 25 CAPSULE ORAL at 18:06

## 2022-08-31 RX ADMIN — GABAPENTIN 600 MG: 300 CAPSULE ORAL at 18:06

## 2022-08-31 RX ADMIN — NICOTINE TRANSDERMAL SYSTEM 21 MG: 21 PATCH, EXTENDED RELEASE TRANSDERMAL at 05:25

## 2022-08-31 RX ADMIN — CALCIUM CARBONATE (ANTACID) CHEW TAB 500 MG 500 MG: 500 CHEW TAB at 05:26

## 2022-08-31 RX ADMIN — LORAZEPAM 2 MG: 1 TABLET ORAL at 20:10

## 2022-08-31 RX ADMIN — CALCIUM CARBONATE (ANTACID) CHEW TAB 500 MG 500 MG: 500 CHEW TAB at 18:06

## 2022-08-31 RX ADMIN — LORAZEPAM 1 MG: 1 TABLET ORAL at 15:25

## 2022-08-31 RX ADMIN — CALCIUM CARBONATE (ANTACID) CHEW TAB 500 MG 500 MG: 500 CHEW TAB at 12:08

## 2022-08-31 RX ADMIN — VALSARTAN 40 MG: 80 TABLET ORAL at 05:26

## 2022-08-31 RX ADMIN — SENNOSIDES AND DOCUSATE SODIUM 2 TABLET: 50; 8.6 TABLET ORAL at 05:26

## 2022-08-31 RX ADMIN — QUETIAPINE FUMARATE 200 MG: 100 TABLET ORAL at 20:10

## 2022-08-31 RX ADMIN — LORAZEPAM 2 MG: 1 TABLET ORAL at 18:06

## 2022-08-31 RX ADMIN — DIBASIC SODIUM PHOSPHATE, MONOBASIC POTASSIUM PHOSPHATE AND MONOBASIC SODIUM PHOSPHATE 250 MG: 852; 155; 130 TABLET ORAL at 15:24

## 2022-08-31 RX ADMIN — OMEPRAZOLE 20 MG: 20 CAPSULE, DELAYED RELEASE ORAL at 05:26

## 2022-08-31 RX ADMIN — GABAPENTIN 600 MG: 300 CAPSULE ORAL at 05:26

## 2022-08-31 RX ADMIN — THIAMINE HCL TAB 100 MG 100 MG: 100 TAB at 05:26

## 2022-08-31 RX ADMIN — DEXTROSE AND SODIUM CHLORIDE: 5; 450 INJECTION, SOLUTION INTRAVENOUS at 00:57

## 2022-08-31 RX ADMIN — THERA TABS 1 TABLET: TAB at 05:26

## 2022-08-31 RX ADMIN — DIBASIC SODIUM PHOSPHATE, MONOBASIC POTASSIUM PHOSPHATE AND MONOBASIC SODIUM PHOSPHATE 250 MG: 852; 155; 130 TABLET ORAL at 04:19

## 2022-08-31 RX ADMIN — DIBASIC SODIUM PHOSPHATE, MONOBASIC POTASSIUM PHOSPHATE AND MONOBASIC SODIUM PHOSPHATE 250 MG: 852; 155; 130 TABLET ORAL at 09:02

## 2022-08-31 RX ADMIN — BUPROPION HYDROCHLORIDE 150 MG: 150 TABLET, EXTENDED RELEASE ORAL at 05:26

## 2022-08-31 RX ADMIN — LORAZEPAM 0.5 MG: 0.5 TABLET ORAL at 10:43

## 2022-08-31 RX ADMIN — CHLORDIAZEPOXIDE HYDROCHLORIDE 25 MG: 25 CAPSULE ORAL at 09:02

## 2022-08-31 RX ADMIN — FOLIC ACID 1 MG: 1 TABLET ORAL at 05:26

## 2022-08-31 RX ADMIN — LORAZEPAM 0.5 MG: 0.5 TABLET ORAL at 05:33

## 2022-08-31 ASSESSMENT — LIFESTYLE VARIABLES
VISUAL DISTURBANCES: NOT PRESENT
AGITATION: NORMAL ACTIVITY
HEADACHE, FULLNESS IN HEAD: NOT PRESENT
TREMOR: MODERATE TREMOR WITH ARMS EXTENDED
NAUSEA AND VOMITING: NO NAUSEA AND NO VOMITING
AGITATION: *
NAUSEA AND VOMITING: NO NAUSEA AND NO VOMITING
ANXIETY: *
TOTAL SCORE: 4
NAUSEA AND VOMITING: NO NAUSEA AND NO VOMITING
PAROXYSMAL SWEATS: NO SWEAT VISIBLE
AGITATION: NORMAL ACTIVITY
VISUAL DISTURBANCES: NOT PRESENT
TOTAL SCORE: 7
HEADACHE, FULLNESS IN HEAD: NOT PRESENT
AUDITORY DISTURBANCES: NOT PRESENT
AUDITORY DISTURBANCES: NOT PRESENT
NAUSEA AND VOMITING: NO NAUSEA AND NO VOMITING
HEADACHE, FULLNESS IN HEAD: NOT PRESENT
PAROXYSMAL SWEATS: NO SWEAT VISIBLE
HEADACHE, FULLNESS IN HEAD: NOT PRESENT
HEADACHE, FULLNESS IN HEAD: VERY MILD
HEADACHE, FULLNESS IN HEAD: NOT PRESENT
TOTAL SCORE: 2
TOTAL SCORE: 12
TREMOR: MODERATE TREMOR WITH ARMS EXTENDED
AGITATION: NORMAL ACTIVITY
VISUAL DISTURBANCES: NOT PRESENT
ORIENTATION AND CLOUDING OF SENSORIUM: CANNOT DO SERIAL ADDITIONS OR IS UNCERTAIN ABOUT DATE
NAUSEA AND VOMITING: NO NAUSEA AND NO VOMITING
TOTAL SCORE: 9
ANXIETY: *
HEADACHE, FULLNESS IN HEAD: NOT PRESENT
TOTAL SCORE: 7
AGITATION: NORMAL ACTIVITY
TREMOR: MODERATE TREMOR WITH ARMS EXTENDED
ANXIETY: NO ANXIETY (AT EASE)
AUDITORY DISTURBANCES: NOT PRESENT
AGITATION: NORMAL ACTIVITY
PAROXYSMAL SWEATS: NO SWEAT VISIBLE
PAROXYSMAL SWEATS: NO SWEAT VISIBLE
NAUSEA AND VOMITING: NO NAUSEA AND NO VOMITING
VISUAL DISTURBANCES: NOT PRESENT
ORIENTATION AND CLOUDING OF SENSORIUM: ORIENTED AND CAN DO SERIAL ADDITIONS
AUDITORY DISTURBANCES: NOT PRESENT
PAROXYSMAL SWEATS: NO SWEAT VISIBLE
ANXIETY: MODERATELY ANXIOUS OR GUARDED, SO ANXIETY IS INFERRED
HEADACHE, FULLNESS IN HEAD: NOT PRESENT
ANXIETY: *
VISUAL DISTURBANCES: NOT PRESENT
PAROXYSMAL SWEATS: NO SWEAT VISIBLE
VISUAL DISTURBANCES: NOT PRESENT
HEADACHE, FULLNESS IN HEAD: NOT PRESENT
ORIENTATION AND CLOUDING OF SENSORIUM: ORIENTED AND CAN DO SERIAL ADDITIONS
TOTAL SCORE: 4
AGITATION: SOMEWHAT MORE THAN NORMAL ACTIVITY
VISUAL DISTURBANCES: NOT PRESENT
PAROXYSMAL SWEATS: NO SWEAT VISIBLE
TOTAL SCORE: 5
TREMOR: *
TREMOR: *
AUDITORY DISTURBANCES: MILD HARSHNESS OR ABILITY TO FRIGHTEN
NAUSEA AND VOMITING: NO NAUSEA AND NO VOMITING
AUDITORY DISTURBANCES: NOT PRESENT
ANXIETY: *
ORIENTATION AND CLOUDING OF SENSORIUM: ORIENTED AND CAN DO SERIAL ADDITIONS
NAUSEA AND VOMITING: NO NAUSEA AND NO VOMITING
AGITATION: SOMEWHAT MORE THAN NORMAL ACTIVITY
TREMOR: *
ORIENTATION AND CLOUDING OF SENSORIUM: CANNOT DO SERIAL ADDITIONS OR IS UNCERTAIN ABOUT DATE
TREMOR: MODERATE TREMOR WITH ARMS EXTENDED
AUDITORY DISTURBANCES: NOT PRESENT
ANXIETY: MILDLY ANXIOUS
ORIENTATION AND CLOUDING OF SENSORIUM: ORIENTED AND CAN DO SERIAL ADDITIONS
ORIENTATION AND CLOUDING OF SENSORIUM: ORIENTED AND CAN DO SERIAL ADDITIONS
TREMOR: MODERATE TREMOR WITH ARMS EXTENDED
ORIENTATION AND CLOUDING OF SENSORIUM: ORIENTED AND CAN DO SERIAL ADDITIONS
AUDITORY DISTURBANCES: NOT PRESENT
AUDITORY DISTURBANCES: NOT PRESENT
NAUSEA AND VOMITING: NO NAUSEA AND NO VOMITING
VISUAL DISTURBANCES: NOT PRESENT
AGITATION: *
TOTAL SCORE: 12
ANXIETY: *
ANXIETY: MILDLY ANXIOUS
TREMOR: TREMOR NOT VISIBLE BUT CAN BE FELT, FINGERTIP TO FINGERTIP
VISUAL DISTURBANCES: NOT PRESENT
ORIENTATION AND CLOUDING OF SENSORIUM: ORIENTED AND CAN DO SERIAL ADDITIONS

## 2022-08-31 ASSESSMENT — COGNITIVE AND FUNCTIONAL STATUS - GENERAL
DRESSING REGULAR UPPER BODY CLOTHING: A LITTLE
MOBILITY SCORE: 22
SUGGESTED CMS G CODE MODIFIER MOBILITY: CJ
SUGGESTED CMS G CODE MODIFIER DAILY ACTIVITY: CK
HELP NEEDED FOR BATHING: A LOT
DAILY ACTIVITIY SCORE: 18
CLIMB 3 TO 5 STEPS WITH RAILING: A LITTLE
WALKING IN HOSPITAL ROOM: A LITTLE
PERSONAL GROOMING: A LITTLE
TOILETING: A LITTLE
DRESSING REGULAR LOWER BODY CLOTHING: A LITTLE

## 2022-08-31 ASSESSMENT — PAIN DESCRIPTION - PAIN TYPE
TYPE: ACUTE PAIN
TYPE: ACUTE PAIN

## 2022-08-31 ASSESSMENT — ACTIVITIES OF DAILY LIVING (ADL): TOILETING: INDEPENDENT

## 2022-08-31 ASSESSMENT — GAIT ASSESSMENTS
DISTANCE (FEET): 250
DISTANCE (FEET): 100
GAIT LEVEL OF ASSIST: SUPERVISED
DEVIATION: NO DEVIATION
ASSISTIVE DEVICE: FRONT WHEEL WALKER

## 2022-08-31 NOTE — PROGRESS NOTES
"Bedside report received from DEWEY Goldstein. Assumed pt care. Pt is AOx4. Reports feeling \"better\" this AM with less anxiety. Tremors slightly improved, pt reports no hallucinations this AM. Denies any pain or other distress at this time. Discussed POC including CIWA, anxiolytics, fall risk/precautions. Pt verbalized understanding. Hourly rounding in place. Fall precautions in place and call light within reach.     "

## 2022-08-31 NOTE — PROGRESS NOTES
Telemetry Shift Summary     Rhythm SB/ SR  HR Range 59-77  Ectopy R PAC/ PVC  Measurements  0.14/ 0.08/ 0.48  Per strip printed 0400     Normal Values  Rhythm SR  HR Range    Measurements 0.12-0.20 / 0.06-0.10  / 0.30-0.52

## 2022-08-31 NOTE — PROGRESS NOTES
Tele strip printed at 0945     Rhythm: SR   HR: 73  Measurements: 0.14/0.10/0.44        Telemetry Shift Summary     Rhythm: SR/SB  HR Range: 50's-70's  Ectopy: Rare PVC     Telemetry monitoring strips placed in pt's chart.

## 2022-08-31 NOTE — DISCHARGE PLANNING
Case Management Discharge Planning    Admission Date: 8/30/2022  GMLOS: 3.4  ALOS: 1    6-Clicks ADL Score: 24  6-Clicks Mobility Score: 22      Anticipated Discharge Dispo: Discharge Disposition: Discharged to home/self care (01)    DME Needed: Yes-walker    DME Ordered: No    Action(s) Taken: Discussed pt in IDT rounds. Per MD, anticipate d/c in 1-2 days, pending PT/OT recs. Per PT note, recommending no PT needs and FWW. No FWW order at this time.    Escalations Completed: None    Medically Clear: No    Next Steps: LSW to follow and assist as needed.    Barriers to Discharge: None    Is the patient up for discharge tomorrow: No

## 2022-08-31 NOTE — THERAPY
Physical Therapy   Initial Evaluation     Patient Name: Marcia Koch  Age:  72 y.o., Sex:  female  Medical Record #: [id number]  Today's Date: 8/31/2022          Assessment  Patient is 72 y.o. female with a diagnosis of ETOH withdrawl with ketoacidosis, hypocalemia, hypokalemia. .Pt resides alone in an single level home with one step to enter. Pt denies any recent falls. Pt presenting with mild tremors BUE but observed to be able to eat breakfast w/o difficulty. Pt able to ambulate for over 150 ft with FWW, no LOB or path deviation noted. Pt would benefit from FWW for home use. Pt denied need for home health services follow up. Recommend pt ambulate on unit with FWW and staff supervision.    Patient will not be actively followed for physical therapy services at this time, however may be seen if requested by physician for 1 more visit within 30 days to address any discharge or equipment needs.   Plan    Recommend Physical Therapy for Evaluation only     DC Equipment Recommendations: Front-Wheel Walker  Discharge Recommendations: Anticipate that the patient will have no further physical therapy needs after discharge from the hospital          Objective     08/31/22 0938   Prior Living Situation   Prior Services None;Home-Independent   Housing / Facility 1 Story House   Steps Into Home 1   Steps In Home 0   Equipment Owned None   Lives with - Patient's Self Care Capacity Alone and Able to Care For Self   Prior Level of Functional Mobility   Bed Mobility Independent   Transfer Status Independent   Ambulation Independent   Distance Ambulation (Feet)   (community)   Assistive Devices Used None   Stairs Independent   Comments states she drives, IND with ADL's and IADL's   History of Falls   Date of Last Fall   (states fall 2 years ago.)   Cognition    Cognition / Consciousness WDL   Active ROM Lower Body    Active ROM Lower Body  WDL   Strength Lower Body   Lower Body Strength  WDL   Balance Assessment   Sitting Balance  (Static) Good   Sitting Balance (Dynamic) Good   Standing Balance (Static) Fair +   Standing Balance (Dynamic) Fair   Weight Shift Sitting Good   Weight Shift Standing Fair   Comments w/FWW   Gait Analysis   Gait Level Of Assist Supervised   Assistive Device Front Wheel Walker   Distance (Feet) 250   # of Times Distance was Traveled 1   Deviation No deviation   Weight Bearing Status no restrictions.   Vision Deficits Impacting Mobility no   Bed Mobility    Supine to Sit Independent   Sit to Supine Independent   Scooting Independent   Rolling Independent   Functional Mobility   Sit to Stand Supervised   Bed, Chair, Wheelchair Transfer Supervised   Transfer Method Stand Step   Mobility supine>EOB   Education Group   Role of Physical Therapist Patient Response Patient;Acceptance;Explanation;Verbal Demonstration   Use of Assistive Device Patient Response Patient;Acceptance;Explanation   Anticipated Discharge Equipment and Recommendations   DC Equipment Recommendations Front-Wheel Walker   Discharge Recommendations Anticipate that the patient will have no further physical therapy needs after discharge from the hospital

## 2022-08-31 NOTE — CARE PLAN
The patient is Stable - Low risk of patient condition declining or worsening    Shift Goals  Clinical Goals: CIWA scales, safety, fall prevention  Patient Goals: stop drinking    Progress made toward(s) clinical / shift goals:      Problem: Knowledge Deficit - Standard  Goal: Patient and family/care givers will demonstrate understanding of plan of care, disease process/condition, diagnostic tests and medications  Outcome: Progressing  Note: Pt updated on POC, CIWA scoring, PRN medications, and safety     Problem: Optimal Care for Alcohol Withdrawal  Goal: Optimal Care for the alcohol withdrawal patient  Outcome: Progressing  Note: CIWA scoring in effect     Problem: Seizure Precautions  Goal: Implementation of seizure precautions  Outcome: Progressing  Note: Bed rails padded, suction at bedside.      Problem: Lifestyle Changes  Goal: Patient's ability to identify lifestyle changes and available resources to help reduce recurrence of condition will improve  Outcome: Progressing       Problem: Psychosocial  Goal: Patient's level of anxiety will decrease  Outcome: Progressing     Problem: Depression  Goal: Patient and family/caregiver will verbalize accurate information about at least two of the possible causes of depression, three-four of the signs and symptoms of depression  Outcome: Progressing     Problem: Fall Risk  Goal: Patient will remain free from falls  Outcome: Progressing          Patient is not progressing towards the following goals:

## 2022-08-31 NOTE — CARE PLAN
The patient is Stable - Low risk of patient condition declining or worsening    Shift Goals  Clinical Goals: CIWA, safety/prevent falls  Patient Goals: walk better, rest    Progress made toward(s) clinical / shift goals:  Medicated for CIWA per MAR. Pt maintaining steady CIWA score, reports anxiety better this afternoon. Falls education provided, fall precautions in place, pt remains free from falls.     Patient is not progressing towards the following goals: n/a

## 2022-08-31 NOTE — PROGRESS NOTES
"Pt refused to let the phlebotomist draw her timed lactic acid. Pt stated \"I'm not gonna do it.\" This RN educated the pt on the importance in trending lab results and pt still refused. Pt asked why she couldn't just go to [facility], and get the same results. This RN tried to explain that [facility] does not work in the hospital. MD Dr. Bernard notified  "

## 2022-08-31 NOTE — PROGRESS NOTES
Received bedside report from RN Qasim, pt care assumed. VSS, pt assessment complete. Pt A&Ox4, no c/o pain at this time. POC discussed with pt and verbalizes no questions. Pt denies any additional needs at this time. Bed locked and in lowest position, bed alarm active. Pt educated on fall risk and verbalized understanding, call light within reach, hourly rounding initiated.

## 2022-08-31 NOTE — PROGRESS NOTES
Tele strip printed at 1234     Rhythm: SR   HR: 92  Measurements: 0.16/0.10/0.40        Telemetry Shift Summary     Rhythm: SR  HR Range: 60's-90's  Ectopy: Rare PVC     Telemetry monitoring strips placed in pt's chart.

## 2022-08-31 NOTE — PROGRESS NOTES
[facility] Medicine Daily Progress Note    Date of Service  8/31/2022    Chief Complaint  Alcohol withdrawal    Hospital Course  Marcia Koch is a 72 y.o. female with hypertension, hyperlipidemia, alcohol use (drinks about 2 cocktails and a glass of wine most days of the week), admitted 8/30/2022 with anxiety and tremors, and has not slept in a couple of days.  Last drink was the day prior to admission.  She also reports she has not eaten in the last 3 to 4 days.  She has not taken her antihypertensives in the last 3 to 4 days as well.  In the ED, patient was hypertensive, but not hypoxic.  She was extremely agitated and tremulous, and was given multiple rounds of IV Valium.  Labs were notable for elevated MCV, hypokalemia, hypophosphatemia, hypocalcemia, and anion gap antibiotic acidosis.  She had no leukocytosis.  Blood alcohol level was 78.6.  She was started on CIWA protocol for alcohol withdrawal, and her electrolytes were replaced.  Lactate was 7.6, which was felt to be related to her starvation ketosis and dehydration.  She was started on IV fluids.  She is also started on thiamine, multivitamin, and folate.    Interval Problem Update  8/31/2022 - I reviewed the patient's chart today. Uneventful night. VSS. Afebrile. Saturating well on RA.  CIWA 7.  WBC remains normal.  Hemoglobin 9.5.  .  Potassium has normalized.  Anion gap is normal.  CO2 is normal.  Renal function is normal.  Calcium 7.3.  Magnesium 1.7.  Phosphorus 2.2.  Lactate has normalized.  Urine drug screen was negative.  Beta hydroxybutyrate was 4.55.  Urinalysis was clean.  Sodium 133.    > I have personally seen and examined the patient today.  She still tremulous, with tremors on outstretched hands.  Denies any hallucinations or delusions.  Had some nausea and episode of vomiting this morning, but that is better.  Denies any pain.  She states that she always has bilateral feet swelling especially during the summer.  No other  complaints.      I have discussed this patient's plan of care and discharge plan at IDT rounds today with Case Management, Nursing, Nursing leadership, and other members of the IDT team.    Consultants/Specialty  None    Code Status  Full Code    Disposition  Patient is not medically cleared for discharge.   Anticipate discharge to to home with close outpatient follow-up.  I have placed the appropriate orders for post-discharge needs.    Review of Systems  ROS     Pertinent positives/negatives as mentioned above.     A complete review of systems was personally done by me. All other systems were negative.       Physical Exam  Temp:  [36.8 °C (98.2 °F)-37.2 °C (99 °F)] 36.8 °C (98.2 °F)  Pulse:  [] 67  Resp:  [18] 18  BP: (102-175)/(57-88) 117/57  SpO2:  [87 %-99 %] 98 %    Physical Exam  Vitals reviewed.   Constitutional:       General: She is not in acute distress.     Appearance: Normal appearance. She is normal weight. She is not ill-appearing or diaphoretic.      Comments: Tremulous   HENT:      Head: Normocephalic and atraumatic.      Right Ear: External ear normal.      Left Ear: External ear normal.      Mouth/Throat:      Mouth: Mucous membranes are moist.      Pharynx: No oropharyngeal exudate or posterior oropharyngeal erythema.   Eyes:      General: No scleral icterus.     Extraocular Movements: Extraocular movements intact.      Conjunctiva/sclera: Conjunctivae normal.      Pupils: Pupils are equal, round, and reactive to light.   Cardiovascular:      Rate and Rhythm: Normal rate and regular rhythm.      Heart sounds: Normal heart sounds. No murmur heard.  Pulmonary:      Effort: Pulmonary effort is normal. No respiratory distress.      Breath sounds: Normal breath sounds. No stridor. No wheezing, rhonchi or rales.   Chest:      Chest wall: No tenderness.   Abdominal:      General: Bowel sounds are normal. There is no distension.      Palpations: Abdomen is soft. There is no mass.      Tenderness:  There is no abdominal tenderness. There is no guarding or rebound.   Musculoskeletal:         General: No swelling. Normal range of motion.      Cervical back: Normal range of motion and neck supple. No rigidity. No muscular tenderness.      Comments: Bilateral feet edema   Lymphadenopathy:      Cervical: No cervical adenopathy.   Skin:     General: Skin is warm and dry.      Coloration: Skin is not jaundiced.      Findings: No rash.   Neurological:      General: No focal deficit present.      Mental Status: She is alert and oriented to person, place, and time. Mental status is at baseline.      Cranial Nerves: No cranial nerve deficit.      Comments: (+) resting tremors on bilateral outstretched hands   Psychiatric:         Mood and Affect: Mood normal.         Behavior: Behavior normal.         Thought Content: Thought content normal.         Judgment: Judgment normal.      Comments: Anxious, but better       Fluids    Intake/Output Summary (Last 24 hours) at 8/31/2022 1007  Last data filed at 8/31/2022 0943  Gross per 24 hour   Intake 240 ml   Output --   Net 240 ml       Laboratory  Recent Labs     08/30/22  0821 08/31/22  0621   WBC 10.2 4.0*   RBC 3.71* 2.75*   HEMOGLOBIN 12.7 9.5*   HEMATOCRIT 37.1 28.8*   .0* 104.7*   MCH 34.2* 34.5*   MCHC 34.2 33.0*   RDW 54.2* 57.8*   PLATELETCT 191 128*   MPV 9.4 9.6     Recent Labs     08/30/22  0821 08/30/22  1054 08/31/22  0621   SODIUM 139 136 133*   POTASSIUM 2.8* 2.8* 4.3   CHLORIDE 96 96 102   CO2 10* 13* 24   GLUCOSE 60* 111* 116*   BUN 8 7* 6*   CREATININE 0.44* 0.43* 0.37*   CALCIUM 7.5* 7.1* 7.3*                   Imaging  IR-US GUIDED PIV   Final Result    Ultrasound-guided PERIPHERAL IV INSERTION performed by    qualified nursing staff as above.      US-EXTREMITY VENOUS LOWER BILAT    (Results Pending)   EC-ECHOCARDIOGRAM COMPLETE W/O CONT    (Results Pending)        Assessment/Plan  * Alcohol abuse with withdrawal (HCC)- (present on  admission)  Assessment & Plan  - Continues to exhibit withdrawal symptoms.  Last drink was on 8/28.  Currently day 3 from last drink.  -Continue alcohol withdrawal protocol with CIWA.  Start scheduled Librium 25 mg every 6 hours, taper with improvement.  Currently day 3 from last drink, expect symptoms to persist until about day 5 from last drink, and improved after.  Monitor closely for worsening symptoms, as may need higher level of care and Precedex drip if he goes into delirium tremens.  -Continue thiamine, multivitamin, and folate.  -Discontinue IV fluids (half-normal saline), especially that sodium is trending down.  -Continue alcohol cessation counseling.      Hypocalcemia- (present on admission)  Assessment & Plan  -Continue calcium carbonate 3 times daily  -Continue to monitor    Hypophosphatemia- (present on admission)  Assessment & Plan  -Likely due to poor p.o. intake  -Continue replacement with K-Phos.  Repeat phosphorus levels in the morning.    Hypokalemia- (present on admission)  Assessment & Plan  -Likely due to poor p.o. intake  -High risk for refeeding syndrome   -replaced.  Potassium is normalized.  Repeat BMP in the morning, and replace as needed.    Bilateral lower extremity edema- (present on admission)  Assessment & Plan  - States this is usual for her, but has not had any work-up.  May be related to low albumin.  - Will obtain venous duplex ultrasound of the lower extremities, along with echocardiogram.  Check BNP.  -Stop IV fluids.    High anion gap metabolic acidosis- (present on admission)  Assessment & Plan  -Suspect due to alcohol ketoacidosis versus starvation ketosis.  Lactate has normalized anion gap is closed and bicarbonate level has normalized as well.  -Stop IV fluids.  Continue to trend.  -Encourage oral intake.    Major depressive disorder, recurrent episode, mild (HCC)- (present on admission)  Assessment & Plan  -Continue home Seroquel, Remeron, and bupropion    Tobacco  dependence- (present on admission)  Assessment & Plan  -Smokes 1/2 to 1 pack/day  -Counseled on smoking cessation.  Continue nicotine replacement therapy, along with bupropion.      Dyslipidemia- (present on admission)  Assessment & Plan  -Continue home statin    Primary hypertension- (present on admission)  Assessment & Plan  - Maintaining good control.  - Continue home propanolol, valsartan.  -Monitor blood pressure trend closely, with as needed IV labetalol for significant hypertension.       VTE prophylaxis: enoxaparin ppx

## 2022-08-31 NOTE — ASSESSMENT & PLAN NOTE
- Has grade 2 diastolic dysfunction on echocardiogram, with normal systolic function, and normal right ventricular size and pressures.  Suspect this is from alcoholism.  She also has right gastrocnemius vein DVT, for which she will be started on anticoagulation.  -Maintain off IV fluids.

## 2022-09-01 PROBLEM — E83.42 HYPOMAGNESEMIA: Status: ACTIVE | Noted: 2022-09-01

## 2022-09-01 LAB
ANION GAP SERPL CALC-SCNC: 9 MMOL/L (ref 7–16)
BUN SERPL-MCNC: 5 MG/DL (ref 8–22)
CALCIUM SERPL-MCNC: 8.1 MG/DL (ref 8.4–10.2)
CHLORIDE SERPL-SCNC: 105 MMOL/L (ref 96–112)
CO2 SERPL-SCNC: 22 MMOL/L (ref 20–33)
CREAT SERPL-MCNC: 0.44 MG/DL (ref 0.5–1.4)
ERYTHROCYTE [DISTWIDTH] IN BLOOD BY AUTOMATED COUNT: 55.8 FL (ref 35.9–50)
GFR SERPLBLD CREATININE-BSD FMLA CKD-EPI: 103 ML/MIN/1.73 M 2
GLUCOSE SERPL-MCNC: 93 MG/DL (ref 65–99)
HCT VFR BLD AUTO: 28.4 % (ref 37–47)
HGB BLD-MCNC: 9.3 G/DL (ref 12–16)
MAGNESIUM SERPL-MCNC: 1.5 MG/DL (ref 1.5–2.5)
MCH RBC QN AUTO: 33.9 PG (ref 27–33)
MCHC RBC AUTO-ENTMCNC: 32.7 G/DL (ref 33.6–35)
MCV RBC AUTO: 103.6 FL (ref 81.4–97.8)
PHOSPHATE SERPL-MCNC: 4.2 MG/DL (ref 2.5–4.5)
PLATELET # BLD AUTO: 114 K/UL (ref 164–446)
PMV BLD AUTO: 9.4 FL (ref 9–12.9)
POTASSIUM SERPL-SCNC: 3.9 MMOL/L (ref 3.6–5.5)
RBC # BLD AUTO: 2.74 M/UL (ref 4.2–5.4)
SODIUM SERPL-SCNC: 136 MMOL/L (ref 135–145)
WBC # BLD AUTO: 3.8 K/UL (ref 4.8–10.8)

## 2022-09-01 PROCEDURE — 700111 HCHG RX REV CODE 636 W/ 250 OVERRIDE (IP): Performed by: INTERNAL MEDICINE

## 2022-09-01 PROCEDURE — A9270 NON-COVERED ITEM OR SERVICE: HCPCS | Performed by: INTERNAL MEDICINE

## 2022-09-01 PROCEDURE — 83735 ASSAY OF MAGNESIUM: CPT

## 2022-09-01 PROCEDURE — 99233 SBSQ HOSP IP/OBS HIGH 50: CPT | Performed by: INTERNAL MEDICINE

## 2022-09-01 PROCEDURE — 85027 COMPLETE CBC AUTOMATED: CPT

## 2022-09-01 PROCEDURE — 94760 N-INVAS EAR/PLS OXIMETRY 1: CPT

## 2022-09-01 PROCEDURE — 700102 HCHG RX REV CODE 250 W/ 637 OVERRIDE(OP): Performed by: INTERNAL MEDICINE

## 2022-09-01 PROCEDURE — 84100 ASSAY OF PHOSPHORUS: CPT

## 2022-09-01 PROCEDURE — 770020 HCHG ROOM/CARE - TELE (206)

## 2022-09-01 PROCEDURE — A9270 NON-COVERED ITEM OR SERVICE: HCPCS | Performed by: EMERGENCY MEDICINE

## 2022-09-01 PROCEDURE — 700102 HCHG RX REV CODE 250 W/ 637 OVERRIDE(OP): Performed by: EMERGENCY MEDICINE

## 2022-09-01 PROCEDURE — 92610 EVALUATE SWALLOWING FUNCTION: CPT

## 2022-09-01 PROCEDURE — 36415 COLL VENOUS BLD VENIPUNCTURE: CPT

## 2022-09-01 PROCEDURE — 80048 BASIC METABOLIC PNL TOTAL CA: CPT

## 2022-09-01 RX ORDER — MAGNESIUM SULFATE HEPTAHYDRATE 40 MG/ML
2 INJECTION, SOLUTION INTRAVENOUS ONCE
Status: COMPLETED | OUTPATIENT
Start: 2022-09-01 | End: 2022-09-01

## 2022-09-01 RX ORDER — MAGNESIUM SULFATE HEPTAHYDRATE 500 MG/ML
2 INJECTION, SOLUTION INTRAMUSCULAR; INTRAVENOUS ONCE
Status: DISCONTINUED | OUTPATIENT
Start: 2022-09-01 | End: 2022-09-01

## 2022-09-01 RX ADMIN — PROPRANOLOL HYDROCHLORIDE 80 MG: 20 TABLET ORAL at 23:15

## 2022-09-01 RX ADMIN — DIBASIC SODIUM PHOSPHATE, MONOBASIC POTASSIUM PHOSPHATE AND MONOBASIC SODIUM PHOSPHATE 250 MG: 852; 155; 130 TABLET ORAL at 05:19

## 2022-09-01 RX ADMIN — NICOTINE TRANSDERMAL SYSTEM 21 MG: 21 PATCH, EXTENDED RELEASE TRANSDERMAL at 05:18

## 2022-09-01 RX ADMIN — RIVAROXABAN 15 MG: 15 TABLET, FILM COATED ORAL at 17:32

## 2022-09-01 RX ADMIN — CALCIUM CARBONATE (ANTACID) CHEW TAB 500 MG 500 MG: 500 CHEW TAB at 17:32

## 2022-09-01 RX ADMIN — GABAPENTIN 600 MG: 300 CAPSULE ORAL at 05:19

## 2022-09-01 RX ADMIN — SENNOSIDES AND DOCUSATE SODIUM 2 TABLET: 50; 8.6 TABLET ORAL at 05:18

## 2022-09-01 RX ADMIN — CHLORDIAZEPOXIDE HYDROCHLORIDE 25 MG: 25 CAPSULE ORAL at 11:19

## 2022-09-01 RX ADMIN — RIVAROXABAN 15 MG: 15 TABLET, FILM COATED ORAL at 07:13

## 2022-09-01 RX ADMIN — THIAMINE HCL TAB 100 MG 100 MG: 100 TAB at 05:19

## 2022-09-01 RX ADMIN — CHLORDIAZEPOXIDE HYDROCHLORIDE 25 MG: 25 CAPSULE ORAL at 05:19

## 2022-09-01 RX ADMIN — LORAZEPAM 1 MG: 1 TABLET ORAL at 08:59

## 2022-09-01 RX ADMIN — VALSARTAN 40 MG: 80 TABLET ORAL at 05:19

## 2022-09-01 RX ADMIN — CALCIUM CARBONATE (ANTACID) CHEW TAB 500 MG 500 MG: 500 CHEW TAB at 11:19

## 2022-09-01 RX ADMIN — THERA TABS 1 TABLET: TAB at 05:19

## 2022-09-01 RX ADMIN — CALCIUM CARBONATE (ANTACID) CHEW TAB 500 MG 500 MG: 500 CHEW TAB at 05:19

## 2022-09-01 RX ADMIN — LORAZEPAM 2 MG: 1 TABLET ORAL at 19:08

## 2022-09-01 RX ADMIN — DIBASIC SODIUM PHOSPHATE, MONOBASIC POTASSIUM PHOSPHATE AND MONOBASIC SODIUM PHOSPHATE 250 MG: 852; 155; 130 TABLET ORAL at 11:19

## 2022-09-01 RX ADMIN — BUPROPION HYDROCHLORIDE 150 MG: 150 TABLET, EXTENDED RELEASE ORAL at 05:19

## 2022-09-01 RX ADMIN — MAGNESIUM SULFATE 2 G: 2 INJECTION INTRAVENOUS at 07:13

## 2022-09-01 RX ADMIN — MIRTAZAPINE 15 MG: 15 TABLET, ORALLY DISINTEGRATING ORAL at 20:19

## 2022-09-01 RX ADMIN — LORAZEPAM 1 MG: 1 TABLET ORAL at 11:19

## 2022-09-01 RX ADMIN — LORAZEPAM 2 MG: 1 TABLET ORAL at 13:50

## 2022-09-01 RX ADMIN — PROPRANOLOL HYDROCHLORIDE 80 MG: 20 TABLET ORAL at 11:20

## 2022-09-01 RX ADMIN — PRAVASTATIN SODIUM 40 MG: 20 TABLET ORAL at 20:18

## 2022-09-01 RX ADMIN — QUETIAPINE FUMARATE 200 MG: 100 TABLET ORAL at 20:18

## 2022-09-01 RX ADMIN — FOLIC ACID 1 MG: 1 TABLET ORAL at 05:19

## 2022-09-01 RX ADMIN — CHLORDIAZEPOXIDE HYDROCHLORIDE 25 MG: 25 CAPSULE ORAL at 17:32

## 2022-09-01 RX ADMIN — LORAZEPAM 0.5 MG: 0.5 TABLET ORAL at 20:19

## 2022-09-01 RX ADMIN — GABAPENTIN 600 MG: 300 CAPSULE ORAL at 17:32

## 2022-09-01 RX ADMIN — OMEPRAZOLE 20 MG: 20 CAPSULE, DELAYED RELEASE ORAL at 05:19

## 2022-09-01 RX ADMIN — DIBASIC SODIUM PHOSPHATE, MONOBASIC POTASSIUM PHOSPHATE AND MONOBASIC SODIUM PHOSPHATE 250 MG: 852; 155; 130 TABLET ORAL at 17:32

## 2022-09-01 RX ADMIN — DIBASIC SODIUM PHOSPHATE, MONOBASIC POTASSIUM PHOSPHATE AND MONOBASIC SODIUM PHOSPHATE 250 MG: 852; 155; 130 TABLET ORAL at 23:15

## 2022-09-01 RX ADMIN — CHLORDIAZEPOXIDE HYDROCHLORIDE 25 MG: 25 CAPSULE ORAL at 23:15

## 2022-09-01 ASSESSMENT — LIFESTYLE VARIABLES
AGITATION: *
ORIENTATION AND CLOUDING OF SENSORIUM: ORIENTED AND CAN DO SERIAL ADDITIONS
AGITATION: NORMAL ACTIVITY
NAUSEA AND VOMITING: NO NAUSEA AND NO VOMITING
AUDITORY DISTURBANCES: NOT PRESENT
AGITATION: NORMAL ACTIVITY
NAUSEA AND VOMITING: NO NAUSEA AND NO VOMITING
ANXIETY: *
AGITATION: NORMAL ACTIVITY
PAROXYSMAL SWEATS: NO SWEAT VISIBLE
VISUAL DISTURBANCES: NOT PRESENT
TREMOR: TREMOR NOT VISIBLE BUT CAN BE FELT, FINGERTIP TO FINGERTIP
ANXIETY: *
NAUSEA AND VOMITING: NO NAUSEA AND NO VOMITING
VISUAL DISTURBANCES: NOT PRESENT
TOTAL SCORE: 4
VISUAL DISTURBANCES: NOT PRESENT
HEADACHE, FULLNESS IN HEAD: NOT PRESENT
HEADACHE, FULLNESS IN HEAD: NOT PRESENT
AGITATION: *
AGITATION: SOMEWHAT MORE THAN NORMAL ACTIVITY
NAUSEA AND VOMITING: NO NAUSEA AND NO VOMITING
TREMOR: *
ANXIETY: *
PAROXYSMAL SWEATS: NO SWEAT VISIBLE
ANXIETY: *
ORIENTATION AND CLOUDING OF SENSORIUM: CANNOT DO SERIAL ADDITIONS OR IS UNCERTAIN ABOUT DATE
ANXIETY: *
HEADACHE, FULLNESS IN HEAD: NOT PRESENT
AUDITORY DISTURBANCES: NOT PRESENT
TOTAL SCORE: 11
TOTAL SCORE: 7
ORIENTATION AND CLOUDING OF SENSORIUM: ORIENTED AND CAN DO SERIAL ADDITIONS
TREMOR: TREMOR NOT VISIBLE BUT CAN BE FELT, FINGERTIP TO FINGERTIP
ORIENTATION AND CLOUDING OF SENSORIUM: ORIENTED AND CAN DO SERIAL ADDITIONS
TREMOR: *
TOTAL SCORE: 8
AUDITORY DISTURBANCES: NOT PRESENT
VISUAL DISTURBANCES: NOT PRESENT
ANXIETY: *
VISUAL DISTURBANCES: NOT PRESENT
PAROXYSMAL SWEATS: NO SWEAT VISIBLE
PAROXYSMAL SWEATS: NO SWEAT VISIBLE
VISUAL DISTURBANCES: NOT PRESENT
NAUSEA AND VOMITING: NO NAUSEA AND NO VOMITING
ORIENTATION AND CLOUDING OF SENSORIUM: ORIENTED AND CAN DO SERIAL ADDITIONS
TREMOR: *
TREMOR: *
VISUAL DISTURBANCES: NOT PRESENT
AUDITORY DISTURBANCES: NOT PRESENT
AUDITORY DISTURBANCES: NOT PRESENT
VISUAL DISTURBANCES: NOT PRESENT
TOTAL SCORE: 12
AUDITORY DISTURBANCES: NOT PRESENT
TREMOR: *
HEADACHE, FULLNESS IN HEAD: NOT PRESENT
AGITATION: *
TOTAL SCORE: 4
PAROXYSMAL SWEATS: NO SWEAT VISIBLE
HEADACHE, FULLNESS IN HEAD: NOT PRESENT
NAUSEA AND VOMITING: NO NAUSEA AND NO VOMITING
HEADACHE, FULLNESS IN HEAD: NOT PRESENT
PAROXYSMAL SWEATS: NO SWEAT VISIBLE
ORIENTATION AND CLOUDING OF SENSORIUM: ORIENTED AND CAN DO SERIAL ADDITIONS
NAUSEA AND VOMITING: NO NAUSEA AND NO VOMITING
TOTAL SCORE: 11
PAROXYSMAL SWEATS: NO SWEAT VISIBLE
HEADACHE, FULLNESS IN HEAD: NOT PRESENT
AGITATION: SOMEWHAT MORE THAN NORMAL ACTIVITY
ANXIETY: *
TREMOR: TREMOR NOT VISIBLE BUT CAN BE FELT, FINGERTIP TO FINGERTIP
TOTAL SCORE: 8
ANXIETY: *
ORIENTATION AND CLOUDING OF SENSORIUM: ORIENTED AND CAN DO SERIAL ADDITIONS
ORIENTATION AND CLOUDING OF SENSORIUM: ORIENTED AND CAN DO SERIAL ADDITIONS
HEADACHE, FULLNESS IN HEAD: NOT PRESENT
PAROXYSMAL SWEATS: NO SWEAT VISIBLE
AUDITORY DISTURBANCES: NOT PRESENT
AUDITORY DISTURBANCES: NOT PRESENT
NAUSEA AND VOMITING: NO NAUSEA AND NO VOMITING

## 2022-09-01 ASSESSMENT — PAIN DESCRIPTION - PAIN TYPE
TYPE: ACUTE PAIN
TYPE: ACUTE PAIN

## 2022-09-01 NOTE — THERAPY
"Speech Language Pathology   Clinical Swallow Evaluation     Patient Name: Marcia Koch  AGE:  72 y.o., SEX:  female  Medical Record #: [id number]  Today's Date: 9/1/2022          HPI:  71 y/o admitted on 8/30 for anxiety/alcohol abuse and withdrawal. Not seen by SLP before at [facility].    PMHx:  HTN, hyperlipidemia, alcohol use      Level of Consciousness: Alert  Affect/Behavior: Appropriate, Cooperative  Follows Directives: Yes  Hearing: Functional hearing  Vision: Functional vision      Prior Living Situation & Level of Function:  Pt lives at home independently. She endorsed a hx of reflux/heartburn and was seen by GI \"many years ago\" for upper and lower GI studies which were found to be within normal limits. She reported some difficulty swallowing medications and hard meats when she does not chew adequately. She denies any emesis following meals.      Oral Mechanism Evaluation  Facial Symmetry: Equal  Labial Observations: Open mouth posture  Lingual Observations: Midline  Dentition: Good  Comments:      Voice  Quality: WFL  Resonance: WFL  Intensity: Appropriate  Comments:      Current Method of Nutrition   Oral diet (regular/thin liquids)         Assessment  Positioning: River's (60-90 degrees)  Bolus Administration: SLP  Oxygen Requirements: Room Air  Factor(s) Affecting Performance: None    Swallowing Trials  Thin Liquid (TN0): WFL  Soft & Bite-Sized (SB6): WFL  Regular (RG7): WFL    Comments:  No overt s/sx of aspiration and she denied any globus sensation with any trials assessed today. Mastication and swallow trigger timely and vocal quality clear following the swallow. Discussed swallowing compensatory strategies including small bites/sips and chewing adequately before swallowing as well as reflux precautions including sitting up at 90* during meals and for at least 30-45 minutes following meals as well as avoiding eating 2-3 hours before bedtime and she verbalized understanding. Discussed recommendation " "for floating medications whole in puree to minimize globus sensation and she also verbalized understanding.       Clinical Impressions  No overt s/sx of aspiration noted with trials, however, pt endorsed a hx of dysphagia. Recommend implementation of strategies to minimize globus sensation as well as implementation of reflux precautions.        Recommendations  1.  Continue regular/thin liquid diet with compensatory strategies and reflux precautions  2.  Instrumentation: None indicated at this time  3.  Swallowing Instructions & Precautions:   Supervision: Independent  Positioning: Fully upright and midline during oral intake  Medication: Whole with puree  Strategies: Small bites/sips  Oral Care: BID      Plan    Recommend Speech Therapy 2 times per week until therapy goals are met for the following treatments:  Dysphagia Training and Patient / Family / Caregiver Education.    Discharge Recommendations: Anticipate that the patient will have no further speech therapy needs after discharge from the hospital       Objective       09/01/22 1222   Initial Contact Note    Initial Contact Note  Order Received and Verified, Speech Therapy Evaluation in Progress with Full Report to Follow.   Vitals   O2 (LPM) 0   O2 Delivery Device None - Room Air   Pain 0 - 10 Group   Therapist Pain Assessment Post Activity Pain Same as Prior to Activity;Nurse Notified;0   Prior Living Situation   Lives with - Patient's Self Care Capacity Alone and Able to Care For Self   Prior Level Of Function   Communication Within Functional Limits   Swallow Impaired   Dentition Intact   Hearing Within Functional Limits for Evaluation   Vision Within Functional Limits for Evaluation   Patient's Primary Language English   Occupation (Pre-Hospital Vocational) Retired Due To Age   Patient / Family Goals   Patient / Family Goal #1 \"sometimes food/pills get stuck in my throat\"   Short Term Goals   Short Term Goal # 1 Pt will consume a regular/thin liquid " diet with no overt s/sx of aspiration   Education Group   Education Provided Dysphagia   Dysphagia Patient Response Patient;Acceptance;Explanation;Verbal Demonstration;Reinforcement Needed   Problem List   Problem List Dysphagia   Anticipated Discharge Needs   Discharge Recommendations Anticipate that the patient will have no further speech therapy needs after discharge from the hospital   Therapy Recommendations Upon DC Not Indicated   Interdisciplinary Plan of Care Collaboration   IDT Collaboration with  Nursing   Patient Position at End of Therapy Seated;In Bed;Call Light within Reach;Tray Table within Reach;Phone within Reach

## 2022-09-01 NOTE — THERAPY
"Occupational Therapy   Initial Evaluation     Patient Name: Marcia Koch  Age:  72 y.o., Sex:  female  Medical Record #: [id number]  Today's Date: 8/31/2022       Assessment    Patient is 72 y.o. female with a diagnosis of EtOH withdrawal. Additional factors influencing patient status / progress: L SDH, R rib fx, GERD, anxiety/depression. Pt lives alone and was I with ADLs/IADLs, ambulates without AD, and has family/friends who can provide assistance with transportation. She presents closely at baseline level today, was able to complete basic ADLs at spv level, including UB/LB dressing in seated/standing unsupported, G/H standing sinkside, and toileting in bathroom without AEs. Functional txfs noted to be safer with FWW use, which pt was compliant with, and verbalized understanding of safety practices. Requested ambulation in hallways with use of FWW, able to tolerate 100ft without c/o fatigue, or rest breaks. Patient will not be actively followed for occupational therapy services at this time, however may be seen if requested by physician for 1 more visit within 30 days to address any discharge or equipment needs.       Plan    Recommend Occupational Therapy  for DC needs only.    DC Equipment Recommendations: None  Discharge Recommendations: Anticipate that the patient will have no further occupational therapy needs after discharge from the hospital     Subjective    \"Can we go for a walk too?\"     Objective       08/31/22 1116   Prior Living Situation   Prior Services None;Home-Independent   Housing / Facility 1 Story House   Steps Into Home 1   Steps In Home 0   Bathroom Set up Bathtub / Shower Combination   Equipment Owned None   Lives with - Patient's Self Care Capacity Alone and Able to Care For Self   Comments Pt lives alone and reports to be I at PLOF   Prior Level of ADL Function   Self Feeding Independent   Grooming / Hygiene Independent   Bathing Independent   Dressing Independent   Toileting " Independent   Prior Level of IADL Function   Medication Management Independent   Laundry Independent   Kitchen Mobility Independent   Finances Independent   Home Management Independent   Shopping Independent   Prior Level Of Mobility Independent Without Device in Home;Independent Without Device in Community   Driving / Transportation Relatives / Others Provide Transportation  (has family or friends who can drive)   Occupation (Pre-Hospital Vocational) Retired Due To Age   History of Falls   History of Falls No   Date of Last Fall   (pt reports last fall was 2 yrs ago)   Pain   Pain Scales 0 to 10 Scale    Intervention Declines   Pain 0 - 10 Group   Pain Rating Scale (NPRS) 0   Therapist Pain Assessment Post Activity Pain Same as Prior to Activity;0   Cognition    Cognition / Consciousness WDL   Balance Assessment   Sitting Balance (Static) Good   Sitting Balance (Dynamic) Good   Standing Balance (Static) Fair +   Standing Balance (Dynamic) Fair   Weight Shift Sitting Good   Weight Shift Standing Fair   Comments with FWW   Bed Mobility    Supine to Sit Independent   Sit to Supine Independent   Scooting Independent   Rolling Independent   ADL Assessment   Grooming Standing;Supervision   Upper Body Dressing Supervision   Lower Body Dressing Supervision   How much help from another person does the patient currently need...   Putting on and taking off regular lower body clothing? 3   Bathing (including washing, rinsing, and drying)? 2   Toileting, which includes using a toilet, bedpan, or urinal? 3   Putting on and taking off regular upper body clothing? 3   Taking care of personal grooming such as brushing teeth? 3   Eating meals? 4   6 Clicks Daily Activity Score 18   Functional Mobility   Sit to Stand Supervised   Bed, Chair, Wheelchair Transfer Supervised   Transfer Method Stand Step   Mobility with FWW   Distance (Feet) 100   # of Times Distance was Traveled 1   Activity Tolerance   Sitting in Chair functional; up  in chair pre and post OT   Sitting Edge of Bed NT   Standing 15 mins   Education Group   Education Provided Role of Occupational Therapist;Home Safety   Role of Occupational Therapist Patient Response Patient;Explanation;Acceptance   Home Safety Patient Response Patient;Explanation;Acceptance;Verbal Demonstration   Anticipated Discharge Equipment and Recommendations   DC Equipment Recommendations None   Discharge Recommendations Anticipate that the patient will have no further occupational therapy needs after discharge from the hospital   Interdisciplinary Plan of Care Collaboration   IDT Collaboration with  Nursing;Physical Therapist   Patient Position at End of Therapy Seated;Chair Alarm On;Call Light within Reach;Tray Table within Reach   Collaboration Comments RN aware of OT session and recs   Session Information   Date / Session Number  8/31 #1 (DC needs only)   Priority 0

## 2022-09-01 NOTE — PROGRESS NOTES
Tele strip printed at 1500     Rhythm: SR   HR: 69  Measurements: 0.16/0.10/0.40        Telemetry Shift Summary     Rhythm: SR  HR Range: 70's  Ectopy: Rare PVC     Telemetry monitoring strips placed in pt's chart.

## 2022-09-01 NOTE — DISCHARGE PLANNING
Received Choice form at 2232  Agency/Facility Name: [facility]  Referral sent per Choice form @ [id number]

## 2022-09-01 NOTE — DISCHARGE PLANNING
Case Management Discharge Planning    Admission Date: 8/30/2022  GMLOS: 3.4  ALOS: 2    6-Clicks ADL Score: 18  6-Clicks Mobility Score: 22      Anticipated Discharge Dispo: Discharge Disposition: Discharged to home/self care (01)    DME Needed: Yes    DME Ordered: Yes    Action(s) Taken: Spoke to pt at bedside. Received verbal approval for FWW from Carla. Choice form completed and faxed to [facility].    Escalations Completed: None    Medically Clear: No    Next Steps: f/u with [facility] regarding FWW delivery    Barriers to Discharge: Medical clearance

## 2022-09-01 NOTE — CARE PLAN
The patient is Stable - Low risk of patient condition declining or worsening    Shift Goals  Clinical Goals: CIWA, safety/prevent falls  Patient Goals: go home    Progress made toward(s) clinical / shift goals:  Medicated for anxiety/agitation/tremors per CIWA, pt's CIWA score remains stable at this time. Falls education provided and continually reinforced. Fall precautions in place.    Patient is not progressing towards the following goals: n/a

## 2022-09-01 NOTE — PROGRESS NOTES
Hospital Medicine Daily Progress Note    Date of Service  9/1/2022    Chief Complaint  Alcohol withdrawal    Hospital Course  Marcia Koch is a 72 y.o. female with hypertension, hyperlipidemia, alcohol use (drinks about 2 cocktails and a glass of wine most days of the week), admitted 8/30/2022 with anxiety and tremors, and has not slept in a couple of days.  Last drink was the day prior to admission.  She also reports she has not eaten in the last 3 to 4 days.  She has not taken her antihypertensives in the last 3 to 4 days as well.  In the ED, patient was hypertensive, but not hypoxic.  She was extremely agitated and tremulous, and was given multiple rounds of IV Valium.  Labs were notable for elevated MCV, hypokalemia, hypophosphatemia, hypocalcemia, and anion gap antibiotic acidosis.  She had no leukocytosis.  Blood alcohol level was 78.6.  Urine drug screen was negative.  Urinalysis was clean.  She was started on CIWA protocol for alcohol withdrawal, and her electrolytes were replaced.  Lactate was 7.6, which was felt to be related to her starvation ketosis and dehydration.  She was started on IV fluids.  She is also started on thiamine, multivitamin, and folate.  She has clinically improved.  Lactate has normalized.  She was noted to have bilateral feet edema.      Interval Problem Update  9/1/2022 - I reviewed the patient's chart. There were no significant overnight events. Remains hemodynamically stable and afebrile. Stable on RA. CIWA has gone down from 12 to 4 this morning.  WBC 3800.  Hemoglobin stable at 9.3.  Platelet count 114,000.  Electrolytes and renal function are normal.  However magnesium is borderline at 1.5.  Venous duplex ultrasound of the lower extremity showed single right gastrocnemius vein with acute thrombus, with no left DVT.  Echocardiogram showed normal LV systolic function, with grade 2 diastolic dysfunction, normal right ventricular size and systolic function, with no  significant valvular abnormalities.  PT/OT recommended no further therapy needs, but recommended the use of a walker with ambulation.    > I have personally seen and examined the patient today. Anxious last night and this morning. Impulsive. Remains tremulous.  Denies any complaints. Denies any pain. No Nausea, Vomiting, abd pain.       I have discussed this patient's plan of care and discharge plan at IDT rounds today with Case Management, Nursing, Nursing leadership, and other members of the IDT team.    Consultants/Specialty  None    Code Status  Full Code    Disposition  Patient is not medically cleared for discharge.   Anticipate discharge to to home with close outpatient follow-up.  I have placed the appropriate orders for post-discharge needs.    Review of Systems  ROS     Pertinent positives/negatives as mentioned above.     A complete review of systems was personally done by me. All other systems were negative.       Physical Exam  Temp:  [36.2 °C (97.2 °F)-37.1 °C (98.8 °F)] 36.2 °C (97.2 °F)  Pulse:  [64-76] 73  Resp:  [18-20] 18  BP: (103-148)/(50-91) 148/75  SpO2:  [90 %-97 %] 95 %    Physical Exam  Vitals reviewed.   Constitutional:       General: She is not in acute distress.     Appearance: Normal appearance. She is normal weight. She is not ill-appearing or diaphoretic.      Comments: Tremulous   HENT:      Head: Normocephalic and atraumatic.      Right Ear: External ear normal.      Left Ear: External ear normal.      Mouth/Throat:      Mouth: Mucous membranes are moist.      Pharynx: No oropharyngeal exudate or posterior oropharyngeal erythema.   Eyes:      General: No scleral icterus.     Extraocular Movements: Extraocular movements intact.      Conjunctiva/sclera: Conjunctivae normal.      Pupils: Pupils are equal, round, and reactive to light.   Cardiovascular:      Rate and Rhythm: Normal rate and regular rhythm.      Heart sounds: Normal heart sounds. No murmur heard.  Pulmonary:      Effort:  Pulmonary effort is normal. No respiratory distress.      Breath sounds: Normal breath sounds. No stridor. No wheezing, rhonchi or rales.   Chest:      Chest wall: No tenderness.   Abdominal:      General: Bowel sounds are normal. There is no distension.      Palpations: Abdomen is soft. There is no mass.      Tenderness: There is no abdominal tenderness. There is no guarding or rebound.   Musculoskeletal:         General: No swelling. Normal range of motion.      Cervical back: Normal range of motion and neck supple. No rigidity. No muscular tenderness.      Comments: Bilateral feet edema   Lymphadenopathy:      Cervical: No cervical adenopathy.   Skin:     General: Skin is warm and dry.      Coloration: Skin is not jaundiced.      Findings: No rash.   Neurological:      General: No focal deficit present.      Mental Status: She is alert and oriented to person, place, and time. Mental status is at baseline.      Cranial Nerves: No cranial nerve deficit.      Comments: (+) resting tremors on bilateral outstretched hands   Psychiatric:         Mood and Affect: Mood normal.         Behavior: Behavior normal.         Thought Content: Thought content normal.         Judgment: Judgment normal.      Comments: Anxious     I have performed the physical examination today 9/1/2022.  In review of yesterday's note, there are no new changes except as documented above.      Fluids    Intake/Output Summary (Last 24 hours) at 9/1/2022 1024  Last data filed at 8/31/2022 1812  Gross per 24 hour   Intake 480 ml   Output --   Net 480 ml       Laboratory  Recent Labs     08/30/22  0821 08/31/22  0621 09/01/22  0455   WBC 10.2 4.0* 3.8*   RBC 3.71* 2.75* 2.74*   HEMOGLOBIN 12.7 9.5* 9.3*   HEMATOCRIT 37.1 28.8* 28.4*   .0* 104.7* 103.6*   MCH 34.2* 34.5* 33.9*   MCHC 34.2 33.0* 32.7*   RDW 54.2* 57.8* 55.8*   PLATELETCT 191 128* 114*   MPV 9.4 9.6 9.4     Recent Labs     08/30/22  1054 08/31/22  0621 09/01/22  0455   SODIUM 136  133* 136   POTASSIUM 2.8* 4.3 3.9   CHLORIDE 96 102 105   CO2 13* 24 22   GLUCOSE 111* 116* 93   BUN 7* 6* 5*   CREATININE 0.43* 0.37* 0.44*   CALCIUM 7.1* 7.3* 8.1*                   Imaging  US-EXTREMITY VENOUS LOWER BILAT   Final Result      EC-ECHOCARDIOGRAM COMPLETE W/O CONT   Final Result      IR-US GUIDED PIV   Final Result    Ultrasound-guided PERIPHERAL IV INSERTION performed by    qualified nursing staff as above.           Assessment/Plan  * Alcohol abuse with withdrawal (HCC)- (present on admission)  Assessment & Plan  - Continues to exhibit withdrawal symptoms.  Last drink was on 8/28.  Currently day 4 from last drink.  -Continue alcohol withdrawal protocol with CIWA.  Continue scheduled Librium 25 mg every 6 hours, will taper with improvement after day 5 from last drink.  Monitor closely for worsening symptoms, as may need higher level of care and Precedex drip if she goes into delirium tremens.  -Continue thiamine, multivitamin, and folate.  -Continue alcohol cessation counseling.      Hypomagnesemia- (present on admission)  Assessment & Plan  - Replace with 2 g of IV magnesium sulfate.  Repeat magnesium in the morning.    Hypocalcemia- (present on admission)  Assessment & Plan  -Continue calcium carbonate 3 times daily  -Continue to monitor    Hypophosphatemia- (present on admission)  Assessment & Plan  -Likely due to poor p.o. intake  -Continue replacement with K-Phos.  Repeat phosphorus levels in the morning.    Hypokalemia- (present on admission)  Assessment & Plan  -Likely due to poor p.o. intake  -High risk for refeeding syndrome.  Magnesium also low.  -replaced.  Potassium has normalized.  Repeat BMP in the morning, and replace as needed.  Replace magnesium.    Acute deep vein (gastrocnemius chilo) thrombosis (DVT) of right lower extremity (HCC)- (present on admission)  Assessment & Plan  - start xarelto.    Bilateral lower extremity edema- (present on admission)  Assessment & Plan  - Has grade 2  diastolic dysfunction on echocardiogram, with normal systolic function, and normal right ventricular size and pressures.  Suspect this is from alcoholism.  She also has right gastrocnemius vein DVT, for which she will be started on anticoagulation.  -Maintain off IV fluids.    High anion gap metabolic acidosis- (present on admission)  Assessment & Plan  -Suspect due to alcohol ketoacidosis versus starvation ketosis.  Lactate has normalized anion gap is closed and bicarbonate level has normalized as well.  -Off IV fluids.  Continue to trend.  -Encourage oral intake.    Major depressive disorder, recurrent episode, mild (HCC)- (present on admission)  Assessment & Plan  -Continue home Seroquel, Remeron, and bupropion    Tobacco dependence- (present on admission)  Assessment & Plan  -Smokes 1/2 to 1 pack/day  -Counseled on smoking cessation.  Continue nicotine replacement therapy, along with bupropion.      Dyslipidemia- (present on admission)  Assessment & Plan  -Continue home statin    Primary hypertension- (present on admission)  Assessment & Plan  - Maintaining good control.  - Continue home propanolol, valsartan.  -Monitor blood pressure trend closely, with as needed IV labetalol for significant hypertension.       VTE prophylaxis: enoxaparin ppx

## 2022-09-01 NOTE — PROGRESS NOTES
Bedside report received from DEWEY Goldstein. Assumed pt care. Pt is anxious this AM, though AOx4. Denies any pain or other distress at this time. Discussed POC including CIWA and anxiolytics, fall risk/precautions, electrolyte replenishment. Pt verbalized understanding. Hourly rounding in place. Fall precautions in place and call light within reach.

## 2022-09-01 NOTE — CARE PLAN
The patient is Stable - Low risk of patient condition declining or worsening    Shift Goals  Clinical Goals: CIWA, safety, fall prevention  Patient Goals: rest    Progress made toward(s) clinical / shift goals:      Problem: Knowledge Deficit - Standard  Goal: Patient and family/care givers will demonstrate understanding of plan of care, disease process/condition, diagnostic tests and medications  Outcome: Progressing  Note: Pt reoriented to being in the hospital, CIWA, and updated on POC.      Problem: Optimal Care for Alcohol Withdrawal  Goal: Optimal Care for the alcohol withdrawal patient  Outcome: Progressing     Problem: Seizure Precautions  Goal: Implementation of seizure precautions  Outcome: Progressing     Problem: Lifestyle Changes  Goal: Patient's ability to identify lifestyle changes and available resources to help reduce recurrence of condition will improve  Outcome: Progressing  Note: Pt verbalizes desire to stop drinking.      Problem: Fall Risk  Goal: Patient will remain free from falls  Outcome: Progressing       Patient is not progressing towards the following goals:

## 2022-09-01 NOTE — ASSESSMENT & PLAN NOTE
- Mg remains low at 1.5. Replace with 2 g of IV magnesium sulfate.  Repeat magnesium in the morning.

## 2022-09-01 NOTE — PROGRESS NOTES
Telemetry Shift Summary     Rhythm SR  HR Range 67-84  Ectopy R PAC/ PVC  Measurements  0.13/ 0.08/ 0.40  Per strip printed 0400     Normal Values  Rhythm SR  HR Range    Measurements 0.12-0.20 / 0.06-0.10  / 0.30-0.52

## 2022-09-02 VITALS
HEART RATE: 68 BPM | BODY MASS INDEX: 25.16 KG/M2 | DIASTOLIC BLOOD PRESSURE: 80 MMHG | TEMPERATURE: 98.1 F | HEIGHT: 63 IN | RESPIRATION RATE: 18 BRPM | WEIGHT: 141.98 LBS | SYSTOLIC BLOOD PRESSURE: 149 MMHG | OXYGEN SATURATION: 96 %

## 2022-09-02 LAB
ANION GAP SERPL CALC-SCNC: 9 MMOL/L (ref 7–16)
BUN SERPL-MCNC: 5 MG/DL (ref 8–22)
CALCIUM SERPL-MCNC: 8.5 MG/DL (ref 8.4–10.2)
CHLORIDE SERPL-SCNC: 106 MMOL/L (ref 96–112)
CO2 SERPL-SCNC: 22 MMOL/L (ref 20–33)
CREAT SERPL-MCNC: 0.37 MG/DL (ref 0.5–1.4)
ERYTHROCYTE [DISTWIDTH] IN BLOOD BY AUTOMATED COUNT: 56 FL (ref 35.9–50)
GFR SERPLBLD CREATININE-BSD FMLA CKD-EPI: 108 ML/MIN/1.73 M 2
GLUCOSE SERPL-MCNC: 99 MG/DL (ref 65–99)
HCT VFR BLD AUTO: 29.2 % (ref 37–47)
HGB BLD-MCNC: 9.5 G/DL (ref 12–16)
MAGNESIUM SERPL-MCNC: 1.5 MG/DL (ref 1.5–2.5)
MCH RBC QN AUTO: 34.2 PG (ref 27–33)
MCHC RBC AUTO-ENTMCNC: 32.5 G/DL (ref 33.6–35)
MCV RBC AUTO: 105 FL (ref 81.4–97.8)
PHOSPHATE SERPL-MCNC: 6.2 MG/DL (ref 2.5–4.5)
PLATELET # BLD AUTO: 141 K/UL (ref 164–446)
PMV BLD AUTO: 10 FL (ref 9–12.9)
POTASSIUM SERPL-SCNC: 3.9 MMOL/L (ref 3.6–5.5)
RBC # BLD AUTO: 2.78 M/UL (ref 4.2–5.4)
SODIUM SERPL-SCNC: 137 MMOL/L (ref 135–145)
WBC # BLD AUTO: 3.6 K/UL (ref 4.8–10.8)

## 2022-09-02 PROCEDURE — A9270 NON-COVERED ITEM OR SERVICE: HCPCS | Performed by: INTERNAL MEDICINE

## 2022-09-02 PROCEDURE — 85027 COMPLETE CBC AUTOMATED: CPT

## 2022-09-02 PROCEDURE — A9270 NON-COVERED ITEM OR SERVICE: HCPCS | Performed by: EMERGENCY MEDICINE

## 2022-09-02 PROCEDURE — 700102 HCHG RX REV CODE 250 W/ 637 OVERRIDE(OP): Performed by: INTERNAL MEDICINE

## 2022-09-02 PROCEDURE — 36415 COLL VENOUS BLD VENIPUNCTURE: CPT

## 2022-09-02 PROCEDURE — 700102 HCHG RX REV CODE 250 W/ 637 OVERRIDE(OP): Performed by: EMERGENCY MEDICINE

## 2022-09-02 PROCEDURE — 84100 ASSAY OF PHOSPHORUS: CPT

## 2022-09-02 PROCEDURE — 700111 HCHG RX REV CODE 636 W/ 250 OVERRIDE (IP): Performed by: INTERNAL MEDICINE

## 2022-09-02 PROCEDURE — 83735 ASSAY OF MAGNESIUM: CPT

## 2022-09-02 PROCEDURE — 80048 BASIC METABOLIC PNL TOTAL CA: CPT

## 2022-09-02 PROCEDURE — 94760 N-INVAS EAR/PLS OXIMETRY 1: CPT

## 2022-09-02 PROCEDURE — 99233 SBSQ HOSP IP/OBS HIGH 50: CPT | Performed by: INTERNAL MEDICINE

## 2022-09-02 RX ORDER — HYDROXYZINE HYDROCHLORIDE 25 MG/1
25 TABLET, FILM COATED ORAL 3 TIMES DAILY PRN
Status: DISCONTINUED | OUTPATIENT
Start: 2022-09-02 | End: 2022-09-02 | Stop reason: HOSPADM

## 2022-09-02 RX ORDER — ALPRAZOLAM 0.25 MG/1
0.25 TABLET ORAL 3 TIMES DAILY PRN
Status: DISCONTINUED | OUTPATIENT
Start: 2022-09-02 | End: 2022-09-02 | Stop reason: HOSPADM

## 2022-09-02 RX ORDER — MAGNESIUM SULFATE HEPTAHYDRATE 40 MG/ML
2 INJECTION, SOLUTION INTRAVENOUS ONCE
Status: COMPLETED | OUTPATIENT
Start: 2022-09-02 | End: 2022-09-02

## 2022-09-02 RX ADMIN — GABAPENTIN 600 MG: 300 CAPSULE ORAL at 05:07

## 2022-09-02 RX ADMIN — HYDROXYZINE HYDROCHLORIDE 25 MG: 25 TABLET, FILM COATED ORAL at 13:47

## 2022-09-02 RX ADMIN — PROPRANOLOL HYDROCHLORIDE 80 MG: 20 TABLET ORAL at 11:25

## 2022-09-02 RX ADMIN — THERA TABS 1 TABLET: TAB at 05:07

## 2022-09-02 RX ADMIN — THIAMINE HCL TAB 100 MG 100 MG: 100 TAB at 05:08

## 2022-09-02 RX ADMIN — CALCIUM CARBONATE (ANTACID) CHEW TAB 500 MG 500 MG: 500 CHEW TAB at 05:09

## 2022-09-02 RX ADMIN — RIVAROXABAN 15 MG: 15 TABLET, FILM COATED ORAL at 07:30

## 2022-09-02 RX ADMIN — LORAZEPAM 2 MG: 1 TABLET ORAL at 00:01

## 2022-09-02 RX ADMIN — CHLORDIAZEPOXIDE HYDROCHLORIDE 25 MG: 25 CAPSULE ORAL at 16:21

## 2022-09-02 RX ADMIN — MAGNESIUM SULFATE 2 G: 2 INJECTION INTRAVENOUS at 08:05

## 2022-09-02 RX ADMIN — NICOTINE TRANSDERMAL SYSTEM 21 MG: 21 PATCH, EXTENDED RELEASE TRANSDERMAL at 05:07

## 2022-09-02 RX ADMIN — CHLORDIAZEPOXIDE HYDROCHLORIDE 25 MG: 25 CAPSULE ORAL at 05:07

## 2022-09-02 RX ADMIN — RIVAROXABAN 15 MG: 15 TABLET, FILM COATED ORAL at 07:59

## 2022-09-02 RX ADMIN — CHLORDIAZEPOXIDE HYDROCHLORIDE 25 MG: 25 CAPSULE ORAL at 11:25

## 2022-09-02 RX ADMIN — LORAZEPAM 1 MG: 1 TABLET ORAL at 05:08

## 2022-09-02 RX ADMIN — ALPRAZOLAM 0.25 MG: 0.25 TABLET ORAL at 15:51

## 2022-09-02 RX ADMIN — BUPROPION HYDROCHLORIDE 150 MG: 150 TABLET, EXTENDED RELEASE ORAL at 05:08

## 2022-09-02 RX ADMIN — CALCIUM CARBONATE (ANTACID) CHEW TAB 500 MG 500 MG: 500 CHEW TAB at 16:20

## 2022-09-02 RX ADMIN — RIVAROXABAN 15 MG: 15 TABLET, FILM COATED ORAL at 16:21

## 2022-09-02 RX ADMIN — VALSARTAN 40 MG: 80 TABLET ORAL at 05:08

## 2022-09-02 RX ADMIN — HYDROXYZINE HYDROCHLORIDE 25 MG: 25 TABLET, FILM COATED ORAL at 08:48

## 2022-09-02 RX ADMIN — CALCIUM CARBONATE (ANTACID) CHEW TAB 500 MG 500 MG: 500 CHEW TAB at 11:25

## 2022-09-02 RX ADMIN — GABAPENTIN 600 MG: 300 CAPSULE ORAL at 16:21

## 2022-09-02 RX ADMIN — OMEPRAZOLE 20 MG: 20 CAPSULE, DELAYED RELEASE ORAL at 05:07

## 2022-09-02 RX ADMIN — DIBASIC SODIUM PHOSPHATE, MONOBASIC POTASSIUM PHOSPHATE AND MONOBASIC SODIUM PHOSPHATE 250 MG: 852; 155; 130 TABLET ORAL at 05:14

## 2022-09-02 RX ADMIN — FOLIC ACID 1 MG: 1 TABLET ORAL at 05:07

## 2022-09-02 ASSESSMENT — LIFESTYLE VARIABLES
HEADACHE, FULLNESS IN HEAD: NOT PRESENT
ORIENTATION AND CLOUDING OF SENSORIUM: ORIENTED AND CAN DO SERIAL ADDITIONS
NAUSEA AND VOMITING: NO NAUSEA AND NO VOMITING
TREMOR: NO TREMOR
ANXIETY: *
TOTAL SCORE: 4
ORIENTATION AND CLOUDING OF SENSORIUM: ORIENTED AND CAN DO SERIAL ADDITIONS
VISUAL DISTURBANCES: NOT PRESENT
AUDITORY DISTURBANCES: NOT PRESENT
NAUSEA AND VOMITING: NO NAUSEA AND NO VOMITING
ORIENTATION AND CLOUDING OF SENSORIUM: ORIENTED AND CAN DO SERIAL ADDITIONS
NAUSEA AND VOMITING: NO NAUSEA AND NO VOMITING
AGITATION: NORMAL ACTIVITY
NAUSEA AND VOMITING: NO NAUSEA AND NO VOMITING
AGITATION: MODERATELY FIDGETY AND RESTLESS
ANXIETY: MODERATELY ANXIOUS OR GUARDED, SO ANXIETY IS INFERRED
HEADACHE, FULLNESS IN HEAD: NOT PRESENT
HEADACHE, FULLNESS IN HEAD: NOT PRESENT
VISUAL DISTURBANCES: NOT PRESENT
AGITATION: NORMAL ACTIVITY
TREMOR: NO TREMOR
AUDITORY DISTURBANCES: NOT PRESENT
TOTAL SCORE: 4
TOTAL SCORE: 9
TREMOR: TREMOR NOT VISIBLE BUT CAN BE FELT, FINGERTIP TO FINGERTIP
VISUAL DISTURBANCES: NOT PRESENT
AGITATION: SOMEWHAT MORE THAN NORMAL ACTIVITY
HEADACHE, FULLNESS IN HEAD: NOT PRESENT
VISUAL DISTURBANCES: NOT PRESENT
ANXIETY: MILDLY ANXIOUS
TREMOR: NO TREMOR
AGITATION: NORMAL ACTIVITY
VISUAL DISTURBANCES: NOT PRESENT
NAUSEA AND VOMITING: NO NAUSEA AND NO VOMITING
AUDITORY DISTURBANCES: NOT PRESENT
PAROXYSMAL SWEATS: NO SWEAT VISIBLE
PAROXYSMAL SWEATS: NO SWEAT VISIBLE
ANXIETY: MODERATELY ANXIOUS OR GUARDED, SO ANXIETY IS INFERRED
ORIENTATION AND CLOUDING OF SENSORIUM: ORIENTED AND CAN DO SERIAL ADDITIONS
PAROXYSMAL SWEATS: NO SWEAT VISIBLE
AUDITORY DISTURBANCES: NOT PRESENT
ANXIETY: MODERATELY ANXIOUS OR GUARDED, SO ANXIETY IS INFERRED
TOTAL SCORE: 2
HEADACHE, FULLNESS IN HEAD: NOT PRESENT
TREMOR: TREMOR NOT VISIBLE BUT CAN BE FELT, FINGERTIP TO FINGERTIP
TOTAL SCORE: 4
ORIENTATION AND CLOUDING OF SENSORIUM: ORIENTED AND CAN DO SERIAL ADDITIONS
PAROXYSMAL SWEATS: NO SWEAT VISIBLE
AUDITORY DISTURBANCES: NOT PRESENT
PAROXYSMAL SWEATS: NO SWEAT VISIBLE

## 2022-09-02 ASSESSMENT — FIBROSIS 4 INDEX: FIB4 SCORE: 3.36

## 2022-09-02 NOTE — PROGRESS NOTES
Telemetry Shift Summary    Rhythm SR  HR Range 65-78  Ectopy rPVC, rPAC  Measurements .12/.08/.38        Normal Values  Rhythm SR  HR Range    Measurements 0.12-0.20 / 0.06-0.10  / 0.30-0.52

## 2022-09-02 NOTE — PROGRESS NOTES
Hospital Medicine Daily Progress Note    Date of Service  9/2/2022    Chief Complaint  Alcohol withdrawal    Hospital Course  Marcia Koch is a 72 y.o. female with hypertension, hyperlipidemia, alcohol use (drinks about 2 cocktails and a glass of wine most days of the week), admitted 8/30/2022 with anxiety and tremors, and has not slept in a couple of days.  Last drink was the day prior to admission.  She also reports she has not eaten in the last 3 to 4 days.  She has not taken her antihypertensives in the last 3 to 4 days as well.  In the ED, patient was hypertensive, but not hypoxic.  She was extremely agitated and tremulous, and was given multiple rounds of IV Valium.  Labs were notable for elevated MCV, hypokalemia, hypophosphatemia, hypocalcemia, and anion gap antibiotic acidosis.  She had no leukocytosis.  Blood alcohol level was 78.6.  Urine drug screen was negative.  Urinalysis was clean.  She was started on CIWA protocol for alcohol withdrawal, and her electrolytes were replaced.  Lactate was 7.6, which was felt to be related to her starvation ketosis and dehydration.  She was started on IV fluids.  She is also started on thiamine, multivitamin, and folate.  She has clinically improved.  Lactate has normalized.  Her electrolytes were replaced and have normalized.  She was noted to have bilateral feet edema. Venous duplex ultrasound of the lower extremity showed single right gastrocnemius vein with acute thrombus, with no left DVT.  Echocardiogram showed normal LV systolic function, with grade 2 diastolic dysfunction, normal right ventricular size and systolic function, with no significant valvular abnormalities.  PT/OT recommended no further therapy needs, but recommended the use of a walker with ambulation.      Interval Problem Update  9/2/2022 - I reviewed the patient's chart. There were no significant overnight events. Remains hemodynamically stable and afebrile. Stable on RA. CIWA 9 this  morning, went up as high as 12 last night.  WBC 3600.  Hemoglobin is stable.  Electrolytes and renal function are normal.  Phosphorus 6.2.  Magnesium 1.5.  No significant arrhythmias on telemetry.    > I have personally seen and examined the patient today.  She continues to be anxious, but not tremulous.  Her left thigh appears puffy, denies throbbing or scratching it.  No other complaints.  No skin rashes.  No respiratory symptoms.  No nausea or vomiting.  No fevers or chills.    I have discussed this patient's plan of care and discharge plan at IDT rounds today with Case Management, Nursing, Nursing leadership, and other members of the IDT team.    Consultants/Specialty  None    Code Status  Full Code    Disposition  Patient is not medically cleared for discharge.   Anticipate discharge to to home with close outpatient follow-up.  I have placed the appropriate orders for post-discharge needs.    Review of Systems  ROS     Pertinent positives/negatives as mentioned above.     A complete review of systems was personally done by me. All other systems were negative.       Physical Exam  Temp:  [36.4 °C (97.6 °F)-37.4 °C (99.3 °F)] 36.8 °C (98.3 °F)  Pulse:  [67-81] 75  Resp:  [16-18] 18  BP: ()/(61-87) 96/71  SpO2:  [91 %-97 %] 91 %    Physical Exam  Vitals reviewed.   Constitutional:       General: She is not in acute distress.     Appearance: Normal appearance. She is normal weight. She is not ill-appearing or diaphoretic.      Comments: Tremulous   HENT:      Head: Normocephalic and atraumatic.      Right Ear: External ear normal.      Left Ear: External ear normal.      Mouth/Throat:      Mouth: Mucous membranes are moist.      Pharynx: No oropharyngeal exudate or posterior oropharyngeal erythema.   Eyes:      General: No scleral icterus.     Extraocular Movements: Extraocular movements intact.      Conjunctiva/sclera: Conjunctivae normal.      Pupils: Pupils are equal, round, and reactive to light.       Comments: Left eyelid appears puffy.   Cardiovascular:      Rate and Rhythm: Normal rate and regular rhythm.      Heart sounds: Normal heart sounds. No murmur heard.  Pulmonary:      Effort: Pulmonary effort is normal. No respiratory distress.      Breath sounds: Normal breath sounds. No stridor. No wheezing, rhonchi or rales.   Chest:      Chest wall: No tenderness.   Abdominal:      General: Bowel sounds are normal. There is no distension.      Palpations: Abdomen is soft. There is no mass.      Tenderness: There is no abdominal tenderness. There is no guarding or rebound.   Musculoskeletal:         General: No swelling. Normal range of motion.      Cervical back: Normal range of motion and neck supple. No rigidity. No muscular tenderness.      Comments: Bilateral feet edema   Lymphadenopathy:      Cervical: No cervical adenopathy.   Skin:     General: Skin is warm and dry.      Coloration: Skin is not jaundiced.      Findings: No rash.   Neurological:      General: No focal deficit present.      Mental Status: She is alert and oriented to person, place, and time. Mental status is at baseline.      Cranial Nerves: No cranial nerve deficit.      Comments: Improved resting tremors on bilateral outstretched hands   Psychiatric:         Mood and Affect: Mood normal.         Behavior: Behavior normal.         Thought Content: Thought content normal.         Judgment: Judgment normal.      Comments: Anxious       Fluids    Intake/Output Summary (Last 24 hours) at 9/2/2022 1115  Last data filed at 9/2/2022 1000  Gross per 24 hour   Intake 720 ml   Output --   Net 720 ml       Laboratory  Recent Labs     08/31/22  0621 09/01/22  0455 09/02/22  0424   WBC 4.0* 3.8* 3.6*   RBC 2.75* 2.74* 2.78*   HEMOGLOBIN 9.5* 9.3* 9.5*   HEMATOCRIT 28.8* 28.4* 29.2*   .7* 103.6* 105.0*   MCH 34.5* 33.9* 34.2*   MCHC 33.0* 32.7* 32.5*   RDW 57.8* 55.8* 56.0*   PLATELETCT 128* 114* 141*   MPV 9.6 9.4 10.0     Recent Labs      08/31/22  0621 09/01/22  0455 09/02/22  0424   SODIUM 133* 136 137   POTASSIUM 4.3 3.9 3.9   CHLORIDE 102 105 106   CO2 24 22 22   GLUCOSE 116* 93 99   BUN 6* 5* 5*   CREATININE 0.37* 0.44* 0.37*   CALCIUM 7.3* 8.1* 8.5                   Imaging  US-EXTREMITY VENOUS LOWER BILAT   Final Result      EC-ECHOCARDIOGRAM COMPLETE W/O CONT   Final Result      IR-US GUIDED PIV   Final Result    Ultrasound-guided PERIPHERAL IV INSERTION performed by    qualified nursing staff as above.           Assessment/Plan  * Alcohol abuse with withdrawal (HCC)- (present on admission)  Assessment & Plan  - Improving withdrawal symptoms.  Last drink was on 8/28.  Currently day 5 from last drink.  -Continue alcohol withdrawal protocol with CIWA.  Continue scheduled Librium 25 mg every 6 hours, will taper with improvement after day 5 from last drink.  Monitor closely for worsening symptoms, as may need higher level of care and Precedex drip if she goes into delirium tremens.  -Continue thiamine, multivitamin, and folate.  -Continue alcohol cessation counseling.      Hypomagnesemia- (present on admission)  Assessment & Plan  - Mg remains low at 1.5. Replace with 2 g of IV magnesium sulfate.  Repeat magnesium in the morning.    Hypocalcemia- (present on admission)  Assessment & Plan  -Continue calcium carbonate 3 times daily  -Continue to monitor    Hypophosphatemia- (present on admission)  Assessment & Plan  -Likely due to poor p.o. intake.  -Phosphorus level is now normal.    Hypokalemia- (present on admission)  Assessment & Plan  -Likely due to poor p.o. intake  -High risk for refeeding syndrome.  Magnesium was also low.  -replaced.  Potassium has normalized.  Repeat BMP in the morning, and replace as needed.  Replace magnesium.    Acute deep vein (gastrocnemius chilo) thrombosis (DVT) of right lower extremity (HCC)- (present on admission)  Assessment & Plan  - continue xarelto.    Bilateral lower extremity edema- (present on  admission)  Assessment & Plan  - Has grade 2 diastolic dysfunction on echocardiogram, with normal systolic function, and normal right ventricular size and pressures.  Suspect this is from alcoholism.  She also has right gastrocnemius vein DVT, for which she will be started on anticoagulation.  -Maintain off IV fluids.    High anion gap metabolic acidosis- (present on admission)  Assessment & Plan  -Suspect due to alcohol ketoacidosis versus starvation ketosis.  Lactate has normalized anion gap is closed and bicarbonate level has normalized as well.  -Off IV fluids.  Continue to trend.  -Encourage oral intake.    Major depressive disorder, recurrent episode, mild (HCC)- (present on admission)  Assessment & Plan  -Continue home Seroquel, Remeron, and bupropion    Tobacco dependence- (present on admission)  Assessment & Plan  -Smokes 1/2 to 1 pack/day  -Counseled on smoking cessation.  Continue nicotine replacement therapy, along with bupropion.      Anxiety- (present on admission)  Assessment & Plan  - start PRN atarax.    Dyslipidemia- (present on admission)  Assessment & Plan  -Continue home statin    Primary hypertension- (present on admission)  Assessment & Plan  - Maintaining good control.  - Continue home propanolol, valsartan.  -Monitor blood pressure trend closely, with as needed IV labetalol for significant hypertension.       VTE prophylaxis: enoxaparin ppx

## 2022-09-02 NOTE — PROGRESS NOTES
Received report from Qasim Marcum. Pt AOx4 and reports no pain. Pt updated on Poc for the evening. Pt has no further questions or needs at this time.

## 2022-09-02 NOTE — CARE PLAN
The patient is Stable - Low risk of patient condition declining or worsening    Shift Goals  Clinical Goals: Danny  Patient Goals: go home  Family Goals: MAJOR    Progress made toward(s) clinical / shift goals:    Problem: Lifestyle Changes  Goal: Patient's ability to identify lifestyle changes and available resources to help reduce recurrence of condition will improve  Outcome: Progressing: pt verbalized problem with drinking and wants to initiate a plan to quit     Problem: Risk for Aspiration  Goal: Patient's risk for aspiration will be absent or decrease  Outcome: Progressing: pt remain free form aspiration throughout shift     Problem: Fall Risk  Goal: Patient will remain free from falls  Outcome: Progressing: pt remained free from falls throughout shift

## 2022-09-03 NOTE — PROGRESS NOTES
"1944 RN answered call light. Patient adamantly expressing desire to leave saying, \"I don't need to be here, there is no reason I should be staying\". Patient assessed, A/Ox4. Patient requested to this RN for transport  to get her home. This RN notified her that we would not be able to set up a ride for her and she would have to provide her own transport. Patient attempted to call two friends to pick her up, no answer.     1951 This RN at bedside. Patient called family member Josie. Patient on phone with Josie saying, \"They are holding me hostage. They wont let me leave. Please come pick me up\".  Patient confirmed to this RN and charge RN that Josie would be picking her up in \" 5 minutes, they live really close\". This RN educated patient that she is not medically cleared to leave and that she would be leaving against medical advice.     1802 Dr. Taylor notified about patient leaving AMA. Patient wheelchair escorted to ED entrance by CNA.   "

## 2022-09-03 NOTE — PROGRESS NOTES
I have been informed by patient nurse [Benita Haro] that the patient has left against medical advice.  Reportedly the patient was alert and oriented x4.  Day attending progress note documentation shows that the patient was alert and oriented to person, place and time and mental status at baseline.  I have called the patient at [phone] however no one answered.

## 2022-09-03 NOTE — PROGRESS NOTES
Telemetry Shift Summary    Rhythm NSR  HR Range 66-74  Ectopy rPVC/PAC  Measurements 0.16/0.10/0.42        Normal Values  Rhythm SR  HR Range    Measurements 0.12-0.20 / 0.06-0.10  / 0.30-0.52

## 2022-09-03 NOTE — DISCHARGE SUMMARY
"Discharge Summary    CHIEF COMPLAINT ON ADMISSION  Chief Complaint   Patient presents with    Anxiety     PT BIB EMS for panic attack. \"I just want a sedative to calm me down\". Pt drinks everyday has not had a drink today. Pt also states \"I have not slept in a couple days\"       Reason for Admission  EMS     Admission Date  8/30/2022    CODE STATUS  Prior    HPI & HOSPITAL COURSE  Marcia Koch is a 72 y.o. female with hypertension, hyperlipidemia, alcohol use (drinks about 2 cocktails and a glass of wine most days of the week), admitted 8/30/2022 with anxiety and tremors, and has not slept in a couple of days.  Last drink was the day prior to admission.  She also reports she has not eaten in the last 3 to 4 days.  She has not taken her antihypertensives in the last 3 to 4 days as well.  In the ED, patient was hypertensive, but not hypoxic.  She was extremely agitated and tremulous, and was given multiple rounds of IV Valium.  Labs were notable for elevated MCV, hypokalemia, hypophosphatemia, hypocalcemia, and anion gap antibiotic acidosis.  She had no leukocytosis.  Blood alcohol level was 78.6.  Urine drug screen was negative.  Urinalysis was clean.  She was started on CIWA protocol for alcohol withdrawal, and her electrolytes were replaced.  Lactate was 7.6, which was felt to be related to her starvation ketosis and dehydration.  She was started on IV fluids.  She is also started on thiamine, multivitamin, and folate.  She has clinically improved.  Lactate has normalized.  Her electrolytes were replaced and have normalized.  She was noted to have bilateral feet edema. Venous duplex ultrasound of the lower extremity showed single right gastrocnemius vein with acute thrombus, with no left DVT.  Echocardiogram showed normal LV systolic function, with grade 2 diastolic dysfunction, normal right ventricular size and systolic function, with no significant valvular abnormalities.  PT/OT recommended no further " therapy needs, but recommended the use of a walker with ambulation.    She clinically improved; however, before her alcohol withdrawal has completely resolved, and because of her anxiety, she decided to leave AGAINST MEDICAL ADVICE, despite the staff's best effort to encourage her to stay until medically cleared.  She has full capacity to make her own medical decisions, and she left on her own volition.    Therefore, she is left in fair but stable condition to home with close outpatient follow-up.    The patient met 2-midnight criteria for an inpatient stay at the time of discharge.    Discharge Date  9/2/2022    FOLLOW UP ITEMS POST DISCHARGE  N/A    DISCHARGE DIAGNOSES  Principal Problem:    Alcohol abuse with withdrawal (HCC) POA: Yes  Active Problems:    Hypokalemia POA: Yes    Hypophosphatemia POA: Yes    Hypocalcemia POA: Yes    Hypomagnesemia POA: Yes    High anion gap metabolic acidosis POA: Yes    Bilateral lower extremity edema POA: Yes    Acute deep vein (gastrocnemius chilo) thrombosis (DVT) of right lower extremity (HCC) POA: Yes    Primary hypertension POA: Yes    Dyslipidemia POA: Yes    Anxiety POA: Yes    Tobacco dependence POA: Yes    Major depressive disorder, recurrent episode, mild (HCC) POA: Yes  Resolved Problems:    * No resolved hospital problems. *      FOLLOW UP  No future appointments.  Sharmin Mcdermott P.A.-C.  [address] [address]  [phone]          Flaco Garber D.O.  [address] [address]  [phone]            MEDICATIONS ON DISCHARGE     Medication List        ASK your doctor about these medications        Instructions   Aleve 220 MG tablet  Generic drug: naproxen   Take 440 mg by mouth 2 times a day as needed. Indications: Pain  Dose: 440 mg     folic acid 1 MG Tabs  Commonly known as: FOLVITE   Take 1 mg by mouth every day.  Dose: 1 mg     gabapentin 600 MG tablet  Commonly known as: NEURONTIN  Ask about: Which instructions  should I use?   Take 600 mg by mouth 2 times a day as needed. Indications: Neuropathic Pain  Dose: 600 mg     mirtazapine 15 MG Tbdp  Commonly known as: Remeron   Take 15 mg by mouth every evening.  Dose: 15 mg     omeprazole 20 MG delayed-release capsule  Commonly known as: PRILOSEC   Take 20 mg by mouth every day.  Dose: 20 mg     pravastatin 40 MG tablet  Commonly known as: PRAVACHOL   Take 40 mg by mouth every evening.  Dose: 40 mg     propranolol 80 MG Tabs  Commonly known as: INDERAL   Take 80 mg by mouth 2 times a day.  Dose: 80 mg     QUEtiapine 200 MG Tabs  Commonly known as: Seroquel   Take 200 mg by mouth at bedtime.  Dose: 200 mg     valsartan 40 MG Tabs  Commonly known as: DIOVAN   Take 40 mg by mouth every day.  Dose: 40 mg     Wellbutrin  MG XL tablet  Generic drug: buPROPion  Ask about: Which instructions should I use?   Take 150 mg by mouth every morning.  Dose: 150 mg     zolpidem 5 MG Tabs  Commonly known as: AMBIEN   Take 2.5-5 mg by mouth at bedtime as needed for Sleep.  Dose: 2.5-5 mg              Allergies  Allergies   Allergen Reactions    Atorvastatin Myalgia    Codeine Nausea    Lisinopril Cough     Cough      Morphine Nausea    Rosuvastatin Myalgia       DIET  No orders of the defined types were placed in this encounter.      ACTIVITY  As tolerated.  Weight bearing as tolerated    CONSULTATIONS  None    PROCEDURES  None    LABORATORY  Lab Results   Component Value Date    SODIUM 137 09/02/2022    POTASSIUM 3.9 09/02/2022    CHLORIDE 106 09/02/2022    CO2 22 09/02/2022    GLUCOSE 99 09/02/2022    BUN 5 (L) 09/02/2022    CREATININE 0.37 (L) 09/02/2022        Lab Results   Component Value Date    WBC 3.6 (L) 09/02/2022    HEMOGLOBIN 9.5 (L) 09/02/2022    HEMATOCRIT 29.2 (L) 09/02/2022    PLATELETCT 141 (L) 09/02/2022

## 2022-09-06 ENCOUNTER — HOSPITAL ENCOUNTER (EMERGENCY)
Facility: MEDICAL CENTER | Age: 72
End: 2022-09-06
Attending: EMERGENCY MEDICINE
Payer: MEDICARE

## 2022-09-06 VITALS
OXYGEN SATURATION: 95 % | SYSTOLIC BLOOD PRESSURE: 159 MMHG | RESPIRATION RATE: 18 BRPM | HEART RATE: 101 BPM | DIASTOLIC BLOOD PRESSURE: 86 MMHG | TEMPERATURE: 97.8 F

## 2022-09-06 DIAGNOSIS — R41.82 ALTERED MENTAL STATUS, UNSPECIFIED ALTERED MENTAL STATUS TYPE: ICD-10-CM

## 2022-09-06 DIAGNOSIS — F10.920 ALCOHOLIC INTOXICATION WITHOUT COMPLICATION (HCC): ICD-10-CM

## 2022-09-06 LAB
ALBUMIN SERPL BCP-MCNC: 3.9 G/DL (ref 3.2–4.9)
ALBUMIN/GLOB SERPL: 1.4 G/DL
ALP SERPL-CCNC: 98 U/L (ref 30–99)
ALT SERPL-CCNC: 20 U/L (ref 2–50)
ANION GAP SERPL CALC-SCNC: 14 MMOL/L (ref 7–16)
APTT PPP: 24.8 SEC (ref 24.7–36)
AST SERPL-CCNC: 28 U/L (ref 12–45)
BASOPHILS # BLD AUTO: 0.9 % (ref 0–1.8)
BASOPHILS # BLD: 0.04 K/UL (ref 0–0.12)
BILIRUB SERPL-MCNC: 0.3 MG/DL (ref 0.1–1.5)
BUN SERPL-MCNC: 3 MG/DL (ref 8–22)
CALCIUM SERPL-MCNC: 8.7 MG/DL (ref 8.4–10.2)
CHLORIDE SERPL-SCNC: 106 MMOL/L (ref 96–112)
CO2 SERPL-SCNC: 22 MMOL/L (ref 20–33)
CREAT SERPL-MCNC: 0.58 MG/DL (ref 0.5–1.4)
EKG IMPRESSION: NORMAL
EOSINOPHIL # BLD AUTO: 0.06 K/UL (ref 0–0.51)
EOSINOPHIL NFR BLD: 1.4 % (ref 0–6.9)
ERYTHROCYTE [DISTWIDTH] IN BLOOD BY AUTOMATED COUNT: 58.3 FL (ref 35.9–50)
ETHANOL BLD-MCNC: 137.7 MG/DL
GFR SERPLBLD CREATININE-BSD FMLA CKD-EPI: 97 ML/MIN/1.73 M 2
GLOBULIN SER CALC-MCNC: 2.7 G/DL (ref 1.9–3.5)
GLUCOSE SERPL-MCNC: 93 MG/DL (ref 65–99)
HCT VFR BLD AUTO: 40.3 % (ref 37–47)
HGB BLD-MCNC: 13.1 G/DL (ref 12–16)
IMM GRANULOCYTES # BLD AUTO: 0.01 K/UL (ref 0–0.11)
IMM GRANULOCYTES NFR BLD AUTO: 0.2 % (ref 0–0.9)
INR PPP: 0.87 (ref 0.87–1.13)
LIPASE SERPL-CCNC: 21 U/L (ref 7–58)
LYMPHOCYTES # BLD AUTO: 1.48 K/UL (ref 1–4.8)
LYMPHOCYTES NFR BLD: 34.5 % (ref 22–41)
MCH RBC QN AUTO: 33.9 PG (ref 27–33)
MCHC RBC AUTO-ENTMCNC: 32.5 G/DL (ref 33.6–35)
MCV RBC AUTO: 104.4 FL (ref 81.4–97.8)
MONOCYTES # BLD AUTO: 0.44 K/UL (ref 0–0.85)
MONOCYTES NFR BLD AUTO: 10.3 % (ref 0–13.4)
NEUTROPHILS # BLD AUTO: 2.26 K/UL (ref 2–7.15)
NEUTROPHILS NFR BLD: 52.7 % (ref 44–72)
NRBC # BLD AUTO: 0 K/UL
NRBC BLD-RTO: 0 /100 WBC
PLATELET # BLD AUTO: 299 K/UL (ref 164–446)
PMV BLD AUTO: 9.2 FL (ref 9–12.9)
POTASSIUM SERPL-SCNC: 3.8 MMOL/L (ref 3.6–5.5)
PROT SERPL-MCNC: 6.6 G/DL (ref 6–8.2)
PROTHROMBIN TIME: 11.5 SEC (ref 12–14.6)
RBC # BLD AUTO: 3.86 M/UL (ref 4.2–5.4)
SODIUM SERPL-SCNC: 142 MMOL/L (ref 135–145)
WBC # BLD AUTO: 4.3 K/UL (ref 4.8–10.8)

## 2022-09-06 PROCEDURE — 80053 COMPREHEN METABOLIC PANEL: CPT

## 2022-09-06 PROCEDURE — 93005 ELECTROCARDIOGRAM TRACING: CPT | Performed by: EMERGENCY MEDICINE

## 2022-09-06 PROCEDURE — 85025 COMPLETE CBC W/AUTO DIFF WBC: CPT

## 2022-09-06 PROCEDURE — 99285 EMERGENCY DEPT VISIT HI MDM: CPT

## 2022-09-06 PROCEDURE — A9270 NON-COVERED ITEM OR SERVICE: HCPCS | Performed by: EMERGENCY MEDICINE

## 2022-09-06 PROCEDURE — 85730 THROMBOPLASTIN TIME PARTIAL: CPT

## 2022-09-06 PROCEDURE — 700102 HCHG RX REV CODE 250 W/ 637 OVERRIDE(OP): Performed by: EMERGENCY MEDICINE

## 2022-09-06 PROCEDURE — 85610 PROTHROMBIN TIME: CPT

## 2022-09-06 PROCEDURE — 82077 ASSAY SPEC XCP UR&BREATH IA: CPT

## 2022-09-06 PROCEDURE — 96374 THER/PROPH/DIAG INJ IV PUSH: CPT

## 2022-09-06 PROCEDURE — 700101 HCHG RX REV CODE 250: Performed by: EMERGENCY MEDICINE

## 2022-09-06 PROCEDURE — 83690 ASSAY OF LIPASE: CPT

## 2022-09-06 PROCEDURE — 36415 COLL VENOUS BLD VENIPUNCTURE: CPT

## 2022-09-06 RX ORDER — LORAZEPAM 1 MG/1
1 TABLET ORAL ONCE
Status: COMPLETED | OUTPATIENT
Start: 2022-09-06 | End: 2022-09-06

## 2022-09-06 RX ADMIN — LORAZEPAM 1 MG: 1 TABLET ORAL at 12:32

## 2022-09-06 RX ADMIN — THIAMINE HYDROCHLORIDE: 100 INJECTION, SOLUTION INTRAMUSCULAR; INTRAVENOUS at 12:32

## 2022-09-06 NOTE — ED TRIAGE NOTES
Bib ems for above complaints.     Chief Complaint   Patient presents with    ALOC     Pt found by waste management wandering around seeming confused, unsure what street she was on. Medics called. Pt able to state her address and name. [facility] and left ama per ems. Neighbor confirmed patient is etoh     /69   Pulse 94   Temp 36.6 °C (97.8 °F) (Temporal)   Resp 16   SpO2 94%

## 2022-09-06 NOTE — ED PROVIDER NOTES
ED Provider Note    CHIEF COMPLAINT  Chief Complaint   Patient presents with    ALOC     Pt found by waste management wandering around seeming confused, unsure what street she was on. Medics called. Pt able to state her address and name. Was apparently taken by ems to [facility] yesterday for detox and left ama per ems. Neighbor confirmed patient is etoh       HPI  Marcia Koch is a 72 y.o. female who presents for evaluation of altered mental status.  Patient was brought in by EMS after she was found wandering in the street and not being oriented.  Patient does have a history of alcohol abuse.  Patient recently went to [facility] for treatment.  The patient states that she has been drinking today.  She complains of feeling nervous and anxious.  Patient had no recent: Fever, chest pain, cough, sputum, vomiting, hematemesis, melena hematochezia.    A discharge summary from 1 week ago showed:    HPI & HOSPITAL COURSE  Marcia Koch is a 72 y.o. female with hypertension, hyperlipidemia, alcohol use (drinks about 2 cocktails and a glass of wine most days of the week), admitted 8/30/2022 with anxiety and tremors, and has not slept in a couple of days.  Last drink was the day prior to admission.  She also reports she has not eaten in the last 3 to 4 days.  She has not taken her antihypertensives in the last 3 to 4 days as well.  In the ED, patient was hypertensive, but not hypoxic.  She was extremely agitated and tremulous, and was given multiple rounds of IV Valium.  Labs were notable for elevated MCV, hypokalemia, hypophosphatemia, hypocalcemia, and anion gap antibiotic acidosis.  She had no leukocytosis.  Blood alcohol level was 78.6.  Urine drug screen was negative.  Urinalysis was clean.  She was started on CIWA protocol for alcohol withdrawal, and her electrolytes were replaced.  Lactate was 7.6, which was felt to be related to her starvation ketosis and dehydration.  She was started on IV fluids.   She is also started on thiamine, multivitamin, and folate.  She has clinically improved.  Lactate has normalized.  Her electrolytes were replaced and have normalized.  She was noted to have bilateral feet edema. Venous duplex ultrasound of the lower extremity showed single right gastrocnemius vein with acute thrombus, with no left DVT.  Echocardiogram showed normal LV systolic function, with grade 2 diastolic dysfunction, normal right ventricular size and systolic function, with no significant valvular abnormalities.  PT/OT recommended no further therapy needs, but recommended the use of a walker with ambulation.     She clinically improved; however, before her alcohol withdrawal has completely resolved, and because of her anxiety, she decided to leave AGAINST MEDICAL ADVICE, despite the staff's best effort to encourage her to stay until medically cleared.  She has full capacity to make her own medical decisions, and she left on her own volition.       REVIEW OF SYSTEMS  See HPI for further details. All other systems negative.    PAST MEDICAL HISTORY  Past Medical History:   Diagnosis Date    Alcohol abuse     Alcoholism (HCC)     Anxiety     Depression     Hiatus hernia syndrome     High cholesterol     Hypertension     Indigestion     Other specified disorder of intestines     diarrhea    Pneumonia     for 2 days    Psychiatric disorder     anxiety, depression    Sleep apnea     Snoring     Unspecified urinary incontinence     Urinary bladder disorder        FAMILY HISTORY  Family History   Problem Relation Age of Onset    Anxiety disorder Brother     Depression Brother     Other Brother          AIDS    Alcohol abuse Mother     Cancer Mother         colon cancer age 70s    Alcohol abuse Father     Hyperlipidemia Father     Other Father         TIA, macular degenration    Cancer Sister         breast    Drug abuse Sister     Cancer Maternal Aunt         breast    Cancer Maternal Uncle         nos    Stroke Neg  Hx     Heart Attack Neg Hx        SOCIAL HISTORY  Positive alcohol abuse;    SURGICAL HISTORY  Past Surgical History:   Procedure Laterality Date    BLADDER SLING FEMALE  8/30/2011    Performed by RENATE STERLING at [facility]    OTHER  2010    surgery for sleep apnea    OTHER  2010    big toenails and second toenails removed    OTHER ABDOMINAL SURGERY  2002    choley    GYN SURGERY  1991    hyst    OTHER  1956    tonsillectomy    ABDOMINAL HYSTERECTOMY TOTAL      Hysterectomy, Total Abdominal    BRIEN BY LAPAROSCOPY      Cholecystectomy, Laparoscopic       CURRENT MEDICATIONS  See nurses notes      ALLERGIES  Allergies   Allergen Reactions    Atorvastatin Myalgia    Codeine Nausea    Lisinopril Cough     Cough      Morphine Nausea    Rosuvastatin Myalgia       PHYSICAL EXAM  VITAL SIGNS: /69   Pulse 94   Temp 36.6 °C (97.8 °F) (Temporal)   Resp 16   SpO2 94%      Constitutional: 72-year-old female, awake, oriented x3  HENT: ,Atraumatic, Bilateral external ears normal, tympanic membranes clear, Oropharynx mildly dry, No oral exudates, Nose normal.   Eyes: PERRL, EOMI, Conjunctiva normal, No discharge.   Neck: Normal range of motion, No tenderness, Supple, No stridor.   Lymphatic: No lymphadenopathy noted.   Cardiovascular: Normal heart rate, Normal rhythm, No murmurs, No rubs, No gallops.   Thorax & Lungs: Normal Equal breath sounds, No respiratory distress, No wheezing, no stridor, no rales. No chest tenderness.   Abdomen: Soft, nontender, nondistended, no organomegaly, positive bowel sounds normal in quality. No guarding or rebound.  Skin: Good skin turgor, pink, warm, dry. No rashes, petechiae, purpura. Normal capillary refill.   Back: No tenderness, No CVA tenderness.   Extremities: Intact distal pulses, No edema, No tenderness, No cyanosis, No clubbing. Vascular: Pulses are 2+, symmetric in the upper and lower extremities.  Musculoskeletal: Mild diffuse arthritic changes. No tenderness  to palpation or major deformities noted.   Neurologic: Alert & oriented x 3, Normal motor function, Normal sensory function, No gross focal deficits noted.   Psychiatric: Affect normal, Judgment normal, Mood normal.         EKG  I have interpreted: Rate 95, rhythm sinus, axis normal, NJ QRS QT intervals normal, no acute STEMI, twelve-lead EKG, no change compared to previous tracing of 8/30/2022; of note the computer read the patient's rhythm as atrial fibrillation.  I can see clear discernible P waves and believe this is a computer misread;    COURSE & MEDICAL DECISION MAKING  Pertinent Labs & Imaging studies reviewed. (See chart for details)  1.  Monitor  2.  IV: Detox  3.  Ativan 1 mg PO    Laboratory studies: CBC shows white count of 4.3, 52% neutrophils, 34% lymphocytes, 10% monocytes, hemoglobin 13.1, crit 40.3, , MCHC 32; CMP shows a BUN of 3, otherwise within normal limits; lipase is 21; diagnostic alcohol 137; coags within normal limits;    Discussion: At this time, the patient presents for evaluation of altered mental status.  The patient's altered mental status.  Secondary to alcohol intoxication.  Patient was monitored with improvement in her mental status she was completely oriented with a GCS of 15 and no focal neurological deficits on initial and repeat examinations.  I am not seeing complications related to her alcoholism.  The patient has been given resources previously.  I encouraged her to follow-up with those resources for her alcohol abuse.  Patient indicates she is comfortable this explanation disposition.  She is requesting for home at this time.    FINAL IMPRESSION  1. Altered mental status, unspecified altered mental status type    2. Alcoholic intoxication without complication (HCC)           PLAN  1.  Appropriate discharge instructions given  2.  Follow-up with primary care    Electronically signed by: Guy G Gansert, M.D., 9/6/2022 12:01 PM

## 2022-09-06 NOTE — ED NOTES
Pt to sit in lobby to wait for cab. Dc instructions and medications discussed with patient at bedside. All questions answered at this time. VSS. No IV in place at this time. Pt to lobby without incident. cab to drive patient home.

## 2022-09-07 ENCOUNTER — PATIENT OUTREACH (OUTPATIENT)
Dept: HEALTH INFORMATION MANAGEMENT | Facility: OTHER | Age: 72
End: 2022-09-07
Payer: MEDICARE

## 2022-09-09 NOTE — PROGRESS NOTES
CHW Cortes will discharge patient from CCM services due to lack of contact after x3 TC attempts 09/09/22. Pt left AMA before CHW could meet bedside.

## 2022-09-12 ENCOUNTER — TELEPHONE (OUTPATIENT)
Dept: SOCIAL WORK | Facility: CLINIC | Age: 72
End: 2022-09-12
Payer: MEDICARE

## 2022-09-13 NOTE — TELEPHONE ENCOUNTER
ED Follow-up  Call Attempts: 2  Date of ED visit: 09/06/22  Call Outcome: Other  Attempted to reach member x2. Unsuccessful. Close encounter, will be past 7 day window.

## 2022-10-31 DIAGNOSIS — M81.0 AGE-RELATED OSTEOPOROSIS WITHOUT CURRENT PATHOLOGICAL FRACTURE: ICD-10-CM

## 2022-10-31 RX ORDER — DIPHENHYDRAMINE HYDROCHLORIDE 50 MG/ML
50 INJECTION INTRAMUSCULAR; INTRAVENOUS PRN
Status: CANCELLED | OUTPATIENT
Start: 2022-11-14

## 2022-10-31 RX ORDER — METHYLPREDNISOLONE SODIUM SUCCINATE 125 MG/2ML
125 INJECTION, POWDER, LYOPHILIZED, FOR SOLUTION INTRAMUSCULAR; INTRAVENOUS PRN
Status: CANCELLED | OUTPATIENT
Start: 2022-11-14

## 2022-10-31 RX ORDER — EPINEPHRINE 1 MG/ML(1)
0.5 AMPUL (ML) INJECTION PRN
Status: CANCELLED | OUTPATIENT
Start: 2022-11-14

## 2022-11-04 ENCOUNTER — DOCUMENTATION (OUTPATIENT)
Dept: HEALTH INFORMATION MANAGEMENT | Facility: OTHER | Age: 72
End: 2022-11-04
Payer: MEDICARE

## 2022-11-17 ENCOUNTER — APPOINTMENT (RX ONLY)
Dept: URBAN - METROPOLITAN AREA CLINIC 4 | Facility: CLINIC | Age: 72
Setting detail: DERMATOLOGY
End: 2022-11-17

## 2022-11-17 DIAGNOSIS — Z86.007 PERSONAL HISTORY OF IN-SITU NEOPLASM OF SKIN: ICD-10-CM

## 2022-11-17 DIAGNOSIS — L57.0 ACTINIC KERATOSIS: ICD-10-CM

## 2022-11-17 DIAGNOSIS — Z85.828 PERSONAL HISTORY OF OTHER MALIGNANT NEOPLASM OF SKIN: ICD-10-CM

## 2022-11-17 DIAGNOSIS — L82.1 OTHER SEBORRHEIC KERATOSIS: ICD-10-CM

## 2022-11-17 DIAGNOSIS — D18.0 HEMANGIOMA: ICD-10-CM

## 2022-11-17 DIAGNOSIS — S31000A OPEN WOUND(S) (MULTIPLE) OF UNSPECIFIED SITE(S), WITHOUT MENTION OF COMPLICATION: ICD-10-CM

## 2022-11-17 DIAGNOSIS — L81.4 OTHER MELANIN HYPERPIGMENTATION: ICD-10-CM

## 2022-11-17 DIAGNOSIS — Z71.89 OTHER SPECIFIED COUNSELING: ICD-10-CM

## 2022-11-17 DIAGNOSIS — D22 MELANOCYTIC NEVI: ICD-10-CM

## 2022-11-17 PROBLEM — D22.5 MELANOCYTIC NEVI OF TRUNK: Status: ACTIVE | Noted: 2022-11-17

## 2022-11-17 PROBLEM — D22.71 MELANOCYTIC NEVI OF RIGHT LOWER LIMB, INCLUDING HIP: Status: ACTIVE | Noted: 2022-11-17

## 2022-11-17 PROBLEM — D22.61 MELANOCYTIC NEVI OF RIGHT UPPER LIMB, INCLUDING SHOULDER: Status: ACTIVE | Noted: 2022-11-17

## 2022-11-17 PROBLEM — D18.01 HEMANGIOMA OF SKIN AND SUBCUTANEOUS TISSUE: Status: ACTIVE | Noted: 2022-11-17

## 2022-11-17 PROBLEM — D22.62 MELANOCYTIC NEVI OF LEFT UPPER LIMB, INCLUDING SHOULDER: Status: ACTIVE | Noted: 2022-11-17

## 2022-11-17 PROBLEM — D22.39 MELANOCYTIC NEVI OF OTHER PARTS OF FACE: Status: ACTIVE | Noted: 2022-11-17

## 2022-11-17 PROBLEM — S81.812A LACERATION WITHOUT FOREIGN BODY, LEFT LOWER LEG, INITIAL ENCOUNTER: Status: ACTIVE | Noted: 2022-11-17

## 2022-11-17 PROBLEM — D22.72 MELANOCYTIC NEVI OF LEFT LOWER LIMB, INCLUDING HIP: Status: ACTIVE | Noted: 2022-11-17

## 2022-11-17 PROCEDURE — 99213 OFFICE O/P EST LOW 20 MIN: CPT | Mod: 25

## 2022-11-17 PROCEDURE — ? COUNSELING

## 2022-11-17 PROCEDURE — ? LIQUID NITROGEN

## 2022-11-17 PROCEDURE — ? BENIGN DESTRUCTION COSMETIC

## 2022-11-17 PROCEDURE — ? PRESCRIPTION

## 2022-11-17 PROCEDURE — ? SUNSCREEN TREATMENT REGIMEN

## 2022-11-17 PROCEDURE — 17000 DESTRUCT PREMALG LESION: CPT

## 2022-11-17 PROCEDURE — ? ADDITIONAL NOTES

## 2022-11-17 RX ORDER — MUPIROCIN 20 MG/G
OINTMENT TOPICAL
Qty: 22 | Refills: 1 | Status: ERX | COMMUNITY
Start: 2022-11-17

## 2022-11-17 RX ADMIN — MUPIROCIN: 20 OINTMENT TOPICAL at 00:00

## 2022-11-17 ASSESSMENT — LOCATION DETAILED DESCRIPTION DERM
LOCATION DETAILED: RIGHT ANTERIOR PROXIMAL THIGH
LOCATION DETAILED: INFERIOR THORACIC SPINE
LOCATION DETAILED: RIGHT INFERIOR CENTRAL MALAR CHEEK
LOCATION DETAILED: LEFT AXILLARY TAIL OF BREAST
LOCATION DETAILED: LEFT ANTERIOR DISTAL UPPER ARM
LOCATION DETAILED: LEFT RIB CAGE
LOCATION DETAILED: LEFT INFERIOR CENTRAL MALAR CHEEK
LOCATION DETAILED: LEFT PROXIMAL PRETIBIAL REGION
LOCATION DETAILED: LEFT ANTERIOR PROXIMAL THIGH
LOCATION DETAILED: RIGHT ANTERIOR PROXIMAL UPPER ARM
LOCATION DETAILED: RIGHT ANTERIOR DISTAL UPPER ARM
LOCATION DETAILED: LEFT MEDIAL UPPER BACK
LOCATION DETAILED: LEFT PROXIMAL POSTERIOR UPPER ARM
LOCATION DETAILED: LEFT SUPERIOR MEDIAL UPPER BACK
LOCATION DETAILED: RIGHT PROXIMAL POSTERIOR UPPER ARM
LOCATION DETAILED: LEFT INFERIOR MEDIAL FOREHEAD
LOCATION DETAILED: RIGHT CENTRAL MALAR CHEEK
LOCATION DETAILED: LEFT MEDIAL BREAST 10-11:00 REGION
LOCATION DETAILED: RIGHT CENTRAL EYEBROW
LOCATION DETAILED: LOWER STERNUM
LOCATION DETAILED: SUPERIOR THORACIC SPINE
LOCATION DETAILED: RIGHT PROXIMAL PRETIBIAL REGION
LOCATION DETAILED: LEFT MEDIAL MALAR CHEEK
LOCATION DETAILED: EPIGASTRIC SKIN
LOCATION DETAILED: MIDDLE STERNUM

## 2022-11-17 ASSESSMENT — LOCATION SIMPLE DESCRIPTION DERM
LOCATION SIMPLE: LEFT PRETIBIAL REGION
LOCATION SIMPLE: LEFT FOREHEAD
LOCATION SIMPLE: UPPER BACK
LOCATION SIMPLE: RIGHT THIGH
LOCATION SIMPLE: RIGHT POSTERIOR UPPER ARM
LOCATION SIMPLE: LEFT UPPER BACK
LOCATION SIMPLE: LEFT BREAST
LOCATION SIMPLE: RIGHT UPPER ARM
LOCATION SIMPLE: LEFT UPPER ARM
LOCATION SIMPLE: ABDOMEN
LOCATION SIMPLE: RIGHT PRETIBIAL REGION
LOCATION SIMPLE: RIGHT CHEEK
LOCATION SIMPLE: RIGHT EYEBROW
LOCATION SIMPLE: LEFT THIGH
LOCATION SIMPLE: LEFT CHEEK
LOCATION SIMPLE: LEFT POSTERIOR UPPER ARM
LOCATION SIMPLE: CHEST

## 2022-11-17 ASSESSMENT — LOCATION ZONE DERM
LOCATION ZONE: ARM
LOCATION ZONE: LEG
LOCATION ZONE: TRUNK
LOCATION ZONE: FACE

## 2022-11-17 NOTE — PROCEDURE: ADDITIONAL NOTES
Additional Notes: Gi states she was scratched by her cat 1 week ago. Appears to be healing well without evidence of secondary infection at this time. Rx for mupirocin given for patient to use until healed completely
Detail Level: Simple
Render Risk Assessment In Note?: no

## 2022-11-17 NOTE — PROCEDURE: LIQUID NITROGEN
Show Applicator Variable?: Yes
Render Note In Bullet Format When Appropriate: No
Detail Level: Detailed
Post-Care Instructions: I reviewed with the patient in detail post-care instructions. Patient is to wear sunprotection, and avoid picking at any of the treated lesions. Pt may apply Vaseline to crusted or scabbing areas.
Consent: The patient's consent was obtained including but not limited to risks of crusting, scabbing, blistering, scarring, darker or lighter pigmentary change, recurrence, incomplete removal and infection.
Duration Of Freeze Thaw-Cycle (Seconds): 3

## 2022-12-02 ENCOUNTER — OUTPATIENT INFUSION SERVICES (OUTPATIENT)
Dept: ONCOLOGY | Facility: MEDICAL CENTER | Age: 72
End: 2022-12-02
Attending: STUDENT IN AN ORGANIZED HEALTH CARE EDUCATION/TRAINING PROGRAM
Payer: MEDICARE

## 2022-12-02 VITALS
HEART RATE: 63 BPM | TEMPERATURE: 97.5 F | BODY MASS INDEX: 24.38 KG/M2 | HEIGHT: 62 IN | RESPIRATION RATE: 16 BRPM | SYSTOLIC BLOOD PRESSURE: 128 MMHG | OXYGEN SATURATION: 94 % | DIASTOLIC BLOOD PRESSURE: 63 MMHG | WEIGHT: 132.5 LBS

## 2022-12-02 DIAGNOSIS — M81.0 AGE-RELATED OSTEOPOROSIS WITHOUT CURRENT PATHOLOGICAL FRACTURE: ICD-10-CM

## 2022-12-02 LAB
CA-I BLD ISE-SCNC: 1.17 MMOL/L (ref 1.1–1.3)
CREAT BLD-MCNC: 0.6 MG/DL (ref 0.5–1.4)

## 2022-12-02 PROCEDURE — 82330 ASSAY OF CALCIUM: CPT

## 2022-12-02 PROCEDURE — 96372 THER/PROPH/DIAG INJ SC/IM: CPT

## 2022-12-02 PROCEDURE — 82565 ASSAY OF CREATININE: CPT

## 2022-12-02 PROCEDURE — 700111 HCHG RX REV CODE 636 W/ 250 OVERRIDE (IP): Performed by: STUDENT IN AN ORGANIZED HEALTH CARE EDUCATION/TRAINING PROGRAM

## 2022-12-02 PROCEDURE — 36415 COLL VENOUS BLD VENIPUNCTURE: CPT

## 2022-12-02 RX ORDER — DIPHENHYDRAMINE HYDROCHLORIDE 50 MG/ML
50 INJECTION INTRAMUSCULAR; INTRAVENOUS PRN
Status: CANCELLED | OUTPATIENT
Start: 2023-05-31

## 2022-12-02 RX ORDER — METHYLPREDNISOLONE SODIUM SUCCINATE 125 MG/2ML
125 INJECTION, POWDER, LYOPHILIZED, FOR SOLUTION INTRAMUSCULAR; INTRAVENOUS PRN
Status: CANCELLED | OUTPATIENT
Start: 2023-05-31

## 2022-12-02 RX ORDER — EPINEPHRINE 1 MG/ML(1)
0.5 AMPUL (ML) INJECTION PRN
Status: CANCELLED | OUTPATIENT
Start: 2023-05-31

## 2022-12-02 RX ADMIN — DENOSUMAB 60 MG: 60 INJECTION SUBCUTANEOUS at 14:11

## 2022-12-02 ASSESSMENT — FIBROSIS 4 INDEX: FIB4 SCORE: 1.49

## 2022-12-03 NOTE — PROGRESS NOTES
Marcia arrived ambulatory for Q 6 month Prolia injection. She denies any s/s acute infection or illness, denies dental procedures in the past 4 weeks or upcoming dental procedures, denies s/s of hypocalcimia.  Pt confirms taking calcuim/vitamin D at home.    Istat Ca/Cr lab drawn from left AC, sterile gauze and coban to site.  Lab parameters met, Prolia injected Sub Q to back left arm per MAR, bandaid applied to site.  aMrcia tolerated well, discharged in no apparent distress, next appointment scheduled and confirmed.

## 2023-03-14 ENCOUNTER — APPOINTMENT (OUTPATIENT)
Dept: NEUROLOGY | Facility: MEDICAL CENTER | Age: 73
End: 2023-03-14
Attending: PSYCHIATRY & NEUROLOGY
Payer: MEDICARE

## 2023-03-17 ENCOUNTER — APPOINTMENT (OUTPATIENT)
Dept: NEUROLOGY | Facility: MEDICAL CENTER | Age: 73
End: 2023-03-17
Attending: PSYCHIATRY & NEUROLOGY
Payer: MEDICARE

## 2023-05-17 ENCOUNTER — APPOINTMENT (RX ONLY)
Dept: URBAN - METROPOLITAN AREA CLINIC 4 | Facility: CLINIC | Age: 73
Setting detail: DERMATOLOGY
End: 2023-05-17

## 2023-05-17 DIAGNOSIS — L82.1 OTHER SEBORRHEIC KERATOSIS: ICD-10-CM

## 2023-05-17 DIAGNOSIS — L57.0 ACTINIC KERATOSIS: ICD-10-CM

## 2023-05-17 DIAGNOSIS — Z85.828 PERSONAL HISTORY OF OTHER MALIGNANT NEOPLASM OF SKIN: ICD-10-CM

## 2023-05-17 DIAGNOSIS — Z86.007 PERSONAL HISTORY OF IN-SITU NEOPLASM OF SKIN: ICD-10-CM

## 2023-05-17 DIAGNOSIS — L82.0 INFLAMED SEBORRHEIC KERATOSIS: ICD-10-CM

## 2023-05-17 DIAGNOSIS — L81.4 OTHER MELANIN HYPERPIGMENTATION: ICD-10-CM

## 2023-05-17 DIAGNOSIS — D22 MELANOCYTIC NEVI: ICD-10-CM

## 2023-05-17 DIAGNOSIS — D18.0 HEMANGIOMA: ICD-10-CM

## 2023-05-17 DIAGNOSIS — Z71.89 OTHER SPECIFIED COUNSELING: ICD-10-CM

## 2023-05-17 PROBLEM — D18.01 HEMANGIOMA OF SKIN AND SUBCUTANEOUS TISSUE: Status: ACTIVE | Noted: 2023-05-17

## 2023-05-17 PROBLEM — D22.5 MELANOCYTIC NEVI OF TRUNK: Status: ACTIVE | Noted: 2023-05-17

## 2023-05-17 PROCEDURE — 17003 DESTRUCT PREMALG LES 2-14: CPT | Mod: 59

## 2023-05-17 PROCEDURE — 99213 OFFICE O/P EST LOW 20 MIN: CPT | Mod: 25

## 2023-05-17 PROCEDURE — 17000 DESTRUCT PREMALG LESION: CPT | Mod: 59

## 2023-05-17 PROCEDURE — ? SUNSCREEN RECOMMENDATIONS

## 2023-05-17 PROCEDURE — 17111 DESTRUCTION B9 LESIONS 15/>: CPT

## 2023-05-17 PROCEDURE — ? LIQUID NITROGEN

## 2023-05-17 PROCEDURE — ? COUNSELING

## 2023-05-17 ASSESSMENT — LOCATION DETAILED DESCRIPTION DERM
LOCATION DETAILED: EPIGASTRIC SKIN
LOCATION DETAILED: LEFT LATERAL SUPERIOR CHEST
LOCATION DETAILED: LEFT MEDIAL BREAST 10-11:00 REGION
LOCATION DETAILED: LEFT PROXIMAL DORSAL FOREARM
LOCATION DETAILED: LOWER STERNUM
LOCATION DETAILED: MIDDLE STERNUM
LOCATION DETAILED: RIGHT PROXIMAL DORSAL FOREARM
LOCATION DETAILED: LEFT RIB CAGE
LOCATION DETAILED: RIGHT ANTERIOR PROXIMAL THIGH
LOCATION DETAILED: LEFT AXILLARY TAIL OF BREAST
LOCATION DETAILED: LEFT POSTERIOR SHOULDER
LOCATION DETAILED: RIGHT MEDIAL BREAST 3-4:00 REGION
LOCATION DETAILED: RIGHT LATERAL SUPERIOR CHEST
LOCATION DETAILED: RIGHT SUPERIOR UPPER BACK
LOCATION DETAILED: SUPERIOR THORACIC SPINE
LOCATION DETAILED: RIGHT MEDIAL BREAST 2-3:00 REGION
LOCATION DETAILED: SUBXIPHOID
LOCATION DETAILED: RIGHT ANTERIOR SHOULDER
LOCATION DETAILED: RIGHT INFERIOR UPPER BACK
LOCATION DETAILED: INFERIOR THORACIC SPINE
LOCATION DETAILED: RIGHT SUPERIOR MEDIAL UPPER BACK
LOCATION DETAILED: LEFT MID-UPPER BACK
LOCATION DETAILED: LEFT MEDIAL SUPERIOR CHEST
LOCATION DETAILED: LEFT MEDIAL UPPER BACK
LOCATION DETAILED: RIGHT INFERIOR LATERAL UPPER BACK

## 2023-05-17 ASSESSMENT — LOCATION SIMPLE DESCRIPTION DERM
LOCATION SIMPLE: LEFT UPPER BACK
LOCATION SIMPLE: LEFT FOREARM
LOCATION SIMPLE: LEFT SHOULDER
LOCATION SIMPLE: ABDOMEN
LOCATION SIMPLE: CHEST
LOCATION SIMPLE: RIGHT FOREARM
LOCATION SIMPLE: LEFT BREAST
LOCATION SIMPLE: RIGHT SHOULDER
LOCATION SIMPLE: UPPER BACK
LOCATION SIMPLE: RIGHT UPPER BACK
LOCATION SIMPLE: RIGHT THIGH
LOCATION SIMPLE: RIGHT BREAST

## 2023-05-17 ASSESSMENT — LOCATION ZONE DERM
LOCATION ZONE: ARM
LOCATION ZONE: LEG
LOCATION ZONE: TRUNK

## 2023-05-17 NOTE — PROCEDURE: LIQUID NITROGEN
Render Post-Care Instructions In Note?: no
Show Aperture Variable?: Yes
Consent: The patient's consent was obtained including but not limited to risks of crusting, scabbing, blistering, scarring, darker or lighter pigmentary change, recurrence, incomplete removal and infection.
Duration Of Freeze Thaw-Cycle (Seconds): 2
Post-Care Instructions: I reviewed with the patient in detail post-care instructions. Patient is to wear sunprotection, and avoid picking at any of the treated lesions. Pt may apply Vaseline to crusted or scabbing areas.
Detail Level: Simple
Number Of Freeze-Thaw Cycles: 2 freeze-thaw cycles
Medical Necessity Information: It is in your best interest to select a reason for this procedure from the list below. All of these items fulfill various CMS LCD requirements except the new and changing color options.
Medical Necessity Clause: This procedure was medically necessary because the lesions that were treated were: inflamed and itchy and very bothersome
Detail Level: Detailed
Spray Paint Text: The liquid nitrogen was applied to the skin utilizing a spray paint frosting technique.

## 2023-05-17 NOTE — PROCEDURE: MIPS QUALITY
Quality 110: Preventive Care And Screening: Influenza Immunization: Influenza Immunization Administered during Influenza season
Quality 226: Preventive Care And Screening: Tobacco Use: Screening And Cessation Intervention: Patient screened for tobacco use, is a smoker AND received Cessation Counseling within measurement period or in the six months prior to the measurement period
Detail Level: Detailed
Quality 111:Pneumonia Vaccination Status For Older Adults: Patient did not receive any pneumococcal conjugate or polysaccharide vaccine on or after their 60th birthday and before the end of the measurement period

## 2023-05-17 NOTE — HPI: FULL BODY SKIN EXAMINATION
How Severe Are Your Spot(S)?: mild
What Type Of Note Output Would You Prefer (Optional)?: Standard Output
What Is The Reason For Today's Visit?: Full Body Skin Examination
What Is The Reason For Today's Visit? (Being Monitored For X): concerning skin lesions on an annual basis
Additional History: Pt has not noticed any new changing moles. No tender bleeding spots.

## 2023-06-06 ENCOUNTER — TELEPHONE (OUTPATIENT)
Dept: NEUROLOGY | Facility: MEDICAL CENTER | Age: 73
End: 2023-06-06
Payer: MEDICARE

## 2023-09-05 ENCOUNTER — TELEPHONE (OUTPATIENT)
Dept: HEALTH INFORMATION MANAGEMENT | Facility: OTHER | Age: 73
End: 2023-09-05

## 2023-11-16 ENCOUNTER — APPOINTMENT (RX ONLY)
Dept: URBAN - METROPOLITAN AREA CLINIC 4 | Facility: CLINIC | Age: 73
Setting detail: DERMATOLOGY
End: 2023-11-16

## 2023-11-16 DIAGNOSIS — Z86.007 PERSONAL HISTORY OF IN-SITU NEOPLASM OF SKIN: ICD-10-CM

## 2023-11-16 DIAGNOSIS — Z71.89 OTHER SPECIFIED COUNSELING: ICD-10-CM

## 2023-11-16 DIAGNOSIS — Z85.828 PERSONAL HISTORY OF OTHER MALIGNANT NEOPLASM OF SKIN: ICD-10-CM

## 2023-11-16 DIAGNOSIS — D22 MELANOCYTIC NEVI: ICD-10-CM

## 2023-11-16 DIAGNOSIS — D18.0 HEMANGIOMA: ICD-10-CM

## 2023-11-16 DIAGNOSIS — L81.4 OTHER MELANIN HYPERPIGMENTATION: ICD-10-CM

## 2023-11-16 DIAGNOSIS — L30.9 DERMATITIS, UNSPECIFIED: ICD-10-CM

## 2023-11-16 DIAGNOSIS — L82.1 OTHER SEBORRHEIC KERATOSIS: ICD-10-CM

## 2023-11-16 PROBLEM — D22.5 MELANOCYTIC NEVI OF TRUNK: Status: ACTIVE | Noted: 2023-11-16

## 2023-11-16 PROBLEM — D18.01 HEMANGIOMA OF SKIN AND SUBCUTANEOUS TISSUE: Status: ACTIVE | Noted: 2023-11-16

## 2023-11-16 PROCEDURE — ? SUNSCREEN RECOMMENDATIONS

## 2023-11-16 PROCEDURE — 99213 OFFICE O/P EST LOW 20 MIN: CPT | Mod: 25

## 2023-11-16 PROCEDURE — 11103 TANGNTL BX SKIN EA SEP/ADDL: CPT

## 2023-11-16 PROCEDURE — ? COUNSELING

## 2023-11-16 PROCEDURE — 11102 TANGNTL BX SKIN SINGLE LES: CPT

## 2023-11-16 PROCEDURE — ? PRESCRIPTION

## 2023-11-16 PROCEDURE — ? BIOPSY BY SHAVE METHOD

## 2023-11-16 RX ORDER — TRIAMCINOLONE ACETONIDE 1 MG/G
CREAM TOPICAL
Qty: 80 | Refills: 3 | Status: ERX | COMMUNITY
Start: 2023-11-16

## 2023-11-16 RX ADMIN — TRIAMCINOLONE ACETONIDE 1: 1 CREAM TOPICAL at 00:00

## 2023-11-16 ASSESSMENT — LOCATION SIMPLE DESCRIPTION DERM
LOCATION SIMPLE: RIGHT PRETIBIAL REGION
LOCATION SIMPLE: LEFT THIGH
LOCATION SIMPLE: ABDOMEN
LOCATION SIMPLE: RIGHT SHOULDER
LOCATION SIMPLE: RIGHT UPPER BACK
LOCATION SIMPLE: LEFT BREAST
LOCATION SIMPLE: LEFT FOREARM
LOCATION SIMPLE: LOWER BACK
LOCATION SIMPLE: LEFT PRETIBIAL REGION
LOCATION SIMPLE: LEFT SHOULDER
LOCATION SIMPLE: RIGHT FOREARM
LOCATION SIMPLE: RIGHT THIGH

## 2023-11-16 ASSESSMENT — LOCATION DETAILED DESCRIPTION DERM
LOCATION DETAILED: RIGHT ANTERIOR PROXIMAL THIGH
LOCATION DETAILED: LEFT MEDIAL BREAST 10-11:00 REGION
LOCATION DETAILED: SUPERIOR LUMBAR SPINE
LOCATION DETAILED: LEFT ANTERIOR PROXIMAL THIGH
LOCATION DETAILED: RIGHT PROXIMAL DORSAL FOREARM
LOCATION DETAILED: LEFT POSTERIOR SHOULDER
LOCATION DETAILED: RIGHT POSTERIOR SHOULDER
LOCATION DETAILED: RIGHT MEDIAL UPPER BACK
LOCATION DETAILED: LEFT PROXIMAL DORSAL FOREARM
LOCATION DETAILED: EPIGASTRIC SKIN
LOCATION DETAILED: LEFT AXILLARY TAIL OF BREAST
LOCATION DETAILED: RIGHT INFERIOR MEDIAL UPPER BACK
LOCATION DETAILED: RIGHT PROXIMAL PRETIBIAL REGION
LOCATION DETAILED: RIGHT ANTERIOR DISTAL THIGH
LOCATION DETAILED: LEFT DISTAL PRETIBIAL REGION

## 2023-11-16 ASSESSMENT — LOCATION ZONE DERM
LOCATION ZONE: ARM
LOCATION ZONE: LEG
LOCATION ZONE: TRUNK

## 2023-11-16 NOTE — PROCEDURE: BIOPSY BY SHAVE METHOD
Detail Level: Detailed
Depth Of Biopsy: dermis
Was A Bandage Applied: Yes
Size Of Lesion In Cm: 0.7
X Size Of Lesion In Cm: 0
Biopsy Type: H and E
Biopsy Method: Personna blade
Anesthesia Type: 1% lidocaine without epinephrine
Anesthesia Volume In Cc: 2
Hemostasis: Aluminum Chloride and Electrocautery
Wound Care: Vaseline
Dressing: bandage
Destruction After The Procedure: No
Type Of Destruction Used: Curettage
Curettage Text: The wound bed was treated with curettage after the biopsy was performed.
Cryotherapy Text: The wound bed was treated with cryotherapy after the biopsy was performed.
Electrodesiccation Text: The wound bed was treated with electrodesiccation after the biopsy was performed.
Electrodesiccation And Curettage Text: The wound bed was treated with electrodesiccation and curettage after the biopsy was performed.
Silver Nitrate Text: The wound bed was treated with silver nitrate after the biopsy was performed.
Lab: [id number]
Lab Facility: 
Consent: Written consent was obtained and risks were reviewed including but not limited to scarring, infection, bleeding, scabbing, incomplete removal, nerve damage and allergy to anesthesia.
Post-Care Instructions: I reviewed with the patient in detail post-care instructions. Patient is to keep the biopsy site dry overnight, and then apply bacitracin twice daily until healed. Patient may apply hydrogen peroxide soaks to remove any crusting.
Notification Instructions: Patient will be notified of biopsy results. However, patient instructed to call the office if not contacted within 2 weeks.
Billing Type: Third-Party Bill
Information: Selecting Yes will display possible errors in your note based on the variables you have selected. This validation is only offered as a suggestion for you. PLEASE NOTE THAT THE VALIDATION TEXT WILL BE REMOVED WHEN YOU FINALIZE YOUR NOTE. IF YOU WANT TO FAX A PRELIMINARY NOTE YOU WILL NEED TO TOGGLE THIS TO 'NO' IF YOU DO NOT WANT IT IN YOUR FAXED NOTE.
Size Of Lesion In Cm: 0.5

## 2023-11-30 NOTE — BH THERAPY
Behavioral Health  Group Therapy Progress Note    Name: Marcia Koch  MRN: [id number]  Date: 4/25/2017    Attendance: Attended   Attendance Duration (min): Greater than 60  Number of Participants: 8     Program/Group: Intensive Outpatient Program  Topics Covered: Relapse Prevention  Participation: Active verbal participation  Affect/Mood Range: Normal range  Affect/Mood Display: Congruent w/content  Cognition: Alert, Oriented  Evidence of Imminent Suicide Risk: No  Evidence of imminent homicide risk: No  Therapeutic Interventions: Relapse prevention  Progress Toward Treatment Goal: Moderate improvement    Marcia reports having a great day, she has found a sponsor and is working on the first three steps of AA. Expressed excitement about attending aftercare.     Treatment Plan: practice mindfulness     Stefany Disla L.C.S.W.              F41.9

## 2023-12-21 ENCOUNTER — APPOINTMENT (RX ONLY)
Dept: URBAN - METROPOLITAN AREA CLINIC 4 | Facility: CLINIC | Age: 73
Setting detail: DERMATOLOGY
End: 2023-12-21

## 2023-12-21 DIAGNOSIS — L20.89 OTHER ATOPIC DERMATITIS: ICD-10-CM

## 2023-12-21 DIAGNOSIS — L85.3 XEROSIS CUTIS: ICD-10-CM

## 2023-12-21 PROCEDURE — ? COUNSELING: TOPICAL STEROIDS

## 2023-12-21 PROCEDURE — ? DIAGNOSIS COMMENT

## 2023-12-21 PROCEDURE — ? TREATMENT REGIMEN

## 2023-12-21 PROCEDURE — 99213 OFFICE O/P EST LOW 20 MIN: CPT

## 2023-12-21 PROCEDURE — ? COUNSELING

## 2023-12-21 ASSESSMENT — LOCATION SIMPLE DESCRIPTION DERM
LOCATION SIMPLE: RIGHT THIGH
LOCATION SIMPLE: LEFT THIGH
LOCATION SIMPLE: ABDOMEN
LOCATION SIMPLE: UPPER BACK
LOCATION SIMPLE: RIGHT LOWER BACK

## 2023-12-21 ASSESSMENT — LOCATION ZONE DERM
LOCATION ZONE: LEG
LOCATION ZONE: TRUNK

## 2023-12-21 ASSESSMENT — LOCATION DETAILED DESCRIPTION DERM
LOCATION DETAILED: PERIUMBILICAL SKIN
LOCATION DETAILED: LEFT ANTERIOR PROXIMAL THIGH
LOCATION DETAILED: SUPERIOR THORACIC SPINE
LOCATION DETAILED: RIGHT SUPERIOR MEDIAL LOWER BACK
LOCATION DETAILED: RIGHT ANTERIOR PROXIMAL THIGH

## 2023-12-21 ASSESSMENT — BSA RASH: BSA RASH: 76

## 2023-12-21 ASSESSMENT — ITCH NUMERIC RATING SCALE: HOW SEVERE IS YOUR ITCHING?: 5

## 2023-12-21 NOTE — PROCEDURE: DIAGNOSIS COMMENT
Detail Level: Generalized
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
Render Risk Assessment In Note?: no

## 2023-12-21 NOTE — PROCEDURE: TREATMENT REGIMEN
Continue Regimen: The patient is encouraged to continue to apply the TAC as directed
Samples Given: Provided samples of Cetaphil Cream as well as coupons
Detail Level: Zone
Otc Regimen: The patient is instructed to moisturize heavily with creams such as Cerave or Cetaphil cream.
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
## 2024-04-24 ENCOUNTER — APPOINTMENT (OUTPATIENT)
Dept: RADIOLOGY | Facility: MEDICAL CENTER | Age: 74
End: 2024-04-24
Attending: STUDENT IN AN ORGANIZED HEALTH CARE EDUCATION/TRAINING PROGRAM
Payer: OTHER MISCELLANEOUS

## 2024-04-24 ENCOUNTER — HOSPITAL ENCOUNTER (EMERGENCY)
Facility: MEDICAL CENTER | Age: 74
End: 2024-04-24
Attending: STUDENT IN AN ORGANIZED HEALTH CARE EDUCATION/TRAINING PROGRAM
Payer: OTHER MISCELLANEOUS

## 2024-04-24 VITALS
WEIGHT: 132 LBS | HEART RATE: 81 BPM | TEMPERATURE: 97.5 F | DIASTOLIC BLOOD PRESSURE: 95 MMHG | SYSTOLIC BLOOD PRESSURE: 177 MMHG | RESPIRATION RATE: 17 BRPM | OXYGEN SATURATION: 95 % | BODY MASS INDEX: 24.29 KG/M2

## 2024-04-24 DIAGNOSIS — S09.90XA CLOSED HEAD INJURY, INITIAL ENCOUNTER: ICD-10-CM

## 2024-04-24 DIAGNOSIS — S80.212A ABRASION OF LEFT KNEE, INITIAL ENCOUNTER: ICD-10-CM

## 2024-04-24 DIAGNOSIS — S93.409A SPRAIN OF ANKLE, UNSPECIFIED LATERALITY, UNSPECIFIED LIGAMENT, INITIAL ENCOUNTER: ICD-10-CM

## 2024-04-24 DIAGNOSIS — S86.912A KNEE STRAIN, LEFT, INITIAL ENCOUNTER: ICD-10-CM
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
## 2024-04-24 PROCEDURE — 73600 X-RAY EXAM OF ANKLE: CPT | Mod: RT

## 2024-04-24 PROCEDURE — 73600 X-RAY EXAM OF ANKLE: CPT | Mod: LT

## 2024-04-24 PROCEDURE — 73562 X-RAY EXAM OF KNEE 3: CPT | Mod: LT

## 2024-04-24 PROCEDURE — 700102 HCHG RX REV CODE 250 W/ 637 OVERRIDE(OP): Performed by: STUDENT IN AN ORGANIZED HEALTH CARE EDUCATION/TRAINING PROGRAM

## 2024-04-24 PROCEDURE — A9270 NON-COVERED ITEM OR SERVICE: HCPCS | Performed by: STUDENT IN AN ORGANIZED HEALTH CARE EDUCATION/TRAINING PROGRAM

## 2024-04-24 PROCEDURE — 70450 CT HEAD/BRAIN W/O DYE: CPT

## 2024-04-24 PROCEDURE — 99284 EMERGENCY DEPT VISIT MOD MDM: CPT

## 2024-04-24 RX ORDER — ACETAMINOPHEN 500 MG
1000 TABLET ORAL ONCE
Status: COMPLETED | OUTPATIENT
Start: 2024-04-24 | End: 2024-04-24

## 2024-04-24 RX ADMIN — ACETAMINOPHEN 1000 MG: 500 TABLET, FILM COATED ORAL at 19:36

## 2024-04-24 ASSESSMENT — FIBROSIS 4 INDEX: FIB4 SCORE: 1.51

## 2024-04-25 NOTE — ED NOTES
Pt discharged to home. Discharge paperwork provided. Education provided by ERP. Reinforced discharge instructions.  Pt was given follow up instructions Pt verbalized understanding of all instructions for discharge.   Patient went out of the ER ambulatory with steady gait., alert and oriented x 4, with all belongings.

## 2024-04-25 NOTE — ED NOTES
Bedside report received from off going RN/tech: Suzie, assumed care of patient.  POC discussed with patient. Call light within reach, all needs addressed at this time.       Fall risk interventions in place: Not Applicable (all applicable per [place] Fall risk assessment)   Continuous monitoring: Pulse Ox or Blood Pressure  IVF/IV medications: Not Applicable   Oxygen: Room Air  Bedside sitter: Not Applicable   Isolation: Not Applicable    BP (!) 150/89   Pulse 94   Temp 36.6 °C (97.9 °F) (Temporal)   Resp 14   Wt 59.9 kg (132 lb)   SpO2 95%  - RA
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
## 2024-04-30 ENCOUNTER — TELEPHONE (OUTPATIENT)
Dept: HEALTH INFORMATION MANAGEMENT | Facility: OTHER | Age: 74
End: 2024-04-30

## 2024-05-22 ENCOUNTER — HOSPITAL ENCOUNTER (EMERGENCY)
Facility: MEDICAL CENTER | Age: 74
End: 2024-05-22
Payer: MEDICARE

## 2024-05-22 VITALS
DIASTOLIC BLOOD PRESSURE: 85 MMHG | HEIGHT: 61 IN | TEMPERATURE: 98.8 F | RESPIRATION RATE: 16 BRPM | OXYGEN SATURATION: 91 % | WEIGHT: 127.21 LBS | HEART RATE: 87 BPM | SYSTOLIC BLOOD PRESSURE: 145 MMHG | BODY MASS INDEX: 24.02 KG/M2

## 2024-05-22 ASSESSMENT — FIBROSIS 4 INDEX: FIB4 SCORE: 1.51

## 2024-05-23 NOTE — ED TRIAGE NOTES
Pt  comes in c/o panic attack that started today   Hx of same   pt states she does not have medication of her panic attacks  here for evaluation

## 2024-05-27 ENCOUNTER — HOSPITAL ENCOUNTER (EMERGENCY)
Facility: MEDICAL CENTER | Age: 74
End: 2024-05-28
Attending: EMERGENCY MEDICINE
Payer: MEDICARE

## 2024-05-27 ENCOUNTER — HOSPITAL ENCOUNTER (EMERGENCY)
Facility: MEDICAL CENTER | Age: 74
End: 2024-05-27
Attending: EMERGENCY MEDICINE
Payer: MEDICARE

## 2024-05-27 VITALS
OXYGEN SATURATION: 95 % | TEMPERATURE: 97.5 F | HEART RATE: 89 BPM | SYSTOLIC BLOOD PRESSURE: 118 MMHG | BODY MASS INDEX: 21.99 KG/M2 | RESPIRATION RATE: 16 BRPM | HEIGHT: 63 IN | WEIGHT: 124.12 LBS | DIASTOLIC BLOOD PRESSURE: 70 MMHG

## 2024-05-27 DIAGNOSIS — R00.2 PALPITATIONS: ICD-10-CM

## 2024-05-27 DIAGNOSIS — F41.1 ANXIETY REACTION: ICD-10-CM

## 2024-05-27 DIAGNOSIS — F41.9 ANXIETY: ICD-10-CM

## 2024-05-27 DIAGNOSIS — E86.0 DEHYDRATION: ICD-10-CM

## 2024-05-27 DIAGNOSIS — F41.0 PANIC ATTACK: ICD-10-CM

## 2024-05-27 DIAGNOSIS — R79.89 ELEVATED LACTIC ACID LEVEL: ICD-10-CM

## 2024-05-27 LAB
ALBUMIN SERPL BCP-MCNC: 3.7 G/DL (ref 3.2–4.9)
ALBUMIN/GLOB SERPL: 1.4 G/DL
ALP SERPL-CCNC: 132 U/L (ref 30–99)
ALT SERPL-CCNC: 15 U/L (ref 2–50)
ANION GAP SERPL CALC-SCNC: 20 MMOL/L (ref 7–16)
AST SERPL-CCNC: 23 U/L (ref 12–45)
BASOPHILS # BLD AUTO: 1.1 % (ref 0–1.8)
BASOPHILS # BLD: 0.1 K/UL (ref 0–0.12)
BILIRUB SERPL-MCNC: 0.5 MG/DL (ref 0.1–1.5)
BUN SERPL-MCNC: 11 MG/DL (ref 8–22)
CALCIUM ALBUM COR SERPL-MCNC: 8.8 MG/DL (ref 8.5–10.5)
CALCIUM SERPL-MCNC: 8.6 MG/DL (ref 8.4–10.2)
CHLORIDE SERPL-SCNC: 96 MMOL/L (ref 96–112)
CO2 SERPL-SCNC: 16 MMOL/L (ref 20–33)
CREAT SERPL-MCNC: 0.55 MG/DL (ref 0.5–1.4)
EKG IMPRESSION: NORMAL
EOSINOPHIL # BLD AUTO: 0.01 K/UL (ref 0–0.51)
EOSINOPHIL NFR BLD: 0.1 % (ref 0–6.9)
ERYTHROCYTE [DISTWIDTH] IN BLOOD BY AUTOMATED COUNT: 53.1 FL (ref 35.9–50)
ETHANOL BLD-MCNC: 124.6 MG/DL
GFR SERPLBLD CREATININE-BSD FMLA CKD-EPI: 97 ML/MIN/1.73 M 2
GLOBULIN SER CALC-MCNC: 2.7 G/DL (ref 1.9–3.5)
GLUCOSE SERPL-MCNC: 93 MG/DL (ref 65–99)
HCT VFR BLD AUTO: 42.6 % (ref 37–47)
HGB BLD-MCNC: 15 G/DL (ref 12–16)
IMM GRANULOCYTES # BLD AUTO: 0.02 K/UL (ref 0–0.11)
IMM GRANULOCYTES NFR BLD AUTO: 0.2 % (ref 0–0.9)
LACTATE SERPL-SCNC: 3 MMOL/L (ref 0.5–2)
LYMPHOCYTES # BLD AUTO: 2.53 K/UL (ref 1–4.8)
LYMPHOCYTES NFR BLD: 27.6 % (ref 22–41)
MCH RBC QN AUTO: 35.6 PG (ref 27–33)
MCHC RBC AUTO-ENTMCNC: 35.2 G/DL (ref 32.2–35.5)
MCV RBC AUTO: 101.2 FL (ref 81.4–97.8)
MONOCYTES # BLD AUTO: 0.67 K/UL (ref 0–0.85)
MONOCYTES NFR BLD AUTO: 7.3 % (ref 0–13.4)
NEUTROPHILS # BLD AUTO: 5.85 K/UL (ref 1.82–7.42)
NEUTROPHILS NFR BLD: 63.7 % (ref 44–72)
NRBC # BLD AUTO: 0 K/UL
NRBC BLD-RTO: 0 /100 WBC (ref 0–0.2)
PLATELET # BLD AUTO: 245 K/UL (ref 164–446)
PMV BLD AUTO: 9.3 FL (ref 9–12.9)
POTASSIUM SERPL-SCNC: 4.3 MMOL/L (ref 3.6–5.5)
PROT SERPL-MCNC: 6.4 G/DL (ref 6–8.2)
RBC # BLD AUTO: 4.21 M/UL (ref 4.2–5.4)
SODIUM SERPL-SCNC: 132 MMOL/L (ref 135–145)
TROPONIN T SERPL-MCNC: 13 NG/L (ref 6–19)
TSH SERPL DL<=0.005 MIU/L-ACNC: 3.81 UIU/ML (ref 0.38–5.33)
WBC # BLD AUTO: 9.2 K/UL (ref 4.8–10.8)

## 2024-05-27 PROCEDURE — 83605 ASSAY OF LACTIC ACID: CPT

## 2024-05-27 PROCEDURE — 93005 ELECTROCARDIOGRAM TRACING: CPT | Performed by: EMERGENCY MEDICINE

## 2024-05-27 PROCEDURE — A9270 NON-COVERED ITEM OR SERVICE: HCPCS | Performed by: EMERGENCY MEDICINE

## 2024-05-27 PROCEDURE — 85025 COMPLETE CBC W/AUTO DIFF WBC: CPT

## 2024-05-27 PROCEDURE — 84484 ASSAY OF TROPONIN QUANT: CPT

## 2024-05-27 PROCEDURE — 80053 COMPREHEN METABOLIC PANEL: CPT

## 2024-05-27 PROCEDURE — 99284 EMERGENCY DEPT VISIT MOD MDM: CPT

## 2024-05-27 PROCEDURE — 700105 HCHG RX REV CODE 258: Performed by: EMERGENCY MEDICINE

## 2024-05-27 PROCEDURE — 700102 HCHG RX REV CODE 250 W/ 637 OVERRIDE(OP): Performed by: EMERGENCY MEDICINE

## 2024-05-27 PROCEDURE — 36415 COLL VENOUS BLD VENIPUNCTURE: CPT

## 2024-05-27 PROCEDURE — 99285 EMERGENCY DEPT VISIT HI MDM: CPT

## 2024-05-27 PROCEDURE — 84443 ASSAY THYROID STIM HORMONE: CPT

## 2024-05-27 PROCEDURE — 82077 ASSAY SPEC XCP UR&BREATH IA: CPT

## 2024-05-27 RX ORDER — SODIUM CHLORIDE, SODIUM LACTATE, POTASSIUM CHLORIDE, CALCIUM CHLORIDE 600; 310; 30; 20 MG/100ML; MG/100ML; MG/100ML; MG/100ML
1000 INJECTION, SOLUTION INTRAVENOUS ONCE
Status: COMPLETED | OUTPATIENT
Start: 2024-05-27 | End: 2024-05-28

## 2024-05-27 RX ORDER — ALPRAZOLAM 0.25 MG/1
0.25 TABLET ORAL
Qty: 3 TABLET | Refills: 0 | Status: SHIPPED | OUTPATIENT
Start: 2024-05-27 | End: 2024-05-27

## 2024-05-27 RX ORDER — DIAZEPAM 5 MG/1
5 TABLET ORAL ONCE
Status: COMPLETED | OUTPATIENT
Start: 2024-05-27 | End: 2024-05-27

## 2024-05-27 RX ORDER — ALPRAZOLAM 0.25 MG/1
0.25 TABLET ORAL
Qty: 3 TABLET | Refills: 0 | Status: SHIPPED | OUTPATIENT
Start: 2024-05-27 | End: 2024-05-28

## 2024-05-27 RX ORDER — ALPRAZOLAM 0.25 MG/1
0.25 TABLET ORAL ONCE
Status: COMPLETED | OUTPATIENT
Start: 2024-05-27 | End: 2024-05-27

## 2024-05-27 RX ADMIN — DIAZEPAM 5 MG: 5 TABLET ORAL at 23:07

## 2024-05-27 RX ADMIN — SODIUM CHLORIDE, POTASSIUM CHLORIDE, SODIUM LACTATE AND CALCIUM CHLORIDE 1000 ML: 600; 310; 30; 20 INJECTION, SOLUTION INTRAVENOUS at 23:45

## 2024-05-27 RX ADMIN — ALPRAZOLAM 0.25 MG: 0.25 TABLET ORAL at 17:10

## 2024-05-27 ASSESSMENT — FIBROSIS 4 INDEX
FIB4 SCORE: 1.51
FIB4 SCORE: 1.51

## 2024-05-27 NOTE — ED PROVIDER NOTES
"ED Provider Note    CHIEF COMPLAINT  Chief Complaint   Patient presents with    Anxiety     Pt recently had first anxiety attack a couple weeks ago. Came here and got some medication and discharged on propranolol, took one today approx 1400 without relief.        EXTERNAL RECORDS REVIEWED  Outpatient Notes patient has a history of anxiety and alcohol abuse    HPI/ROS  LIMITATION TO HISTORY   Select: : None  OUTSIDE HISTORIAN(S):  josey Rojaspatrick Koch is a 73 y.o. female who presents to the emergency department with chief complaint of anxiety.  Patient states she follows up with a psychiatrist and she has been taking propranolol as needed for anxiety.  She was seen here few days ago for anxiety and states that they gave her something to help.  She states today the anxiety is just \"too terrible\".  She states that she does not want herself but she just feels like she cannot calm down her thoughts of running and she is feeling extremely anxious.  She is not having any pain or shortness of breath she just cannot relax.  She denies feelings of alcohol withdrawal    PAST MEDICAL HISTORY   has a past medical history of Alcohol abuse, Alcoholism (HCC), Anxiety, Depression, Hiatus hernia syndrome, High cholesterol, Hypertension, Indigestion, Other specified disorder of intestines, Pneumonia, Psychiatric disorder, Sleep apnea, Snoring, Unspecified urinary incontinence, and Urinary bladder disorder.    SURGICAL HISTORY   has a past surgical history that includes bladder sling female (2011); teddy by laparoscopy; other abdominal surgery (); other (); other (); other (); gyn surgery (); and abdominal hysterectomy total.    FAMILY HISTORY  Family History   Problem Relation Age of Onset    Anxiety disorder Brother     Depression Brother     Other Brother          AIDS    Alcohol abuse Mother     Cancer Mother         colon cancer age 70s    Alcohol abuse Father     Hyperlipidemia Father     Other " "Father         TIA, macular degenration    Cancer Sister         breast    Drug abuse Sister     Cancer Maternal Aunt         breast    Cancer Maternal Uncle         nos    Stroke Neg Hx     Heart Attack Neg Hx        SOCIAL HISTORY  Social History     Tobacco Use    Smoking status: Every Day     Current packs/day: 0.00     Average packs/day: 0.5 packs/day for 25.0 years (12.5 ttl pk-yrs)     Types: Cigarettes     Start date: 1986     Last attempt to quit: 2011     Years since quittin.7    Smokeless tobacco: Never   Vaping Use    Vaping status: Never Used   Substance and Sexual Activity    Alcohol use: Yes     Comment: 2-3 shots of vodka daily    Drug use: No    Sexual activity: Not Currently     Partners: Male     Birth control/protection: Abstinence       CURRENT MEDICATIONS  Home Medications       Reviewed by William Lcuia R.N. (Registered Nurse) on 24 at 1558  Med List Status: Not Addressed     Medication Last Dose Status   buPROPion (WELLBUTRIN XL) 150 MG XL tablet  Active   folic acid (FOLVITE) 1 MG Tab  Active   gabapentin (NEURONTIN) 600 MG tablet  Active   mirtazapine (REMERON) 15 MG TABLET DISPERSIBLE  Active   naproxen (ANAPROX) 220 MG tablet  Active   omeprazole (PRILOSEC) 20 MG delayed-release capsule  Active   pravastatin (PRAVACHOL) 40 MG tablet  Active   propranolol (INDERAL) 80 MG Tab  Active   QUEtiapine (SEROQUEL) 200 MG Tab  Active   valsartan (DIOVAN) 40 MG Tab  Active   zolpidem (AMBIEN) 5 MG Tab  Active                    ALLERGIES  Allergies   Allergen Reactions    Atorvastatin Myalgia    Codeine Nausea    Lisinopril Cough     Cough      Morphine Nausea    Rosuvastatin Myalgia       PHYSICAL EXAM  VITAL SIGNS: /77   Pulse 100   Temp 36.1 °C (97 °F) (Temporal)   Resp 18   Ht 1.6 m (5' 3\")   Wt 56.3 kg (124 lb 1.9 oz)   SpO2 94%   Breastfeeding No   BMI 21.99 kg/m²    Pulse OX: Pulse Oxygen level is within normal limits  Constitutional: Alert in mild " distress anxious apparent  HENT: Normocephalic, Atraumatic, MMM no tongue fasciculations   Eyes: PERound. Conjunctiva normal, non-icteric.   Heart: Regular rate and rhythm, intact distal pulses   Lungs: Symmetrical movement, no resp distress   Abdomen: Non-tender, non-distended, normal bowel sounds  EXT/Back no hand tremors  Skin: Warm, Dry, No erythema, No rash.   Neurologic: Alert and oriented, Grossly non-focal.   Psych: patient anxious not responding to internal stimuli no SI no HI      COURSE & MEDICAL DECISION MAKING    ASSESSMENT, COURSE AND PLAN  Care Narrative:     Patient has a history of alcohol dependence and anxiety usually takes propranolol to take it at home today without relief of symptoms.  She began a very low-dose 0.25 mg Xanax.  She is little hypertensive on arrival and not clearly tachycardic this could be a mild component of alcohol withdrawal but she does not have any signs of shakes and she states she is not going through withdrawal at this time.  Will reassess her after the medication.      DISPOSITION AND DISCUSSIONS  6:08 PM  I reassessed patient at the bedside.  She is feeling better after the Xanax states she is feeling almost significantly improved.  She was sent home with 3 tablets to last the next few days until she can follow-up with her psychiatrist for further medication modification.  She does not meet criteria for legal hold at this time.  She understands that she feels comfortable going home and return to the emergency department with any new or worsening issues.    I have discussed management of the patient with the following physicians and AMADOR's:  none    Discussion of management with other QHP or appropriate source(s): None     Escalation of care considered, and ultimately not performed:Laboratory analysis    Barriers to care at this time, including but not limited to:  na .     Decision tools and prescription drugs considered including, but not limited to:  3 tablets of  xanax .    The patient will return for new or worsening symptoms and is stable at the time of discharge.    The patient is referred to a primary physician for blood pressure management, diabetic screening, and for all other preventative health concerns.    DISPOSITION:  Patient will be discharged home in stable condition.    FOLLOW UP:  your psychiatrist    Call       Sharmin Mcdermott P.A.-C.  [address] [address]  [place] [id number] [phone]    Schedule an appointment as soon as possible for a visit       [facility], Emergency Dept  [address]  [place] [id number] [phone]    If symptoms worsen      OUTPATIENT MEDICATIONS:  Discharge Medication List as of 5/27/2024  6:13 PM            FINAL DIAGNOSIS  1. Anxiety    2. Panic attack           Electronically signed by: Elenita Moya M.D., 5/27/2024 4:46 PM

## 2024-05-27 NOTE — ED TRIAGE NOTES
"BIB ems for following complaints.     Chief Complaint   Patient presents with    Anxiety     Pt recently had first anxiety attack a couple weeks ago. Came here and got some medication and discharged on propranolol, took one today approx 1400 without relief.      /77   Pulse 100   Temp 36.1 °C (97 °F) (Temporal)   Resp 18   Ht 1.6 m (5' 3\")   Wt 56.3 kg (124 lb 1.9 oz)   SpO2 94%   Breastfeeding No   BMI 21.99 kg/m²     "

## 2024-05-28 VITALS
OXYGEN SATURATION: 99 % | HEART RATE: 80 BPM | WEIGHT: 124 LBS | DIASTOLIC BLOOD PRESSURE: 65 MMHG | RESPIRATION RATE: 18 BRPM | SYSTOLIC BLOOD PRESSURE: 140 MMHG | TEMPERATURE: 98.2 F | BODY MASS INDEX: 21.97 KG/M2

## 2024-05-28 RX ORDER — DIAZEPAM 5 MG/1
5 TABLET ORAL EVERY 6 HOURS PRN
Qty: 12 TABLET | Refills: 0 | Status: SHIPPED | OUTPATIENT
Start: 2024-05-28 | End: 2024-06-02

## 2024-05-28 RX ORDER — DIAZEPAM 5 MG/1
5 TABLET ORAL EVERY 6 HOURS PRN
Qty: 12 TABLET | Refills: 0 | Status: SHIPPED | OUTPATIENT
Start: 2024-05-28 | End: 2024-05-28

## 2024-05-28 NOTE — ED NOTES
Patient reports feeling claustrophobic. Blinds opened for her. She is offered reading material, puzzle, or music to distract her but she declined. Reports blinds being opened is helpful.

## 2024-05-28 NOTE — ED PROVIDER NOTES
ED Provider Note    CHIEF COMPLAINT  Chief Complaint   Patient presents with    Anxiety     PT who was treated at this ER earlier tonight for anxiety presents d/t anxiety.        EXTERNAL RECORDS REVIEWED  Inpatient Notes reviewed discharge summary dated September 3, 2022 by Dr. Payan.  Patient admitted with anxiety and tremors and insomnia.  At this point history of alcohol use, 3 drinks per day.  Patient admitted on August 30.  Found to be agitated, tremulous, hypertensive.  Started on CIWA protocol for alcohol withdrawal.  Patient left AGAINST MEDICAL ADVICE September 2.    In addition reviewed emergency department note dated today by Dr. Moya.  Patient seen for anxiety and racing thoughts.  Takes propranolol for anxiety and notes no relief.  Tachycardic but no other evidence of alcohol withdrawal.  Patient treated with Xanax and improved.  Able to be discharged.  Written for Xanax, plan to follow-up with established psychiatrist.    HPI/ROS  LIMITATION TO HISTORY   Select: : None  OUTSIDE HISTORIAN(S):  EMS patient seen earlier for anxiety at this facility    Marcia Koch is a 73 y.o. female who presents for evaluation of feeling anxious.  Patient relates symptoms for the last 3 days.  She does note previous history of anxiety but relates she does not recall really being this bad in the recent past.  She can again no particular stressors.  Chart review demonstrates patient takes Inderal, Seroquel, and Remeron.  She relates no recent medication changes.  She describes her symptoms as they were unable to calm down, sense of racing thoughts, as well as sensation of racing heart rate.  No chest pain, no dyspnea.  No recent febrile illness, no nausea, no vomiting.  Patient was treated with alprazolam 0.25 mg earlier today without improvement.  Given persistence of her anxiety and sensation of racing heart rate she came to be reassessed.    PAST MEDICAL HISTORY   has a past medical history of Alcohol abuse,  Alcoholism (HCC), Anxiety, Depression, Hiatus hernia syndrome, High cholesterol, Hypertension, Indigestion, Other specified disorder of intestines, Pneumonia, Psychiatric disorder, Sleep apnea, Snoring, Unspecified urinary incontinence, and Urinary bladder disorder.    SURGICAL HISTORY   has a past surgical history that includes bladder sling female (2011); teddy by laparoscopy; other abdominal surgery (); other (); other (); other (); gyn surgery (); and abdominal hysterectomy total.    FAMILY HISTORY  Family History   Problem Relation Age of Onset    Anxiety disorder Brother     Depression Brother     Other Brother          AIDS    Alcohol abuse Mother     Cancer Mother         colon cancer age 70s    Alcohol abuse Father     Hyperlipidemia Father     Other Father         TIA, macular degenration    Cancer Sister         breast    Drug abuse Sister     Cancer Maternal Aunt         breast    Cancer Maternal Uncle         nos    Stroke Neg Hx     Heart Attack Neg Hx        SOCIAL HISTORY  Social History     Tobacco Use    Smoking status: Every Day     Current packs/day: 0.00     Average packs/day: 0.5 packs/day for 25.0 years (12.5 ttl pk-yrs)     Types: Cigarettes     Start date: 1986     Last attempt to quit: 2011     Years since quittin.7    Smokeless tobacco: Never   Vaping Use    Vaping status: Never Used   Substance and Sexual Activity    Alcohol use: Yes     Comment: 2-3 shots of vodka daily    Drug use: No    Sexual activity: Not Currently     Partners: Male     Birth control/protection: Abstinence       CURRENT MEDICATIONS  Home Medications       Reviewed by Jorge Gregorio R.N. (Registered Nurse) on 24 at 2246  Med List Status: Not Addressed     Medication Last Dose Status   ALPRAZolam (XANAX) 0.25 MG Tab  Active   buPROPion (WELLBUTRIN XL) 150 MG XL tablet  Active   folic acid (FOLVITE) 1 MG Tab  Active   gabapentin (NEURONTIN) 600 MG tablet  Active    mirtazapine (REMERON) 15 MG TABLET DISPERSIBLE  Active   naproxen (ANAPROX) 220 MG tablet  Active   omeprazole (PRILOSEC) 20 MG delayed-release capsule  Active   pravastatin (PRAVACHOL) 40 MG tablet  Active   propranolol (INDERAL) 80 MG Tab  Active   QUEtiapine (SEROQUEL) 200 MG Tab  Active   valsartan (DIOVAN) 40 MG Tab  Active   zolpidem (AMBIEN) 5 MG Tab  Active                    ALLERGIES  Allergies   Allergen Reactions    Atorvastatin Myalgia    Codeine Nausea    Lisinopril Cough     Cough      Morphine Nausea    Rosuvastatin Myalgia       PHYSICAL EXAM  VITAL SIGNS: BP (!) 140/65   Pulse 80   Temp 36.8 °C (98.2 °F) (Temporal)   Resp 18   Wt 56.2 kg (124 lb)   SpO2 99%   BMI 21.97 kg/m²    General: Alert, no acute distress, appears anxious  Skin: Warm, dry, normal for ethnicity  Head: Normocephalic, atraumatic  Neck: Trachea midline, no tenderness  Eye: PERRL, normal conjunctiva  ENMT: Oral mucosa moist, no pharyngeal erythema or exudate  Cardiovascular: Regular rate and rhythm, No murmur, Normal peripheral perfusion  Respiratory: Lungs CTA, respirations are non-labored, breath sounds are equal  Musculoskeletal: No swelling, no deformity  Neurological: Alert and oriented to person, place, time, and situation  Lymphatics: No lymphadenopathy  Psychiatric: Cooperative, anxious with mildly pressured speech, mood congruent affect.    EKG/LABS  Results for orders placed or performed during the hospital encounter of 05/27/24   CBC w/ Differential   Result Value Ref Range    WBC 9.2 4.8 - 10.8 K/uL    RBC 4.21 4.20 - 5.40 M/uL    Hemoglobin 15.0 12.0 - 16.0 g/dL    Hematocrit 42.6 37.0 - 47.0 %    .2 (H) 81.4 - 97.8 fL    MCH 35.6 (H) 27.0 - 33.0 pg    MCHC 35.2 32.2 - 35.5 g/dL    RDW 53.1 (H) 35.9 - 50.0 fL    Platelet Count 245 164 - 446 K/uL    MPV 9.3 9.0 - 12.9 fL    Neutrophils-Polys 63.70 44.00 - 72.00 %    Lymphocytes 27.60 22.00 - 41.00 %    Monocytes 7.30 0.00 - 13.40 %    Eosinophils 0.10  0.00 - 6.90 %    Basophils 1.10 0.00 - 1.80 %    Immature Granulocytes 0.20 0.00 - 0.90 %    Nucleated RBC 0.00 0.00 - 0.20 /100 WBC    Neutrophils (Absolute) 5.85 1.82 - 7.42 K/uL    Lymphs (Absolute) 2.53 1.00 - 4.80 K/uL    Monos (Absolute) 0.67 0.00 - 0.85 K/uL    Eos (Absolute) 0.01 0.00 - 0.51 K/uL    Baso (Absolute) 0.10 0.00 - 0.12 K/uL    Immature Granulocytes (abs) 0.02 0.00 - 0.11 K/uL    NRBC (Absolute) 0.00 K/uL   Complete Metabolic Panel (CMP)   Result Value Ref Range    Sodium 132 (L) 135 - 145 mmol/L    Potassium 4.3 3.6 - 5.5 mmol/L    Chloride 96 96 - 112 mmol/L    Co2 16 (L) 20 - 33 mmol/L    Anion Gap 20.0 (H) 7.0 - 16.0    Glucose 93 65 - 99 mg/dL    Bun 11 8 - 22 mg/dL    Creatinine 0.55 0.50 - 1.40 mg/dL    Calcium 8.6 8.4 - 10.2 mg/dL    Correct Calcium 8.8 8.5 - 10.5 mg/dL    AST(SGOT) 23 12 - 45 U/L    ALT(SGPT) 15 2 - 50 U/L    Alkaline Phosphatase 132 (H) 30 - 99 U/L    Total Bilirubin 0.5 0.1 - 1.5 mg/dL    Albumin 3.7 3.2 - 4.9 g/dL    Total Protein 6.4 6.0 - 8.2 g/dL    Globulin 2.7 1.9 - 3.5 g/dL    A-G Ratio 1.4 g/dL   Troponin - STAT Once   Result Value Ref Range    Troponin T 13 6 - 19 ng/L   TSH (for screening thyroid dysfunction)   Result Value Ref Range    TSH 3.810 0.380 - 5.330 uIU/mL   LACTIC ACID   Result Value Ref Range    Lactic Acid 3.0 (H) 0.5 - 2.0 mmol/L   ETHYL ALCOHOL (BLOOD)   Result Value Ref Range    Diagnostic Alcohol 124.6 (H) <10.1 mg/dL   ESTIMATED GFR   Result Value Ref Range    GFR (CKD-EPI) 97 >60 mL/min/1.73 m 2   EKG   Result Value Ref Range    Report       [facility] Emergency Dept.    Test Date:  2024  Pt Name:    VIDHI ROSALES               Department: EDS  MRN:        [id number]                      [facility]  Gender:     Female                       Technician: ERASTO  :        1950                   Requested By:SANDEEP CARLOS  Order #:    [id number]                    Reading MD: SANDEEP DE LA CRUZ  MD BREANNA    Measurements  Intervals                                Axis  Rate:       87                           P:          81  NM:         58                           QRS:        83  QRSD:       86                           T:          66  QT:         383  QTc:        461    Interpretive Statements  Sinus rhythm  Atrial premature complex  Short NM interval  Borderline right axis deviation  Compared to ECG 09/06/2022 12:28:47  Atrial premature complex(es) now present  Short NM interval now present  Atrial fibrillation no longer present  Electronically Signed On 05- 23:13:14 P DT by SANDEEP CARLOS MD        I have independently interpreted this EKG          COURSE & MEDICAL DECISION MAKING    ASSESSMENT, COURSE AND PLAN  Care Narrative: This patient is a very pleasant 73-year-old female who presents for evaluation of anxiety getting worse for the last several days described as racing thoughts and palpitations, thankfully she notes no difficulty with sleep.  Chart review demonstrates patient does have history of alcohol use and was admitted for potential alcohol withdrawal in 2022.  Indeed alcohol level is mildly elevated today.  She notes poor fluid intake and does appear to be volume depleted, she has elevated lactic acid (without evidence of sepsis/infectious process ) and anion gap of 20 noted all consistent with volume depletion.  Suspect likely alcoholic ketosis as well given her low serum bicarbonate.  Clear indication for IV fluids.  Reassuringly EKG does not demonstrate dysrhythmia, troponin well within normal limits; this is not consistent with acute coronary syndrome.      Patient doing much better after IV fluids and single dose of diazepam.  She has not filled the Xanax and given to quite well with diazepam I will cancel the Xanax prescription.  Otherwise given feeling better and well in appearance with workup consistent with significant volume depletion. Thankfully no evidence of  endorgan damage nor any exam evidence of delirium tremens nor inability to tolerate oral intake.  There is no indication for inpatient management at this time.    Chest Pain: Heart score of 3, low risk stratification, doubt ACS  ED OBS: Yes; I am placing the patient in to an observation status due to a diagnostic uncertainty as well as therapeutic intensity. Patient placed in observation status at 10:48 PM, 5/27/2024.     Observation plan is as follows: Metabolic/toxicologic workup will be obtained as well as EKG.  Differential diagnosis at this point includes but is not restricted to anxiety, dehydration, electrolyte derangement, alcohol withdrawal, cardiac dysrhythmia    2327: Labs thus far consistent with significant volume depletion, anion gap of 20 with bicarb of 16.  Ethanol level is also elevated, this is concerning for mild alcoholic ketosis.  Thankfully she is tolerating p.o. and as such no indication for inpatient management but 1 L of LR will be infused at this time given this finding.  Patient reassessed and updated with these findings.  IV fluids initiated as well as p.o. fluids.  She does admit she has had very little water today.    0034: Patient reassessed, she is feeling much better after IV fluids, resting 100.  I have updated her with workup results include evidence of significant dehydration/ketosis.  Given she is feeling much better and able to tolerate p.o. there is not indication for inpatient management, encouraged her with regard to adequate hydration at home.  She has not filled the Xanax, I will discontinue the prescription and will write her for a attenuated course of Valium for acute anxiety reaction, noted this is not a long-term treatment and will refer her back to behavioral health given she has not seen psychiatry in quite some time.    Upon Reevaluation, the patient's condition has: Improved; and will be discharged.    Patient discharged from ED Observation status at 0053 (Time)  5/28/24 (Date).   Hydration: Based on the patient's presentation of Dehydration the patient was given IV fluids. IV Hydration was used because oral hydration was not as rapid as required. Upon recheck following hydration, the patient was doing better, heart rate improving, currently 80.    Narcotics Script:In prescribing controlled substances to this patient, I certify that I have obtained and reviewed the medical history of Marcia Koch. I have also made a good barbra effort to obtain applicable records from other providers who have treated the patient and records did not demonstrate any increased risk of substance abuse that would prevent me from prescribing controlled substances.     I have conducted a physical exam and documented it. I have reviewed Ms. Koch’s prescription history as maintained by the [place] Prescription Monitoring Program.     I have assessed the patient’s risk for abuse, dependency, and addiction using the validated Opioid Risk Tool available at https://www.mdcigobubble.com/fsqbkh-hbji-wkqx-ort-narcotic-abuse.     Given the above, I believe the benefits of controlled substance therapy outweigh the risks. The reasons for prescribing controlled substances include in my professional opinion, controlled substances are the only reasonable choice for this patient because his oral noncontrolled substances inadequate for anxiety control . Accordingly, I have discussed the risk and benefits, treatment plan, and alternative therapies with the patient.        Patient Vitals for the past 24 hrs:   BP Temp Temp src Pulse Resp SpO2 Weight   05/28/24 0041 (!) 140/65 36.8 °C (98.2 °F) Temporal 80 18 99 % --   05/28/24 0033 -- -- -- 82 -- 93 % --   05/27/24 2245 (!) 160/81 36.9 °C (98.4 °F) Temporal 90 18 94 % --   05/27/24 2244 -- -- -- -- -- -- 56.2 kg (124 lb)        ADDITIONAL PROBLEMS MANAGED  Anxiety reaction, palpitations, alcohol use complicating complicated significant dehydration requiring IV fluid  resuscitation, elevated lactic acid level secondary to the same    DISPOSITION AND DISCUSSIONS  I have discussed management of the patient with the following physicians and AMADOR's:  NA    Discussion of management with other Q or appropriate source(s): None     Escalation of care considered, and ultimately not performed:acute inpatient care management, however at this time, the patient is most appropriate for outpatient management    Barriers to care at this time, including but not limited to: Patient lacks transportation .     Decision tools and prescription drugs considered including, but not limited to: Pain Medications  aware reviewed, no contraindication to prescription of controlled substances .  CIWA score is 4 on initial assessment, medications for withdrawal not indicated.    I reviewed prescription monitoring program for patient's narcotic use before prescribing a scheduled drug.The patient will not drink alcohol nor drive with prescribed medications. The patient will return for new or worsening symptoms and is stable at the time of discharge.    Patient has had high blood pressure while in the emergency department, felt likely secondary to medical condition. Counseled patient to monitor blood pressure at home and follow up with primary care physician.      DISPOSITION:  Patient will be discharged home in stable condition.    FOLLOW UP:  Sharmin Mcdermott P.A.-C.  [address] [address]  [phone]    Schedule an appointment as soon as possible for a visit         OUTPATIENT MEDICATIONS:  New Prescriptions    DIAZEPAM (VALIUM) 5 MG TAB    Take 1 Tablet by mouth every 6 hours as needed for Anxiety for up to 5 days.          FINAL DIAGNOSIS  1. Dehydration    2. Anxiety reaction    3. Palpitations    4. Elevated lactic acid level           Electronically signed by: Hang Jones M.D., 5/27/2024 10:39 PM

## 2024-05-28 NOTE — ED TRIAGE NOTES
Chief Complaint   Patient presents with    Anxiety     PT who was treated at this ER earlier tonight for anxiety presents d/t anxiety.      BP (!) 160/81   Pulse 90   Temp 36.9 °C (98.4 °F) (Temporal)   Resp 18   Wt 56.2 kg (124 lb)   SpO2 94%   BMI 21.97 kg/m²

## 2024-05-28 NOTE — ED NOTES
Patient reports significant improvement in symptoms and feels good about going home. Patient verbalized understanding to plan of care and discharge information. Patient in stable condition. Patient ambulated to waiting room to wait for her cab that she arranged.

## 2024-05-30 ENCOUNTER — HOSPITAL ENCOUNTER (EMERGENCY)
Facility: MEDICAL CENTER | Age: 74
End: 2024-05-30
Attending: EMERGENCY MEDICINE
Payer: MEDICARE

## 2024-05-30 VITALS
BODY MASS INDEX: 21.64 KG/M2 | RESPIRATION RATE: 18 BRPM | DIASTOLIC BLOOD PRESSURE: 64 MMHG | HEART RATE: 64 BPM | SYSTOLIC BLOOD PRESSURE: 118 MMHG | HEIGHT: 63 IN | WEIGHT: 122.14 LBS | TEMPERATURE: 97.3 F | OXYGEN SATURATION: 97 %

## 2024-05-30 DIAGNOSIS — F41.9 ANXIETY: ICD-10-CM

## 2024-05-30 LAB
ALBUMIN SERPL BCP-MCNC: 3.7 G/DL (ref 3.2–4.9)
ALBUMIN/GLOB SERPL: 1.3 G/DL
ALP SERPL-CCNC: 118 U/L (ref 30–99)
ALT SERPL-CCNC: 18 U/L (ref 2–50)
ANION GAP SERPL CALC-SCNC: 21 MMOL/L (ref 7–16)
AST SERPL-CCNC: 39 U/L (ref 12–45)
BASOPHILS # BLD AUTO: 1 % (ref 0–1.8)
BASOPHILS # BLD: 0.08 K/UL (ref 0–0.12)
BILIRUB SERPL-MCNC: 0.6 MG/DL (ref 0.1–1.5)
BUN SERPL-MCNC: 14 MG/DL (ref 8–22)
CALCIUM ALBUM COR SERPL-MCNC: 9.4 MG/DL (ref 8.5–10.5)
CALCIUM SERPL-MCNC: 9.2 MG/DL (ref 8.4–10.2)
CHLORIDE SERPL-SCNC: 95 MMOL/L (ref 96–112)
CO2 SERPL-SCNC: 17 MMOL/L (ref 20–33)
CREAT SERPL-MCNC: 0.6 MG/DL (ref 0.5–1.4)
EOSINOPHIL # BLD AUTO: 0.05 K/UL (ref 0–0.51)
EOSINOPHIL NFR BLD: 0.6 % (ref 0–6.9)
ERYTHROCYTE [DISTWIDTH] IN BLOOD BY AUTOMATED COUNT: 56.3 FL (ref 35.9–50)
ETHANOL BLD-MCNC: 80.4 MG/DL
GFR SERPLBLD CREATININE-BSD FMLA CKD-EPI: 95 ML/MIN/1.73 M 2
GLOBULIN SER CALC-MCNC: 2.8 G/DL (ref 1.9–3.5)
GLUCOSE SERPL-MCNC: 68 MG/DL (ref 65–99)
HCT VFR BLD AUTO: 44.8 % (ref 37–47)
HGB BLD-MCNC: 15.2 G/DL (ref 12–16)
IMM GRANULOCYTES # BLD AUTO: 0.03 K/UL (ref 0–0.11)
IMM GRANULOCYTES NFR BLD AUTO: 0.4 % (ref 0–0.9)
LIPASE SERPL-CCNC: 20 U/L (ref 11–82)
LYMPHOCYTES # BLD AUTO: 1.94 K/UL (ref 1–4.8)
LYMPHOCYTES NFR BLD: 23.8 % (ref 22–41)
MCH RBC QN AUTO: 36 PG (ref 27–33)
MCHC RBC AUTO-ENTMCNC: 33.9 G/DL (ref 32.2–35.5)
MCV RBC AUTO: 106.2 FL (ref 81.4–97.8)
MONOCYTES # BLD AUTO: 0.58 K/UL (ref 0–0.85)
MONOCYTES NFR BLD AUTO: 7.1 % (ref 0–13.4)
NEUTROPHILS # BLD AUTO: 5.46 K/UL (ref 1.82–7.42)
NEUTROPHILS NFR BLD: 67.1 % (ref 44–72)
NRBC # BLD AUTO: 0 K/UL
NRBC BLD-RTO: 0 /100 WBC (ref 0–0.2)
PLATELET # BLD AUTO: 217 K/UL (ref 164–446)
PMV BLD AUTO: 9.6 FL (ref 9–12.9)
POTASSIUM SERPL-SCNC: 4.8 MMOL/L (ref 3.6–5.5)
PROT SERPL-MCNC: 6.5 G/DL (ref 6–8.2)
RBC # BLD AUTO: 4.22 M/UL (ref 4.2–5.4)
SODIUM SERPL-SCNC: 133 MMOL/L (ref 135–145)
WBC # BLD AUTO: 8.1 K/UL (ref 4.8–10.8)

## 2024-05-30 PROCEDURE — 36415 COLL VENOUS BLD VENIPUNCTURE: CPT

## 2024-05-30 PROCEDURE — 99285 EMERGENCY DEPT VISIT HI MDM: CPT

## 2024-05-30 PROCEDURE — 700105 HCHG RX REV CODE 258: Performed by: EMERGENCY MEDICINE

## 2024-05-30 PROCEDURE — 96374 THER/PROPH/DIAG INJ IV PUSH: CPT

## 2024-05-30 PROCEDURE — 83690 ASSAY OF LIPASE: CPT

## 2024-05-30 PROCEDURE — 700111 HCHG RX REV CODE 636 W/ 250 OVERRIDE (IP): Mod: JZ | Performed by: EMERGENCY MEDICINE

## 2024-05-30 PROCEDURE — 82077 ASSAY SPEC XCP UR&BREATH IA: CPT

## 2024-05-30 PROCEDURE — 85025 COMPLETE CBC W/AUTO DIFF WBC: CPT

## 2024-05-30 PROCEDURE — 80053 COMPREHEN METABOLIC PANEL: CPT

## 2024-05-30 RX ORDER — SODIUM CHLORIDE 9 MG/ML
1000 INJECTION, SOLUTION INTRAVENOUS ONCE
Status: COMPLETED | OUTPATIENT
Start: 2024-05-30 | End: 2024-05-30

## 2024-05-30 RX ORDER — DIAZEPAM 5 MG/ML
2.5 INJECTION, SOLUTION INTRAMUSCULAR; INTRAVENOUS ONCE
Status: COMPLETED | OUTPATIENT
Start: 2024-05-30 | End: 2024-05-30

## 2024-05-30 RX ADMIN — SODIUM CHLORIDE 1000 ML: 9 INJECTION, SOLUTION INTRAVENOUS at 15:18

## 2024-05-30 RX ADMIN — DIAZEPAM 2.5 MG: 5 INJECTION, SOLUTION INTRAMUSCULAR; INTRAVENOUS at 15:19

## 2024-05-30 ASSESSMENT — FIBROSIS 4 INDEX: FIB4 SCORE: 1.75

## 2024-05-30 NOTE — ED TRIAGE NOTES
"Chief Complaint   Patient presents with    Anxiety     Per pt has been seen 3 times this week for same.     ALOC     Pt state she \"doesn't feel right, feels disoriented\". Is oriented to self, states she doesn't know the date or year, can't verbalize who the president is. States she is unable to eat or drink due to loss of appetite. Lives alone, HX of depression. Denies SI, HI.      /68   Pulse 67   Temp 36.3 °C (97.3 °F) (Temporal)   Resp 16   Ht 1.6 m (5' 3\")   Wt 55.4 kg (122 lb 2.2 oz)   SpO2 92%   BMI 21.64 kg/m²     Pt drank 2 glasses of wine this AM \"to calm myself\" states drink almost daily.  "

## 2024-05-30 NOTE — ED PROVIDER NOTES
"ED Provider Note    CHIEF COMPLAINT  Chief Complaint   Patient presents with    Anxiety     Per pt has been seen 3 times this week for same.     ALOC     Pt state she \"doesn't feel right, feels disoriented\". Is oriented to self, states she doesn't know the date or year, can't verbalize who the president is. States she is unable to eat or drink due to loss of appetite. Lives alone, HX of depression. Denies SI, HI.        EXTERNAL RECORDS REVIEWED  Inpatient Notes patient has had multiple recent ER visits to for recurrent anxiety.  Patient was seen twice 2 days ago on the 27th    HPI/ROS  LIMITATION TO HISTORY   Select: : None  OUTSIDE HISTORIAN(S):  None    Marcia Sharyn Koch is a 73 y.o. female who presents back to the emergency ferment for persistent anxiety.  Past medical history as document below.  Patient with a history of chronic alcohol abuse and anxiety.  States that she does not currently have any anxiety medicine at home.  Does have a pending prescription that she has yet to fill.  Furthermore she was reportedly told by her PCP office to call back next week to schedule follow-up appointment.  At this point she states that while she was having anxiety flare earlier she is currently feeling less anxious.    PAST MEDICAL HISTORY   has a past medical history of Alcohol abuse, Alcoholism (HCC), Anxiety, Depression, Hiatus hernia syndrome, High cholesterol, Hypertension, Indigestion, Other specified disorder of intestines, Pneumonia, Psychiatric disorder, Sleep apnea, Snoring, Unspecified urinary incontinence, and Urinary bladder disorder.    SURGICAL HISTORY   has a past surgical history that includes bladder sling female (8/30/2011); teddy by laparoscopy; other abdominal surgery (2002); other (2010); other (1956); other (2010); gyn surgery (1991); and abdominal hysterectomy total.    FAMILY HISTORY  Family History   Problem Relation Age of Onset    Anxiety disorder Brother     Depression Brother     Other " "Brother          AIDS    Alcohol abuse Mother     Cancer Mother         colon cancer age 70s    Alcohol abuse Father     Hyperlipidemia Father     Other Father         TIA, macular degenration    Cancer Sister         breast    Drug abuse Sister     Cancer Maternal Aunt         breast    Cancer Maternal Uncle         nos    Stroke Neg Hx     Heart Attack Neg Hx        SOCIAL HISTORY  Social History     Tobacco Use    Smoking status: Every Day     Current packs/day: 0.00     Average packs/day: 0.5 packs/day for 25.0 years (12.5 ttl pk-yrs)     Types: Cigarettes     Start date: 1986     Last attempt to quit: 2011     Years since quittin.7    Smokeless tobacco: Never   Vaping Use    Vaping status: Never Used   Substance and Sexual Activity    Alcohol use: Yes     Comment: 2-3 shots of vodka daily    Drug use: No    Sexual activity: Not Currently     Partners: Male     Birth control/protection: Abstinence       CURRENT MEDICATIONS  Home Medications    **Home medications have not yet been reviewed for this encounter**         ALLERGIES  Allergies   Allergen Reactions    Atorvastatin Myalgia    Codeine Nausea    Lisinopril Cough     Cough      Morphine Nausea    Rosuvastatin Myalgia       PHYSICAL EXAM  VITAL SIGNS: /64   Pulse 64   Temp 36.3 °C (97.3 °F) (Temporal)   Resp 18   Ht 1.6 m (5' 3\")   Wt 55.4 kg (122 lb 2.2 oz)   SpO2 97%   BMI 21.64 kg/m²          Pulse ox interpretation: I interpret this pulse ox as normal.  Constitutional: Alert in no apparent distress.  HENT: No signs of trauma, Bilateral external ears normal, Nose normal.   Eyes: Pupils are equal and reactive  Neck: Normal range of motion, No tenderness, Supple  Cardiovascular: Regular rate and rhythm, no murmurs.   Thorax & Lungs: Normal breath sounds, No respiratory distress, No wheezing, No chest tenderness.   Skin: Warm, Dry, No erythema, No rash.   Musculoskeletal: Good range of motion in all major joints. No " tenderness to palpation or major deformities noted.   Neurologic: Alert , Normal motor function, Normal sensory function, No focal deficits noted.   Psychiatric: Affect normal, does not appear significantly anxious at this time, judgment normal, Mood normal.         EKG/LABS  Results for orders placed or performed during the hospital encounter of 05/30/24   CBC WITH DIFFERENTIAL   Result Value Ref Range    WBC 8.1 4.8 - 10.8 K/uL    RBC 4.22 4.20 - 5.40 M/uL    Hemoglobin 15.2 12.0 - 16.0 g/dL    Hematocrit 44.8 37.0 - 47.0 %    .2 (H) 81.4 - 97.8 fL    MCH 36.0 (H) 27.0 - 33.0 pg    MCHC 33.9 32.2 - 35.5 g/dL    RDW 56.3 (H) 35.9 - 50.0 fL    Platelet Count 217 164 - 446 K/uL    MPV 9.6 9.0 - 12.9 fL    Neutrophils-Polys 67.10 44.00 - 72.00 %    Lymphocytes 23.80 22.00 - 41.00 %    Monocytes 7.10 0.00 - 13.40 %    Eosinophils 0.60 0.00 - 6.90 %    Basophils 1.00 0.00 - 1.80 %    Immature Granulocytes 0.40 0.00 - 0.90 %    Nucleated RBC 0.00 0.00 - 0.20 /100 WBC    Neutrophils (Absolute) 5.46 1.82 - 7.42 K/uL    Lymphs (Absolute) 1.94 1.00 - 4.80 K/uL    Monos (Absolute) 0.58 0.00 - 0.85 K/uL    Eos (Absolute) 0.05 0.00 - 0.51 K/uL    Baso (Absolute) 0.08 0.00 - 0.12 K/uL    Immature Granulocytes (abs) 0.03 0.00 - 0.11 K/uL    NRBC (Absolute) 0.00 K/uL   COMP METABOLIC PANEL   Result Value Ref Range    Sodium 133 (L) 135 - 145 mmol/L    Potassium 4.8 3.6 - 5.5 mmol/L    Chloride 95 (L) 96 - 112 mmol/L    Co2 17 (L) 20 - 33 mmol/L    Anion Gap 21.0 (H) 7.0 - 16.0    Glucose 68 65 - 99 mg/dL    Bun 14 8 - 22 mg/dL    Creatinine 0.60 0.50 - 1.40 mg/dL    Calcium 9.2 8.4 - 10.2 mg/dL    Correct Calcium 9.4 8.5 - 10.5 mg/dL    AST(SGOT) 39 12 - 45 U/L    ALT(SGPT) 18 2 - 50 U/L    Alkaline Phosphatase 118 (H) 30 - 99 U/L    Total Bilirubin 0.6 0.1 - 1.5 mg/dL    Albumin 3.7 3.2 - 4.9 g/dL    Total Protein 6.5 6.0 - 8.2 g/dL    Globulin 2.8 1.9 - 3.5 g/dL    A-G Ratio 1.3 g/dL   LIPASE   Result Value Ref Range     Lipase 20 11 - 82 U/L   ETHYL ALCOHOL (BLOOD)   Result Value Ref Range    Diagnostic Alcohol 80.4 (H) <10.1 mg/dL   ESTIMATED GFR   Result Value Ref Range    GFR (CKD-EPI) 95 >60 mL/min/1.73 m 2           COURSE & MEDICAL DECISION MAKING    ASSESSMENT, COURSE AND PLAN  Care Narrative: Will repeat hematologic evaluation given recent abnormal labs on her last ER visit.  Will also provide additional anxiolysis while here.  Will start IV fluids for rehydration.      DISPOSITION AND DISCUSSIONS  I have discussed management of the patient with the following physicians and AMADOR's: None    Discussion of management with other QHP or appropriate source(s): None     Escalation of care considered, and ultimately not performed:acute inpatient care management, however at this time, the patient is most appropriate for outpatient management    Barriers to care at this time, including but not limited to: Persistent anxiety   .       73-year-old female presenting to the emergency department with recurrent anxiety and request for medications here.  Currently does not have any anxiolytics at home.  No other acute changes.    Patient does have outpatient prescription for benzodiazepines and she is going to leave the ER at this time to go fill this prescription which I do believe will be appropriate specially for ongoing weekend care.    Labs as above.  Recurrent alcohol intoxication.  External encouraged the patient to continue with aggressive home hydration and decrease alcohol consumption.    FINAL DIAGNOSIS  1. Anxiety           Electronically signed by: Jono Menard M.D., 5/30/2024 2:52 PM

## 2024-05-30 NOTE — ED NOTES
Pt d/c home.  No rx given.  Dany called for pt.  Pt escorted  to waiting area.  Pt calm , aware to return for any changes or concerns

## 2024-06-01 ENCOUNTER — HOSPITAL ENCOUNTER (EMERGENCY)
Facility: MEDICAL CENTER | Age: 74
End: 2024-06-01
Attending: EMERGENCY MEDICINE
Payer: MEDICARE

## 2024-06-01 ENCOUNTER — APPOINTMENT (OUTPATIENT)
Dept: RADIOLOGY | Facility: MEDICAL CENTER | Age: 74
End: 2024-06-01
Attending: EMERGENCY MEDICINE
Payer: MEDICARE

## 2024-06-01 VITALS
HEART RATE: 62 BPM | OXYGEN SATURATION: 97 % | WEIGHT: 130 LBS | HEIGHT: 63 IN | TEMPERATURE: 97 F | RESPIRATION RATE: 17 BRPM | DIASTOLIC BLOOD PRESSURE: 47 MMHG | BODY MASS INDEX: 23.04 KG/M2 | SYSTOLIC BLOOD PRESSURE: 97 MMHG

## 2024-06-01 DIAGNOSIS — F10.10 ALCOHOL ABUSE: ICD-10-CM

## 2024-06-01 DIAGNOSIS — F41.9 ANXIETY: ICD-10-CM

## 2024-06-01 LAB
ALBUMIN SERPL BCP-MCNC: 3.6 G/DL (ref 3.2–4.9)
ALBUMIN/GLOB SERPL: 1.6 G/DL
ALP SERPL-CCNC: 97 U/L (ref 30–99)
ALT SERPL-CCNC: 26 U/L (ref 2–50)
ANION GAP SERPL CALC-SCNC: 22 MMOL/L (ref 7–16)
AST SERPL-CCNC: 59 U/L (ref 12–45)
BASOPHILS # BLD AUTO: 0.6 % (ref 0–1.8)
BASOPHILS # BLD: 0.05 K/UL (ref 0–0.12)
BILIRUB SERPL-MCNC: 0.5 MG/DL (ref 0.1–1.5)
BUN SERPL-MCNC: 11 MG/DL (ref 8–22)
CALCIUM ALBUM COR SERPL-MCNC: 9.1 MG/DL (ref 8.5–10.5)
CALCIUM SERPL-MCNC: 8.8 MG/DL (ref 8.4–10.2)
CHLORIDE SERPL-SCNC: 95 MMOL/L (ref 96–112)
CO2 SERPL-SCNC: 17 MMOL/L (ref 20–33)
CREAT SERPL-MCNC: 0.52 MG/DL (ref 0.5–1.4)
EOSINOPHIL # BLD AUTO: 0.06 K/UL (ref 0–0.51)
EOSINOPHIL NFR BLD: 0.7 % (ref 0–6.9)
ERYTHROCYTE [DISTWIDTH] IN BLOOD BY AUTOMATED COUNT: 53.6 FL (ref 35.9–50)
ETHANOL BLD-MCNC: 68.9 MG/DL
GFR SERPLBLD CREATININE-BSD FMLA CKD-EPI: 98 ML/MIN/1.73 M 2
GLOBULIN SER CALC-MCNC: 2.3 G/DL (ref 1.9–3.5)
GLUCOSE SERPL-MCNC: 65 MG/DL (ref 65–99)
HCT VFR BLD AUTO: 41 % (ref 37–47)
HGB BLD-MCNC: 14 G/DL (ref 12–16)
IMM GRANULOCYTES # BLD AUTO: 0.02 K/UL (ref 0–0.11)
IMM GRANULOCYTES NFR BLD AUTO: 0.2 % (ref 0–0.9)
LYMPHOCYTES # BLD AUTO: 2.41 K/UL (ref 1–4.8)
LYMPHOCYTES NFR BLD: 29.1 % (ref 22–41)
MCH RBC QN AUTO: 35.6 PG (ref 27–33)
MCHC RBC AUTO-ENTMCNC: 34.1 G/DL (ref 32.2–35.5)
MCV RBC AUTO: 104.3 FL (ref 81.4–97.8)
MONOCYTES # BLD AUTO: 0.67 K/UL (ref 0–0.85)
MONOCYTES NFR BLD AUTO: 8.1 % (ref 0–13.4)
NEUTROPHILS # BLD AUTO: 5.08 K/UL (ref 1.82–7.42)
NEUTROPHILS NFR BLD: 61.3 % (ref 44–72)
NRBC # BLD AUTO: 0 K/UL
NRBC BLD-RTO: 0 /100 WBC (ref 0–0.2)
PLATELET # BLD AUTO: 214 K/UL (ref 164–446)
PMV BLD AUTO: 9.4 FL (ref 9–12.9)
POTASSIUM SERPL-SCNC: 4 MMOL/L (ref 3.6–5.5)
PROT SERPL-MCNC: 5.9 G/DL (ref 6–8.2)
RBC # BLD AUTO: 3.93 M/UL (ref 4.2–5.4)
SODIUM SERPL-SCNC: 134 MMOL/L (ref 135–145)
WBC # BLD AUTO: 8.3 K/UL (ref 4.8–10.8)

## 2024-06-01 PROCEDURE — 80053 COMPREHEN METABOLIC PANEL: CPT

## 2024-06-01 PROCEDURE — 82077 ASSAY SPEC XCP UR&BREATH IA: CPT

## 2024-06-01 PROCEDURE — 93005 ELECTROCARDIOGRAM TRACING: CPT | Performed by: EMERGENCY MEDICINE

## 2024-06-01 PROCEDURE — 71045 X-RAY EXAM CHEST 1 VIEW: CPT

## 2024-06-01 PROCEDURE — 99285 EMERGENCY DEPT VISIT HI MDM: CPT

## 2024-06-01 PROCEDURE — 36415 COLL VENOUS BLD VENIPUNCTURE: CPT

## 2024-06-01 PROCEDURE — 85025 COMPLETE CBC W/AUTO DIFF WBC: CPT

## 2024-06-01 PROCEDURE — 90791 PSYCH DIAGNOSTIC EVALUATION: CPT

## 2024-06-01 PROCEDURE — 700105 HCHG RX REV CODE 258: Performed by: EMERGENCY MEDICINE

## 2024-06-01 RX ORDER — SODIUM CHLORIDE, SODIUM LACTATE, POTASSIUM CHLORIDE, CALCIUM CHLORIDE 600; 310; 30; 20 MG/100ML; MG/100ML; MG/100ML; MG/100ML
1000 INJECTION, SOLUTION INTRAVENOUS ONCE
Status: COMPLETED | OUTPATIENT
Start: 2024-06-01 | End: 2024-06-01

## 2024-06-01 RX ORDER — ESCITALOPRAM OXALATE 20 MG/1
20 TABLET ORAL DAILY
COMMUNITY

## 2024-06-01 RX ADMIN — SODIUM CHLORIDE, POTASSIUM CHLORIDE, SODIUM LACTATE AND CALCIUM CHLORIDE 1000 ML: 600; 310; 30; 20 INJECTION, SOLUTION INTRAVENOUS at 12:15

## 2024-06-01 ASSESSMENT — FIBROSIS 4 INDEX: FIB4 SCORE: 3.05

## 2024-06-01 NOTE — DISCHARGE PLANNING
Met with pt who reports she lives alone. Independent with ADLs and IADLs. Explained to pt that [facility] has order HH services, pt is agreeable and signed choice form.     Care Transition Team Assessment    Information Source  Orientation Level: Oriented X4  Information Given By: Patient  Who is responsible for making decisions for patient? : Patient    Readmission Evaluation  Is this a readmission?: No    Elopement Risk  Legal Hold: No    Interdisciplinary Discharge Planning  Does Admitting Nurse Feel This Could be a Complex Discharge?: No  Primary Care Physician: Sharmin Mcdermott PAC  Lives with - Patient's Self Care Capacity: Alone and Able to Care For Self  Support Systems: Parent  Do You Take your Prescribed Medications Regularly: Yes  Able to Return to Previous ADL's: Yes  Mobility Issues: No  Prior Services: Home-Independent  Patient Prefers to be Discharged to:: Home  Assistance Needed: No  Durable Medical Equipment: Not Applicable    Discharge Preparedness  What is your plan after discharge?: Home health care  What are your discharge supports?: Parent  Prior Functional Level: Ambulatory, Independent with Activities of Daily Living, Independent with Medication Management  Difficulity with ADLs: None  Difficulity with IADLs: Driving    Functional Assesment  Prior Functional Level: Ambulatory, Independent with Activities of Daily Living, Independent with Medication Management    Finances  Financial Barriers to Discharge: No  Prescription Coverage: Yes    Vision / Hearing Impairment  Vision Impairment : Yes  Hearing Impairment : No    Values / Beliefs / Concerns  Values / Beliefs Concerns : No    Advance Directive  Advance Directive?: Living Will    Domestic Abuse  Have you ever been the victim of abuse or violence?: No         Discharge Risks or Barriers  Discharge risks or barriers?: Post-acute placement / services  Patient risk factors: Vulnerable adult    Anticipated Discharge Information  Discharge Disposition:  D/T to home under HHA care in anticipation of covered skilled care (06)

## 2024-06-01 NOTE — ED NOTES
Pharmacy Medication Reconciliation      ~Medication reconciliation updated and complete per patient at bedside & patient home pharmacy   ~Allergies have been verified and updated   ~No oral ABX within the last 30 days  ~Patient home pharmacy :  [facility]       ~Anticoagulants (rivaroxaban, apixaban, edoxaban, dabigatran, warfarin, enoxaparin) taken in the last 14 days? No

## 2024-06-01 NOTE — DISCHARGE PLANNING
[facility]   MD ok for HH pending ben. MD said he needs someone to assess home safety situation and why pt has been having difficulty with obtaining medications.    Per MD, he talked to pharmacist and pt picked up her prescriptions.

## 2024-06-01 NOTE — ED PROVIDER NOTES
CHIEF COMPLAINT  Chief Complaint   Patient presents with    Anxiety     Pt ran out of her anxiety medications 4 days ago and has been unable to go to the pharmacy to refill them. She has been feeling dizzy for 4 days but has been sleeping. Today she noticed some SOB and increased in anxiety. No CP. Pt appears to be in no distress.   Pt takes Escitalopram 20 mg and Dizpepam 5 mg PRN and propranolol 80 mg daily. She has all 3 bottles with her and they are empty.        LIMITATION TO HISTORY   Select: none    HPI    Marciapatrick Koch is a 73 y.o. female who presents to the Emergency Department complaining of persistent anxiety.  The patient states that she has been here the last 4 days.  Today she just felt extremely anxious she states that she does not have any medications at home so she came to the emergency department because of her anxiety.  When specifically asked her symptoms she just states that she feels anxious denies any chest pain shortness of breath fevers chills nausea vomiting or any other symptoms.  Patient is requesting medications to help her with her anxiety.    OUTSIDE HISTORIAN(S):  Select: None    EXTERNAL RECORDS REVIEWED  Select: Other patient has been seen here multiple times.  Does have a history of alcohol abuse have an alcohol level of 127 on 5/27/2024.  On [address] alcohol was 80.4      PAST MEDICAL HISTORY  Past Medical History:   Diagnosis Date    Alcohol abuse     Alcoholism (HCC)     Anxiety     Depression     Hiatus hernia syndrome     High cholesterol     Hypertension     Indigestion     Other specified disorder of intestines     diarrhea    Pneumonia     for 2 days    Psychiatric disorder     anxiety, depression    Sleep apnea     Snoring     Unspecified urinary incontinence     Urinary bladder disorder      .    SURGICAL HISTORY  Past Surgical History:   Procedure Laterality Date    BLADDER SLING FEMALE  8/30/2011    Performed by RENATE STERLING at SURGERY [facility]     OTHER      surgery for sleep apnea    OTHER      big toenails and second toenails removed    OTHER ABDOMINAL SURGERY      choley    GYN SURGERY      hyst    OTHER      tonsillectomy    ABDOMINAL HYSTERECTOMY TOTAL      Hysterectomy, Total Abdominal    BRIEN BY LAPAROSCOPY      Cholecystectomy, Laparoscopic         FAMILY HISTORY  Family History   Problem Relation Age of Onset    Anxiety disorder Brother     Depression Brother     Other Brother          AIDS    Alcohol abuse Mother     Cancer Mother         colon cancer age 70s    Alcohol abuse Father     Hyperlipidemia Father     Other Father         TIA, macular degenration    Cancer Sister         breast    Drug abuse Sister     Cancer Maternal Aunt         breast    Cancer Maternal Uncle         nos    Stroke Neg Hx     Heart Attack Neg Hx           SOCIAL HISTORY  Social History     Socioeconomic History    Marital status:      Spouse name: Not on file    Number of children: Not on file    Years of education: Not on file    Highest education level: Not on file   Occupational History    Not on file   Tobacco Use    Smoking status: Every Day     Current packs/day: 0.00     Average packs/day: 0.5 packs/day for 25.0 years (12.5 ttl pk-yrs)     Types: Cigarettes     Start date: 1986     Last attempt to quit: 2011     Years since quittin.7    Smokeless tobacco: Never   Vaping Use    Vaping status: Never Used   Substance and Sexual Activity    Alcohol use: Yes     Comment: 2-3 shots of vodka daily    Drug use: No    Sexual activity: Not Currently     Partners: Male     Birth control/protection: Abstinence   Other Topics Concern    Not on file   Social History Narrative    Not on file     Social Determinants of Health     Financial Resource Strain: Not on file   Food Insecurity: No Food Insecurity (2021)    Hunger Vital Sign     Worried About Running Out of Food in the Last Year: Never true     Ran Out of Food in the  "Last Year: Never true   Transportation Needs: No Transportation Needs (1/25/2021)    PRAPARE - Transportation     Lack of Transportation (Medical): No     Lack of Transportation (Non-Medical): No   Physical Activity: Not on file   Stress: Not on file   Social Connections: Not on file   Intimate Partner Violence: Not on file   Housing Stability: Not on file         CURRENT MEDICATIONS  No current facility-administered medications on file prior to encounter.     Current Outpatient Medications on File Prior to Encounter   Medication Sig Dispense Refill    escitalopram (LEXAPRO) 20 MG tablet Take 20 mg by mouth every day.      diazePAM (VALIUM) 5 MG Tab Take 1 Tablet by mouth every 6 hours as needed for Anxiety for up to 5 days. 12 Tablet 0    propranolol (INDERAL) 80 MG Tab Take 80 mg by mouth every day.             ALLERGIES  Allergies   Allergen Reactions    Atorvastatin Myalgia    Codeine Nausea    Lisinopril Cough     Cough      Morphine Nausea    Rosuvastatin Myalgia       PHYSICAL EXAM  VITAL SIGNS:BP 97/47   Pulse 62   Temp 36.1 °C (97 °F) (Temporal)   Resp 17   Ht 1.6 m (5' 3\")   Wt 59 kg (130 lb)   SpO2 97%   BMI 23.03 kg/m²     Constitutional: Patient is actually very calm and restful does not appear to be anxious well-developed no acute distress   HENT: Normocephalic, Atraumatic, Bilateral external ears normal.  Eyes:  conjunctiva are normal.   Neck: Supple.  Nontender midline  Cardiovascular: Regular rate and rhythm without murmurs gallops or rubs.   Thorax & Lungs: No respiratory distress. Breathing comfortably. Lungs are clear to auscultation bilaterally, there are no wheezes no rales. Chest wall is nontender.  Abdomen: Soft, non distended, non tender   Skin: Warm, Dry, No erythema,   Back: No tenderness, No CVA tenderness.  Musculoskeletal: No clubbing cyanosis or edema good range of motion   Neurologic: Alert & oriented x 3, normal sensation moving all extremities appears normal   Psychiatric: " Patient does have more of a flat affect but otherwise not suicidal not homicidal      DIAGNOSTIC STUDIES / PROCEDURES      LABS  Results for orders placed or performed during the hospital encounter of 06/01/24   CBC with Differential   Result Value Ref Range    WBC 8.3 4.8 - 10.8 K/uL    RBC 3.93 (L) 4.20 - 5.40 M/uL    Hemoglobin 14.0 12.0 - 16.0 g/dL    Hematocrit 41.0 37.0 - 47.0 %    .3 (H) 81.4 - 97.8 fL    MCH 35.6 (H) 27.0 - 33.0 pg    MCHC 34.1 32.2 - 35.5 g/dL    RDW 53.6 (H) 35.9 - 50.0 fL    Platelet Count 214 164 - 446 K/uL    MPV 9.4 9.0 - 12.9 fL    Neutrophils-Polys 61.30 44.00 - 72.00 %    Lymphocytes 29.10 22.00 - 41.00 %    Monocytes 8.10 0.00 - 13.40 %    Eosinophils 0.70 0.00 - 6.90 %    Basophils 0.60 0.00 - 1.80 %    Immature Granulocytes 0.20 0.00 - 0.90 %    Nucleated RBC 0.00 0.00 - 0.20 /100 WBC    Neutrophils (Absolute) 5.08 1.82 - 7.42 K/uL    Lymphs (Absolute) 2.41 1.00 - 4.80 K/uL    Monos (Absolute) 0.67 0.00 - 0.85 K/uL    Eos (Absolute) 0.06 0.00 - 0.51 K/uL    Baso (Absolute) 0.05 0.00 - 0.12 K/uL    Immature Granulocytes (abs) 0.02 0.00 - 0.11 K/uL    NRBC (Absolute) 0.00 K/uL   Complete Metabolic Panel (CMP)   Result Value Ref Range    Sodium 134 (L) 135 - 145 mmol/L    Potassium 4.0 3.6 - 5.5 mmol/L    Chloride 95 (L) 96 - 112 mmol/L    Co2 17 (L) 20 - 33 mmol/L    Anion Gap 22.0 (H) 7.0 - 16.0    Glucose 65 65 - 99 mg/dL    Bun 11 8 - 22 mg/dL    Creatinine 0.52 0.50 - 1.40 mg/dL    Calcium 8.8 8.4 - 10.2 mg/dL    Correct Calcium 9.1 8.5 - 10.5 mg/dL    AST(SGOT) 59 (H) 12 - 45 U/L    ALT(SGPT) 26 2 - 50 U/L    Alkaline Phosphatase 97 30 - 99 U/L    Total Bilirubin 0.5 0.1 - 1.5 mg/dL    Albumin 3.6 3.2 - 4.9 g/dL    Total Protein 5.9 (L) 6.0 - 8.2 g/dL    Globulin 2.3 1.9 - 3.5 g/dL    A-G Ratio 1.6 g/dL   ETHYL ALCOHOL (BLOOD)   Result Value Ref Range    Diagnostic Alcohol 68.9 (H) <10.1 mg/dL   ESTIMATED GFR   Result Value Ref Range    GFR (CKD-EPI) 98 >60 mL/min/1.73  m 2       EKG:   Normal sinus rhythm with a rate of 58 Axis is normal at 84.  P waves are normal.  The patient has nonspecific ST elevations in 2 3 aVF, V4 through V6.  There are no ST depressions.  T waves are otherwise normal.  There is a nonspecific EKG compared to prior EKG no significant changes.    RADIOLOGY  I have independently interpreted the diagnostic imaging associated with this visit and am waiting the final reading from the radiologist.   My preliminary interpretation is as follows: No acute infiltrates.    Radiologist interpretation:  DX-CHEST-PORTABLE (1 VIEW)   Final Result      Trace left pleural effusion.              COURSE & MEDICAL DECISION MAKING    ED COURSE:    ED Observation Status? No, the patient does not qualify for observation    INTERVENTIONS BY ME:  Medications   lactated ringers (LR) bolus (0 mL Intravenous Stopped 6/1/24 1402)         Rechecks patient improved after the fluids as far as her blood pressure.      INITIAL ASSESSMENT, COURSE AND PLAN  Care Narrative: Patient has been seen here now this is the fourth time just this month alone for the same symptoms.  Patient is not tachycardic she was mildly hypotensive at 86/49.  Prior to even given her fluid she is up to her baseline 96 systolic over 60.  I did give her a liter bolus.  Laboratory studies show normal white blood cell count normal H&H she does have a macrocytosis with an MCV of 104.3.  Her diagnostic alcohol 68.9 today but has been as high as 160 in the past.  Patient sodium slightly low at 134 with a slightly low bicarb of 17 and an slightly elevated gap of 22.  This is most likely dehydration and from the use of alcohol.  Looking at her history she has been seen here multiple times she is in no significant distress.  I did have behavioral health talk to her about not only her substance use but also her description of anxiety.  At this point I am not going to give her any more benzodiazepines she is not exhibiting any  symptoms of withdrawal however, because of her symptomatology we have offered to get her over to [facility] for both substance use disorder as well as to treat her anxiety she did not want to do that.  Patient states that she has not had any medications however after medication reconciliation her prescription for propranolol and diazepam were picked up 4 days ago from the [facility].  At this point whether or not the patient is being completely truthful I do not feel that the patient has an emergency currently We have had behavioral health evaluate the patient I will have home health do home health evaluation on the patient and have offered this to her but at this point she does not wish to be transferred or taken to [facility] and I do not feel the patient requires any inpatient treatment here so I will discharge the patient. I Did have a very direct and long conversation with the patient about her current problems and I recommended that she does need to see her psychiatrist as well as her therapist for continued outpatient treatment and care.            HYDRATION: Based on the patient's presentation of dehydration,  the patient was given IV fluids. IV Hydration was used because oral hydration is unable to be done due to the patient's symptoms. Upon recheck following hydration, the patient was improved.     ADDITIONAL PROBLEM LIST  Alcoholism  DISPOSITION AND DISCUSSIONS  I have discussed management of the patient with the following physicians and AMADOR's: , behavioral health    Escalation of care considered, and ultimately not performed: Considered acute inpatient treatment however the patient has no significant findings of endorgan injury.  Recommended transfer to [facility] the patient refuses above.    Barriers to care at this time, including but not limited to: Patient's alcohol use.     Decision tools and prescription drugs considered including, but not  limited to: As above.    FINAL DIAGNOSIS  1. Anxiety    2. Alcohol abuse             The patient will return for new or worsening symptoms and is stable at the time of discharge.    The patient is referred to a primary physician for blood pressure management, diabetic screening, and for all other preventative health concerns.    I reviewed prescription monitoring program for patient's narcotic use before prescribing a scheduled drug.The patient will not drink alcohol nor drive with prescribed medications        DISPOSITION:  Patient will be discharged home in stable condition.    FOLLOW UP:  Sharmin Mcdermott P.A.-C.  [address] [address]  [phone]    Schedule an appointment as soon as possible for a visit   For further evaluation, Return if any symptoms worsen      OUTPATIENT MEDICATIONS:  New Prescriptions    No medications on file                    Electronically signed by: Lei Bueno M.D.,2:13 PM 06/01/24

## 2024-06-01 NOTE — FACE TO FACE
Face to Face Supporting Documentation - Home Health    The encounter with this patient was in whole or in part the primary reason for home health admission.    Date of encounter:   Patient:                    MRN:                       YOB: 2023  Marcia Koch  [id number]  1950     Home health to see patient for:  Skilled Nursing care for assessment, interventions & education, Registered dietitian consult, and Medical social work consult    Skilled need for:  Medication Management has been seen in the emergency department now 4 times just within the last week because of medical mismanagement from her underlying psychiatric diagnosis of anxiety.  Will need to set up for medication management.  The patient states that she has been unable to get her medications.    Skilled nursing interventions to include:  Comment: Evaluate home and home safety evaluation    Homebound status evidenced by:  Require the use of special transportation or Needs the assistance of another person in order to leave the home. Leaving home requires a considerable and taxing effort. There is a normal inability to leave the home.  Has severe anxiety and states that she cannot leave the house    Community Physician to provide follow up care: Sharmin Mcdermott P.A.-C.     Optional Interventions? Yes, Details: Patient has been in the emergency department multiple times for nonemergent cases.  At this point it is set up and evaluate for home safety as well as for outpatient follow-up.  If this can be obtained then that is an optional intervention however it may require some home health in the beginning to set up the home for this follow-up.      I certify the face to face encounter for this home health care referral meets the CMS requirements and the encounter/clinical assessment with the patient was, in whole, or in part, for the medical condition(s) listed above, which is the primary reason for home health care. Based on  my clinical findings: the service(s) are medically necessary, support the need for home health care, and the homebound criteria are met.  I certify that this patient has had a face to face encounter by myself.  Lei Bueno M.D. - NPI: [id number]

## 2024-06-01 NOTE — DISCHARGE PLANNING
note:  Met with pt who was ambulating in the hallway independently from the bathroom. Pt said she needs a ride home. Confirmed with ER RN.     ROSARIO Santos  [id number]  Cost $20.90  Pt has [employer] [facility].

## 2024-06-01 NOTE — CONSULTS
"[facility]   TRIAGE ASSESSMENT    Name: Marcia Koch  MRN: [id number]  : 1950  Age: 73 y.o.  Date of assessment: 2024  PCP: Sharmin Mcdermott P.A.-C.  Persons in attendance: Patient  Patient Location: [facility]    This evaluation was conducted via zoom using secure and encrypted video conferencing technology. The patient was located at [facility] and the Alert Team RN /Psychologist was located at [facility]. The patients identity was confirmed and verbal consent for the telemedicine conference was obtatained.      CHIEF COMPLAINT/PRESENTING ISSUE (as stated by patient): PT presents for her third ED visit in one week for c/o anxiety. She states she has difficulty following up with outpatient appointments and picking up medications from the pharmacy. Stating \"I don't travel well.\" She reports that she drinks two glasses of wine daily, last drink this morning. Chart reveals multiple referrals to [facility] for addiction, but pt has not followed up. Denies SI/HI/AVH. Does not meet criteria for a legal hold and is not interested in addiction treatment. Referral sent to [facility] for MH counseling, [facility] Case Management, and social work will meet with pt to evaluate the need for home health services.   Chief Complaint   Patient presents with    Anxiety     Pt ran out of her anxiety medications 4 days ago and has been unable to go to the pharmacy to refill them. She has been feeling dizzy for 4 days but has been sleeping. Today she noticed some SOB and increased in anxiety. No CP. Pt appears to be in no distress.   Pt takes Escitalopram 20 mg and Dizpepam 5 mg PRN and propranolol 80 mg daily. She has all 3 bottles with her and they are empty.         CURRENT LIVING SITUATION/SOCIAL SUPPORT/FINANCIAL RESOURCES: Lives alone, nominal social supports.    BEHAVIORAL HEALTH/SUBSTANCE USE TREATMENT HISTORY  Does patient/parent report a " history of prior behavioral health/substance use treatment for patient?   Yes:    Dates Level of Care Facilty/Provider Diagnosis/Problem Medications   current OP psychiatry Nilsa Lara Depression, anxiety Propranolol, escitalopram                                                                      SAFETY ASSESSMENT - SELF  Does patient acknowledge current or past symptoms of dangerousness to self or is previous history noted? no  Does parent/significant other report patient has current or past symptoms of dangerousness to self? N\A  Does presenting problem suggest symptoms of dangerousness to self? No    SAFETY ASSESSMENT - OTHERS  Does patient acknowledge current or past symptoms of aggressive behavior or risk to others or is previous history noted? no  Does parent/significant other report patient has current or past symptoms of aggressive behavior or risk to others?  N\A  Does presenting problem suggest symptoms of dangerousness to others? No    LEGAL HISTORY  Does patient acknowledge history of arrest/MCC/snf or is previous history noted? no    Crisis Safety Plan completed and copy given to patient? N\A    ABUSE/NEGLECT SCREENING  Does patient report feeling “unsafe” in his/her home, or afraid of anyone?  no  Does patient report any history of physical, sexual, or emotional abuse?  no  Does parent or significant other report any of the above? N\A  Is there evidence of neglect by self?  no  Is there evidence of neglect by a caregiver? no  Does the patient/parent report any history of CPS/APS/police involvement related to suspected abuse/neglect or domestic violence? no  Based on the information provided during the current assessment, is a mandated report of suspected abuse/neglect being made?  No    SUBSTANCE USE SCREENING  Yes:  Alcides all substances used in the past 30 days:      Last Use Amount   [x]   Alcohol 6/1/24 2 glasses of wine   []   Marijuana     []   Heroin     []   Prescription Opioids  (used  "without prescription, for    recreation, or in excess of prescribed amount)     []   Other Prescription  (used without prescription, for    recreation, or in excess of prescribed amount)     []   Cocaine      []   Methamphetamine     []   \"\" drugs (ectasy, MDMA)     []   Other substances        UDS results: not done  Breathalyzer results: 68.9    What consequences does the patient associate with any of the above substance use and or addictive behaviors? None    Risk factors for detox (check all that apply):  [x]  Seizures   [x]  Diaphoretic (sweating)   [x]  Tremors   [x]  Hallucinations   [x]  Increased blood pressure   [x]  Decreased blood pressure   []  Other   []  None      [x] Patient education on risk factors for detoxification and instructed to return to ER as needed.      MENTAL STATUS   Participation: Active verbal participation and Guarded  Grooming: Casual  Orientation: Alert and Fully Oriented  Behavior: Calm  Eye contact: Good  Mood: Depressed  Affect: Flat  Thought process: Logical and Goal-directed  Thought content: Within normal limits  Speech: Rate within normal limits and Soft  Perception: Within normal limits  Memory:  No gross evidence of memory deficits  Insight: Adequate  Judgment:  Adequate  Other:    Collateral information:    Source:  [] Significant other present in person:   [] Significant other by telephone  [] [facility]   [] [facility] Nursing Staff  [] [facility]  [] Other:     [] Unable to complete full assessment due to:  [] Acute intoxication  [] Patient declined to participate/engage  [] Patient verbally unresponsive  [] Significant cognitive deficits  [] Significant perceptual distortions or behavioral disorganization  [] Other:      CLINICAL IMPRESSIONS:  Primary:  anxiety  Secondary:  defer       IDENTIFIED NEEDS/PLAN:  [Trigger DISPOSITION list for any items marked]    []  Imminent safety risk - self [] Imminent safety risk - others   []  Acute substance " withdrawal []  Psychosis/Impaired reality testing   [x]  Mood/anxiety [x]  Substance use/Addictive behavior   []  Maladaptive behaviro []  Parent/child conflict   []  Family/Couples conflict []  Biomedical   []  Housing []  Financial   []   Legal  Occupational/Educational   []  Domestic violence []  Other:     Recommended Plan of Care:  Refer to [facility] and [facility] case management, home health per social work  *[facility] may not be utilized for moderate or high risk patients    Has the Recommended Plan of Care/Level of Observation been reviewed with the patient's assigned nurse? yes    Does patient/parent or guardian express agreement with the above plan? yes       Referral appointment(s) scheduled? N\A    Alert team only:   I have discussed findings and recommendations with Dr. Bueno who is in agreement with these recommendations.     Referral information sent to the following outpatient community providers : [facility] case management, [facility]    Referral information sent to the following inpatient community providers :    If applicable : Referred  to  Alert Team for legal hold follow up at (time): N/A      Radha Randall R.N.  6/1/2024

## 2024-06-01 NOTE — DISCHARGE PLANNING
ATTN: Case Management  RE: Referral for Home Health    Reason for referral denial: Patient has no PCP and was discharged without acceptance              Unfortunately, we are not able to accept this referral for the reason listed above. If further clarity is needed, our Transitional Care Specialists are available to discuss any barriers to service at [phone].      We look forward to collaborating with you in the future,  [facility] TeamPatient has no PCP and was discharged without Home Health acceptance.

## 2024-06-03 NOTE — DISCHARGE PLANNING
Received Choice Form at: 6/1/2024  9807  Agency/Facility Name: [facility]    Outcome: Scanned to media only as no orders placed

## 2024-06-05 NOTE — DISCHARGE PLANNING
Anticipated Discharge Disposition: Home    Action: Declined by [facility]. Spoke with pt ID Verified x3 . Choice form done  1 ) [facility] 2 [facility]    Barriers to Discharge: none    Plan: Will cont to follow

## 2024-06-12 ENCOUNTER — HOSPITAL ENCOUNTER (EMERGENCY)
Facility: MEDICAL CENTER | Age: 74
End: 2024-06-12
Attending: EMERGENCY MEDICINE
Payer: MEDICARE

## 2024-06-12 VITALS
TEMPERATURE: 98.4 F | BODY MASS INDEX: 23.04 KG/M2 | DIASTOLIC BLOOD PRESSURE: 73 MMHG | WEIGHT: 130 LBS | HEIGHT: 63 IN | HEART RATE: 60 BPM | RESPIRATION RATE: 16 BRPM | OXYGEN SATURATION: 96 % | SYSTOLIC BLOOD PRESSURE: 135 MMHG

## 2024-06-12 DIAGNOSIS — F41.0 PANIC ATTACK: ICD-10-CM

## 2024-06-12 DIAGNOSIS — F10.29 ALCOHOL DEPENDENCE WITH UNSPECIFIED ALCOHOL-INDUCED DISORDER (HCC): ICD-10-CM

## 2024-06-12 DIAGNOSIS — F41.9 ANXIETY: ICD-10-CM

## 2024-06-12 LAB — EKG IMPRESSION: NORMAL

## 2024-06-12 PROCEDURE — 36415 COLL VENOUS BLD VENIPUNCTURE: CPT

## 2024-06-12 PROCEDURE — 700102 HCHG RX REV CODE 250 W/ 637 OVERRIDE(OP): Performed by: EMERGENCY MEDICINE

## 2024-06-12 PROCEDURE — 96375 TX/PRO/DX INJ NEW DRUG ADDON: CPT

## 2024-06-12 PROCEDURE — 93005 ELECTROCARDIOGRAM TRACING: CPT

## 2024-06-12 PROCEDURE — 99285 EMERGENCY DEPT VISIT HI MDM: CPT

## 2024-06-12 PROCEDURE — 96374 THER/PROPH/DIAG INJ IV PUSH: CPT

## 2024-06-12 PROCEDURE — A9270 NON-COVERED ITEM OR SERVICE: HCPCS | Performed by: EMERGENCY MEDICINE

## 2024-06-12 RX ORDER — DIAZEPAM 2 MG/1
2 TABLET ORAL ONCE
Status: COMPLETED | OUTPATIENT
Start: 2024-06-12 | End: 2024-06-12

## 2024-06-12 RX ADMIN — DIAZEPAM 2 MG: 2 TABLET ORAL at 09:42

## 2024-06-12 ASSESSMENT — FIBROSIS 4 INDEX: FIB4 SCORE: 3.89

## 2024-06-12 NOTE — DISCHARGE PLANNING
Anticipated Discharge Disposition: home     Action: ER MD notes that she is back for anxiety. She has been seen a few times recently. Noted she is being followed for Home health by Michelle, katherine Kuhn. Will ask that they follow for nurse, SURINDER seeing her Thursday ( tomorrow). She is having issues with taking medication, she notes that is out not having transportation which is a benefit for SCP. ER CM will reach out to the liaison.  Fax ER note  Michelle RIVERO Encouraged them to assist her in making Psychiatrist and PCP as soon as possible for medication clarifications. Hattie notes old meds at home, recommend clearing meds.    Barriers to Discharge: None    Plan: No further needs

## 2024-06-12 NOTE — ED NOTES
ERP at bedside. Pt agrees with plan of care discussed by ERP. CIDET acknowledged with patient. Edmundo in low position, side rail up for pt safety. Call light within reach. Will continue to monitor.

## 2024-06-12 NOTE — ED PROVIDER NOTES
"ED Provider Note    Primary care provider: Sharmin Mcdermott P.A.-C.  Means of arrival: EMS  History obtained from: patient  History limited by: none    CHIEF COMPLAINT  Chief Complaint   Patient presents with    Anxiety     \"Having a panic attack\"  feel SOB.         HPI/ROS  Marciapatrick Koch is a 73 y.o. female who presents to the Emergency Department for evaluation of anxiety.  Patient reports that she has frequent panic attacks and has not seen her psychiatrist in over a year.  Has had multiple ER visits over the last couple weeks due to anxiety.  She was prescribed diazepam and feels this helps her symptoms.  She ran out of this.  Per EMS they attempted to call her psychiatrist office and there is an appointment available tomorrow however patient feels that she cannot wait till tomorrow to manage her anxiety and panic attack.  She reports she feels very anxious and short of breath with her panic attacks.  This is typical of her normal panic attacks.    EXTERNAL RECORDS REVIEWED  Patient seen in the emergency department  multiple times in the last 2 week for anxiety.  Per record review history of alcohol abuse and anxiety.  Most recent visits as noted below:  -5/27/2024: Seen for anxiety and treated with low-dose of Xanax, advised to follow-up with her psychiatrist  -5/27/2024: repeat visit the same day, had full workup including cardiac workup electrolytes which were all unremarkable.  She was noted to have elevated alcohol level at that time of 124, given small dose of diazepam and prescribed diazepam  -5/30/2024: ER visit for anxiety at that time had repeat labs and workup, alcohol level again mildly elevated at 80, labs otherwise unremarkable  -6/1/2024: ER visit for anxiety, EKG and labs were done which were unremarkable.  At that time patient advised benzodiazepines will not be prescribed in the emergency department, she will need to follow-up with her primary care provider      PDMP report reveals that " patient did fill diazepam on 2024, she was prescribed 5 mg tablets, # 12 pills of these    LIMITATION TO HISTORY   None indication    OUTSIDE HISTORIAN(S):  EMS as noted above      PAST MEDICAL HISTORY   has a past medical history of Alcohol abuse, Alcoholism (HCC), Anxiety, Depression, Hiatus hernia syndrome, High cholesterol, Hypertension, Indigestion, Other specified disorder of intestines, Pneumonia, Psychiatric disorder, Sleep apnea, Snoring, Unspecified urinary incontinence, and Urinary bladder disorder.    SURGICAL HISTORY   has a past surgical history that includes bladder sling female (2011); teddy by laparoscopy; other abdominal surgery (); other (); other (); other (); gyn surgery (); and abdominal hysterectomy total.    SOCIAL HISTORY  Social History     Tobacco Use    Smoking status: Every Day     Current packs/day: 0.00     Average packs/day: 0.5 packs/day for 25.0 years (12.5 ttl pk-yrs)     Types: Cigarettes     Start date: 1986     Last attempt to quit: 2011     Years since quittin.8    Smokeless tobacco: Never   Vaping Use    Vaping status: Never Used   Substance Use Topics    Alcohol use: Yes     Comment: 2-3 shots of vodka daily    Drug use: No      Social History     Substance and Sexual Activity   Drug Use No       FAMILY HISTORY  Family History   Problem Relation Age of Onset    Anxiety disorder Brother     Depression Brother     Other Brother          AIDS    Alcohol abuse Mother     Cancer Mother         colon cancer age 70s    Alcohol abuse Father     Hyperlipidemia Father     Other Father         TIA, macular degenration    Cancer Sister         breast    Drug abuse Sister     Cancer Maternal Aunt         breast    Cancer Maternal Uncle         nos    Stroke Neg Hx     Heart Attack Neg Hx        CURRENT MEDICATIONS  Home Medications    **Home medications have not yet been reviewed for this encounter**         ALLERGIES  Allergies   Allergen  "Reactions    Atorvastatin Myalgia    Codeine Nausea    Lisinopril Cough     Cough      Morphine Nausea    Rosuvastatin Myalgia       PHYSICAL EXAM  VITAL SIGNS: BP (!) 148/67   Pulse 69   Temp 36.8 °C (98.2 °F) (Temporal)   Resp 16   Ht 1.6 m (5' 3\")   Wt 59 kg (130 lb)   SpO2 95%   BMI 23.03 kg/m²   Vitals reviewed by myself.  Physical Exam  Nursing note and vitals reviewed.  Constitutional: Well-developed and well-nourished. No acute distress.   HENT: Head is normocephalic and atraumatic.  Eyes: extra-ocular movements intact  Cardiovascular: regular rate and  regular rhythm.   Pulmonary/Chest: normal respiratory effort  Musculoskeletal: Extremities exhibit normal range of motion without edema or tenderness.   Neurological: Awake and alert  Skin: Skin is warm and dry. No rash.         DIAGNOSTIC STUDIES:  LABS  none    EKG Interpretation:    12 Lead EKG independently interpreted by myself to show:  EKG at 9:32 AM: Normal sinus rhythm, heart rate 60, normal axis, normal intervals, , , QTc 415, no acute ST-T segment changes, no evidence of acute arrhythmia or ischemia per my independent interpretation      COURSE & MEDICAL DECISION MAKING      INITIAL ASSESSMENT, ED COURSE AND PLAN    Patient is a 73-year-old female who presents for evaluation of anxiety.  Differential diagnosis includes generalized anxiety disorder, alcohol abuse, alcohol withdrawal, panic attack.  Patient has had extensive recent workup including cardiac markers electrolytes and renal function.  At this time I do not believe labs and imaging are indicated.  Will obtain screening EKG to assess for any signs of ischemia however symptoms seem consistent with her panic attacks.    Patient's initial vitals notable for slight hypertension likely secondary to anxiety.  Currently not exhibiting signs of overt alcohol withdrawal as she is not tachycardic or tremulous.  She is treated with diazepam.  EKG returns and demonstrates no " evidence of acute arrhythmia or ischemia.  After thorough chart review patient has had multiple ER visits over the last 2 weeks and been advised to follow-up with her psychiatrist which she has not done.  She has not EMS did call her psychiatrist office today and they have an appointment tomorrow, patient just needs to call and schedule the appointment patient does need to be cleared by the emergency department.  At this time I advised patient we will not prescribe chronic benzodiazepines in the emergency department and she will need to follow-up with her psychiatrist.  Case management was involved in order to help facilitate outpatient follow-up.    I called and spoke with Dr. White myself who is patient's psychiatrist.  She reports that patient has not followed up in a year and a half.  She frequently no-shows her visits.  She agrees that patient does abuse alcohol and has been to rehab in the past agrees that benzodiazepine prescription is not indicated at this time.  Patient should call and schedule follow-up if she is interested in follow-up with psychiatry for her anxiety.    I had a long discussion with patient regarding her alcohol use and how I do not feel comfortable prescribing benzodiazepines from the emergency department.  She will need to follow-up with psychiatry for management of her chronic generalized anxiety disorder.  She states that she lacks transportation and therefore again case management discussed with her options for transportation that are available through her medical insurance.      Patient is feeling improved after receiving diazepam and is given strict return precautions and discharged in stable condition.         DISPOSITION AND DISCUSSIONS  I have discussed management of the patient with the following physicians and AMADOR's:  none    Discussion of management with other [facility] or appropriate source(s): I spoke with case management to help facilitate outpatient follow-up    Escalation  of care considered, and ultimately not performed:see above    Barriers to care at this time, including but not limited to: none.     Decision tools and prescription drugs considered including, but not limited to: see above.        FINAL IMPRESSION  1. Anxiety    2. Panic attack    3. Alcohol dependence with unspecified alcohol-induced disorder (HCC)

## 2024-06-12 NOTE — DISCHARGE PLANNING
Anticipated Discharge Disposition: Home    Action: ER MD and CM attempted to set pt Psych appt but she will need to do so herself and set transport. [facility] Customer Service number  or   7am to 8 pm  Monday to Friday, they will assist her with transport to and from. All this info will be sent to Michelle RIVERO so her MSW and re review with her as well.    Barriers to Discharge: None    Plan: no further needs.

## 2024-06-12 NOTE — ED TRIAGE NOTES
"Chief Complaint   Patient presents with    Anxiety     \"Having a panic attack\"  feel SOB.       BP (!) 148/67   Pulse 69   Temp 36.8 °C (98.2 °F) (Temporal)   Ht 1.6 m (5' 3\")   Wt 59 kg (130 lb)   SpO2 95%   BMI 23.03 kg/m²     "

## 2024-06-13 ENCOUNTER — HOSPITAL ENCOUNTER (EMERGENCY)
Facility: MEDICAL CENTER | Age: 74
End: 2024-06-13
Attending: EMERGENCY MEDICINE
Payer: MEDICARE

## 2024-06-13 VITALS
WEIGHT: 130.07 LBS | OXYGEN SATURATION: 93 % | BODY MASS INDEX: 23.05 KG/M2 | HEIGHT: 63 IN | HEART RATE: 57 BPM | SYSTOLIC BLOOD PRESSURE: 138 MMHG | DIASTOLIC BLOOD PRESSURE: 83 MMHG | RESPIRATION RATE: 18 BRPM | TEMPERATURE: 97.6 F

## 2024-06-13 DIAGNOSIS — F41.9 ANXIETY: ICD-10-CM

## 2024-06-13 PROCEDURE — 99284 EMERGENCY DEPT VISIT MOD MDM: CPT

## 2024-06-13 ASSESSMENT — FIBROSIS 4 INDEX: FIB4 SCORE: 3.89

## 2024-06-13 NOTE — DISCHARGE INSTRUCTIONS
You were seen in the ER for anxiety.  Most important for you will be follow-up with your psychiatrist, Dr. White.  You were provided with transportation options by our  as I know your transportation is limited at this point.  Please make an appointment with your psychiatrist and be sure to keep the appointment as this will be most helpful for you.  Return to the ER with new or worsening symptoms.

## 2024-06-13 NOTE — DISCHARGE PLANNING
Confirmed information on facesheet as correct.   PCP is :CHRISTA Mcdermott  Pharmacy is :[facility]  DME:none  Social Support:family/pt lives alone  Anticipate dc plan and resources needed:Needs appointment with Dr. Orta at [facility].     Received notification from DEWEY Rodriguez that pt needs assistance in obtaining a psychiatrist appointment. CM met with pt. CM called [facility] # [phone]. Talked to [place] who said that pt has missed several appts with Dr. Bertha Orta and MD will not give her another pt while still in the hospital. CM tried to explain that pt is not admitted and about to leave the hospital.   Brooklyn said that as per Dr. Orta, pt needs to call from home herself. Notified Dr. Villatoro. CM provided contact number to pt for Dr. Orta.    CM talked to Michelle La and she confirmed that pt is active with them. Bessie said she will help pt get an appt with Dr. Bertha Orta.         Care Transition Team Assessment    Information Source  Orientation Level: Oriented X4  Information Given By: Patient  Who is responsible for making decisions for patient? : Patient    Readmission Evaluation  Is this a readmission?: No    Elopement Risk  Legal Hold: No  Ambulatory or Self Mobile in Wheelchair: No-Not an Elopement Risk    Interdisciplinary Discharge Planning  Does Admitting Nurse Feel This Could be a Complex Discharge?: No  Primary Care Physician: Sharmin GOODWIN  Lives with - Patient's Self Care Capacity: Alone and Able to Care For Self  Support Systems: Parent  Housing / Facility: 1 Story Apartment / Condo  Do You Take your Prescribed Medications Regularly: No  Able to Return to Previous ADL's: Yes  Mobility Issues: No  Prior Services: Home-Independent  Patient Prefers to be Discharged to:: Home  Assistance Needed: Yes  Durable Medical Equipment: Not Applicable    Discharge Preparedness  What is your plan after discharge?:  Home health care  What are your discharge supports?: Parent  Prior Functional Level: Ambulatory, Independent with Activities of Daily Living, Independent with Medication Management  Difficulity with ADLs: None  Difficulity with IADLs: Driving    Functional Assesment  Prior Functional Level: Ambulatory, Independent with Activities of Daily Living, Independent with Medication Management    Finances  Financial Barriers to Discharge: No    Vision / Hearing Impairment  Vision Impairment : Yes  Hearing Impairment : No    Values / Beliefs / Concerns  Values / Beliefs Concerns : No    Advance Directive  Advance Directive?: DPOA for Health Care  Durable Power of  Name and Contact : Legal Guardian  Advance Directive offered?: AD Booklet refused    Domestic Abuse  Have you ever been the victim of abuse or violence?: No         Discharge Risks or Barriers  Discharge risks or barriers?: Post-acute placement / services  Patient risk factors: Vulnerable adult    Anticipated Discharge Information  Discharge Disposition: Discharged to home/self care (01)

## 2024-06-13 NOTE — ED PROVIDER NOTES
"ED Provider Note    CHIEF COMPLAINT  Chief Complaint   Patient presents with    Anxiety     States had a \"panic attack\" this am  Seen yesterday for c/o same       EXTERNAL RECORDS REVIEWED  Other patient was seen in this emergency department yesterday for anxiety.  Record review at that time revealed a history of alcohol abuse and anxiety with multiple visits to the ER during which time she was requesting benzodiazepines.  She was advised to follow-up with her psychiatrist each time.  Her most recent visit was yesterday for anxiety once again at which time she was given 1 dose of benzodiazepines.  The provider contacted her psychiatrist, Dr. White, who reported that the patient has not followed up in a year and a half and frequently no-shows to her visits.  She agreed that the patient abuses alcohol and has been to rehab in the past, she agreed also that benzodiazepine prescription was not in the patient's best interest.  ERP yesterday had a long discussion with the patient regarding her alcohol use and she was educated that benzodiazepine prescription from the emergency department was not appropriate.  She was instructed to follow-up with psychiatry.  The patient indicated she lacks transportation and case management discussed options for transportation with her that are available through her medical insurance.    HPI/ROS  LIMITATION TO HISTORY   Select: : None  OUTSIDE HISTORIAN(S):  None    Marcia Sharyn Koch is a 73 y.o. female who presents with a chief complaint of anxiety.  Patient has a history of alcohol abuse, she notes that she last drank alcohol 3 days ago.  This morning she had a panic attack and contacted EMS to bring her to the ER for evaluation.  She admits that she was seen in the ER yesterday and was hoping to get another dose of medication to calm her down.  She knows that she has a psychiatrist who is available to see her but has not called her to schedule an appointment.  She has no physical " complaints presently.    PAST MEDICAL HISTORY   has a past medical history of Alcohol abuse, Alcoholism (HCC), Anxiety, Depression, Hiatus hernia syndrome, High cholesterol, Hypertension, Indigestion, Other specified disorder of intestines, Pneumonia, Psychiatric disorder, Sleep apnea, Snoring, Unspecified urinary incontinence, and Urinary bladder disorder.    SURGICAL HISTORY   has a past surgical history that includes bladder sling female (2011); teddy by laparoscopy; other abdominal surgery (); other (); other (); other (); gyn surgery (); and abdominal hysterectomy total.    FAMILY HISTORY  Family History   Problem Relation Age of Onset    Anxiety disorder Brother     Depression Brother     Other Brother          AIDS    Alcohol abuse Mother     Cancer Mother         colon cancer age 70s    Alcohol abuse Father     Hyperlipidemia Father     Other Father         TIA, macular degenration    Cancer Sister         breast    Drug abuse Sister     Cancer Maternal Aunt         breast    Cancer Maternal Uncle         nos    Stroke Neg Hx     Heart Attack Neg Hx        SOCIAL HISTORY  Social History     Tobacco Use    Smoking status: Every Day     Current packs/day: 0.00     Average packs/day: 0.5 packs/day for 25.0 years (12.5 ttl pk-yrs)     Types: Cigarettes     Start date: 1986     Last attempt to quit: 2011     Years since quittin.8    Smokeless tobacco: Never   Vaping Use    Vaping status: Never Used   Substance and Sexual Activity    Alcohol use: Yes     Comment: 2-3 shots of vodka daily    Drug use: No    Sexual activity: Not Currently     Partners: Male     Birth control/protection: Abstinence       CURRENT MEDICATIONS  Home Medications    **Home medications have not yet been reviewed for this encounter**         ALLERGIES  Allergies   Allergen Reactions    Atorvastatin Myalgia    Codeine Nausea    Lisinopril Cough     Cough      Morphine Nausea    Rosuvastatin Myalgia  "      PHYSICAL EXAM  VITAL SIGNS: /83   Pulse (!) 57   Temp 36.4 °C (97.6 °F) (Temporal)   Resp 18   Ht 1.6 m (5' 3\")   Wt 59 kg (130 lb 1.1 oz)   SpO2 93%   BMI 23.04 kg/m²    Physical Exam  Vitals and nursing note reviewed.   Constitutional:       Appearance: Normal appearance.   HENT:      Head: Normocephalic and atraumatic.      Right Ear: External ear normal.      Left Ear: External ear normal.      Nose: Nose normal.      Mouth/Throat:      Mouth: Mucous membranes are moist.      Pharynx: Oropharynx is clear.   Eyes:      Extraocular Movements: Extraocular movements intact.      Conjunctiva/sclera: Conjunctivae normal.      Pupils: Pupils are equal, round, and reactive to light.   Cardiovascular:      Rate and Rhythm: Normal rate and regular rhythm.      Pulses: Normal pulses.   Pulmonary:      Effort: Pulmonary effort is normal.      Breath sounds: Normal breath sounds.   Abdominal:      Palpations: Abdomen is soft.      Tenderness: There is no abdominal tenderness.   Musculoskeletal:         General: Normal range of motion.      Cervical back: Normal range of motion and neck supple.   Skin:     General: Skin is warm and dry.   Neurological:      General: No focal deficit present.      Mental Status: She is alert and oriented to person, place, and time.   Psychiatric:      Comments: Calm and pleasant.       EKG/LABS  N/A  I have independently interpreted this EKG    RADIOLOGY/PROCEDURES   I have independently interpreted the diagnostic imaging associated with this visit and am waiting the final reading from the radiologist.   My preliminary interpretation is as follows: N/A    Radiologist interpretation:  No orders to display     COURSE & MEDICAL DECISION MAKING    ASSESSMENT, COURSE AND PLAN  Care Narrative: This is a 73-year-old female with a history of alcohol abuse and anxiety who is here after she experienced a panic attack earlier today.  She was here yesterday with similar symptoms and " received a dose of benzodiazepines.  She was seen by case management who provided her with the customer service number for transportation that is covered by her home health.  She has no current medical complaints.  She is very slightly bradycardic but afebrile with otherwise normal vital signs.  Her exam is very reassuring.  She is calm, pleasant, cooperative, has capacity to make medical decisions on her behalf.  She does not meet criteria for legal hold.  She did request a dose of anxiety medication which I do not feel is appropriate any longer from the emergency department.  I had an extensive discussion with the patient regarding next steps which would be to contact her psychiatrist for outpatient follow-up.  I enlisted the assistance of our on-call , Bernie Gaspar, who tried to assist the patient with calling her psychiatrist but because the patient has missed so many appointments in the past they declined to schedule an appointment from the ER and have requested that the patient Call and schedule an appointment when she returns home to gauge how committed she is to her own care.  I think at this point that that is reasonable.  She has been provided with assistance on numerous occasions.  Although I did consider obtaining labs and imaging I did not think it was indicated.  Her most recent lab work was performed less than 2 weeks ago and was largely reassuring, consistent with her history of alcohol abuse.  She was once again provided with transportation information.  She was discharged in stable condition.  We did go over strict return precautions.    ADDITIONAL PROBLEMS MANAGED  None    DISPOSITION AND DISCUSSIONS  I have discussed management of the patient with the following physicians and AMADOR's: None    Discussion of management with other [facility] or appropriate source(s): Case Management assisted once again with providing the patient with transportation resources      Escalation of care  considered, and ultimately not performed:IV fluids, Laboratory analysis, diagnostic imaging, and acute inpatient care management, however at this time, the patient is most appropriate for outpatient management    Barriers to care at this time, including but not limited to: Patient lacks transportation .     Decision tools and prescription drugs considered including, but not limited to:  None .    FINAL DIAGNOSIS  1. Anxiety      Electronically signed by: Endy Villatoro M.D., 6/13/2024 9:37 AM

## 2024-06-13 NOTE — DISCHARGE PLANNING
Prior to discharge, CM offerred pt an Uber transport to home. Pt declined as she wants to ride a taxi to the grocery store first.

## 2024-06-13 NOTE — ED NOTES
"Chief Complaint   Patient presents with    Anxiety     States had a \"panic attack\" this am  Seen yesterday for c/o same     /83   Pulse (!) 57   Temp 36.4 °C (97.6 °F) (Temporal)   Resp 18   Ht 1.6 m (5' 3\")   Wt 59 kg (130 lb 1.1 oz)   SpO2 93%   BMI 23.04 kg/m²     Pt to ED via [facility] for c/o anxiety, seen yesterday for c/o same, states has not been able to get to Pharmacy for prescriptions.    "

## 2024-06-26 ENCOUNTER — APPOINTMENT (RX ONLY)
Dept: URBAN - METROPOLITAN AREA CLINIC 35 | Facility: CLINIC | Age: 74
Setting detail: DERMATOLOGY
End: 2024-06-26

## 2024-08-23 ENCOUNTER — APPOINTMENT (OUTPATIENT)
Dept: PHYSICAL THERAPY | Facility: MEDICAL CENTER | Age: 74
End: 2024-08-23
Attending: STUDENT IN AN ORGANIZED HEALTH CARE EDUCATION/TRAINING PROGRAM
Payer: MEDICARE

## 2024-09-06 ENCOUNTER — APPOINTMENT (OUTPATIENT)
Dept: PHYSICAL THERAPY | Facility: MEDICAL CENTER | Age: 74
End: 2024-09-06
Attending: STUDENT IN AN ORGANIZED HEALTH CARE EDUCATION/TRAINING PROGRAM

## 2024-09-13 ENCOUNTER — APPOINTMENT (OUTPATIENT)
Dept: PHYSICAL THERAPY | Facility: MEDICAL CENTER | Age: 74
End: 2024-09-13
Attending: STUDENT IN AN ORGANIZED HEALTH CARE EDUCATION/TRAINING PROGRAM

## 2024-09-20 ENCOUNTER — APPOINTMENT (OUTPATIENT)
Dept: PHYSICAL THERAPY | Facility: MEDICAL CENTER | Age: 74
End: 2024-09-20
Attending: STUDENT IN AN ORGANIZED HEALTH CARE EDUCATION/TRAINING PROGRAM

## 2024-09-27 ENCOUNTER — APPOINTMENT (OUTPATIENT)
Dept: PHYSICAL THERAPY | Facility: MEDICAL CENTER | Age: 74
End: 2024-09-27
Attending: STUDENT IN AN ORGANIZED HEALTH CARE EDUCATION/TRAINING PROGRAM

## 2024-10-04 ENCOUNTER — APPOINTMENT (OUTPATIENT)
Dept: PHYSICAL THERAPY | Facility: MEDICAL CENTER | Age: 74
End: 2024-10-04
Attending: STUDENT IN AN ORGANIZED HEALTH CARE EDUCATION/TRAINING PROGRAM
Payer: MEDICARE

## 2024-10-11 ENCOUNTER — APPOINTMENT (OUTPATIENT)
Dept: PHYSICAL THERAPY | Facility: MEDICAL CENTER | Age: 74
End: 2024-10-11
Attending: STUDENT IN AN ORGANIZED HEALTH CARE EDUCATION/TRAINING PROGRAM
Payer: MEDICARE

## 2024-10-18 ENCOUNTER — APPOINTMENT (OUTPATIENT)
Dept: PHYSICAL THERAPY | Facility: MEDICAL CENTER | Age: 74
End: 2024-10-18
Attending: STUDENT IN AN ORGANIZED HEALTH CARE EDUCATION/TRAINING PROGRAM
Payer: MEDICARE

## 2025-07-25 ASSESSMENT — LIFESTYLE VARIABLES
TREMOR: MODERATE TREMOR WITH ARMS EXTENDED
AGITATION: *
TOTAL SCORE: 25
PAROXYSMAL SWEATS: *
HEADACHE, FULLNESS IN HEAD: MODERATELY SEVERE
ORIENTATION AND CLOUDING OF SENSORIUM: ORIENTED AND CAN DO SERIAL ADDITIONS
VISUAL DISTURBANCES: NOT PRESENT
AUDITORY DISTURBANCES: NOT PRESENT
NAUSEA AND VOMITING: CONSTANT NAUSEA, FREQUENT DRY HEAVES AND VOMITING
ANXIETY: *

## 2025-07-26 ENCOUNTER — APPOINTMENT (OUTPATIENT)
Dept: RADIOLOGY | Facility: MEDICAL CENTER | Age: 75
End: 2025-07-26
Attending: STUDENT IN AN ORGANIZED HEALTH CARE EDUCATION/TRAINING PROGRAM
Payer: MEDICARE

## 2025-07-26 ENCOUNTER — HOSPITAL ENCOUNTER (INPATIENT)
Facility: MEDICAL CENTER | Age: 75
LOS: 4 days | End: 2025-07-30
Attending: STUDENT IN AN ORGANIZED HEALTH CARE EDUCATION/TRAINING PROGRAM | Admitting: STUDENT IN AN ORGANIZED HEALTH CARE EDUCATION/TRAINING PROGRAM
Payer: MEDICARE

## 2025-07-26 PROBLEM — K92.2 UGIB (UPPER GASTROINTESTINAL BLEED): Status: ACTIVE | Noted: 2025-07-26

## 2025-07-26 PROBLEM — E87.20 LACTIC ACIDOSIS: Status: ACTIVE | Noted: 2025-07-26

## 2025-07-26 SDOH — ECONOMIC STABILITY: TRANSPORTATION INSECURITY
IN THE PAST 12 MONTHS, HAS LACK OF RELIABLE TRANSPORTATION KEPT YOU FROM MEDICAL APPOINTMENTS, MEETINGS, WORK OR FROM GETTING THINGS NEEDED FOR DAILY LIVING?: NO

## 2025-07-26 ASSESSMENT — FIBROSIS 4 INDEX
FIB4 SCORE: 2.33
FIB4 SCORE: 4

## 2025-07-26 ASSESSMENT — ENCOUNTER SYMPTOMS
NEUROLOGICAL NEGATIVE: 1
WEAKNESS: 1
NERVOUS/ANXIOUS: 0
PSYCHIATRIC NEGATIVE: 1
CARDIOVASCULAR NEGATIVE: 1
RESPIRATORY NEGATIVE: 1
SPUTUM PRODUCTION: 1
NAUSEA: 1
SHORTNESS OF BREATH: 1
ABDOMINAL PAIN: 1
COUGH: 1
EYES NEGATIVE: 1
MUSCULOSKELETAL NEGATIVE: 1
VOMITING: 1

## 2025-07-26 ASSESSMENT — LIFESTYLE VARIABLES
HEADACHE, FULLNESS IN HEAD: NOT PRESENT
NAUSEA AND VOMITING: *
ANXIETY: NO ANXIETY (AT EASE)
ANXIETY: *
TOTAL SCORE: 1
TREMOR: *
HEADACHE, FULLNESS IN HEAD: NOT PRESENT
HAVE YOU EVER FELT YOU SHOULD CUT DOWN ON YOUR DRINKING: YES
NAUSEA AND VOMITING: *
NAUSEA AND VOMITING: NO NAUSEA AND NO VOMITING
VISUAL DISTURBANCES: NOT PRESENT
NAUSEA AND VOMITING: NO NAUSEA AND NO VOMITING
ANXIETY: *
DOES PATIENT WANT TO TALK TO SOMEONE ABOUT QUITTING: NO
AGITATION: *
HAVE PEOPLE ANNOYED YOU BY CRITICIZING YOUR DRINKING: NO
PAROXYSMAL SWEATS: NO SWEAT VISIBLE
TOTAL SCORE: 1
TOTAL SCORE: 12
EVER FELT BAD OR GUILTY ABOUT YOUR DRINKING: NO
EVER HAD A DRINK FIRST THING IN THE MORNING TO STEADY YOUR NERVES TO GET RID OF A HANGOVER: NO
TREMOR: MODERATE TREMOR WITH ARMS EXTENDED
PAROXYSMAL SWEATS: BARELY PERCEPTIBLE SWEATING. PALMS MOIST
ORIENTATION AND CLOUDING OF SENSORIUM: ORIENTED AND CAN DO SERIAL ADDITIONS
DOES PATIENT WANT TO STOP DRINKING: YES
SUBSTANCE_ABUSE: 1
TOTAL SCORE: 4
AUDITORY DISTURBANCES: NOT PRESENT
NAUSEA AND VOMITING: MILD NAUSEA WITH NO VOMITING
AGITATION: SOMEWHAT MORE THAN NORMAL ACTIVITY
AUDITORY DISTURBANCES: NOT PRESENT
TREMOR: *
AGITATION: SOMEWHAT MORE THAN NORMAL ACTIVITY
HEADACHE, FULLNESS IN HEAD: MILD
VISUAL DISTURBANCES: NOT PRESENT
HEADACHE, FULLNESS IN HEAD: VERY MILD
AVERAGE NUMBER OF DAYS PER WEEK YOU HAVE A DRINK CONTAINING ALCOHOL: 7
ALCOHOL_USE: YES
AUDITORY DISTURBANCES: NOT PRESENT
TOTAL SCORE: 4
HEADACHE, FULLNESS IN HEAD: MILD
AUDITORY DISTURBANCES: NOT PRESENT
HOW MANY TIMES IN THE PAST YEAR HAVE YOU HAD 5 OR MORE DRINKS IN A DAY: 2
PAROXYSMAL SWEATS: BARELY PERCEPTIBLE SWEATING. PALMS MOIST
VISUAL DISTURBANCES: NOT PRESENT
ORIENTATION AND CLOUDING OF SENSORIUM: ORIENTED AND CAN DO SERIAL ADDITIONS
ORIENTATION AND CLOUDING OF SENSORIUM: ORIENTED AND CAN DO SERIAL ADDITIONS
ANXIETY: MODERATELY ANXIOUS OR GUARDED, SO ANXIETY IS INFERRED
ORIENTATION AND CLOUDING OF SENSORIUM: ORIENTED AND CAN DO SERIAL ADDITIONS
ON A TYPICAL DAY WHEN YOU DRINK ALCOHOL HOW MANY DRINKS DO YOU HAVE: 4
AGITATION: NORMAL ACTIVITY
PAROXYSMAL SWEATS: NO SWEAT VISIBLE
AUDITORY DISTURBANCES: NOT PRESENT
VISUAL DISTURBANCES: NOT PRESENT
TOTAL SCORE: 10
VISUAL DISTURBANCES: NOT PRESENT
TOTAL SCORE: 1
CONSUMPTION TOTAL: POSITIVE
PAROXYSMAL SWEATS: NO SWEAT VISIBLE
TOTAL SCORE: 8
ANXIETY: *
AGITATION: NORMAL ACTIVITY
ORIENTATION AND CLOUDING OF SENSORIUM: ORIENTED AND CAN DO SERIAL ADDITIONS
TOTAL SCORE: 3
TREMOR: *
TREMOR: MODERATE TREMOR WITH ARMS EXTENDED
TOTAL SCORE: 13

## 2025-07-26 ASSESSMENT — SOCIAL DETERMINANTS OF HEALTH (SDOH)
WITHIN THE LAST YEAR, HAVE YOU BEEN HUMILIATED OR EMOTIONALLY ABUSED IN OTHER WAYS BY YOUR PARTNER OR EX-PARTNER?: NO
WITHIN THE PAST 12 MONTHS, YOU WORRIED THAT YOUR FOOD WOULD RUN OUT BEFORE YOU GOT THE MONEY TO BUY MORE: NEVER TRUE
WITHIN THE LAST YEAR, HAVE YOU BEEN KICKED, HIT, SLAPPED, OR OTHERWISE PHYSICALLY HURT BY YOUR PARTNER OR EX-PARTNER?: NO
WITHIN THE PAST 12 MONTHS, THE FOOD YOU BOUGHT JUST DIDN'T LAST AND YOU DIDN'T HAVE MONEY TO GET MORE: NEVER TRUE
WITHIN THE LAST YEAR, HAVE YOU BEEN AFRAID OF YOUR PARTNER OR EX-PARTNER?: NO
IN THE PAST 12 MONTHS, HAS THE ELECTRIC, GAS, OIL, OR WATER COMPANY THREATENED TO SHUT OFF SERVICE IN YOUR HOME?: NO
WITHIN THE LAST YEAR, HAVE TO BEEN RAPED OR FORCED TO HAVE ANY KIND OF SEXUAL ACTIVITY BY YOUR PARTNER OR EX-PARTNER?: NO

## 2025-07-26 ASSESSMENT — PAIN DESCRIPTION - PAIN TYPE
TYPE: ACUTE PAIN

## 2025-07-26 NOTE — CONSULTS
"Critical Care Consultation    Date of consult: 7/26/2025    Referring Physician  Vladimir Zuniga M.D.    Reason for Consultation  Upper GI Bleed    History of Presenting Illness  \"Marcia Koch is a 75 y.o. female who presented 7/26/2025 with hematemesis.  Patient has a history of alcohol use.  She drinks about 3 to 4 glasses of wine per day.  About an hour and a half prior to coming to the ER, patient felt a progressive worsening shortness of breath and generalized weakness.  She decided to come to the ER.     In ER, patient found to have white blood cell count 12,000 with hemoglobin 16, bicarbonate 14 with anion gap 28, lactic acid 5.8.  Chest x-ray negative for acute cardiopulmonary abnormality.  CTPA showed no pulmonary embolism, however diffuse esophageal wall thickening is noted, consistent with esophagitis.  Patient was noted to have a 6-8 episodes of hematemesis in the ER.  Patient will be admitted for upper GI bleed.\" - Dr Rader H&P    Reviewed last 24 hour events:  CV: lactate 5.8->6.6->5.6, NSR, SBP 140s  Pulm: 2L O2  Neuro: AAOx4  GI: active hematemesis, +brown BM, on octreotide  Renal: AG 28  ID/Abx: CTX 1g daily  Heme: WBC 12.4  Endo: hypoglycemic 50s-60s  DVT ppx: SCDs  GI: IV PPI BID  Labs/imaging: reviewed  Lines/Tubes/Drains: PIV      Code Status  Full Code    Review of Systems  Review of Systems   Gastrointestinal:  Positive for abdominal pain and vomiting.   Psychiatric/Behavioral:  The patient is not nervous/anxious.        Past Medical History   has a past medical history of Alcohol abuse, Alcoholism (HCC), Anxiety, Anxiety, Depression, Hiatus hernia syndrome, High cholesterol, Hypertension, Indigestion, Other specified disorder of intestines, Pneumonia, Psychiatric disorder, Sleep apnea, Snoring, Unspecified urinary incontinence, and Urinary bladder disorder.    Surgical History   has a past surgical history that includes bladder sling female (8/30/2011); teddy by laparoscopy; other " abdominal surgery (); other (); other (); other (); gyn surgery (); and abdominal hysterectomy total.    Family History  Family History   Problem Relation Age of Onset    Anxiety disorder Brother     Depression Brother     Other Brother          AIDS    Alcohol abuse Mother     Cancer Mother         colon cancer age 70s    Alcohol abuse Father     Hyperlipidemia Father     Other Father         TIA, macular degenration    Cancer Sister         breast    Drug abuse Sister     Cancer Maternal Aunt         breast    Cancer Maternal Uncle         nos    Stroke Neg Hx     Heart Attack Neg Hx        Social History   reports that she has been smoking cigarettes. She started smoking about 38 years ago. She has a 12.5 pack-year smoking history. She has never used smokeless tobacco. She reports current alcohol use. She reports that she does not use drugs.    Medications  Home Medications       Reviewed by Sarah Camp (Pharmacy Tech) on 25 at 0723  Med List Status: Complete     Medication Last Dose Status   Multiple Vitamin (MULTIVITAMIN PO) 2025 Active                  Audit from Redirected Encounters    **Home medications have not yet been reviewed for this encounter**       Current Medications[1]    Allergies  Allergies[2]    Vital Signs last 24 hours  Temp:  [35.7 °C (96.3 °F)-37 °C (98.6 °F)] 35.7 °C (96.3 °F)  Pulse:  [84-97] 97  Resp:  [12-29] 21  BP: (112-150)/(54-74) 146/74  SpO2:  [92 %-99 %] 92 %    Physical Exam  Physical Exam  Constitutional:       Appearance: Normal appearance.   HENT:      Head: Normocephalic and atraumatic.   Cardiovascular:      Rate and Rhythm: Normal rate and regular rhythm.   Pulmonary:      Effort: Pulmonary effort is normal.      Breath sounds: Normal breath sounds.   Abdominal:      General: Abdomen is flat.      Palpations: Abdomen is soft.   Musculoskeletal:      Right lower leg: No edema.      Left lower leg: No edema.   Neurological:       General: No focal deficit present.      Mental Status: She is alert and oriented to person, place, and time.   Psychiatric:         Mood and Affect: Mood normal.         Behavior: Behavior normal.         Fluids    Intake/Output Summary (Last 24 hours) at 7/26/2025 1142  Last data filed at 7/26/2025 0558  Gross per 24 hour   Intake 1836 ml   Output 600 ml   Net 1236 ml       Laboratory  Recent Results (from the past 48 hours)   CBC WITH DIFFERENTIAL    Collection Time: 07/26/25  2:58 AM   Result Value Ref Range    WBC 12.4 (H) 4.8 - 10.8 K/uL    RBC 4.87 4.20 - 5.40 M/uL    Hemoglobin 16.7 (H) 12.0 - 16.0 g/dL    Hematocrit 49.6 (H) 37.0 - 47.0 %    .8 (H) 81.4 - 97.8 fL    MCH 34.3 (H) 27.0 - 33.0 pg    MCHC 33.7 32.2 - 35.5 g/dL    RDW 47.2 35.9 - 50.0 fL    Platelet Count 265 164 - 446 K/uL    MPV 8.9 (L) 9.0 - 12.9 fL    Neutrophils-Polys 76.70 (H) 44.00 - 72.00 %    Lymphocytes 17.60 (L) 22.00 - 41.00 %    Monocytes 4.40 0.00 - 13.40 %    Eosinophils 0.10 0.00 - 6.90 %    Basophils 0.60 0.00 - 1.80 %    Immature Granulocytes 0.60 0.00 - 0.90 %    Nucleated RBC 0.00 0.00 - 0.20 /100 WBC    Neutrophils (Absolute) 9.49 (H) 1.82 - 7.42 K/uL    Lymphs (Absolute) 2.18 1.00 - 4.80 K/uL    Monos (Absolute) 0.54 0.00 - 0.85 K/uL    Eos (Absolute) 0.01 0.00 - 0.51 K/uL    Baso (Absolute) 0.08 0.00 - 0.12 K/uL    Immature Granulocytes (abs) 0.08 0.00 - 0.11 K/uL    NRBC (Absolute) 0.00 K/uL   COMP METABOLIC PANEL    Collection Time: 07/26/25  2:58 AM   Result Value Ref Range    Sodium 137 135 - 145 mmol/L    Potassium 3.7 3.6 - 5.5 mmol/L    Chloride 95 (L) 96 - 112 mmol/L    Co2 14 (L) 20 - 33 mmol/L    Anion Gap 28.0 (H) 7.0 - 16.0    Glucose 87 65 - 99 mg/dL    Bun 9 8 - 22 mg/dL    Creatinine 0.56 0.50 - 1.40 mg/dL    Calcium 8.8 8.5 - 10.5 mg/dL    Correct Calcium 8.6 8.5 - 10.5 mg/dL    AST(SGOT) 40 12 - 45 U/L    ALT(SGPT) 23 2 - 50 U/L    Alkaline Phosphatase 150 (H) 30 - 99 U/L    Total Bilirubin 0.5 0.1 -  1.5 mg/dL    Albumin 4.2 3.2 - 4.9 g/dL    Total Protein 6.9 6.0 - 8.2 g/dL    Globulin 2.7 1.9 - 3.5 g/dL    A-G Ratio 1.6 g/dL   TROPONIN    Collection Time: 25  2:58 AM   Result Value Ref Range    Troponin T 11 6 - 19 ng/L   LIPASE    Collection Time: 25  2:58 AM   Result Value Ref Range    Lipase 14 11 - 82 U/L   proBrain Natriuretic Peptide, NT    Collection Time: 25  2:58 AM   Result Value Ref Range    NT-proBNP 80 0 - 125 pg/mL   LACTIC ACID    Collection Time: 25  2:58 AM   Result Value Ref Range    Lactic Acid 5.8 (HH) 0.5 - 2.0 mmol/L   MAGNESIUM    Collection Time: 25  2:58 AM   Result Value Ref Range    Magnesium 1.6 1.5 - 2.5 mg/dL   ESTIMATED GFR    Collection Time: 25  2:58 AM   Result Value Ref Range    GFR (CKD-EPI) 96 >60 mL/min/1.73 m 2   PHOSPHORUS    Collection Time: 25  2:58 AM   Result Value Ref Range    Phosphorus 4.5 2.5 - 4.5 mg/dL   EKG    Collection Time: 25  3:03 AM   Result Value Ref Range    Report       [facility] Emergency Dept.    Test Date:  2025  Pt Name:    VIDHI ROSALES               Department: [facility]  MRN:        [id number]                      [facility]  Gender:     Female                       Technician: [id number]  :        1950                   Requested By:JANE ELIZONDO  Order #:    [id number]                    Reading MD:    Measurements  Intervals                                Axis  Rate:       78                           P:          73  VA:         149                          QRS:        89  QRSD:       99                           T:          80  QT:         400  QTc:        456    Interpretive Statements  Sinus rhythm  Borderline right axis deviation  Nonspecific T abnrm, anterolateral leads  ST elevation, consider inferior injury  Compared to ECG 2024 09:32:13  ST (T wave) deviation now present  Myocardial infarct finding now present     CoV-2, Flu A/B, And  RSV by PCR (CepOnFarmid)    Collection Time: 07/26/25  3:04 AM    Specimen: Respirate   Result Value Ref Range    Influenza virus A RNA Negative Negative    Influenza virus B, PCR Negative Negative    RSV, PCR Negative Negative    SARS-CoV-2 by PCR NotDetected     SARS-CoV-2 Source NP Swab    Blood Culture - Draw one from central line and one from peripheral site    Collection Time: 07/26/25  3:50 AM    Specimen: Peripheral; Blood   Result Value Ref Range    Significant Indicator NEG     Source BLD     Site PERIPHERAL     Culture Result       No Growth  Note: Blood cultures are incubated for 5 days and  are monitored continuously.Positive blood cultures  are called to the RN and reported as soon as  they are identified.  Blood culture testing and Gram stain, if indicated, are  performed at [facility],  [address] [place] [address].  Positive blood  cultures are sent to [facility],  [address] [place] [address], for organism identification  and susceptibility testing.     Blood Culture - Draw one from central line and one from peripheral site    Collection Time: 07/26/25  4:15 AM    Specimen: Line; Blood   Result Value Ref Range    Significant Indicator NEG     Source BLD     Site LINE     Culture Result       No Growth  Note: Blood cultures are incubated for 5 days and  are monitored continuously.Positive blood cultures  are called to the RN and reported as soon as  they are identified.  Blood culture testing and Gram stain, if indicated, are  performed at [facility],  [address] [place] [address].  Positive blood  cultures are sent to [facility],  [address] [place] [address], for organism identification  and susceptibility testing.     COD (ADULT)    Collection Time: 07/26/25  5:16 AM   Result Value Ref Range    ABO Grouping Only O     Rh Grouping Only POS     Antibody Screen-Cod NEG    LACTIC ACID    Collection  Time: 07/26/25  5:24 AM   Result Value Ref Range    Lactic Acid 6.6 (HH) 0.5 - 2.0 mmol/L   LACTIC ACID    Collection Time: 07/26/25  7:02 AM   Result Value Ref Range    Lactic Acid 5.6 (HH) 0.5 - 2.0 mmol/L   PROTHROMBIN TIME (INR)    Collection Time: 07/26/25  8:02 AM   Result Value Ref Range    PT 12.7 12.0 - 14.6 sec    INR 0.92 0.87 - 1.13   APTT    Collection Time: 07/26/25  8:02 AM   Result Value Ref Range    APTT 24.1 (L) 24.7 - 36.0 sec   HEMOGLOBIN AND HEMATOCRIT    Collection Time: 07/26/25 10:11 AM   Result Value Ref Range    Hemoglobin 14.3 12.0 - 16.0 g/dL    Hematocrit 43.5 37.0 - 47.0 %       Imaging  CT CHEST reviewed - +esophageal thickening    Assessment/Plan  * UGIB (upper gastrointestinal bleed)- (present on admission)  Assessment & Plan  Likely secondary to esophagitis from frequent EtOH use.  Also with significant NSAID use ~4 days/week.  No known cirrhosis history.   INR 0.9  Obtain RUQUS r/o cirrhosis.  Continue octreotide and ceftriaxone for now until RUQUS returns  IV PPI BID  1g TID carafate slurry  Trend Hb q6h  GI consult for consideration of EGD    Lactic acidosis  Assessment & Plan  Low concern for sepsis. Likely secondary to dehydration.  Trend with IVF resuscitation    ETOH abuse- (present on admission)  Assessment & Plan  Drinks 3-4 glasses wine/day. No known history of withdrawal.  - Start CIWA protocol with PRN benzodiazepene        Discussed patient condition and risk of morbidity and/or mortality with RN and Patient.    The patient remains critically ill.  Critical care time = 45 minutes in directly providing and coordinating critical care and extensive data review.  No time overlap and excludes procedures.    Vladimir Zuniga MD  Pulmonary and Critical Care Medicine  [facility]           [1]   Current Facility-Administered Medications   Medication Dose Route Frequency Provider Last Rate Last Admin    octreotide (SandoSTATIN) 1,250 mcg in  mL Infusion  50 mcg/hr  Intravenous Continuous Khristopher R Faiss, D.O.   Continue to Floor at 07/26/25 0803    LORazepam (Ativan) tablet 0.5 mg  0.5 mg Oral Q4HRS PRN Khristopher R Faiss, D.O.        LORazepam (Ativan) tablet 1 mg  1 mg Oral Q4HRS PRN Khristopher R Faiss, D.O.        LORazepam (Ativan) tablet 2 mg  2 mg Oral Q2HRS PRN Khristopher R Faiss, D.O.   2 mg at 07/26/25 0634    LORazepam (Ativan) tablet 3 mg  3 mg Oral Q HOUR PRN Khristopher R Faiss, D.O.        LORazepam (Ativan) tablet 4 mg  4 mg Oral Q15 MIN PRN Khristopher R Faiss, D.O.        Or    diazePAM (Valium) injection 10 mg  10 mg Intravenous Q15 MIN PRN Khristopher R Fabrandon, D.O.   10 mg at 07/26/25 1018    acetaminophen (Tylenol) tablet 650 mg  650 mg Oral Q6HRS PRN Bhargav Last M.D.        labetalol (Normodyne/Trandate) injection 10 mg  10 mg Intravenous Q4HRS PRN Bhargav Last M.D.        ondansetron (Zofran) syringe/vial injection 4 mg  4 mg Intravenous Q4HRS PRN Bhargav Last M.D.   4 mg at 07/26/25 1018    Or    ondansetron (Zofran ODT) dispertab 4 mg  4 mg Oral Q4HRS PRN Bhargav Last M.D.        thiamine (B-1) 500 mg in dextrose 5% 100 mL IVPB  500 mg Intravenous TID Bhargav Last M.D. 200 mL/hr at 07/26/25 1000 500 mg at 07/26/25 1000    [START ON 7/27/2025] cefTRIAXone (Rocephin) 1,000 mg in  mL IVPB  1,000 mg Intravenous Q24HRS Bhargav Last M.D.        pantoprazole (Protonix) injection 40 mg  40 mg Intravenous BID Bhargav Last M.D.        escitalopram (Lexapro) tablet 20 mg  20 mg Oral DAILY Bhargav Last M.D.   20 mg at 07/26/25 0713    D5LR infusion   Intravenous Continuous Vladimir Zuniga M.D. 75 mL/hr at 07/26/25 1105 New Bag at 07/26/25 1105    magnesium sulfate IVPB premix 2 g  2 g Intravenous Once Vladimir Zuniga M.D.   Stopped at 07/26/25 1312    potassium chloride SA (Kdur) tablet 40 mEq  40 mEq Oral DAILY Vladimir Zuniga M.D.        sucralfate (Carafate) tablet 1 g  1 g Oral TID Vladimir Zuniga M.D.        [2]   Allergies  Allergen Reactions    Atorvastatin Myalgia    Codeine Nausea    Lisinopril Cough     Cough      Morphine Nausea    Rosuvastatin Myalgia

## 2025-07-26 NOTE — ED TRIAGE NOTES
"Chief Complaint   Patient presents with    Shortness of Breath     Started today w/productive cough     Epigastric Pain     Non radiating    N/V     From the mucus     PT endorses she smokes 3packs a day &lives alone  /73   Pulse 84   Temp 36.7 °C (98 °F) (Temporal)   Resp (!) 22   Ht 1.615 m (5' 3.6\")   Wt 61.2 kg (135 lb)   SpO2 94%   BMI 23.47 kg/m²     " 30F pmhx of obesity and pshx of abdominoplasty now s/p RA lap VSG and HH repair with mesh (2/21)    BCLD/IVF  Cefotetan x 24 hrs (gastric spillage)   pain/nausea control  OOBA/SCD/IS  Lovenox POD1, pending AM CBC  AM labs  Nutrition consult   30F pmhx of obesity and pshx of abdominoplasty now s/p RA lap VSG and HH repair with mesh (2/21)    BCLD/IVF  Pain/nausea control  Holding LVX  OOBA/SCD/IS  AM labs  Dispo: Home 30F pmhx of obesity and pshx of abdominoplasty now s/p RA lap VSG and HH repair with mesh (2/21)    BCLD/IVF  pain/nausea control  OOBA/SCD/IS  Nutrition consult

## 2025-07-26 NOTE — H&P
Hospital Medicine History & Physical Note    Date of Service  7/26/2025    Primary Care Physician  Sharmin Mcdermott P.A.-C.    Consultants  None    Code Status  Full Code    Chief Complaint  Chief Complaint   Patient presents with    Shortness of Breath     Started today w/productive cough     Epigastric Pain     Non radiating    N/V     From the mucus       History of Presenting Illness  Marcia Koch is a 75 y.o. female who presented 7/26/2025 with hematemesis.  Patient has a history of alcohol use.  She drinks about 3 to 4 glasses of wine per day.  About an hour and a half prior to coming to the ER, patient felt a progressive worsening shortness of breath and generalized weakness.  She decided to come to the ER.    In ER, patient found to have white blood cell count 12,000 with hemoglobin 16, bicarbonate 14 with anion gap 28, lactic acid 5.8.  Chest x-ray negative for acute cardiopulmonary abnormality.  CTPA showed no pulmonary embolism, however diffuse esophageal wall thickening is noted, consistent with esophagitis.  Patient was noted to have a 6-8 episodes of hematemesis in the ER.  Patient will be admitted for upper GI bleed.    I discussed the plan of care with patient.    Review of Systems  Review of Systems   Constitutional:  Positive for malaise/fatigue.   HENT: Negative.     Eyes: Negative.    Respiratory: Negative.     Cardiovascular: Negative.    Gastrointestinal:  Positive for nausea and vomiting.   Genitourinary: Negative.    Musculoskeletal: Negative.    Skin: Negative.    Neurological: Negative.    Endo/Heme/Allergies: Negative.    Psychiatric/Behavioral: Negative.         Past Medical History   has a past medical history of Alcohol abuse, Alcoholism (HCC), Anxiety, Anxiety, Depression, Hiatus hernia syndrome, High cholesterol, Hypertension, Indigestion, Other specified disorder of intestines, Pneumonia, Psychiatric disorder, Sleep apnea, Snoring, Unspecified urinary incontinence, and Urinary  bladder disorder.    Surgical History   has a past surgical history that includes bladder sling female (2011); teddy by laparoscopy; other abdominal surgery (); other (); other (); other (); gyn surgery (); and abdominal hysterectomy total.     Family History  Family History   Problem Relation Age of Onset    Anxiety disorder Brother     Depression Brother     Other Brother          AIDS    Alcohol abuse Mother     Cancer Mother         colon cancer age 70s    Alcohol abuse Father     Hyperlipidemia Father     Other Father         TIA, macular degenration    Cancer Sister         breast    Drug abuse Sister     Cancer Maternal Aunt         breast    Cancer Maternal Uncle         nos    Stroke Neg Hx     Heart Attack Neg Hx         Family history reviewed with patient. There is no family history that is pertinent to the chief complaint.     Social History   reports that she has been smoking cigarettes. She started smoking about 38 years ago. She has a 12.5 pack-year smoking history. She has never used smokeless tobacco. She reports current alcohol use. She reports that she does not use drugs.    Allergies  Allergies[1]    Medications  Prior to Admission Medications   Prescriptions Last Dose Informant Patient Reported? Taking?   escitalopram (LEXAPRO) 20 MG tablet  Patient's Home Pharmacy Yes No   Sig: Take 20 mg by mouth every day.   propranolol (INDERAL) 80 MG Tab  Patient's Home Pharmacy Yes No   Sig: Take 80 mg by mouth every day.      Facility-Administered Medications: None       Physical Exam  Temp:  [36.7 °C (98 °F)] 36.7 °C (98 °F)  Pulse:  [84-96] 94  Resp:  [17-22] 20  BP: (126-146)/(58-73) 139/67  SpO2:  [92 %-99 %] 96 %  Blood Pressure : 139/67   Temperature: 36.7 °C (98 °F)   Pulse: 94   Respiration: 20   Pulse Oximetry: 96 %       Physical Exam  Constitutional:       Appearance: Normal appearance. She is normal weight.   HENT:      Head: Normocephalic.      Nose: Nose normal.       Mouth/Throat:      Mouth: Mucous membranes are moist.   Eyes:      Pupils: Pupils are equal, round, and reactive to light.   Cardiovascular:      Rate and Rhythm: Normal rate and regular rhythm.      Pulses: Normal pulses.   Pulmonary:      Effort: Pulmonary effort is normal.      Breath sounds: Normal breath sounds.   Abdominal:      General: Abdomen is flat. Bowel sounds are normal.      Palpations: Abdomen is soft.   Musculoskeletal:         General: Normal range of motion.      Cervical back: Neck supple.   Skin:     General: Skin is warm.   Neurological:      General: No focal deficit present.      Mental Status: She is alert and oriented to person, place, and time. Mental status is at baseline.   Psychiatric:         Mood and Affect: Mood normal.         Behavior: Behavior normal.         Thought Content: Thought content normal.         Judgment: Judgment normal.         Laboratory:  Recent Labs     07/26/25  0258   WBC 12.4*   RBC 4.87   HEMOGLOBIN 16.7*   HEMATOCRIT 49.6*   .8*   MCH 34.3*   MCHC 33.7   RDW 47.2   PLATELETCT 265   MPV 8.9*     Recent Labs     07/26/25  0258   SODIUM 137   POTASSIUM 3.7   CHLORIDE 95*   CO2 14*   GLUCOSE 87   BUN 9   CREATININE 0.56   CALCIUM 8.8     Recent Labs     07/26/25  0258   ALTSGPT 23   ASTSGOT 40   ALKPHOSPHAT 150*   TBILIRUBIN 0.5   LIPASE 14   GLUCOSE 87         Recent Labs     07/26/25  0258   NTPROBNP 80         Recent Labs     07/26/25  0258   TROPONINT 11       Imaging:  CT-CTA CHEST PULMONARY ARTERY W/ RECONS   Final Result      1.  No pulmonary embolism.   2.  Diffuse esophageal wall thickening with adjacent inflammation, consistent with esophagitis.            DX-CHEST-PORTABLE (1 VIEW)   Final Result      No acute process.          no X-Ray or EKG requiring interpretation    Assessment/Plan:  Justification for Admission Status  I anticipate this patient will require at least two midnights for appropriate medical management, necessitating  inpatient admission because pt has ugib    Patient will need a ICU (Adult and Pediatrics) bed on MEDICAL service .  The need is secondary to ugib.    * UGIB (upper gastrointestinal bleed)- (present on admission)  Assessment & Plan  Patient with alcohol use presents for hematemesis, unclear whether or not patient has cirrhosis and varices  Hemoglobin 16.4  Protonix 40 mg IV twice daily  Octreotide  Rocephin  Hemoglobin every 6 hours  Fluids  N.p.o.  GI consult in the morning    Lactic acidosis  Assessment & Plan  Lactic acid 5.8 -> 6.6, likely from alcohol use, patient not hypotensive.  Fluids  Trend lactic acid    ETOH abuse- (present on admission)  Assessment & Plan  CIWA protocol  IV thiamine, IV folic acid  Monitor for DTs      Patient is critically ill.   The patient continues to have: Upper GI bleed in setting of alcohol use  The vital organ system that is affected is the: GI tract, cardiovascular system  If untreated there is a high chance of deterioration into: Hemorrhagic shock  And eventually death.   The critical care that I am providing today is: IV Protonix, IV octreotide, IV Rocephin, fluid resuscitation  The critical that has been undertaken is medically complex.   There has been no overlap in critical care time.   Critical Care Time not including procedures: 39 minutes      VTE prophylaxis: pharmacologic prophylaxis contraindicated due to ugib         [1]   Allergies  Allergen Reactions    Atorvastatin Myalgia    Codeine Nausea    Lisinopril Cough     Cough      Morphine Nausea    Rosuvastatin Myalgia

## 2025-07-26 NOTE — ED NOTES
Repeat Lactic Acid collected and sent to lab;    Pt medicated per MAR, tolerated oral pill well, no coughing or choking noted;

## 2025-07-26 NOTE — CONSULTS
Gastroenterology Consult Note:    DELANO Handy  Date & Time note created:    7/26/2025   2:24 PM     Referring MD:  Dr. Vladimir Zuniga    Patient ID:  Name:             Marcia Koch   YOB: 1950  Age:                 75 y.o.  female   MRN:               [id number]                                                             Reason for Consult:      Hematemesis    History of Present Illness:    This is a 74 yo female PMH HTN, alcohol abuse, GERD, HLD who was seen in consultation for hematemesis x 4-5 episodes starting yesterday. Has been experiencing N/V/D for the past few days but yesterday began to notice coffee ground emesis. No melena. Began to have SOB and generalized weakness decided to come to ER. Notable labs: WBC: 12. Hgb: 16-->14.3. MCV: 101.8. Platelet count: 265. ALP: 150. INR: -.92. Lactic acid: 5.8-->6.6-->5.6-->3.4.     CTA: no pulmonary embolism. Diffuse esophageal wall thickening with adjacent inflammation     Over the past week, took some Aleve for back pain. Also, drinks 3-4 glasses of wine per day. No history of cirrhosis.     ASSESSMENT:  1. Hematemesis  2. N/V  3. Diarrhea likely viral gastroenteritis  4. HTN  5. Alcohol abuse  6. SOB with crackles    PLAN:  Clear liquids, no reds  EGD tomorrow  NPO after midnight  Trend Hgb and transfuse >7  PPI IV BID  Continue octreotide for now if no signs of EV/PHG will be able to discontinue.  GI PCR stool profile, Stool culture.    Chest Xray in AM    Review of Systems:      Review of Systems   Constitutional:  Positive for malaise/fatigue.   HENT: Negative.     Eyes: Negative.    Respiratory:  Positive for cough, sputum production and shortness of breath.    Cardiovascular: Negative.    Gastrointestinal:  Positive for nausea and vomiting.   Genitourinary: Negative.    Musculoskeletal: Negative.    Skin: Negative.    Neurological:  Positive for weakness.   Psychiatric/Behavioral:  Positive for substance abuse.               Physical Exam:  Vitals/ General Appearance:   Weight/BMI: Body mass index is 23.8 kg/m².    Vitals:    07/26/25 1031 07/26/25 1100 07/26/25 1200 07/26/25 1400   BP: (!) 140/65 (!) 141/64 (!) 141/65 139/63   Pulse: 94 94 98 94   Resp: (!) 22 (!) 22 (!) 35 20   Temp:   36.5 °C (97.7 °F) 37 °C (98.6 °F)   TempSrc:    Temporal   SpO2: 93% 91% 93% 89%   Weight:       Height:         Oxygen Therapy:  Pulse Oximetry: 89 %, O2 (LPM): 2, O2 Delivery Device: Silicone Nasal Cannula    Physical Exam  Vitals reviewed.   Constitutional:       Appearance: She is ill-appearing.   HENT:      Head: Normocephalic and atraumatic.      Mouth/Throat:      Mouth: Mucous membranes are moist.   Eyes:      Extraocular Movements: Extraocular movements intact.      Pupils: Pupils are equal, round, and reactive to light.   Cardiovascular:      Rate and Rhythm: Normal rate and regular rhythm.      Pulses: Normal pulses.      Heart sounds: Normal heart sounds.   Pulmonary:      Effort: Pulmonary effort is normal.      Breath sounds: Wheezing and rales present.   Abdominal:      General: Bowel sounds are normal. There is no distension.      Palpations: Abdomen is soft.      Tenderness: There is no abdominal tenderness.   Musculoskeletal:         General: Normal range of motion.      Cervical back: Normal range of motion and neck supple.   Skin:     General: Skin is warm and dry.      Capillary Refill: Capillary refill takes 2 to 3 seconds.   Neurological:      General: No focal deficit present.      Mental Status: She is alert and oriented to person, place, and time.   Psychiatric:         Mood and Affect: Mood normal.         Behavior: Behavior normal.         Thought Content: Thought content normal.         Judgment: Judgment normal.         Past Medical History:   Past Medical History[1]    Past Surgical History:  Past Surgical History[2]    Hospital Medications:  Current Medications[3]    Current Outpatient Medications:  Current  Medications[4]    Medication Allergy:  Allergies[5]    Family History:  Family History   Problem Relation Age of Onset    Anxiety disorder Brother     Depression Brother     Other Brother          AIDS    Alcohol abuse Mother     Cancer Mother         colon cancer age 70s    Alcohol abuse Father     Hyperlipidemia Father     Other Father         TIA, macular degenration    Cancer Sister         breast    Drug abuse Sister     Cancer Maternal Aunt         breast    Cancer Maternal Uncle         nos    Stroke Neg Hx     Heart Attack Neg Hx        Social History:  Social History     Socioeconomic History    Marital status:      Spouse name: Not on file    Number of children: Not on file    Years of education: Not on file    Highest education level: Not on file   Occupational History    Not on file   Tobacco Use    Smoking status: Every Day     Current packs/day: 0.00     Average packs/day: 0.5 packs/day for 25.0 years (12.5 ttl pk-yrs)     Types: Cigarettes     Start date: 1986     Last attempt to quit: 2011     Years since quittin.9    Smokeless tobacco: Never   Vaping Use    Vaping status: Never Used   Substance and Sexual Activity    Alcohol use: Yes    Drug use: No    Sexual activity: Not Currently     Partners: Male     Birth control/protection: Abstinence   Other Topics Concern    Not on file   Social History Narrative    Not on file     Social Drivers of Health     Financial Resource Strain: Not on file   Food Insecurity: No Food Insecurity (2025)    Hunger Vital Sign     Worried About Running Out of Food in the Last Year: Never true     Ran Out of Food in the Last Year: Never true   Transportation Needs: No Transportation Needs (2025)    PRAPARE - Transportation     Lack of Transportation (Medical): No     Lack of Transportation (Non-Medical): No   Physical Activity: Not on file   Stress: Not on file   Social Connections: Not on file   Intimate Partner Violence: Not At Risk  (7/26/2025)    Humiliation, Afraid, Rape, and Kick questionnaire     Fear of Current or Ex-Partner: No     Emotionally Abused: No     Physically Abused: No     Sexually Abused: No   Housing Stability: Unknown (7/26/2025)    Housing Stability Vital Sign     Unable to Pay for Housing in the Last Year: No     Number of Times Moved in the Last Year: Not on file     Homeless in the Last Year: No         MDM (Data Review):     Records reviewed and summarized in current documentation    Lab Data Review:  Recent Results (from the past 24 hours)   CBC WITH DIFFERENTIAL    Collection Time: 07/26/25  2:58 AM   Result Value Ref Range    WBC 12.4 (H) 4.8 - 10.8 K/uL    RBC 4.87 4.20 - 5.40 M/uL    Hemoglobin 16.7 (H) 12.0 - 16.0 g/dL    Hematocrit 49.6 (H) 37.0 - 47.0 %    .8 (H) 81.4 - 97.8 fL    MCH 34.3 (H) 27.0 - 33.0 pg    MCHC 33.7 32.2 - 35.5 g/dL    RDW 47.2 35.9 - 50.0 fL    Platelet Count 265 164 - 446 K/uL    MPV 8.9 (L) 9.0 - 12.9 fL    Neutrophils-Polys 76.70 (H) 44.00 - 72.00 %    Lymphocytes 17.60 (L) 22.00 - 41.00 %    Monocytes 4.40 0.00 - 13.40 %    Eosinophils 0.10 0.00 - 6.90 %    Basophils 0.60 0.00 - 1.80 %    Immature Granulocytes 0.60 0.00 - 0.90 %    Nucleated RBC 0.00 0.00 - 0.20 /100 WBC    Neutrophils (Absolute) 9.49 (H) 1.82 - 7.42 K/uL    Lymphs (Absolute) 2.18 1.00 - 4.80 K/uL    Monos (Absolute) 0.54 0.00 - 0.85 K/uL    Eos (Absolute) 0.01 0.00 - 0.51 K/uL    Baso (Absolute) 0.08 0.00 - 0.12 K/uL    Immature Granulocytes (abs) 0.08 0.00 - 0.11 K/uL    NRBC (Absolute) 0.00 K/uL   COMP METABOLIC PANEL    Collection Time: 07/26/25  2:58 AM   Result Value Ref Range    Sodium 137 135 - 145 mmol/L    Potassium 3.7 3.6 - 5.5 mmol/L    Chloride 95 (L) 96 - 112 mmol/L    Co2 14 (L) 20 - 33 mmol/L    Anion Gap 28.0 (H) 7.0 - 16.0    Glucose 87 65 - 99 mg/dL    Bun 9 8 - 22 mg/dL    Creatinine 0.56 0.50 - 1.40 mg/dL    Calcium 8.8 8.5 - 10.5 mg/dL    Correct Calcium 8.6 8.5 - 10.5 mg/dL    AST(SGOT)  40 12 - 45 U/L    ALT(SGPT) 23 2 - 50 U/L    Alkaline Phosphatase 150 (H) 30 - 99 U/L    Total Bilirubin 0.5 0.1 - 1.5 mg/dL    Albumin 4.2 3.2 - 4.9 g/dL    Total Protein 6.9 6.0 - 8.2 g/dL    Globulin 2.7 1.9 - 3.5 g/dL    A-G Ratio 1.6 g/dL   TROPONIN    Collection Time: 25  2:58 AM   Result Value Ref Range    Troponin T 11 6 - 19 ng/L   LIPASE    Collection Time: 25  2:58 AM   Result Value Ref Range    Lipase 14 11 - 82 U/L   proBrain Natriuretic Peptide, NT    Collection Time: 25  2:58 AM   Result Value Ref Range    NT-proBNP 80 0 - 125 pg/mL   LACTIC ACID    Collection Time: 25  2:58 AM   Result Value Ref Range    Lactic Acid 5.8 (HH) 0.5 - 2.0 mmol/L   MAGNESIUM    Collection Time: 25  2:58 AM   Result Value Ref Range    Magnesium 1.6 1.5 - 2.5 mg/dL   ESTIMATED GFR    Collection Time: 25  2:58 AM   Result Value Ref Range    GFR (CKD-EPI) 96 >60 mL/min/1.73 m 2   PHOSPHORUS    Collection Time: 25  2:58 AM   Result Value Ref Range    Phosphorus 4.5 2.5 - 4.5 mg/dL   EKG    Collection Time: 25  3:03 AM   Result Value Ref Range    Report       [facility]    Test Date:  2025  Pt Name:    VIDHI ROSALES               Department: [facility]  MRN:        [id number]                      [facility]  Gender:     Female                       Technician: [id number]  :        1950                   Requested By:JANE ELIZONDO  Order #:    [id number]                    Reading MD:    Measurements  Intervals                                Axis  Rate:       78                           P:          73  IL:         149                          QRS:        89  QRSD:       99                           T:          80  QT:         400  QTc:        456    Interpretive Statements  Sinus rhythm  Borderline right axis deviation  Nonspecific T abnrm, anterolateral leads  ST elevation, consider inferior injury  Compared to ECG  06/12/2024 09:32:13  ST (T wave) deviation now present  Myocardial infarct finding now present     CoV-2, Flu A/B, And RSV by PCR (Teravacid)    Collection Time: 07/26/25  3:04 AM    Specimen: Respirate   Result Value Ref Range    Influenza virus A RNA Negative Negative    Influenza virus B, PCR Negative Negative    RSV, PCR Negative Negative    SARS-CoV-2 by PCR NotDetected     SARS-CoV-2 Source NP Swab    Blood Culture - Draw one from central line and one from peripheral site    Collection Time: 07/26/25  3:50 AM    Specimen: Peripheral; Blood   Result Value Ref Range    Significant Indicator NEG     Source BLD     Site PERIPHERAL     Culture Result       No Growth  Note: Blood cultures are incubated for 5 days and  are monitored continuously.Positive blood cultures  are called to the RN and reported as soon as  they are identified.  Blood culture testing and Gram stain, if indicated, are  performed at [facility],  [address] [place] [address].  Positive blood  cultures are sent to [facility],  [address] [place] [address], for organism identification  and susceptibility testing.     Blood Culture - Draw one from central line and one from peripheral site    Collection Time: 07/26/25  4:15 AM    Specimen: Line; Blood   Result Value Ref Range    Significant Indicator NEG     Source BLD     Site LINE     Culture Result       No Growth  Note: Blood cultures are incubated for 5 days and  are monitored continuously.Positive blood cultures  are called to the RN and reported as soon as  they are identified.  Blood culture testing and Gram stain, if indicated, are  performed at [facility],  [address] [place] [address].  Positive blood  cultures are sent to [facility],  [address] [place] [address], for organism identification  and susceptibility testing.     COD (ADULT)    Collection Time: 07/26/25  5:16 AM   Result  Value Ref Range    ABO Grouping Only O     Rh Grouping Only POS     Antibody Screen-Cod NEG    LACTIC ACID    Collection Time: 07/26/25  5:24 AM   Result Value Ref Range    Lactic Acid 6.6 (HH) 0.5 - 2.0 mmol/L   LACTIC ACID    Collection Time: 07/26/25  7:02 AM   Result Value Ref Range    Lactic Acid 5.6 (HH) 0.5 - 2.0 mmol/L   PROTHROMBIN TIME (INR)    Collection Time: 07/26/25  8:02 AM   Result Value Ref Range    PT 12.7 12.0 - 14.6 sec    INR 0.92 0.87 - 1.13   APTT    Collection Time: 07/26/25  8:02 AM   Result Value Ref Range    APTT 24.1 (L) 24.7 - 36.0 sec   HEMOGLOBIN AND HEMATOCRIT    Collection Time: 07/26/25 10:11 AM   Result Value Ref Range    Hemoglobin 14.3 12.0 - 16.0 g/dL    Hematocrit 43.5 37.0 - 47.0 %   LACTIC ACID    Collection Time: 07/26/25 11:50 AM   Result Value Ref Range    Lactic Acid 3.4 (H) 0.5 - 2.0 mmol/L       Imaging/Procedures Review:      US-RUQ   Final Result      1.  Echogenic heterogeneous liver parenchyma could relate to hepatocellular disease/cirrhosis.   2.  No hepatic mass identified.         CT-CTA CHEST PULMONARY ARTERY W/ RECONS   Final Result      1.  No pulmonary embolism.   2.  Diffuse esophageal wall thickening with adjacent inflammation, consistent with esophagitis.            DX-CHEST-PORTABLE (1 VIEW)   Final Result      No acute process.           MDM (Assessment and Plan):     Patient Active Problem List    Diagnosis Date Noted    UGIB (upper gastrointestinal bleed) 07/26/2025    Lactic acidosis 07/26/2025    Osteoporosis without current pathological fracture 10/31/2022    Hypomagnesemia 09/01/2022    Bilateral lower extremity edema 08/31/2022    Acute deep vein (gastrocnemius chilo) thrombosis (DVT) of right lower extremity (HCC) 08/31/2022    Alcohol abuse with withdrawal (HCC) 08/30/2022    Hypokalemia 08/30/2022    Hypophosphatemia 08/30/2022    Hypocalcemia 08/30/2022    High anion gap metabolic acidosis 08/30/2022    MICHEAL (generalized anxiety disorder)  03/30/2022    Major depressive disorder, recurrent episode, mild (HCC) 03/30/2022    Hallucinosis, alcoholic and narcotic related, recent trauma, rib fractures and hypoxia(HCC) 02/12/2021    Chronic respiratory failure with hypoxia (HCC) 01/27/2021    Closed fracture of multiple ribs of right side 01/25/2021    Subdural hematoma (HCC) 01/25/2021    Trauma 01/25/2021    GERD (gastroesophageal reflux disease) 01/25/2021    Hypoxia 01/25/2021    Impaired fasting glucose 01/05/2021    Polycythemia 09/15/2020    Tobacco dependence 08/12/2020    Anxiety 05/11/2017    Alcohol use disorder, moderate, dependence (HCC) 04/07/2017    Moderate sedative, hypnotic, 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  Please call for any questions or concerns.    Kimberly Armendariz, POLY.P.R.N.             [1]   Past Medical History:  Diagnosis Date    Alcohol abuse     Alcoholism (HCC)     Anxiety     Anxiety     Depression     Hiatus hernia syndrome     High cholesterol     Hypertension     Indigestion     Other specified disorder of intestines     diarrhea    Pneumonia     for 2 days    Psychiatric disorder     anxiety, depression    Sleep apnea     Snoring     Unspecified urinary incontinence     Urinary bladder disorder    [2]   Past Surgical History:  Procedure Laterality Date    BLADDER SLING FEMALE  8/30/2011    Performed by RENATE STERLING at SURGERY [facility]    OTHER  2010    surgery for sleep apnea    OTHER  2010    big toenails and second toenails removed    OTHER ABDOMINAL SURGERY  2002    choley    GYN SURGERY  1991    hyst    OTHER  1956    tonsillectomy    ABDOMINAL HYSTERECTOMY TOTAL      Hysterectomy, Total Abdominal    BRIEN BY LAPAROSCOPY      Cholecystectomy, Laparoscopic   [3]   Current Facility-Administered Medications:      octreotide (SandoSTATIN) 1,250 mcg in  mL Infusion, 50 mcg/hr, Intravenous, Continuous, Khristopher R Faiss, D.O., Continue to Floor at 07/26/25 0803    LORazepam (Ativan) tablet 0.5 mg, 0.5 mg, Oral, Q4HRS PRN, Khristopher R Faiss, D.O.    LORazepam (Ativan) tablet 1 mg, 1 mg, Oral, Q4HRS PRN, Khristopher R Faiss, D.O.    LORazepam (Ativan) tablet 2 mg, 2 mg, Oral, Q2HRS PRN, Khristopher R Faiss, D.O., 2 mg at 07/26/25 1153    LORazepam (Ativan) tablet 3 mg, 3 mg, Oral, Q HOUR PRN, Khristopher R Faiss, D.O.    LORazepam (Ativan) tablet 4 mg, 4 mg, Oral, Q15 MIN PRN **OR** diazePAM (Valium) injection 10 mg, 10 mg, Intravenous, Q15 MIN PRN, Khristopher R Faiss, D.O., 10 mg at 07/26/25 1018    acetaminophen (Tylenol) tablet 650 mg, 650 mg, Oral, Q6HRS PRN, Bhargav Last M.D.    labetalol (Normodyne/Trandate) injection 10 mg, 10 mg, Intravenous, Q4HRS PRN, Bhargav Last M.D.    ondansetron (Zofran) syringe/vial injection 4 mg, 4 mg, Intravenous, Q4HRS PRN, 4 mg at 07/26/25 1018 **OR** ondansetron (Zofran ODT) dispertab 4 mg, 4 mg, Oral, Q4HRS PRN, Bhargav Last M.D.    thiamine (B-1) 500 mg in dextrose 5% 100 mL IVPB, 500 mg, Intravenous, TID, Bhargav Last M.D., Last Rate: 200 mL/hr at 07/26/25 1000, 500 mg at 07/26/25 1000    [START ON 7/27/2025] cefTRIAXone (Rocephin) 1,000 mg in  mL IVPB, 1,000 mg, Intravenous, Q24HRS, Bhargav Last M.D.    pantoprazole (Protonix) injection 40 mg, 40 mg, Intravenous, BID, Bhargav Last M.D.    escitalopram (Lexapro) tablet 20 mg, 20 mg, Oral, DAILY, Bhargav Last M.D., 20 mg at 07/26/25 0713    D5LR infusion, , Intravenous, Continuous, Vladimir Zuniga M.D., Last Rate: 75 mL/hr at 07/26/25 1105, New Bag at 07/26/25 1105    potassium chloride SA (Kdur) tablet 40 mEq, 40 mEq, Oral, DAILY, Vladimir Zuniga M.D.    sucralfate (Carafate) tablet 1 g, 1 g, Oral, TID, Vladimir Zuniga M.D., 1 g at 07/26/25 1153  [4]   Current  Facility-Administered Medications   Medication Dose Route Frequency Provider Last Rate Last Admin    octreotide (SandoSTATIN) 1,250 mcg in  mL Infusion  50 mcg/hr Intravenous Continuous Khristopher R Faiss, D.O.   Continue to Floor at 07/26/25 0803    LORazepam (Ativan) tablet 0.5 mg  0.5 mg Oral Q4HRS PRN Khristopher R Faiss, D.O.        LORazepam (Ativan) tablet 1 mg  1 mg Oral Q4HRS PRN Khristopher R Faiss, D.O.        LORazepam (Ativan) tablet 2 mg  2 mg Oral Q2HRS PRN Khristopher R Faiss, D.O.   2 mg at 07/26/25 1153    LORazepam (Ativan) tablet 3 mg  3 mg Oral Q HOUR PRN Khristopher R Faiss, D.O.        LORazepam (Ativan) tablet 4 mg  4 mg Oral Q15 MIN PRN Khristopher R Faiss, D.O.        Or    diazePAM (Valium) injection 10 mg  10 mg Intravenous Q15 MIN PRN Khristopher R Faiss, D.O.   10 mg at 07/26/25 1018    acetaminophen (Tylenol) tablet 650 mg  650 mg Oral Q6HRS PRN Bhargav Last M.D.        labetalol (Normodyne/Trandate) injection 10 mg  10 mg Intravenous Q4HRS PRN Bhargav Last M.D.        ondansetron (Zofran) syringe/vial injection 4 mg  4 mg Intravenous Q4HRS PRN Bhargav Last M.D.   4 mg at 07/26/25 1018    Or    ondansetron (Zofran ODT) dispertab 4 mg  4 mg Oral Q4HRS PRN Bhargav Last M.D.        thiamine (B-1) 500 mg in dextrose 5% 100 mL IVPB  500 mg Intravenous TID Bhargav Last M.D. 200 mL/hr at 07/26/25 1000 500 mg at 07/26/25 1000    [START ON 7/27/2025] cefTRIAXone (Rocephin) 1,000 mg in  mL IVPB  1,000 mg Intravenous Q24HRS Bhargav Last M.D.        pantoprazole (Protonix) injection 40 mg  40 mg Intravenous BID Bhargav Last M.D.        escitalopram (Lexapro) tablet 20 mg  20 mg Oral DAILY Bhargav Last M.D.   20 mg at 07/26/25 0713    D5LR infusion   Intravenous Continuous Vladmiir Zuniga M.D. 75 mL/hr at 07/26/25 1105 New Bag at 07/26/25 1105    potassium chloride SA (Kdur) tablet 40 mEq  40 mEq Oral DAILY Vladimir Zuniga M.D.         sucralfate (Carafate) tablet 1 g  1 g Oral TID Vladimir Zuniga M.D.   1 g at 07/26/25 1153   [5]   Allergies  Allergen Reactions    Atorvastatin Myalgia    Codeine Nausea    Lisinopril Cough     Cough      Morphine Nausea    Rosuvastatin Myalgia

## 2025-07-26 NOTE — PROGRESS NOTES
4 Eyes Skin Assessment Completed by DEWEY Rodriguez and DEWEY Ayala.    Skin assessment is primarily focused on high risk bony prominences. Pay special attention to skin beneath and around medical devices, high risk bony prominences, skin to skin areas and areas where the patient lacks sensation to feel pain and areas where the patient previously had breakdown.     Head (Occipital):  WDL   Ears (Under Medical Devices): WDL   Nose (Under Medical Devices): WDL   Mouth:  WDL   Neck: WDL   Breast/Chest:  WDL   Shoulder Blades:  WDL   Spine:   WDL   (R) Arm/Elbow/Hand: WDL   (L) Arm/Elbow/Hand: WDL   Abdomen: WDL   Pannus/Groin:  WDL   Sacrum/Coccyx:   WDL   (R) Ischial Tuberosity (Sit Bones):  WDL   (L) Ischial Tuberosity (Sit Bones):  WDL   (R) Leg:  WDL   (L) Leg:  WDL   (R) Heel:  Dry, cracked, flaky    (R) Foot/Toe: Dry, cracked, flaky    (L) Heel: Dry, cracked, flaky    (L) Foot/Toe:  Dry, cracked, flaky        DEVICES IN USE:   Respiratory Devices:  NA, patient on room air  Feeding Devices:  N/A   Lines & BP Monitoring Devices:  Peripheral IV, BP cuff, and Pulse ox    Orthopedic Devices:  N/A  Miscellaneous Devices:  N/A    PROTOCOL INTERVENTIONS:   [facility]  Already in place    WOUND PHOTOS:   N/A no wounds identified    WOUND CONSULT:   N/A, no advanced wound care needs identified

## 2025-07-26 NOTE — ED PROVIDER NOTES
ED Provider Note    CHIEF COMPLAINT  Chief Complaint   Patient presents with    Shortness of Breath     Started today w/productive cough     Epigastric Pain     Non radiating    N/V     From the mucus       EXTERNAL RECORDS REVIEWED  External ED Note she has had previous ER visits for anxiety attacks.  She had a psychiatrist for panic attacks.  She had an admission in 2022 for alcohol withdrawal.  Was found to have a DVT during that admission    HPI/ROS  LIMITATION TO HISTORY   Select: : None  OUTSIDE HISTORIAN(S):  EMS called for shortness of breath.  Normal saturation for EMS.  Coarse lung sounds reported.  Nebulizer albuterol attempted and patient reported no benefit to EMS.    Marcia Koch is a 75 y.o. female who presents for acute shortness of breath.  Patient has shortness of breath with productive cough this evening about 1 hour prior to arrival.  She was short of breath initially felt anxious.  She had no chest pain.  She has had no recent fevers or sick contacts.  After climbing on when she began having a cough and there was some brown or blood-tinged component to the productive mucus.  On chart review had a DVT in 2022, not currently on anticoagulation per patient.  Not having any cough pain or leg swelling.  No known heart disease.  Still smokes    On repeat interview later in the encounter the patient actually had a episode of hematemesis.  She had been saying that she was coughing blood but this appears to be bloody vomit.    PAST MEDICAL HISTORY   has a past medical history of Alcohol abuse, Alcoholism (HCC), Anxiety, Anxiety, Depression, Hiatus hernia syndrome, High cholesterol, Hypertension, Indigestion, Other specified disorder of intestines, Pneumonia, Psychiatric disorder, Sleep apnea, Snoring, Unspecified urinary incontinence, and Urinary bladder disorder.    SURGICAL HISTORY   has a past surgical history that includes bladder sling female (8/30/2011); teddy by laparoscopy; other abdominal  "surgery (); other (); other (195); other (); gyn surgery (); and abdominal hysterectomy total.    FAMILY HISTORY  Family History   Problem Relation Age of Onset    Anxiety disorder Brother     Depression Brother     Other Brother          AIDS    Alcohol abuse Mother     Cancer Mother         colon cancer age 70s    Alcohol abuse Father     Hyperlipidemia Father     Other Father         TIA, macular degenration    Cancer Sister         breast    Drug abuse Sister     Cancer Maternal Aunt         breast    Cancer Maternal Uncle         nos    Stroke Neg Hx     Heart Attack Neg Hx        SOCIAL HISTORY  Social History     Tobacco Use    Smoking status: Every Day     Current packs/day: 0.00     Average packs/day: 0.5 packs/day for 25.0 years (12.5 ttl pk-yrs)     Types: Cigarettes     Start date: 1986     Last attempt to quit: 2011     Years since quittin.9    Smokeless tobacco: Never   Vaping Use    Vaping status: Never Used   Substance and Sexual Activity    Alcohol use: Yes    Drug use: No    Sexual activity: Not Currently     Partners: Male     Birth control/protection: Abstinence       CURRENT MEDICATIONS  Home Medications    **Home medications have not yet been reviewed for this encounter**       Audit from Redirected Encounters    **Home medications have not yet been reviewed for this encounter**         ALLERGIES  Allergies[1]    PHYSICAL EXAM  VITAL SIGNS: /58   Pulse 87   Temp 36.7 °C (98 °F) (Temporal)   Resp 17   Ht 1.615 m (5' 3.6\")   Wt 61.2 kg (135 lb)   SpO2 99%   BMI 23.47 kg/m²    Pulse oximetry interpretation: I interpret the pulse oximetry as normal.  Constitutional: Awake and alert. No acute distress.  Head: NCAT.  HEENT: Normal Conjunctiva.  Neck: Grossly normal range of motion. Airway midline.  Cardiovascular: Normal heart rate, Normal rhythm. No calf or leg swelling.  Thorax & Lungs: No respiratory distress. Diminished bases b/l.  Abdomen: Normal " inspection. Nontender. Nondistended  Skin: No obvious rash.  Back: No midline tenderness.  Musculoskeletal: No obvious deformity. Moves all extremities Well.  Neurologic: A&Ox4.  Psychiatric: Mood and affect are appropriate for situation.    EKG/LABS  Results for orders placed or performed during the hospital encounter of 07/26/25   CBC WITH DIFFERENTIAL    Collection Time: 07/26/25  2:58 AM   Result Value Ref Range    WBC 12.4 (H) 4.8 - 10.8 K/uL    RBC 4.87 4.20 - 5.40 M/uL    Hemoglobin 16.7 (H) 12.0 - 16.0 g/dL    Hematocrit 49.6 (H) 37.0 - 47.0 %    .8 (H) 81.4 - 97.8 fL    MCH 34.3 (H) 27.0 - 33.0 pg    MCHC 33.7 32.2 - 35.5 g/dL    RDW 47.2 35.9 - 50.0 fL    Platelet Count 265 164 - 446 K/uL    MPV 8.9 (L) 9.0 - 12.9 fL    Neutrophils-Polys 76.70 (H) 44.00 - 72.00 %    Lymphocytes 17.60 (L) 22.00 - 41.00 %    Monocytes 4.40 0.00 - 13.40 %    Eosinophils 0.10 0.00 - 6.90 %    Basophils 0.60 0.00 - 1.80 %    Immature Granulocytes 0.60 0.00 - 0.90 %    Nucleated RBC 0.00 0.00 - 0.20 /100 WBC    Neutrophils (Absolute) 9.49 (H) 1.82 - 7.42 K/uL    Lymphs (Absolute) 2.18 1.00 - 4.80 K/uL    Monos (Absolute) 0.54 0.00 - 0.85 K/uL    Eos (Absolute) 0.01 0.00 - 0.51 K/uL    Baso (Absolute) 0.08 0.00 - 0.12 K/uL    Immature Granulocytes (abs) 0.08 0.00 - 0.11 K/uL    NRBC (Absolute) 0.00 K/uL   COMP METABOLIC PANEL    Collection Time: 07/26/25  2:58 AM   Result Value Ref Range    Sodium 137 135 - 145 mmol/L    Potassium 3.7 3.6 - 5.5 mmol/L    Chloride 95 (L) 96 - 112 mmol/L    Co2 14 (L) 20 - 33 mmol/L    Anion Gap 28.0 (H) 7.0 - 16.0    Glucose 87 65 - 99 mg/dL    Bun 9 8 - 22 mg/dL    Creatinine 0.56 0.50 - 1.40 mg/dL    Calcium 8.8 8.5 - 10.5 mg/dL    Correct Calcium 8.6 8.5 - 10.5 mg/dL    AST(SGOT) 40 12 - 45 U/L    ALT(SGPT) 23 2 - 50 U/L    Alkaline Phosphatase 150 (H) 30 - 99 U/L    Total Bilirubin 0.5 0.1 - 1.5 mg/dL    Albumin 4.2 3.2 - 4.9 g/dL    Total Protein 6.9 6.0 - 8.2 g/dL    Globulin 2.7 1.9  - 3.5 g/dL    A-G Ratio 1.6 g/dL   TROPONIN    Collection Time: 25  2:58 AM   Result Value Ref Range    Troponin T 11 6 - 19 ng/L   LIPASE    Collection Time: 25  2:58 AM   Result Value Ref Range    Lipase 14 11 - 82 U/L   proBrain Natriuretic Peptide, NT    Collection Time: 25  2:58 AM   Result Value Ref Range    NT-proBNP 80 0 - 125 pg/mL   LACTIC ACID    Collection Time: 25  2:58 AM   Result Value Ref Range    Lactic Acid 5.8 (HH) 0.5 - 2.0 mmol/L   MAGNESIUM    Collection Time: 25  2:58 AM   Result Value Ref Range    Magnesium 1.6 1.5 - 2.5 mg/dL   ESTIMATED GFR    Collection Time: 25  2:58 AM   Result Value Ref Range    GFR (CKD-EPI) 96 >60 mL/min/1.73 m 2   EKG    Collection Time: 25  3:03 AM   Result Value Ref Range    Report       [facility] Emergency Dept.    Test Date:  2025  Pt Name:    VIDHI ROSALES               Department: [facility]  MRN:        [id number]                      [facility]  Gender:     Female                       Technician: [id number]  :        [id number]                   Requested By:JANE ELIZONDO  Order #:    [id number]                    Reading MD:    Measurements  Intervals                                Axis  Rate:       78                           P:          73  HI:         149                          QRS:        89  QRSD:       99                           T:          80  QT:         400  QTc:        456    Interpretive Statements  Sinus rhythm  Borderline right axis deviation  Nonspecific T abnrm, anterolateral leads  ST elevation, consider inferior injury  Compared to ECG 2024 09:32:13  ST (T wave) deviation now present  Myocardial infarct finding now present     CoV-2, Flu A/B, And RSV by PCR ([facility])    Collection Time: 25  3:04 AM    Specimen: Respirate   Result Value Ref Range    Influenza virus A RNA Negative Negative    Influenza virus B, PCR Negative Negative    RSV, PCR  Negative Negative    SARS-CoV-2 by PCR NotDetected     SARS-CoV-2 Source NP Swab      Sinus rhythm 70 bpm.  There is minimal ST elevation not meeting STEMI criteria.  She does have new T wave inversions in lead aVL and V2.  In comparison to EKG from 2024 these were not present.  Otherwise normal intervals.  I have independently interpreted this EKG    RADIOLOGY/PROCEDURES   I have independently interpreted the diagnostic imaging associated with this visit and am waiting the final reading from the radiologist.   My preliminary interpretation is as follows: No PE    Radiologist interpretation:  CT-CTA CHEST PULMONARY ARTERY W/ RECONS   Final Result      1.  No pulmonary embolism.   2.  Diffuse esophageal wall thickening with adjacent inflammation, consistent with esophagitis.            DX-CHEST-PORTABLE (1 VIEW)   Final Result      No acute process.          COURSE & MEDICAL DECISION MAKING    ASSESSMENT, COURSE AND PLAN  Care Narrative:   75-year-old female history of anxiety, daily alcohol use presents by ambulance for shortness of breath not found hypoxic and with clear lung sounds  Afebrile and normal vitals  On exam she is not hypoxic, her lung are clear with some diminished bases but no crackles or wheezes, no dyspnea and talking full sentences.  Soft nontender exam of the belly.  Differential includes PE, pneumonia, sepsis, viral illness, COPD exacerbation  Patient actually later had what appears to be hematemesis originally reported to be hemoptysis.  Thus GI bleed is another consideration  Labs show hemoconcentration and mild leukocytosis, she has a macrocytosis.  CMP with anion gap acidosis bicarb of 14, lactic of 5.8.  She is receiving 30 cc/kg fluids and ceftriaxone 1 g empirically ordered  Troponin is normal, magnesium is not deranged  X-ray showed no focal findings  She requested anxiolytic and valium 5mg IV given  EKG nonischemic  A CT of the chest to rule out PE was obtained.  No PE however patient  with finding of esophagitis. Patient now endorsing more sore throat, pain with passage of liquids.   Type and cross and coagulation studies ordered.  Will admit the patient to ICU given height of lactic acid and GI bleed.  CIWA placed.  Patient agreeable for admission.    Hydration: Based on the patient's presentation of Acute Vomiting the patient was given IV fluids. IV Hydration was used because oral hydration was not as rapid as required. Upon recheck following hydration, the patient was improved.    CRITICAL CARE  The very real possibilty of a deterioration of this patient's condition required the highest level of my preparedness for sudden, emergent intervention.  I provided critical care services, which included medication orders, frequent reevaluations of the patient's condition and response to treatment, ordering and reviewing test results, and discussing the case with various consultants.  The critical care time associated with the care of the patient was 31 minutes. Review chart for interventions. This time is exclusive of any other billable procedures.         Joelle Arredondo D.O. spent greater than 3 minutes with the patient explaining the importance of smoking cessation.     ADDITIONAL PROBLEMS MANAGED    Upper GI bleed  Lactic acidosis/anion gap acidosis  Alcohol use disorder  Esophagitis on CT    DISPOSITION AND DISCUSSIONS  I have discussed management of the patient with the following physicians and AMADOR's:    Dr. Last    Discussion of management with other [facility] or appropriate source(s): Pharmacy for octreotide and pantoprazole     Barriers to care at this time, including but not limited to: None.     Decision tools and prescription drugs considered including, but not limited to: Antibiotics for possible variceal bleed. Empirically ordered when lactic returned elevated.    FINAL DIAGNOSIS  1. Upper GI bleed    2. Lactic acidosis    3. Alcohol use disorder         Electronically signed by:  Joelle Arredondo D.O., 7/26/2025 3:02 AM           [1]   Allergies  Allergen Reactions    Atorvastatin Myalgia    Codeine Nausea    Lisinopril Cough     Cough      Morphine Nausea    Rosuvastatin Myalgia

## 2025-07-26 NOTE — CARE PLAN
Problem: Seizure Precautions  Goal: Implementation of seizure precautions  Outcome: Progressing     Problem: Lifestyle Changes  Goal: Patient's ability to identify lifestyle changes and available resources to help reduce recurrence of condition will improve  Outcome: Progressing     Problem: Psychosocial  Goal: Patient's level of anxiety will decrease  Outcome: Progressing   The patient is Stable - Low risk of patient condition declining or worsening         Progress made toward(s) clinical / shift goals:  plan for egd tomorrow. Wll continue to trend labs and medicate appropriately for withdrawal. Pt verbalizes understanding of plan of care.     Patient is not progressing towards the following goals:

## 2025-07-26 NOTE — ASSESSMENT & PLAN NOTE
Patient with alcohol use presents for hematemesis, unclear whether or not patient has cirrhosis and varices  Hemoglobin 16.4  Protonix 40 mg IV twice daily  Octreotide  Ceftriaxone  GI consulted, plan for colonoscopy during this hospitalization    7/28/2025   Not stable for the procedure  I discussed patient condition with gastroenterology, anticipate that she can tolerate the procedure by 7/30 7/29  Patient medically stable but still receiving Ativan.  Planned intervention with endoscopy tomorrow

## 2025-07-26 NOTE — ASSESSMENT & PLAN NOTE
Drinks 3-4 glasses wine/day. No known history of withdrawal.  - Start CIWA protocol with PRN benzodiazepene

## 2025-07-26 NOTE — ED NOTES
Pharmacy Medication Reconciliation      ~Medication reconciliation updated and complete per patient at bedside   ~Allergies have been verified and updated   ~Patient home pharmacy :  [facility]       ~Anticoagulants (rivaroxaban, apixaban, edoxaban, dabigatran, warfarin, enoxaparin) taken in the last 14 days? No   ~Antimicrobials include (antifungals antivirals, antiparasitics,antibiotics) in the last 30 days? No

## 2025-07-26 NOTE — ASSESSMENT & PLAN NOTE
I will start on CIWA protocol   I will place on continuous cardiac monitoring  I will check K, Mg, Na, Ca. Monitor electrolytes and replace accordingly, particularly magnesium, potassium and phosphorus  I will order fall, seizure, & aspiration precautions.  I will order thiamine folic acid and multivitamin.     7/29/2025   Minimal need of ativan on CIWA  A fib rate controlled.

## 2025-07-26 NOTE — ASSESSMENT & PLAN NOTE
Likely secondary to esophagitis from frequent EtOH use.  Also with significant NSAID use ~4 days/week.  No known cirrhosis history.   INR 0.9  Obtain RUQUS r/o cirrhosis.  Continue octreotide and ceftriaxone for now until RUQUS returns  IV PPI BID  1g TID carafate slurry  Trend Hb q6h  GI consult for consideration of EGD

## 2025-07-27 ENCOUNTER — APPOINTMENT (OUTPATIENT)
Dept: RADIOLOGY | Facility: MEDICAL CENTER | Age: 75
End: 2025-07-27
Attending: NURSE PRACTITIONER
Payer: MEDICARE

## 2025-07-27 PROBLEM — J18.9 PNEUMONIA OF LEFT LOWER LOBE DUE TO INFECTIOUS ORGANISM: Status: ACTIVE | Noted: 2025-07-27

## 2025-07-27 PROBLEM — J96.01 ACUTE HYPOXEMIC RESPIRATORY FAILURE (HCC): Status: ACTIVE | Noted: 2025-07-27

## 2025-07-27 ASSESSMENT — LIFESTYLE VARIABLES
TREMOR: *
AGITATION: *
VISUAL DISTURBANCES: NOT PRESENT
TREMOR: *
TREMOR: *
HEADACHE, FULLNESS IN HEAD: NOT PRESENT
VISUAL DISTURBANCES: NOT PRESENT
AUDITORY DISTURBANCES: NOT PRESENT
AUDITORY DISTURBANCES: NOT PRESENT
TOTAL SCORE: 10
AUDITORY DISTURBANCES: NOT PRESENT
HEADACHE, FULLNESS IN HEAD: NOT PRESENT
VISUAL DISTURBANCES: NOT PRESENT
ORIENTATION AND CLOUDING OF SENSORIUM: DATE DISORIENTATION BY MORE THAN TWO CALENDAR DAYS
ORIENTATION AND CLOUDING OF SENSORIUM: DATE DISORIENTATION BY NO MORE THAN TWO CALENDAR DAYS
AGITATION: MODERATELY FIDGETY AND RESTLESS
AGITATION: SOMEWHAT MORE THAN NORMAL ACTIVITY
ANXIETY: NO ANXIETY (AT EASE)
VISUAL DISTURBANCES: NOT PRESENT
ANXIETY: MODERATELY ANXIOUS OR GUARDED, SO ANXIETY IS INFERRED
ANXIETY: *
PAROXYSMAL SWEATS: NO SWEAT VISIBLE
ANXIETY: *
ORIENTATION AND CLOUDING OF SENSORIUM: ORIENTED AND CAN DO SERIAL ADDITIONS
PAROXYSMAL SWEATS: BARELY PERCEPTIBLE SWEATING. PALMS MOIST
TOTAL SCORE: 20
PAROXYSMAL SWEATS: NO SWEAT VISIBLE
TOTAL SCORE: 4
AGITATION: NORMAL ACTIVITY
PAROXYSMAL SWEATS: NO SWEAT VISIBLE
PAROXYSMAL SWEATS: BARELY PERCEPTIBLE SWEATING. PALMS MOIST
ORIENTATION AND CLOUDING OF SENSORIUM: ORIENTED AND CAN DO SERIAL ADDITIONS
AUDITORY DISTURBANCES: NOT PRESENT
HEADACHE, FULLNESS IN HEAD: VERY MILD
ANXIETY: *
AUDITORY DISTURBANCES: NOT PRESENT
TOTAL SCORE: 8
PAROXYSMAL SWEATS: NO SWEAT VISIBLE
TOTAL SCORE: 3
ANXIETY: *
HEADACHE, FULLNESS IN HEAD: NOT PRESENT
PAROXYSMAL SWEATS: NO SWEAT VISIBLE
ORIENTATION AND CLOUDING OF SENSORIUM: ORIENTED AND CAN DO SERIAL ADDITIONS
HEADACHE, FULLNESS IN HEAD: NOT PRESENT
TREMOR: *
NAUSEA AND VOMITING: NO NAUSEA AND NO VOMITING
TREMOR: *
AUDITORY DISTURBANCES: NOT PRESENT
NAUSEA AND VOMITING: NO NAUSEA AND NO VOMITING
ORIENTATION AND CLOUDING OF SENSORIUM: CANNOT DO SERIAL ADDITIONS OR IS UNCERTAIN ABOUT DATE
HEADACHE, FULLNESS IN HEAD: NOT PRESENT
TOTAL SCORE: 18
AGITATION: NORMAL ACTIVITY
HEADACHE, FULLNESS IN HEAD: MILD
VISUAL DISTURBANCES: NOT PRESENT
AGITATION: NORMAL ACTIVITY
ORIENTATION AND CLOUDING OF SENSORIUM: DISORIENTED FOR PLACE AND / OR PERSON
AUDITORY DISTURBANCES: NOT PRESENT
TREMOR: *
AUDITORY DISTURBANCES: NOT PRESENT
AGITATION: SOMEWHAT MORE THAN NORMAL ACTIVITY
NAUSEA AND VOMITING: NO NAUSEA AND NO VOMITING
ANXIETY: *
TOTAL SCORE: 12
PAROXYSMAL SWEATS: NO SWEAT VISIBLE
NAUSEA AND VOMITING: NO NAUSEA AND NO VOMITING
TREMOR: *
TREMOR: MODERATE TREMOR WITH ARMS EXTENDED
ORIENTATION AND CLOUDING OF SENSORIUM: ORIENTED AND CAN DO SERIAL ADDITIONS
TREMOR: MODERATE TREMOR WITH ARMS EXTENDED
ORIENTATION AND CLOUDING OF SENSORIUM: DATE DISORIENTATION BY NO MORE THAN TWO CALENDAR DAYS
NAUSEA AND VOMITING: NO NAUSEA AND NO VOMITING
VISUAL DISTURBANCES: NOT PRESENT
PAROXYSMAL SWEATS: NO SWEAT VISIBLE
AUDITORY DISTURBANCES: NOT PRESENT
NAUSEA AND VOMITING: MILD NAUSEA WITH NO VOMITING
TOTAL SCORE: 11
TREMOR: *
AGITATION: *
VISUAL DISTURBANCES: NOT PRESENT
NAUSEA AND VOMITING: NO NAUSEA AND NO VOMITING
NAUSEA AND VOMITING: NO NAUSEA AND NO VOMITING
VISUAL DISTURBANCES: MILD SENSITIVITY
TOTAL SCORE: 9
NAUSEA AND VOMITING: NO NAUSEA AND NO VOMITING
NAUSEA AND VOMITING: NO NAUSEA AND NO VOMITING
PAROXYSMAL SWEATS: NO SWEAT VISIBLE
HEADACHE, FULLNESS IN HEAD: NOT PRESENT
HEADACHE, FULLNESS IN HEAD: NOT PRESENT
VISUAL DISTURBANCES: MODERATELY SEVERE HALLUCINATIONS
AGITATION: *
ANXIETY: MILDLY ANXIOUS
TOTAL SCORE: 10
ORIENTATION AND CLOUDING OF SENSORIUM: ORIENTED AND CAN DO SERIAL ADDITIONS
ANXIETY: *
VISUAL DISTURBANCES: NOT PRESENT
AUDITORY DISTURBANCES: NOT PRESENT
ANXIETY: MODERATELY ANXIOUS OR GUARDED, SO ANXIETY IS INFERRED
AGITATION: NORMAL ACTIVITY
HEADACHE, FULLNESS IN HEAD: NOT PRESENT

## 2025-07-27 ASSESSMENT — COGNITIVE AND FUNCTIONAL STATUS - GENERAL
SUGGESTED CMS G CODE MODIFIER MOBILITY: CJ
MOBILITY SCORE: 22
CLIMB 3 TO 5 STEPS WITH RAILING: A LITTLE
DAILY ACTIVITIY SCORE: 24
WALKING IN HOSPITAL ROOM: A LITTLE
SUGGESTED CMS G CODE MODIFIER DAILY ACTIVITY: CH

## 2025-07-27 ASSESSMENT — ENCOUNTER SYMPTOMS
MYALGIAS: 0
VOMITING: 1
FLANK PAIN: 0
FEVER: 0
ROS GI COMMENTS: ANOREXIA
SHORTNESS OF BREATH: 1
EYE DISCHARGE: 0
COUGH: 1
NERVOUS/ANXIOUS: 0
EYE REDNESS: 0
BRUISES/BLEEDS EASILY: 0
FOCAL WEAKNESS: 0
VOMITING: 0
STRIDOR: 0
CHILLS: 0
ABDOMINAL PAIN: 0
BLOOD IN STOOL: 0
SORE THROAT: 1

## 2025-07-27 ASSESSMENT — PAIN DESCRIPTION - PAIN TYPE
TYPE: ACUTE PAIN

## 2025-07-27 NOTE — CARE PLAN
The patient is Watcher - Medium risk of patient condition declining or worsening    Shift Goals  Clinical Goals: EGD, monitor H/H s/s bleeding, CIWA  Patient Goals: comfort, manage anxiety and sore throat    Progress made toward(s) clinical / shift goals:      Pt went down for EGD but was turned away due to developing PNA on CXR. Hospitalist notified. Resumed clear liquid diet. No hematemesis or melena this shift. Emergent EGD if bleeding returns. Otherwise continue conservative medical management and continue CIWA protocol. Pt has been scoring 8-10 this shift and has received a total of 2 mg PO Ativan.     Problem: Knowledge Deficit - Standard  Goal: Patient and family/care givers will demonstrate understanding of plan of care, disease process/condition, diagnostic tests and medications  Outcome: Progressing     Problem: Optimal Care for Alcohol Withdrawal  Goal: Optimal Care for the alcohol withdrawal patient  Outcome: Progressing  Note: Medicating for withdrawal with Ativan per CIWA protocol. Repleting vitamins and electrolytes. SZR precautions in place. Monitoring for sedation associated with ativan.      Problem: Seizure Precautions  Goal: Implementation of seizure precautions  Outcome: Progressing       Patient is not progressing towards the following goals:

## 2025-07-27 NOTE — PROGRESS NOTES
Received pt report from Irma. Assumed pt care. Pt reports feeling anxious. Scored CIWA in flowsheets and provided 1 mg PO ativan per MAR. Otherwise pt hemodynamically stable. Assessment WDL. Discussed POC and left patient in position of comfort with call light within reach.

## 2025-07-27 NOTE — CARE PLAN
The patient is Stable - Low risk of patient condition declining or worsening         Progress made toward(s) clinical / shift goals:  Patient resting comfortably overnight, denies pain. CIWA scores 3-8 treated with PRN ativan. VSS. Required 4L nc while sleeping. NPOpmn for EGD today. H&H q6H.    Patient is not progressing towards the following goals:      Problem: Optimal Care for Alcohol Withdrawal  Goal: Optimal Care for the alcohol withdrawal patient  Outcome: Progressing     Problem: Psychosocial  Goal: Patient's level of anxiety will decrease  Outcome: Progressing     Problem: Fall Risk  Goal: Patient will remain free from falls  Outcome: Progressing     Problem: Pain - Standard  Goal: Alleviation of pain or a reduction in pain to the patient’s comfort goal  Outcome: Progressing

## 2025-07-27 NOTE — PROGRESS NOTES
12-hour chart check complete.    Monitor Summary  Rhythm: SR-ST  Rate:   Ectopy: rPVC/PAC  Measurements: .15/.08/.32

## 2025-07-27 NOTE — PROGRESS NOTES
12-hour chart check complete.    Monitor Summary  Rhythm: SR -ST  Rate:   Ectopy: PVC, PAC  Measurements: 0.16/0.08/0.34

## 2025-07-27 NOTE — PROGRESS NOTES
Gastroenterology Progress Note     Author: Booker Hanson M.D.     Date & Time Created: 7/27/2025 11:52 AM    Patient ID:  Name:             Marcia Koch   YOB: 1950  Age:                 75 y.o.  female   MRN:               [id number]    Chief Complaint    Hematemesis    GI History  This is a 74 yo female PMH HTN, alcohol abuse, GERD, HLD who was seen in consultation for hematemesis x 4-5 episodes starting yesterday. Has been experiencing N/V/D for the past few days but yesterday began to notice coffee ground emesis. No melena. Began to have SOB and generalized weakness decided to come to ER. Notable labs: WBC: 12. Hgb: 16-->14.3. MCV: 101.8. Platelet count: 265. ALP: 150. INR: -.92. Lactic acid: 5.8-->6.6-->5.6-->3.4.      CTA: no pulmonary embolism. Diffuse esophageal wall thickening with adjacent inflammation      Over the past week, took some Aleve for back pain. Also, drinks 3-4 glasses of wine per day. No history of cirrhosis.       Initial labs  CBC: WBC 12,400.  Hgb 16.7, .  INR: 0.92, PTT 24.  CMP: GFR 96.  Alkaline phosphatase 150.  Lipase: 14.  Lactic acid: 5.8 initially.      Imaging  CXR 7/26/2025: No acute process.  CTA chest 7/26/2025: No pulmonary embolism.  Diffusely thick esophageal wall.  CXR 7/27/2025: New retrocardiac density.    US RUQ 7/26/2025: Echogenic liver.  No liver mass.        Interval History  7/27/2025   Hgb 13.1, lactic acid 1.4 today    She has a chronic productive cough attributed to smoking.  About July 19 the productive cough got worse, and about that time the severity of the cough caused gagging and vomiting 3 times daily.  Each time she vomited, she brought up brown liquid.  On July 25 she started bringing up brown-maroon liquid.  During the week she also developed worsening shortness of breath, which resulted in admission through the emergency room at 2 AM on July 26.    She was drinking about 4 glasses of wine daily and taking Aleve as  needed, about every other day.    Initial WBC was 12,400 with lactate 5.6, Hgb 16.7 and .  The lactate level normalized at 1.6 on July 26 at 1600.    Initial CTA chest found clear lungs and a circumferentially thick esophageal wall.    She was treated with ceftriaxone, pantoprazole and octreotide infusion.  She has not had further signs of active GI bleeding.  Hgb stabilized at 13.1 today.  Chest x-ray today found a new retrocardiac density, and pneumonia is suspected.    EGD was initially scheduled for today, but it was canceled because the current risk of endoscopy seems higher than the benefit.  For now, endoscopy would be best performed shortly before discharge, when her condition has improved.  If active bleeding occurs in the interim, then urgent endoscopy could be reconsidered.    Hgb 13.1, lactic acid 1.4        ASSESSMENT  Upper GI bleeding with hematemesis  Treating with octreotide infusion and pantoprazole 40 mg IV twice daily.  Evaluate with EGD at the optimal timing, preferably on the day before planned discharge.    Thick esophageal wall on CTA  Evaluate this issue during the EGD.    Alcohol abuse  She plans to stop drinking alcohol.    Recent diarrhea  If she continues to have diarrhea then check stool studies.    Probable pneumonia  Being treated with ceftriaxone.        PLAN  Continue octreotide infusion and pantoprazole.  Evaluate with EGD on the day before planned discharge.  Urgent EGD if active bleeding occurs.  Stool culture and GI PCR profile if diarrhea occurs.  Advance diet to full liquids as tolerated.          Problem List  Active Hospital Problems    Diagnosis     *UGIB (upper gastrointestinal bleed) [K92.2]     Lactic acidosis [E87.20]     ETOH abuse [F10.10]        Labs:  Recent Labs     07/26/25  0258 07/26/25  1011 07/27/25  0016 07/27/25  0405 07/27/25  1000   WBC 12.4*  --  10.8 9.5  --    RBC 4.87  --  3.63* 3.47*  --    HEMOGLOBIN 16.7*   < > 12.3 12.0 13.1   HEMATOCRIT  49.6*   < > 37.0 35.7* 39.2   .8*  --  101.9* 102.9*  --    MCH 34.3*  --  33.9* 34.6*  --    MCHC 33.7  --  33.2 33.6  --    RDW 47.2  --  48.3 47.8  --    PLATELETCT 265  --  155* 129*  --    MPV 8.9*  --  10.1 10.1  --     < > = values in this interval not displayed.      Recent Labs     07/26/25  0802   APTT 24.1*   INR 0.92     Recent Labs     07/26/25 0258 07/26/25 1600 07/27/25  0016   SODIUM 137 139 137   POTASSIUM 3.7 4.2 3.8   CHLORIDE 95* 101 103   CO2 14* 14* 23   GLUCOSE 87 203* 195*   BUN 9 6* 6*   CREATININE 0.56 0.63 0.61   CALCIUM 8.8 7.9* 8.1*     Recent Labs     07/26/25 0258 07/26/25 1600 07/27/25  0016   ALTSGPT 23  --   --    ASTSGOT 40  --   --    ALKPHOSPHAT 150*  --   --    TBILIRUBIN 0.5  --   --    LIPASE 14  --   --    GLUCOSE 87 203* 195*       Blood Culture   Date Value Ref Range Status   07/06/2011 No growth after 5 days of incubation.  Final        GI/Nutrition:  Orders Placed This Encounter   Procedures    Diet NPO Restrict to: Sips with Medications     Standing Status:   Standing     Number of Occurrences:   1     Diet NPO Restrict to::   Sips with Medications [3]       Hospital Medications:  [MAR Hold] cefTRIAXone (ROCEPHIN) IV, 1,000 mg, Intravenous, Q24HRS  [MAR Hold] pantoprazole, 40 mg, Intravenous, BID  [MAR Hold] escitalopram, 20 mg, Oral, DAILY  [MAR Hold] potassium chloride SA, 40 mEq, Oral, DAILY  [MAR Hold] sucralfate, 1 g, Oral, TID      PRN medications: [MAR Hold] LORazepam, [MAR Hold] LORazepam, [MAR Hold] LORazepam, [MAR Hold] LORazepam, [MAR Hold] LORazepam **OR** [MAR Hold] diazePAM, [MAR Hold] acetaminophen, [MAR Hold] labetalol, [MAR Hold] ondansetron **OR** [MAR Hold] ondansetron    Current Outpatient Medications:  Medications Ordered Prior to Encounter[1]    Fluids:    Intake/Output Summary (Last 24 hours) at 7/27/2025 1152  Last data filed at 7/27/2025 0610  Gross per 24 hour   Intake 1556.49 ml   Output 100 ml   Net 1456.49 ml          Past Medical  "History:   Past Medical History[2]    Past Surgical History:  Past Surgical History[3]    Physical Exam:  Vitals/ General Appearance:   Weight/BMI: Body mass index is 23.8 kg/m².  BP (!) 140/72   Pulse 79   Temp 36.7 °C (98 °F) (Temporal)   Resp 20   Ht 1.615 m (5' 3.6\")   Wt 62.1 kg (136 lb 14.5 oz)   SpO2 97%   Vitals:    07/27/25 0400 07/27/25 0500 07/27/25 0600 07/27/25 1130   BP: 123/58 122/58 (!) 141/66 (!) 140/72   Pulse: 80 80 86 79   Resp: (!) 21 (!) 21 18 20   Temp: 36.2 °C (97.2 °F)  36.7 °C (98 °F)    TempSrc: Temporal  Temporal    SpO2: 94% 94% 95% 97%   Weight:       Height:         Oxygen Therapy:  Pulse Oximetry: 97 %, O2 (LPM): 3, O2 Delivery Device: Silicone Nasal Cannula    Physical Exam  Vitals reviewed.   Constitutional:       General: She is not in acute distress.  HENT:      Head: Normocephalic and atraumatic.   Eyes:      Extraocular Movements: Extraocular movements intact.      Pupils: Pupils are equal, round, and reactive to light.      Comments: Pupils were equal and round.   Cardiovascular:      Rate and Rhythm: Regular rhythm.      Heart sounds: Normal heart sounds. No murmur heard.     No gallop.   Pulmonary:      Comments: Normal breath sounds anteriorly.  Abdominal:      Palpations: Abdomen is soft. There is no mass.      Tenderness: There is no abdominal tenderness.   Psychiatric:         Judgment: Judgment normal.         Review of Systems:  Review of Systems   Respiratory:  Positive for cough and shortness of breath.    Cardiovascular:  Negative for chest pain.   Gastrointestinal:  Positive for vomiting. Negative for abdominal pain, blood in stool and melena.             Booker Hanson M.D.                 [1]   No current facility-administered medications on file prior to encounter.     Current Outpatient Medications on File Prior to Encounter   Medication Sig Dispense Refill    Multiple Vitamin (MULTIVITAMIN PO) Take 1 Tablet by mouth every morning.     [2]   Past " Medical History:  Diagnosis Date    Alcohol abuse     Alcoholism (HCC)     Anxiety     Anxiety     Depression     Hiatus hernia syndrome     High cholesterol     Hypertension     Indigestion     Other specified disorder of intestines     diarrhea    Pneumonia     for 2 days    Psychiatric disorder     anxiety, depression    Sleep apnea     Snoring     Unspecified urinary incontinence     Urinary bladder disorder    [3]   Past Surgical History:  Procedure Laterality Date    BLADDER SLING FEMALE  8/30/2011    Performed by RENATE STERLING at [facility]    OTHER  2010    surgery for sleep apnea    OTHER  2010    big toenails and second toenails removed    OTHER ABDOMINAL SURGERY  2002    choley    GYN SURGERY  1991    hyst    OTHER  1956    tonsillectomy    ABDOMINAL HYSTERECTOMY TOTAL      Hysterectomy, Total Abdominal    BRIEN BY LAPAROSCOPY      Cholecystectomy, Laparoscopic

## 2025-07-27 NOTE — PROGRESS NOTES
Hospital Medicine Daily Progress Note    Date of Service  7/27/2025    Chief Complaint  Marcia Koch is a 75 y.o. female admitted 7/26/2025 with alcohol withdrawal and GI bleeding    Hospital Course  Marcia Koch is a 75 y.o. female with a past medical history of hyperlipidemia, gastroesophageal flux disease, hypertension, and alcohol dependence admitted 7/26/2025 with alcohol withdrawal and GI bleeding.  CT imaging showed evidence for diffuse esophageal wall thickening with adjacent inflammation. Patient has been started on ceftriaxone, octreotide and intravenous pantoprazole.  Gastroenterology consulted, plan for scope evaluation.  She was found to have pneumonia, she has been started on ceftriaxone.  Doxycycline has been added.    Interval Problem Update  Heart rate 70s-180  Requiring 4 L of oxygen to achieve adequate saturation.  Hemoglobin stable 12-13, continue to monitor.  Transfuse for hemoglobin of less than or equal to 7.  Lactic acidosis resolved, 3.4, down to 1.4.  I will reduce the rate of intravenous fluids.  Imaging is concerning for pneumonia, I will add doxycycline in addition to ceftriaxone.  Complaining of sore throat.  I will add throat lozenges, rule out COVID-19, influenza and RSV  I will place speech evaluation to rule out aspiration.   I have discussed this patient's plan of care and discharge plan at IDT rounds today with Case Management, Nursing, Nursing leadership, and other members of the IDT team.    Consultants/Specialty  GI, GIC     Code Status  Full Code    Disposition  Not medically cleared, respiratory failure, pneumonia, will require intravenous antibiotics.  Will require GI consultation and scope evaluation.  I have placed the appropriate orders for post-discharge needs.    Review of Systems  Review of Systems   Constitutional:  Positive for malaise/fatigue. Negative for chills and fever.   HENT:  Positive for sore throat.    Eyes:  Negative for discharge and redness.    Respiratory:  Positive for cough and shortness of breath. Negative for stridor.    Cardiovascular:  Negative for chest pain and leg swelling.   Gastrointestinal:  Negative for abdominal pain and vomiting.        Anorexia   Genitourinary:  Negative for flank pain.   Musculoskeletal:  Negative for myalgias.   Skin: Negative.    Neurological:  Negative for focal weakness.   Endo/Heme/Allergies:  Does not bruise/bleed easily.   Psychiatric/Behavioral:  The patient is not nervous/anxious.       Physical Exam  Temp:  [36.2 °C (97.2 °F)-37 °C (98.6 °F)] 36.7 °C (98 °F)  Pulse:  [] 79  Resp:  [18-38] 20  BP: (122-154)/(58-72) 140/72  SpO2:  [89 %-97 %] 97 %    Physical Exam  Constitutional:       General: She is not in acute distress.     Appearance: She is ill-appearing.   HENT:      Head: Normocephalic and atraumatic.      Right Ear: External ear normal.      Left Ear: External ear normal.      Nose: No congestion or rhinorrhea.      Mouth/Throat:      Mouth: Mucous membranes are moist.      Pharynx: No oropharyngeal exudate or posterior oropharyngeal erythema.   Eyes:      General: No scleral icterus.        Right eye: No discharge.         Left eye: No discharge.      Conjunctiva/sclera: Conjunctivae normal.      Pupils: Pupils are equal, round, and reactive to light.   Cardiovascular:      Rate and Rhythm: Tachycardia present.      Heart sounds:      No friction rub. No gallop.   Pulmonary:      Breath sounds: Rhonchi and rales present.      Comments: Requiring 4 L of oxygen to achieve adequate saturation.  Reduced air entry bilaterally.  Bilateral rhonchi and crepitation  Abdominal:      General: Abdomen is flat. There is no distension.      Tenderness: There is no guarding.   Musculoskeletal:         General: No swelling.      Cervical back: Neck supple. No rigidity. No muscular tenderness.      Right lower leg: No edema.      Left lower leg: No edema.   Skin:     General: Skin is dry.      Capillary Refill:  Capillary refill takes 2 to 3 seconds.      Coloration: Skin is pale. Skin is not jaundiced.      Findings: No bruising or erythema.   Neurological:      Mental Status: She is alert and oriented to person, place, and time.   Psychiatric:         Mood and Affect: Mood normal.         Judgment: Judgment normal.       Fluids    Intake/Output Summary (Last 24 hours) at 7/27/2025 1606  Last data filed at 7/27/2025 1500  Gross per 24 hour   Intake 1556.49 ml   Output 300 ml   Net 1256.49 ml      Laboratory  Recent Labs     07/26/25  0258 07/26/25  1011 07/27/25  0016 07/27/25  0405 07/27/25  1000   WBC 12.4*  --  10.8 9.5  --    RBC 4.87  --  3.63* 3.47*  --    HEMOGLOBIN 16.7*   < > 12.3 12.0 13.1   HEMATOCRIT 49.6*   < > 37.0 35.7* 39.2   .8*  --  101.9* 102.9*  --    MCH 34.3*  --  33.9* 34.6*  --    MCHC 33.7  --  33.2 33.6  --    RDW 47.2  --  48.3 47.8  --    PLATELETCT 265  --  155* 129*  --    MPV 8.9*  --  10.1 10.1  --     < > = values in this interval not displayed.     Recent Labs     07/26/25  0258 07/26/25  1600 07/27/25  0016   SODIUM 137 139 137   POTASSIUM 3.7 4.2 3.8   CHLORIDE 95* 101 103   CO2 14* 14* 23   GLUCOSE 87 203* 195*   BUN 9 6* 6*   CREATININE 0.56 0.63 0.61   CALCIUM 8.8 7.9* 8.1*     Recent Labs     07/26/25  0802   APTT 24.1*   INR 0.92     Imaging  DX-CHEST-PORTABLE (1 VIEW)   Final Result      New retrocardiac density, which could represent airspace disease or atelectasis.      US-RUQ   Final Result      1.  Echogenic heterogeneous liver parenchyma could relate to hepatocellular disease/cirrhosis.   2.  No hepatic mass identified.         CT-CTA CHEST PULMONARY ARTERY W/ RECONS   Final Result      1.  No pulmonary embolism.   2.  Diffuse esophageal wall thickening with adjacent inflammation, consistent with esophagitis.            DX-CHEST-PORTABLE (1 VIEW)   Final Result      No acute process.      EC-ECHOCARDIOGRAM COMPLETE W/O CONT    (Results Pending)      Assessment/Plan  *  UGIB (upper gastrointestinal bleed)- (present on admission)  Assessment & Plan  Patient with alcohol use presents for hematemesis, unclear whether or not patient has cirrhosis and varices  Hemoglobin 16.4  Protonix 40 mg IV twice daily  Octreotide  Ceftriaxone  GI consulted, plan for colonoscopy during this hospitalization    Acute hypoxemic respiratory failure (HCC)- (present on admission)  Assessment & Plan  Secondary to pneumonia.  Imaging is concerning for pneumonia.    Rule out COVID-19, flu Dora and RSV  I will start doxycycline and ceftriaxone   I will check a speech evaluation to rule out aspiration  Oxygen as needed, Respiratory protocol, Bronchodilators, Incentive spirometry   Will check echocardiography to rule out heart failure    Pneumonia of left lower lobe due to infectious organism- (present on admission)  Assessment & Plan  Imaging is concerning for pneumonia.    Complicated by acute hypoxemic respiratory failure  Rule out COVID-19, flu Dora and RSV  I will start doxycycline and ceftriaxone   I will check a speech evaluation to rule out aspiration    Alcohol dependence with withdrawal (HCC)- (present on admission)  Assessment & Plan  I will start on CIWA protocol   I will place on continuous cardiac monitoring  I will check K, Mg, Na, Ca. Monitor electrolytes and replace accordingly, particularly magnesium, potassium and phosphorus  I will order fall, seizure, & aspiration precautions.  I will order thiamine folic acid and multivitamin.     Lactic acidosis- (present on admission)  Assessment & Plan  Lactic acid 5.8 -> 6.6, likely from alcohol use,, and dehydration  Resolved with intravenous fluids.       VTE prophylaxis: SCDs    Total time spent is 69 minutes.   This included review of patient chart since admission including ICU course, face-to-face interview, physical examination, lab review and analysis.  In addition, I spoke with patient and nurse, charge nurse, pharmacy, case management during  discharge planning IDT rounds.  In addition I discussed patient condition with intensive care, Dr. Zuniga, and gastroenterology team, GI consultants.

## 2025-07-27 NOTE — ASSESSMENT & PLAN NOTE
Secondary to pneumonia.  Imaging is concerning for pneumonia.    Rule out COVID-19, flu Dora and RSV  I will start doxycycline and ceftriaxone   I will check a speech evaluation to rule out aspiration  Oxygen as needed, Respiratory protocol, Bronchodilators, Incentive spirometry   Will check echocardiography to rule out heart failure    7/29/2025   Oxygen requirement slightly better this morning, now requiring 2 L of oxygen.  Continue antibiotics, continue incentive spirometer.

## 2025-07-27 NOTE — ASSESSMENT & PLAN NOTE
Imaging is concerning for pneumonia.    Complicated by acute hypoxemic respiratory failure  Rule out COVID-19, flu Dora and RSV  I will start doxycycline and ceftriaxone   I will check a speech evaluation to rule out aspiration    7/29/2025   Oxygen requirement improving.  Continue ceftriaxone and doxycycline

## 2025-07-28 ENCOUNTER — APPOINTMENT (OUTPATIENT)
Dept: CARDIOLOGY | Facility: MEDICAL CENTER | Age: 75
End: 2025-07-28
Attending: HOSPITALIST
Payer: MEDICARE

## 2025-07-28 PROBLEM — I48.91 ATRIAL FIBRILLATION (HCC): Status: ACTIVE | Noted: 2025-07-28

## 2025-07-28 PROBLEM — I16.0 HYPERTENSIVE URGENCY: Status: ACTIVE | Noted: 2025-07-28

## 2025-07-28 PROBLEM — Z01.818 PREOPERATIVE EXAMINATION: Status: ACTIVE | Noted: 2025-07-28

## 2025-07-28 PROBLEM — Z71.89 ACP (ADVANCE CARE PLANNING): Status: ACTIVE | Noted: 2025-07-28

## 2025-07-28 PROBLEM — I48.91 ATRIAL FIBRILLATION WITH RAPID VENTRICULAR RESPONSE (HCC): Status: ACTIVE | Noted: 2025-07-28

## 2025-07-28 ASSESSMENT — LIFESTYLE VARIABLES
AUDITORY DISTURBANCES: NOT PRESENT
AGITATION: NORMAL ACTIVITY
ANXIETY: *
AGITATION: *
PAROXYSMAL SWEATS: NO SWEAT VISIBLE
HEADACHE, FULLNESS IN HEAD: NOT PRESENT
TOTAL SCORE: 10
PAROXYSMAL SWEATS: BARELY PERCEPTIBLE SWEATING. PALMS MOIST
ANXIETY: *
ANXIETY: *
TREMOR: MODERATE TREMOR WITH ARMS EXTENDED
TREMOR: *
PAROXYSMAL SWEATS: NO SWEAT VISIBLE
TOTAL SCORE: VERY MILD ITCHING, PINS AND NEEDLES SENSATION, BURNING OR NUMBNESS
TOTAL SCORE: 9
VISUAL DISTURBANCES: NOT PRESENT
AUDITORY DISTURBANCES: NOT PRESENT
PAROXYSMAL SWEATS: NO SWEAT VISIBLE
TREMOR: *
TREMOR: *
TOTAL SCORE: 7
AUDITORY DISTURBANCES: NOT PRESENT
VISUAL DISTURBANCES: NOT PRESENT
ORIENTATION AND CLOUDING OF SENSORIUM: ORIENTED AND CAN DO SERIAL ADDITIONS
HEADACHE, FULLNESS IN HEAD: NOT PRESENT
TOTAL SCORE: 14
NAUSEA AND VOMITING: NO NAUSEA AND NO VOMITING
NAUSEA AND VOMITING: NO NAUSEA AND NO VOMITING
HEADACHE, FULLNESS IN HEAD: NOT PRESENT
VISUAL DISTURBANCES: NOT PRESENT
AUDITORY DISTURBANCES: NOT PRESENT
ANXIETY: NO ANXIETY (AT EASE)
ORIENTATION AND CLOUDING OF SENSORIUM: DATE DISORIENTATION BY NO MORE THAN TWO CALENDAR DAYS
AGITATION: NORMAL ACTIVITY
TREMOR: *
AUDITORY DISTURBANCES: NOT PRESENT
NAUSEA AND VOMITING: MILD NAUSEA WITH NO VOMITING
VISUAL DISTURBANCES: NOT PRESENT
AGITATION: SOMEWHAT MORE THAN NORMAL ACTIVITY
NAUSEA AND VOMITING: NO NAUSEA AND NO VOMITING
HEADACHE, FULLNESS IN HEAD: NOT PRESENT
PAROXYSMAL SWEATS: *
ORIENTATION AND CLOUDING OF SENSORIUM: CANNOT DO SERIAL ADDITIONS OR IS UNCERTAIN ABOUT DATE
AGITATION: NORMAL ACTIVITY
HEADACHE, FULLNESS IN HEAD: NOT PRESENT
NAUSEA AND VOMITING: NO NAUSEA AND NO VOMITING
ORIENTATION AND CLOUDING OF SENSORIUM: CANNOT DO SERIAL ADDITIONS OR IS UNCERTAIN ABOUT DATE
ORIENTATION AND CLOUDING OF SENSORIUM: CANNOT DO SERIAL ADDITIONS OR IS UNCERTAIN ABOUT DATE
VISUAL DISTURBANCES: NOT PRESENT
TREMOR: *
TOTAL SCORE: 3
ANXIETY: MILDLY ANXIOUS
VISUAL DISTURBANCES: NOT PRESENT
PAROXYSMAL SWEATS: *
TOTAL SCORE: 3
ANXIETY: NO ANXIETY (AT EASE)
NAUSEA AND VOMITING: NO NAUSEA AND NO VOMITING
ORIENTATION AND CLOUDING OF SENSORIUM: CANNOT DO SERIAL ADDITIONS OR IS UNCERTAIN ABOUT DATE
AGITATION: NORMAL ACTIVITY
HEADACHE, FULLNESS IN HEAD: NOT PRESENT
AUDITORY DISTURBANCES: NOT PRESENT

## 2025-07-28 ASSESSMENT — ENCOUNTER SYMPTOMS
ABDOMINAL PAIN: 0
SORE THROAT: 1
STRIDOR: 0
EYE REDNESS: 0
MYALGIAS: 0
COUGH: 1
FOCAL WEAKNESS: 0
VOMITING: 0
NERVOUS/ANXIOUS: 1
FEVER: 0
ROS GI COMMENTS: ANOREXIA
EYE DISCHARGE: 0
BRUISES/BLEEDS EASILY: 0
SHORTNESS OF BREATH: 1
CHILLS: 0
FLANK PAIN: 0

## 2025-07-28 ASSESSMENT — COGNITIVE AND FUNCTIONAL STATUS - GENERAL
STANDING UP FROM CHAIR USING ARMS: A LITTLE
SUGGESTED CMS G CODE MODIFIER MOBILITY: CK
CLIMB 3 TO 5 STEPS WITH RAILING: A LOT
DRESSING REGULAR UPPER BODY CLOTHING: A LITTLE
TURNING FROM BACK TO SIDE WHILE IN FLAT BAD: A LITTLE
TOILETING: A LOT
MOBILITY SCORE: 16
SUGGESTED CMS G CODE MODIFIER DAILY ACTIVITY: CK
DRESSING REGULAR LOWER BODY CLOTHING: A LITTLE
HELP NEEDED FOR BATHING: A LOT
PERSONAL GROOMING: A LITTLE
WALKING IN HOSPITAL ROOM: A LITTLE
MOVING FROM LYING ON BACK TO SITTING ON SIDE OF FLAT BED: A LITTLE
DAILY ACTIVITIY SCORE: 16
EATING MEALS: A LITTLE
MOVING TO AND FROM BED TO CHAIR: A LOT

## 2025-07-28 ASSESSMENT — PAIN DESCRIPTION - PAIN TYPE
TYPE: ACUTE PAIN
TYPE: ACUTE PAIN

## 2025-07-28 ASSESSMENT — FIBROSIS 4 INDEX: FIB4 SCORE: 3.18

## 2025-07-28 NOTE — DISCHARGE PLANNING
Case Management Discharge Planning    Admission Date: 7/26/2025  GMLOS: 4.2  ALOS: 2    6-Clicks ADL Score: 16  6-Clicks Mobility Score: 16  PT and/or OT Eval ordered: Yes  Post-acute Referrals Ordered: Yes  Post-acute Choice Obtained: No  Has referral(s) been sent to post-acute provider:  Yes      Anticipated Discharge Dispo: Discharge Disposition: D/T to SNF with Medicare cert in anticipation of skilled care (03)    DME Needed: No    Action(s) Taken:     RNCM attended IDT rounds and reviewed chart. Per IDT rounds discussion, anticipate post-acute placement need.      SNF order placed and referrals sent to local SNFs per protocol based on SLP recommendation. Pending PT/OT eval.    Escalations Completed: None    Medically Clear: No    Next Steps: Follow-up with medical team to discuss DC needs and barriers. Follow-up regarding PT/OT eval and SNF acceptance.    Barriers to Discharge: Medical clearance, Pending Placement, and Pending PT Evaluation

## 2025-07-28 NOTE — PROGRESS NOTES
Telemetry Shift Summary     Rhythm: SR  Rate: 77-80  Measurements: .16/.07/.37  Ectopy (reported by Monitor Tech): rPVC     Normal Values  Rhythm: Sinus  HR:   Measurements: 0.12-0.20/0.06-0.10/0.30-0.52

## 2025-07-28 NOTE — PROGRESS NOTES
Gastroenterology Progress Note     Author: CYNDY Delcid     Date & Time Created: 7/28/2025 11:55 AM    Patient ID:  Name:             Marcia Koch   YOB: 1950  Age:                 75 y.o.  female   MRN:               [id number]    Chief Complaint    Hematemesis    GI History  This is a 74 yo female PMH HTN, alcohol abuse, GERD, HLD who was seen in consultation for hematemesis x 4-5 episodes starting yesterday. Has been experiencing N/V/D for the past few days but yesterday began to notice coffee ground emesis. No melena. Began to have SOB and generalized weakness decided to come to ER. Notable labs: WBC: 12. Hgb: 16-->14.3. MCV: 101.8. Platelet count: 265. ALP: 150. INR: -.92. Lactic acid: 5.8-->6.6-->5.6-->3.4.      CTA: no pulmonary embolism. Diffuse esophageal wall thickening with adjacent inflammation      Over the past week, took some Aleve for back pain. Also, drinks 3-4 glasses of wine per day. No history of cirrhosis.       Initial labs  CBC: WBC 12,400.  Hgb 16.7, .  INR: 0.92, PTT 24.  CMP: GFR 96.  Alkaline phosphatase 150.  Lipase: 14.  Lactic acid: 5.8 initially.      Imaging  CXR 7/26/2025: No acute process.  CTA chest 7/26/2025: No pulmonary embolism.  Diffusely thick esophageal wall.  CXR 7/27/2025: New retrocardiac density.    US RUQ 7/26/2025: Echogenic liver.  No liver mass.        Interval History  7/27/2025   Hgb 13.1, lactic acid 1.4 today    She has a chronic productive cough attributed to smoking.  About July 19 the productive cough got worse, and about that time the severity of the cough caused gagging and vomiting 3 times daily.  Each time she vomited, she brought up brown liquid.  On July 25 she started bringing up brown-maroon liquid.  During the week she also developed worsening shortness of breath, which resulted in admission through the emergency room at 2 AM on July 26.    She was drinking about 4 glasses of wine daily and taking Aleve as needed,  about every other day.    Initial WBC was 12,400 with lactate 5.6, Hgb 16.7 and .  The lactate level normalized at 1.6 on July 26 at 1600.    Initial CTA chest found clear lungs and a circumferentially thick esophageal wall.    She was treated with ceftriaxone, pantoprazole and octreotide infusion.  She has not had further signs of active GI bleeding.  Hgb stabilized at 13.1 today.  Chest x-ray today found a new retrocardiac density, and pneumonia is suspected.    EGD was initially scheduled for today, but it was canceled because the current risk of endoscopy seems higher than the benefit.  For now, endoscopy would be best performed shortly before discharge, when her condition has improved.  If active bleeding occurs in the interim, then urgent endoscopy could be reconsidered.    Hgb 13.1, lactic acid 1.4    7/28/2025:  Patient more confused and agitated overnight, treated for ETOH withdrawal. She is sedated and obtunded this AM. Minimally responsive.  No hematemesis or melena. Hgb is stable. She is requiring oxygen                Problem List  Active Hospital Problems    Diagnosis     *UGIB (upper gastrointestinal bleed) [K92.2]     Lactic acidosis [E87.20]     ETOH abuse [F10.10]        Labs:  Recent Labs     07/27/25  0016 07/27/25  0405 07/27/25  1000 07/27/25  1845 07/28/25  0013 07/28/25  0731   WBC 10.8 9.5  --   --  6.2  --    RBC 3.63* 3.47*  --   --  3.51*  --    HEMOGLOBIN 12.3 12.0   < > 12.8 12.1 12.2   HEMATOCRIT 37.0 35.7*   < > 39.7 36.3* 37.7   .9* 102.9*  --   --  103.4*  --    MCH 33.9* 34.6*  --   --  34.5*  --    MCHC 33.2 33.6  --   --  33.3  --    RDW 48.3 47.8  --   --  47.3  --    PLATELETCT 155* 129*  --   --  138*  --    MPV 10.1 10.1  --   --  9.7  --     < > = values in this interval not displayed.      Recent Labs     07/26/25  0802   APTT 24.1*   INR 0.92     Recent Labs     07/26/25  1600 07/27/25  0016 07/28/25  0013   SODIUM 139 137 140   POTASSIUM 4.2 3.8 3.7    CHLORIDE 101 103 105   CO2 14* 23 23   GLUCOSE 203* 195* 127*   BUN 6* 6* 3*   CREATININE 0.63 0.61 0.50   CALCIUM 7.9* 8.1* 8.6     Recent Labs     07/26/25  0258 07/26/25  1600 07/27/25  0016 07/28/25  0013   ALTSGPT 23  --   --  15   ASTSGOT 40  --   --  23   ALKPHOSPHAT 150*  --   --  101*   TBILIRUBIN 0.5  --   --  0.4   LIPASE 14  --   --   --    GLUCOSE 87 203* 195* 127*       Blood Culture   Date Value Ref Range Status   07/06/2011 No growth after 5 days of incubation.  Final        GI/Nutrition:  Orders Placed This Encounter   Procedures    Diet Order Diet: Full Liquid; Tray Modifications (optional): SLP - 1:1 Supervision by Nursing, SLP - Deliver to Nursing Station     Standing Status:   Standing     Number of Occurrences:   1     Diet::   Full Liquid [11]     Tray Modifications (optional):   SLP - 1:1 Supervision by Nursing     Tray Modifications (optional):   SLP - Deliver to Nursing Station       Hospital Medications:  thiamine, 200 mg, Intravenous, TID  potassium phosphate, 30 mmol, Intravenous, Once  carvedilol, 3.125 mg, Oral, BID WITH MEALS  doxycycline monohydrate, 100 mg, Oral, Q12HRS  folic acid, 1 mg, Oral, DAILY  therapeutic multivitamin-minerals, 1 Tablet, Oral, DAILY  ammonium lactate, , Topical, BID  pantoprazole, 40 mg, Intravenous, BID  escitalopram, 20 mg, Oral, DAILY  sucralfate, 1 g, Oral, TID      PRN medications: Metoprolol Tartrate, hydrALAZINE, benzocaine-menthol, LORazepam, LORazepam, LORazepam, LORazepam, LORazepam **OR** diazePAM, acetaminophen, labetalol, ondansetron **OR** ondansetron    Current Outpatient Medications:  Medications Ordered Prior to Encounter[1]    Fluids:    Intake/Output Summary (Last 24 hours) at 7/27/2025 1152  Last data filed at 7/27/2025 0610  Gross per 24 hour   Intake 1556.49 ml   Output 100 ml   Net 1456.49 ml     Weight: 53.5 kg (117 lb 15.1 oz)    Past Medical History:   Past Medical History[2]    Past Surgical History:  Past Surgical  "History[3]    Physical Exam:  Vitals/ General Appearance:   Weight/BMI: Body mass index is 20.5 kg/m².  BP (!) 148/72   Pulse 77   Temp 36.9 °C (98.4 °F) (Temporal)   Resp 16   Ht 1.615 m (5' 3.6\")   Wt 53.5 kg (117 lb 15.1 oz)   SpO2 95%   Vitals:    07/28/25 0807 07/28/25 0935 07/28/25 1049 07/28/25 1118   BP: (!) 159/70 (!) 206/101 (!) 212/103 (!) 148/72   Pulse: 82 75 64 77   Resp: 18   16   Temp: 36.8 °C (98.2 °F)   36.9 °C (98.4 °F)   TempSrc: Temporal   Temporal   SpO2: 96%   95%   Weight:       Height:         Oxygen Therapy:  Pulse Oximetry: 95 %, O2 (LPM): 3, O2 Delivery Device: None - Room Air    Physical Exam  Vitals reviewed.   Constitutional:       General: She is not in acute distress.  HENT:      Head: Normocephalic and atraumatic.      Right Ear: External ear normal.      Left Ear: External ear normal.      Nose: Nose normal.      Mouth/Throat:      Mouth: Mucous membranes are moist.      Pharynx: Oropharynx is clear.   Eyes:      Extraocular Movements: Extraocular movements intact.      Pupils: Pupils are equal, round, and reactive to light.      Comments: Pupils were equal and round.   Cardiovascular:      Rate and Rhythm: Regular rhythm.      Heart sounds: Normal heart sounds. No murmur heard.     No gallop.   Pulmonary:      Comments: Normal breath sounds anteriorly.  Abdominal:      Palpations: Abdomen is soft. There is no mass.      Tenderness: There is no abdominal tenderness.   Neurological:      Mental Status: She is lethargic and disoriented.   Psychiatric:         Attention and Perception: She is inattentive.         Behavior: Behavior is slowed and withdrawn.         Review of Systems:  Review of Systems   Unable to perform ROS: Medical condition   Gastrointestinal:  Negative for melena and vomiting.       ASSESSMENT  Upper GI bleeding with hematemesis  Treating with octreotide infusion and pantoprazole 40 mg IV twice daily.  Evaluate with EGD at the optimal timing, preferably on " the day before planned discharge.    Thick esophageal wall on CTA  Evaluate this issue during the EGD.    Alcohol abuse  She plans to stop drinking alcohol.    Recent diarrhea  If she continues to have diarrhea then check stool studies.    Probable pneumonia  Being treated with ceftriaxone.        PLAN  Continue octreotide infusion and pantoprazole.  EGD at optimal timing when oxygen requirements improve and no longer in withdrawal or if emergent bleeding develops.     DELANO Delcid                   [1]   No current facility-administered medications on file prior to encounter.     Current Outpatient Medications on File Prior to Encounter   Medication Sig Dispense Refill    Multiple Vitamin (MULTIVITAMIN PO) Take 1 Tablet by mouth every morning.     [2]   Past Medical History:  Diagnosis Date    Alcohol abuse     Alcoholism (HCC)     Anxiety     Anxiety     Depression     Hiatus hernia syndrome     High cholesterol     Hypertension     Indigestion     Other specified disorder of intestines     diarrhea    Pneumonia     for 2 days    Psychiatric disorder     anxiety, depression    Sleep apnea     Snoring     Unspecified urinary incontinence     Urinary bladder disorder    [3]   Past Surgical History:  Procedure Laterality Date    BLADDER SLING FEMALE  8/30/2011    Performed by RENATE STERLING at SURGERY [facility]    OTHER  2010    surgery for sleep apnea    OTHER  2010    big toenails and second toenails removed    OTHER ABDOMINAL SURGERY  2002    choley    GYN SURGERY  1991    hyst    OTHER  1956    tonsillectomy    ABDOMINAL HYSTERECTOMY TOTAL      Hysterectomy, Total Abdominal    BRIEN BY LAPAROSCOPY      Cholecystectomy, Laparoscopic

## 2025-07-28 NOTE — PROGRESS NOTES
Telemetry Shift Summary     Rhythm: SR/afib  HR: , up to 155  Ectopy: rPVC, PAC    Measurements: -/.06/-    Normal Values  Rhythm: SR  HR:   Measurements: 0.12-0.20/0.08-0.10/0.30-0.52

## 2025-07-28 NOTE — PROGRESS NOTES
4 Eyes Skin Assessment Completed by Yann RN and DEWEY Segovia.    Skin assessment is primarily focused on high risk bony prominences. Pay special attention to skin beneath and around medical devices, high risk bony prominences, skin to skin areas and areas where the patient lacks sensation to feel pain and areas where the patient previously had breakdown.     Head (Occipital):  WDL   Ears (Under Medical Devices): Pink   Nose (Under Medical Devices): Pink   Mouth:  WDL   Neck: WDL   Breast/Chest:  WDL   Shoulder Blades:  WDL   Spine:   WDL   (R) Arm/Elbow/Hand: WDL   (L) Arm/Elbow/Hand: WDL   Abdomen: WDL   Pannus/Groin:  WDL   Sacrum/Coccyx:   WDL   (R) Ischial Tuberosity (Sit Bones):  WDL   (L) Ischial Tuberosity (Sit Bones):  WDL   (R) Leg:  Bruising   (L) Leg:  Bruising   (R) Heel:  Pink and Blanching   (R) Foot/Toe: Dry, flaky   (L) Heel: Pink and Blanching   (L) Foot/Toe:  Dry, flaky       DEVICES IN USE:   Respiratory Devices:  Nasal cannula  Feeding Devices:  N/A   Lines & BP Monitoring Devices:  Peripheral IV    Orthopedic Devices:  N/A  Miscellaneous Devices:  Telemetry monitor    PROTOCOL INTERVENTIONS:   Standard/Trauma Bed:  Already in place    WOUND PHOTOS:   Completed and in EPIC     WOUND CONSULT:   Consult ordered for the following areas Dorsal feet

## 2025-07-28 NOTE — CARE PLAN
The patient is Watcher - Medium risk of patient condition declining or worsening    Shift Goals  Clinical Goals: monitor CIWA, labs, patient safety  Patient Goals: take care of cat    Progress made toward(s) clinical / shift goals:    Problem: Optimal Care for Alcohol Withdrawal  Goal: Optimal Care for the alcohol withdrawal patient  Outcome: Progressing       Patient is not progressing towards the following goals:      Problem: Knowledge Deficit - Standard  Goal: Patient and family/care givers will demonstrate understanding of plan of care, disease process/condition, diagnostic tests and medications  Outcome: Not Progressing

## 2025-07-28 NOTE — PROGRESS NOTES
Patient increasingly confused, oriented to self. Attempting to get oob, continuing to remove oxygen, heart monitor, pulling at IVs. CIWAs have ranged from 12-20, she has received a significant amount of oral ativan with no success. Orders for BUE soft restraints placed at this time for patient safety and orders for 2.5 IV Valium per Dr. Peoples.      0000: patient resting comfortably in bed    0040: Per monitor tech, patient tachycardic. EKG shows afib. Patient unreliable historian, but no signs of hx of afib in patient's chart. Patient denies cp/palps at this time. /87. Heart rate ranging from 120-130s. MD Peoples aware - morning labs drawn, pending.    0055: patient heart rate sustaining 130s, reaching up to 140s while sleeping. MD high, orders for 2.5 IV Metop and electrolyte replacement.heart rate 115 /74 post metop

## 2025-07-28 NOTE — ASSESSMENT & PLAN NOTE
Secondary to alcohol withdrawal.  Blood pressure is quite elevated systolic reaching 200-212.  This is likely secondary to alcohol withdrawal  carvedilol 3.125 twice daily.  I will start clonidine with hold parameters.  I will start hydralazine as needed for extreme hypertension  Echo: EF 60% Mild mitral annular calcification.  Aortic valve sclerosis without significant stenosis.  Mild tricuspid regurgitation.  Continue to monitor on telemetry.  Continue CIWA protocol

## 2025-07-28 NOTE — CARE PLAN
The patient is Stable - Low risk of patient condition declining or worsening    Shift Goals  Clinical Goals: CIWA, monitor labs and vitals, pt safety  Patient Goals: rest, go home    Progress made toward(s) clinical / shift goals:    Problem: Knowledge Deficit - Standard  Goal: Patient and family/care givers will demonstrate understanding of plan of care, disease process/condition, diagnostic tests and medications  Outcome: Progressing     Problem: Optimal Care for Alcohol Withdrawal  Goal: Optimal Care for the alcohol withdrawal patient  Outcome: Progressing     Problem: Psychosocial  Goal: Patient's level of anxiety will decrease  Outcome: Progressing     Problem: Risk for Aspiration  Goal: Patient's risk for aspiration will be absent or decrease  Outcome: Progressing       Patient is not progressing towards the following goals:

## 2025-07-28 NOTE — ASSESSMENT & PLAN NOTE
Currently the patient has hypertensive urgency, atrial fibrillation with rapid ventricular response, respiratory failure on 4 L of oxygen.  As long as there is no emergency, I recommend to delay her GI procedure until she is more hemodynamically stable and her respiratory status improves.  Anticipate the patient to be more appropriate for procedure by 7/30 depending on how she progresses.       Medical Assessment Risk:  High    Age more than 65   Has hypertensive urgency.  Has acute hypoxemic respiratory failure.  Requiring intravenous antibiotics for pneumonia.  Has atrial fibrillation with rapid ventricular response.  Going through alcohol withdrawal.    Surgical Risk:   Intermediate    Cardiovascular:   Hide is atrial fibrillation with rapid ventricular response.  Has hypertensive urgency  I will order a Pre-op EKG  I will order an Echo     Pulmonary:  Oxygen per protocol  Incentive Spirometer  Respiratory therapy consult   Has acute hypoxemic respiratory failure.  Has pneumonia, receiving intravenous antibiotics with  Transthoracic Echocardiogram (dyspnea of unkonwn etiology or elevated BNP)    Renal:   I will order follow-up BUN and creatinine   Will check a bladder scan to rule out urine retention    Neurologic:   Going through alcohol withdrawal.     Acetaminophen PO TID PRN  Try to minimize using opioid Pain medications.    If patient develops delirium or agitation consider quetiapine 12.5 mg PO x1 PRN agitation (can repeat x1 in 2 hours PRN agitation).      Hematologic:  Plan to hold pharmacologic DVT prophylaxis for now as she has GI bleeding

## 2025-07-28 NOTE — WOUND TEAM
RN requested feet be assessed for dry flaky skin to both feet. Pt has build up of flaky skin. Ammonium lactate ordered.

## 2025-07-28 NOTE — PROGRESS NOTES
Hospital Medicine Daily Progress Note    Date of Service  7/28/2025    Chief Complaint  Marcia Koch is a 75 y.o. female admitted 7/26/2025 with alcohol withdrawal and GI bleeding    Hospital Course  Marcia Koch is a 75 y.o. female with a past medical history of hyperlipidemia, gastroesophageal flux disease, hypertension, and alcohol dependence admitted 7/26/2025 with alcohol withdrawal and GI bleeding.  CT imaging showed evidence for diffuse esophageal wall thickening with adjacent inflammation. Patient has been started on ceftriaxone, octreotide and intravenous pantoprazole.  Gastroenterology consulted, plan for scope evaluation.  She was found to have pneumonia, she has been started on ceftriaxone.  Doxycycline has been added.    Interval Problem Update  Intermittently tachycardic, went into atrial fibrillation with rapid ventricular response last night with a rate of 130s-140s.  Blood pressure is quite elevated systolic reaching 200-212.  This is likely secondary to alcohol withdrawal  I am starting carvedilol 3.125 twice daily.  I will start clonidine with all parameters.  Continue telemetry monitoring.  I will check an echocardiography.  Patient had delirium last night, improving this morning  Hemoglobin has been stable 12.2, I will reduce the frequency of checks to every 12 hours as long as there is no recurrence of bleeding..  Potassium 3.7, magnesium is 1.6, I will replace  Phosphorus is low, I will replace  Oxygen requirement slightly better this morning, now requiring 2-3 L of oxygen.  Continue antibiotics, continue incentive spirometer.  Not stable for the procedure  I discussed patient condition with gastroenterology, anticipate that she can tolerate the procedure by 7/30    I will place speech evaluation to rule out aspiration.   I have discussed this patient's plan of care and discharge plan at IDT rounds today with Case Management, Nursing, Nursing leadership, and other members of the  IDT team.    Consultants/Specialty  GI, GIC     Code Status  Full Code    Disposition  Not medically cleared, respiratory failure, pneumonia, will require intravenous antibiotics.  Will require GI consultation and scope evaluation.  I have placed the appropriate orders for post-discharge needs.    Review of Systems  Review of Systems   Constitutional:  Positive for malaise/fatigue. Negative for chills and fever.   HENT:  Positive for sore throat.    Eyes:  Negative for discharge and redness.   Respiratory:  Positive for cough and shortness of breath. Negative for stridor.    Cardiovascular:  Negative for chest pain and leg swelling.   Gastrointestinal:  Negative for abdominal pain and vomiting.        Anorexia   Genitourinary:  Negative for flank pain.   Musculoskeletal:  Negative for myalgias.   Skin: Negative.    Neurological:  Negative for focal weakness.   Endo/Heme/Allergies:  Does not bruise/bleed easily.   Psychiatric/Behavioral:  The patient is nervous/anxious.       Physical Exam  Temp:  [36.6 °C (97.9 °F)-37 °C (98.6 °F)] 36.9 °C (98.4 °F)  Pulse:  [] 77  Resp:  [16-20] 16  BP: (126-212)/() 148/72  SpO2:  [94 %-97 %] 95 %    Physical Exam  Constitutional:       General: She is not in acute distress.     Appearance: She is ill-appearing.   HENT:      Head: Normocephalic and atraumatic.      Right Ear: External ear normal.      Left Ear: External ear normal.      Nose: No congestion or rhinorrhea.      Mouth/Throat:      Mouth: Mucous membranes are moist.      Pharynx: No oropharyngeal exudate or posterior oropharyngeal erythema.   Eyes:      General: No scleral icterus.        Right eye: No discharge.         Left eye: No discharge.      Conjunctiva/sclera: Conjunctivae normal.      Pupils: Pupils are equal, round, and reactive to light.   Cardiovascular:      Rate and Rhythm: Tachycardia present.      Heart sounds:      No friction rub. No gallop.   Pulmonary:      Breath sounds: Rhonchi and  rales present.      Comments: Requiring 4 L of oxygen to achieve adequate saturation.  Reduced air entry bilaterally.  Bilateral rhonchi and crepitation  Abdominal:      General: Abdomen is flat. There is no distension.      Tenderness: There is no guarding.   Musculoskeletal:         General: No swelling.      Cervical back: Neck supple. No rigidity. No muscular tenderness.      Right lower leg: No edema.      Left lower leg: No edema.   Skin:     General: Skin is dry.      Capillary Refill: Capillary refill takes 2 to 3 seconds.      Coloration: Skin is pale. Skin is not jaundiced.      Findings: No bruising or erythema.   Neurological:      Mental Status: She is alert and oriented to person, place, and time.   Psychiatric:      Comments: Anxious mood       Fluids    Intake/Output Summary (Last 24 hours) at 7/28/2025 1218  Last data filed at 7/28/2025 0620  Gross per 24 hour   Intake --   Output 500 ml   Net -500 ml      Laboratory  Recent Labs     07/27/25  0016 07/27/25  0405 07/27/25  1000 07/27/25  1845 07/28/25  0013 07/28/25  0731   WBC 10.8 9.5  --   --  6.2  --    RBC 3.63* 3.47*  --   --  3.51*  --    HEMOGLOBIN 12.3 12.0   < > 12.8 12.1 12.2   HEMATOCRIT 37.0 35.7*   < > 39.7 36.3* 37.7   .9* 102.9*  --   --  103.4*  --    MCH 33.9* 34.6*  --   --  34.5*  --    MCHC 33.2 33.6  --   --  33.3  --    RDW 48.3 47.8  --   --  47.3  --    PLATELETCT 155* 129*  --   --  138*  --    MPV 10.1 10.1  --   --  9.7  --     < > = values in this interval not displayed.     Recent Labs     07/26/25  1600 07/27/25  0016 07/28/25  0013   SODIUM 139 137 140   POTASSIUM 4.2 3.8 3.7   CHLORIDE 101 103 105   CO2 14* 23 23   GLUCOSE 203* 195* 127*   BUN 6* 6* 3*   CREATININE 0.63 0.61 0.50   CALCIUM 7.9* 8.1* 8.6     Recent Labs     07/26/25  0802   APTT 24.1*   INR 0.92     Imaging  EC-ECHOCARDIOGRAM COMPLETE W/ CONT   Final Result      DX-CHEST-PORTABLE (1 VIEW)   Final Result      New retrocardiac density, which  could represent airspace disease or atelectasis.      US-RUQ   Final Result      1.  Echogenic heterogeneous liver parenchyma could relate to hepatocellular disease/cirrhosis.   2.  No hepatic mass identified.         CT-CTA CHEST PULMONARY ARTERY W/ RECONS   Final Result      1.  No pulmonary embolism.   2.  Diffuse esophageal wall thickening with adjacent inflammation, consistent with esophagitis.            DX-CHEST-PORTABLE (1 VIEW)   Final Result      No acute process.         I personally reviewed patient EKG shows atrial fibrillation with a rate of 133.  There is no ST elevation.  There is 1 mm ST depression in lead I, aVL and leads V4-V6.  There are T wave inversions in V4-V6.  QTc is 462.    Assessment/Plan  * UGIB (upper gastrointestinal bleed)- (present on admission)  Assessment & Plan  Patient with alcohol use presents for hematemesis, unclear whether or not patient has cirrhosis and varices  Hemoglobin 16.4  Protonix 40 mg IV twice daily  Octreotide  Ceftriaxone  GI consulted, plan for colonoscopy during this hospitalization    7/28/2025   Not stable for the procedure  I discussed patient condition with gastroenterology, anticipate that she can tolerate the procedure by 7/30     Preoperative examination- (present on admission)  Assessment & Plan  Currently the patient has hypertensive urgency, atrial fibrillation with rapid ventricular response, respiratory failure on 4 L of oxygen.  As long as there is no emergency, I recommend to delay her GI procedure until she is more hemodynamically stable and her respiratory status improves.  Anticipate the patient to be more appropriate for procedure by 7/30 depending on how she progresses.       Medical Assessment Risk:  High    Age more than 65   Has hypertensive urgency.  Has acute hypoxemic respiratory failure.  Requiring intravenous antibiotics for pneumonia.  Has atrial fibrillation with rapid ventricular response.  Going through alcohol  withdrawal.    Surgical Risk:   Intermediate    Cardiovascular:   Hide is atrial fibrillation with rapid ventricular response.  Has hypertensive urgency  I will order a Pre-op EKG  I will order an Echo     Pulmonary:  Oxygen per protocol  Incentive Spirometer  Respiratory therapy consult   Has acute hypoxemic respiratory failure.  Has pneumonia, receiving intravenous antibiotics with  Transthoracic Echocardiogram (dyspnea of unkonwn etiology or elevated BNP)    Renal:   I will order follow-up BUN and creatinine   Will check a bladder scan to rule out urine retention    Neurologic:   Going through alcohol withdrawal.     Acetaminophen PO TID PRN  Try to minimize using opioid Pain medications.    If patient develops delirium or agitation consider quetiapine 12.5 mg PO x1 PRN agitation (can repeat x1 in 2 hours PRN agitation).      Hematologic:  Plan to hold pharmacologic DVT prophylaxis for now as she has GI bleeding    Atrial fibrillation with rapid ventricular response (HCC)- (present on admission)  Assessment & Plan  I will start scheduled carvedilol with hold parameters    I will start PRN IV metoprolol for sustained HR > 120   I will check an echo  I will place on continuous cardiac monitoring.      Hypertensive urgency- (present on admission)  Assessment & Plan  Secondary to alcohol withdrawal.  Blood pressure is quite elevated systolic reaching 200-212.  This is likely secondary to alcohol withdrawal  I am starting carvedilol 3.125 twice daily.  I will start clonidine with all parameters.  I will start hydralazine as needed for extreme hypertension  I will check an echo  Continue to monitor on telemetry.  Continue CIWA protocol    Acute hypoxemic respiratory failure (HCC)- (present on admission)  Assessment & Plan  Secondary to pneumonia.  Imaging is concerning for pneumonia.    Rule out COVID-19, flu Dora and RSV  I will start doxycycline and ceftriaxone   I will check a speech evaluation to rule out  aspiration  Oxygen as needed, Respiratory protocol, Bronchodilators, Incentive spirometry   Will check echocardiography to rule out heart failure    7/28/2025   Oxygen requirement slightly better this morning, now requiring 2-3 L of oxygen.  Continue antibiotics, continue incentive spirometer.     Pneumonia of left lower lobe due to infectious organism- (present on admission)  Assessment & Plan  Imaging is concerning for pneumonia.    Complicated by acute hypoxemic respiratory failure  Rule out COVID-19, flu Dora and RSV  I will start doxycycline and ceftriaxone   I will check a speech evaluation to rule out aspiration    7/28/2025   Oxygen requirement improving.  Continue ceftriaxone and doxycycline    Alcohol dependence with withdrawal (HCC)- (present on admission)  Assessment & Plan  I will start on CIWA protocol   I will place on continuous cardiac monitoring  I will check K, Mg, Na, Ca. Monitor electrolytes and replace accordingly, particularly magnesium, potassium and phosphorus  I will order fall, seizure, & aspiration precautions.  I will order thiamine folic acid and multivitamin.     7/28/2025   Still going through alcohol withdrawal, atrial fibrillation rapid ventricular response, hypertensive urgency  Continue CIWA protocol, continue to monitor and replace electrolytes as needed    Atrial fibrillation (HCC)- (present on admission)  Assessment & Plan  I will check echocardiography.  I will start carvedilol with hold parameters  I will start PRN metoprolol for sustained HR > 120   I will place on continuous cardiac monitoring.   [id number] Score > 2, she will need therapeutic anticoagulation after having GI procedures and if cleared for anticoagulation     Lactic acidosis- (present on admission)  Assessment & Plan  Lactic acid 5.8 -> 6.6, likely from alcohol use,, and dehydration  Resolved with intravenous fluids.    ACP (advance care planning)- (present on admission)  Assessment & Plan  I had a  discussion with the patient regarding goals of care, diagnoses, prognosis, and CODE STATUS. We discussed her prognosis and comorbidities.  The patient has advanced age of 75 years.  She has a number of chronic medical problems including alcohol dependence, debility, frequent hospitalizations for alcohol withdrawal she also has multiple acute medical problems including GI bleed, alcohol withdrawal and acute hypoxemic respiratory failure.  Currently she wants to maintain a full code.  She is open to all forms of invasive or noninvasive diagnostic and therapeutic interventions.          VTE prophylaxis: SCDs    I had a discussion with the patient regarding goals of care, diagnoses, prognosis, and CODE STATUS. We discussed her prognosis and comorbidities.  The patient has advanced age of 75 years.  She has a number of chronic medical problems including alcohol dependence, debility, frequent hospitalizations for alcohol withdrawal she also has multiple acute medical problems including GI bleed, alcohol withdrawal and acute hypoxemic respiratory failure.  Currently she wants to maintain a full code.  She is open to all forms of invasive or noninvasive diagnostic and therapeutic interventions.  I spent 16 minutes on advanced care planning.    Total time spent is 68 minutes.   This included review of patient chart for overnight events , face-to-face interview, physical examination, lab review and analysis.  In addition, I spoke with patient and nurse, charge nurse, pharmacy, case management during discharge planning IDT rounds.  In addition for time needed for advance care planning.  In addition I discussed patient condition with gastroenterology team, GI consultants [facility]

## 2025-07-28 NOTE — ASSESSMENT & PLAN NOTE
Rate controlled with current interventions.  [id number] Score > 2, she will need therapeutic anticoagulation after having GI procedures and if cleared for anticoagulation

## 2025-07-28 NOTE — ASSESSMENT & PLAN NOTE
I had a discussion with the patient regarding goals of care, diagnoses, prognosis, and CODE STATUS. We discussed her prognosis and comorbidities.  The patient has advanced age of 75 years.  She has a number of chronic medical problems including alcohol dependence, debility, frequent hospitalizations for alcohol withdrawal she also has multiple acute medical problems including GI bleed, alcohol withdrawal and acute hypoxemic respiratory failure.  Currently she wants to maintain a full code.  She is open to all forms of invasive or noninvasive diagnostic and therapeutic interventions.

## 2025-07-28 NOTE — ASSESSMENT & PLAN NOTE
I will start scheduled carvedilol with hold parameters    I will start PRN IV metoprolol for sustained HR > 120   I will check an echo  I will place on continuous cardiac monitoring.

## 2025-07-28 NOTE — THERAPY
Speech Language Pathology   Clinical Swallow Evaluation     Patient Name:  Marcia Koch  AGE:  75 y.o., SEX:  female  Medical Record #:  [id number]  Date of Service:  7/28/2025    Precautions  Medical: Fall Risk, Altered Mentation    History of Present Illness  Pt is a 76 y/o woman admitted 7/26 with SOB, epigastric pain, and hematemesis. Possible upper GI bleed. Per GI, plan for EGD closer to d/c and can advance to full liquids as tolerated. CIWA protocol initiated.   PMHx: alcohol abuse (3-4 glasses of wine per day), HTN, indigestion, pneumonia.   Last seen by SLP 9/1/22 with recs for regular/thin liquid diet.     Chest CT:   1.  No pulmonary embolism.  2.  Diffuse esophageal wall thickening with adjacent inflammation, consistent with esophagitis.    CXR: New retrocardiac density, which could represent airspace disease or atelectasis.    General Information:  Vitals  O2 (LPM): 2  O2 Delivery Device: Silicone Nasal Cannula  Level of Consciousness: Drowsy  Patient Behaviors: Fatigue, Drowsy, Forgetful  Orientation: Self, General place, Current month, Current year  Follows Directives: Yes - simple commands only    Prior Living Situation & Level of Function:  Prior Services: Home-Independent  Lives with - Patient's Self Care Capacity: Alone and Unable to Care For Self  Communication: Unknown  Swallowing: Unknown    Oral Mechanism Evaluation:  Dentition: Good, Natural dentition   Facial Symmetry: Equal, Pt did not follow commands to assess  Facial Sensation: Pt did not follow commands to assess     Labial Observations: WFL   Lingual Observations: Midline  Motor Speech: WFL       Laryngeal Function:  Secretion Management: Adequate  Voice Quality: WFL  Max Phonation Time (seconds): 5  Cough: Perceptually WNL     Subjective  RN cleared patient for CSE. Patient awake, but drowsy throughout entire evaluation. Patient oriented x3 except to situation, but not a good historian and appeared forgetful at times. Patient on  clear liquid diet but okay for full liquid per GI notes.    Assessment  Current Method of Nutrition: Oral diet (Clears)  Positioning: River's (60-90 degrees)  Bolus Administration: SLP  O2 (LPM): 2 O2 Delivery Device: Silicone Nasal Cannula  Factor(s) Affecting Performance: Impaired endurance, Impaired command following, Impaired mental status     Swallowing Trials:  Swallowing Trials  Ice: WFL  Thin Liquid (TN0): WFL  Liquidised (LQ3): WFL  Pureed (PU4): WFL    Comments: Patient unable to complete formal oral motor evaluation, but upon inspection, no gross deficits noted. Patient required frequent cues to sustain alertness throughout evaluation. Patient consumed PO trials of ice, thins via cup sip and straw, liquidized purees, and pudding. Patient required 1:1 feeding d/t drowsiness and coordination of oxymask with bites/sips. Bolus acceptance was sufficient. Bolus containment was reduced with anterior bolus loss of cup sip of thins x2. Presumed delayed A-P bolus transit with delayed initiation of pharyngeal swallow to palpation. No s/sx of aspiration noted on any consistencies trialed. However, patient remains at risk for aspiration d/t drowsiness, AMS, and CIWA.     Clinical Impressions  Patient is at risk for aspiration d/t aforementioned factors. Patient okay to upgrade to full liquid diet (per GI) with strict 1:1 assistance. Please ensure patient is awake, alert for all PO intake and hold PO if s/sx of aspiration occur. SLP to follow closely for diet tolerance and will complete diagnostic swallow study w/ any concerns and as patient can participate.     Recommendations  Diet Consistency: Full liquids  Instrumentation: Instrumental swallow study pending clinical progress  Medication: Whole with liquid, Whole with puree, As tolerated  Supervision: 1:1 feeding with constant supervision  Positioning: Fully upright and midline during oral intake, Meals sitting upright in a chair, as tolerated  Risk Management :  "Small bites/sips, Alternate bites and sips, Slow rate of intake, Reduce environmental distractions, Physical mobility, as tolerated  Oral Care: Q4h    SLP Treatment Plan  Treatment Plan: Dysphagia Treatment, Patient/Family/Caregiver Training  SLP Frequency: 4x Per Week  Estimated Duration: Until Therapy Goals Met    Anticipated Discharge Needs  Discharge Recommendations: Recommend post-acute placement for additional speech therapy services prior to discharge home   Therapy Recommendations Upon DC: Dysphagia Training, Community Re-Integration, Patient / Family / Caregiver Education      Patient / Family Goals  Patient / Family Goal #1: \"I don't like applesauce\"  Short Term Goals  Short Term Goal # 1: Patient will consume full liquid diet with 1:1 assistance with no overt s/sx of aspiration or decline in respiratory status.    Bernie Clark, SLP   "

## 2025-07-29 ASSESSMENT — LIFESTYLE VARIABLES
VISUAL DISTURBANCES: NOT PRESENT
AUDITORY DISTURBANCES: NOT PRESENT
VISUAL DISTURBANCES: NOT PRESENT
TOTAL SCORE: 8
NAUSEA AND VOMITING: NO NAUSEA AND NO VOMITING
AUDITORY DISTURBANCES: NOT PRESENT
ANXIETY: *
PAROXYSMAL SWEATS: NO SWEAT VISIBLE
NAUSEA AND VOMITING: NO NAUSEA AND NO VOMITING
NAUSEA AND VOMITING: NO NAUSEA AND NO VOMITING
HEADACHE, FULLNESS IN HEAD: NOT PRESENT
TREMOR: *
ORIENTATION AND CLOUDING OF SENSORIUM: ORIENTED AND CAN DO SERIAL ADDITIONS
AUDITORY DISTURBANCES: NOT PRESENT
VISUAL DISTURBANCES: NOT PRESENT
AGITATION: NORMAL ACTIVITY
AUDITORY DISTURBANCES: NOT PRESENT
ORIENTATION AND CLOUDING OF SENSORIUM: ORIENTED AND CAN DO SERIAL ADDITIONS
TREMOR: *
TOTAL SCORE: 7
ANXIETY: *
HEADACHE, FULLNESS IN HEAD: NOT PRESENT
TOTAL SCORE: 4
VISUAL DISTURBANCES: NOT PRESENT
HEADACHE, FULLNESS IN HEAD: NOT PRESENT
AUDITORY DISTURBANCES: NOT PRESENT
ORIENTATION AND CLOUDING OF SENSORIUM: ORIENTED AND CAN DO SERIAL ADDITIONS
AGITATION: NORMAL ACTIVITY
PAROXYSMAL SWEATS: NO SWEAT VISIBLE
HEADACHE, FULLNESS IN HEAD: MILD
ORIENTATION AND CLOUDING OF SENSORIUM: ORIENTED AND CAN DO SERIAL ADDITIONS
ANXIETY: *
TOTAL SCORE: 5
TOTAL SCORE: 5
AGITATION: NORMAL ACTIVITY
ANXIETY: *
AUDITORY DISTURBANCES: NOT PRESENT
HEADACHE, FULLNESS IN HEAD: NOT PRESENT
AGITATION: NORMAL ACTIVITY
VISUAL DISTURBANCES: NOT PRESENT
NAUSEA AND VOMITING: *
ORIENTATION AND CLOUDING OF SENSORIUM: ORIENTED AND CAN DO SERIAL ADDITIONS
ANXIETY: MODERATELY ANXIOUS OR GUARDED, SO ANXIETY IS INFERRED
TREMOR: TREMOR NOT VISIBLE BUT CAN BE FELT, FINGERTIP TO FINGERTIP
PAROXYSMAL SWEATS: NO SWEAT VISIBLE
TREMOR: *
ORIENTATION AND CLOUDING OF SENSORIUM: ORIENTED AND CAN DO SERIAL ADDITIONS
TOTAL SCORE: 4
PAROXYSMAL SWEATS: NO SWEAT VISIBLE
VISUAL DISTURBANCES: NOT PRESENT
PAROXYSMAL SWEATS: BARELY PERCEPTIBLE SWEATING. PALMS MOIST
HEADACHE, FULLNESS IN HEAD: NOT PRESENT
ANXIETY: *
TREMOR: *
AGITATION: NORMAL ACTIVITY
NAUSEA AND VOMITING: NO NAUSEA AND NO VOMITING
TREMOR: TREMOR NOT VISIBLE BUT CAN BE FELT, FINGERTIP TO FINGERTIP
NAUSEA AND VOMITING: NO NAUSEA AND NO VOMITING
PAROXYSMAL SWEATS: NO SWEAT VISIBLE
AGITATION: NORMAL ACTIVITY

## 2025-07-29 ASSESSMENT — ENCOUNTER SYMPTOMS
PALPITATIONS: 0
INSOMNIA: 0
CHILLS: 0
VOMITING: 0
MYALGIAS: 0
DIZZINESS: 0
CLAUDICATION: 0
FEVER: 0
SPEECH CHANGE: 0
DEPRESSION: 0
CONSTIPATION: 1
HEARTBURN: 0
COUGH: 0
PHOTOPHOBIA: 0
ABDOMINAL PAIN: 0
HALLUCINATIONS: 0
BLURRED VISION: 0
SHORTNESS OF BREATH: 0
HEADACHES: 0
SENSORY CHANGE: 0
NERVOUS/ANXIOUS: 0
DIARRHEA: 0
WEAKNESS: 0

## 2025-07-29 ASSESSMENT — FIBROSIS 4 INDEX
FIB4 SCORE: 3.15
FIB4 SCORE: 3.15

## 2025-07-29 ASSESSMENT — COGNITIVE AND FUNCTIONAL STATUS - GENERAL
PERSONAL GROOMING: A LITTLE
DRESSING REGULAR LOWER BODY CLOTHING: A LOT
TOILETING: A LOT
DAILY ACTIVITIY SCORE: 16
DRESSING REGULAR UPPER BODY CLOTHING: A LITTLE
STANDING UP FROM CHAIR USING ARMS: A LITTLE
HELP NEEDED FOR BATHING: A LOT
CLIMB 3 TO 5 STEPS WITH RAILING: A LOT
MOVING TO AND FROM BED TO CHAIR: A LITTLE
WALKING IN HOSPITAL ROOM: A LITTLE
TURNING FROM BACK TO SIDE WHILE IN FLAT BAD: A LITTLE
SUGGESTED CMS G CODE MODIFIER DAILY ACTIVITY: CK
SUGGESTED CMS G CODE MODIFIER MOBILITY: CK
MOVING FROM LYING ON BACK TO SITTING ON SIDE OF FLAT BED: A LITTLE
MOBILITY SCORE: 17

## 2025-07-29 ASSESSMENT — GAIT ASSESSMENTS
DEVIATION: BRADYKINETIC;SHUFFLED GAIT
DISTANCE (FEET): 40
DISTANCE (FEET): 15
ASSISTIVE DEVICE: FRONT WHEEL WALKER
GAIT LEVEL OF ASSIST: MINIMAL ASSIST

## 2025-07-29 ASSESSMENT — ACTIVITIES OF DAILY LIVING (ADL): TOILETING: INDEPENDENT

## 2025-07-29 ASSESSMENT — PAIN DESCRIPTION - PAIN TYPE
TYPE: ACUTE PAIN
TYPE: ACUTE PAIN

## 2025-07-29 NOTE — DISCHARGE PLANNING
" note:  Pt asked to talk to CM. Pt asked that we no longer call her father because he is old and blind. Explained that accdg to records her father is listed as legal guardian that is why CM LVM.       \"MRN: [id number]  Room / Bed: 0002-01  Code: FULL (has ACP docs)  Legal Guardian: Danilo Koch\"  "

## 2025-07-29 NOTE — THERAPY
Occupational Therapy   Initial Evaluation     Patient Name:  Marcia Koch  Age:  75 y.o., Sex:  female  Medical Record #:  [id number]  Today's Date:  7/29/2025     Precautions  Medical: (P) Fall Risk  Swallowing: (P) Swallow Precautions    Assessment  Patient is 75 y.o. female who presented w/ SOB, nausea, vomit, weakness.  Admitted w/ UGIB, pneumonia, respiratory failure and ETOH withdrawal.  PMH - ETOH, Anxiety, Depression, HTN, Pneumonia, ELLYN.  Additional factors influencing patient status / progress: Weakness, limited activity tolerance, impaired balance.      Pt lives alone in a [facility] in [place], [place].  Father resides in the area and is physically limited in his ability to assist.  Friends in the area are available to assist on an intermittent basis.  PLOF Mod I to Indep for ADL's, transfers and ambulation w/out a device.    Pt demonstrated Supervision for bed mobility, Min assist sit/stand, CGA FWW, Min assist toilet transfer, Min assist UBCM, Mod assist LBCM, Mod assist toileting.  Therapist reviewed environmental/safety awareness, fall precautions, AE/DME, ADL's and transfers.      Plan    Occupational Therapy Initial Treatment Plan   Treatment Interventions: (P) Self Care / Activities of Daily Living, Adaptive Equipment, Neuro Re-Education / Balance, Therapeutic Activity, Family / Caregiver Training  Treatment Frequency: (P) 3 Times per Week  Duration: (P) Until Therapy Goals Met    DC Equipment Recommendations: (P) Unable to determine at this time  Discharge Recommendations: (P) Recommend post-acute placement for additional occupational therapy services prior to discharge home     Subjective    Pt was alert and cooperative w/ tx.     Objective       07/29/25 1445    Services   Is patient using  services for this encounter? No   Initial Contact Note    Initial Contact Note Order Received and Verified, Occupational Therapy Evaluation in Progress with Full Report to Follow.   Prior Living  Situation   Housing / Facility 1 Story House   Steps Into Home 4   Steps In Home 0   Rail None   Bathroom Set up Walk In Shower   Equipment Owned Front-Wheel Walker   Lives with - Patient's Self Care Capacity Alone and Able to Care For Self   Comments Pt lives alone in a [facility] in [place], [place].  Father resides in the area and is physically limited in his ability to assist.  Friends in the area are available to assist on an intermittent basis.  PLOF Mod I to Indep for ADL's, transfers and ambulation w/out a device.   Prior Level of ADL Function   Self Feeding Independent   Grooming / Hygiene Independent   Bathing Independent   Dressing Independent   Toileting Independent   Prior Level of IADL Function   Medication Management Independent   Laundry Independent   Kitchen Mobility Independent   Home Management Independent   Prior Level Of Mobility Independent Without Device in Community;Independent With Steps in Community;Independent Without Device in Home;Independent With Steps in Home   Driving / Transportation Relatives / Others Provide Transportation   Occupation (Pre-Hospital Vocational) Retired Due To Age   Precautions   Medical Fall Risk   Swallowing Swallow Precautions   Vitals   Pulse Oximetry 97 %   O2 (LPM) 3   O2 Delivery Device Nasal Cannula   Pain   Intervention Declines   Cognition    Level of Consciousness Alert   Active ROM Upper Body   Active ROM Upper Body  WDL   Coordination Upper Body   Coordination WDL   Balance Assessment   Sitting Balance (Static) Fair   Sitting Balance (Dynamic) Fair   Standing Balance (Static) Fair -   Standing Balance (Dynamic) Fair -   Weight Shift Sitting Fair   Weight Shift Standing Fair   Comments OOB FWW   Bed Mobility    Supine to Sit Supervised   Sit to Supine Supervised   ADL Assessment   Upper Body Dressing Minimal Assist   Lower Body Dressing Moderate Assist   Toileting Moderate Assist   How much help from another person does the patient currently need...   Putting on and  taking off regular lower body clothing? 2   Bathing (including washing, rinsing, and drying)? 2   Toileting, which includes using a toilet, bedpan, or urinal? 2   Putting on and taking off regular upper body clothing? 3   Taking care of personal grooming such as brushing teeth? 3   Eating meals? 4   6 Clicks Daily Activity Score 16   Functional Mobility   Sit to Stand Minimal Assist   Bed, Chair, Wheelchair Transfer Minimal Assist   Toilet Transfers Minimal Assist   Transfer Method Stand Step   Mobility CGA FWW   Distance (Feet) 15   # of Times Distance was Traveled 2   Short Term Goals   Short Term Goal # 1 Mod I bed mobility (to/from supine/EOB sitting)   Short Term Goal # 2 Mod I U/LBCM via AE/DME PRN   Short Term Goal # 3 Sup toilet transfer/bathroom via DME   Short Term Goal # 4 Sup toileting/bathroom via AE/DME PRN   Education Group   Education Provided Transfers;Role of Occupational Therapist;Activities of Daily Living;Use of Call Light;Adaptive Equipment   Role of Occupational Therapist Patient Response Patient;Acceptance;Explanation;Demonstration;Verbal Demonstration   Transfers Patient Response Patient;Acceptance;Explanation;Demonstration;Teach Back;Verbal Demonstration;Action Demonstration;Reinforcement Needed   ADL Patient Response Patient;Acceptance;Explanation;Verbal Demonstration;Action Demonstration;Reinforcement Needed;Demonstration;Teach Back   Adaptive Equipment Patient Response Patient;Acceptance;Explanation;Demonstration;Teach Back;Verbal Demonstration;Action Demonstration;Reinforcement Needed   Use of Call Light Patient Response Patient;Acceptance;Explanation;Teach Back;Verbal Demonstration   Occupational Therapy Initial Treatment Plan    Treatment Interventions Self Care / Activities of Daily Living;Adaptive Equipment;Neuro Re-Education / Balance;Therapeutic Activity;Family / Caregiver Training   Treatment Frequency 3 Times per Week   Duration Until Therapy Goals Met   Problem List   Problem  List Decreased Active Daily Living Skills;Decreased Functional Mobility;Decreased Activity Tolerance;Safety Awareness Deficits / Cognition;Impaired Postural Control / Balance   Anticipated Discharge Equipment and Recommendations   DC Equipment Recommendations Unable to determine at this time   Discharge Recommendations Recommend post-acute placement for additional occupational therapy services prior to discharge home

## 2025-07-29 NOTE — CARE PLAN
The patient is Watcher - Medium risk of patient condition declining or worsening    Shift Goals  Clinical Goals: ciwas, monitor vitals/labs, safety  Patient Goals: go home  Family Goals: kelly    Progress made toward(s) clinical / shift goals:      Problem: Knowledge Deficit - Standard  Goal: Patient and family/care givers will demonstrate understanding of plan of care, disease process/condition, diagnostic tests and medications  Description: Target End Date:  1-3 days or as soon as patient condition allows    Document in Patient Education    1.  Patient and family/caregiver oriented to unit, equipment, visitation policy and means for communicating concern  2.  Complete/review Learning Assessment  3.  Assess knowledge level of disease process/condition, treatment plan, diagnostic tests and medications  4.  Explain disease process/condition, treatment plan, diagnostic tests and medications  Outcome: Progressing  Note: Patient has been educated on plan of care, medications, and safety. No further questions from patient. Patient a&o X3 today. Plan is for patient to get egd tomorrow     Problem: Optimal Care for Alcohol Withdrawal  Goal: Optimal Care for the alcohol withdrawal patient  Description: Target End Date:  1 to 3 days    1.  Alcohol history screening done on admission  2.  CIWA-AR score assessment (includes assessment of nausea/vomiting, tremor, sweats, anxiety, agitation, tactile, visual and auditory disturbances, headache and orientation/sensorium).  Document on CIWA flowsheet.  3.  Follow CIWA-AR score protocol  4.  Frequent reorientation  5.  Monitor for fluid and electrolyte imbalance.  6.  Assess for respiratory depression due to sedation (pulse ox)  7.  Consider thiamine, multivitamins, folic acid and magnesium sulfate per provider order  8.  Collaborate with Social Workers/Case Management  9.  Collaborate with mental health  Outcome: Progressing  Note: Patient on Q4hr ciwas, ativan given per protocol      Problem: Fall Risk  Goal: Patient will remain free from falls  Description: Target End Date:  Prior to discharge or change in level of care    Document interventions on the Beth Montoya Fall Risk Assessment    1.  Assess for fall risk factors  2.  Implement fall precautions  Outcome: Progressing  Note: Patient educated on fall risk and safety Bed is locked and in lowest position, bed alarm on, call light placed within reach, and frequent rounding done        Patient is not progressing towards the following goals:

## 2025-07-29 NOTE — HOSPITAL COURSE
Marcia Koch is a 75 y.o. female with a past medical history of hyperlipidemia, gastroesophageal flux disease, hypertension, and alcohol dependence admitted 7/26/2025 with alcohol withdrawal and GI bleeding.  CT imaging showed evidence for diffuse esophageal wall thickening with adjacent inflammation. Patient has been started on ceftriaxone, octreotide and intravenous pantoprazole.  Gastroenterology consulted, plan for scope evaluation.  She was found to have pneumonia, she has been started on ceftriaxone.  Doxycycline has been added.     Patient with severe alcohol withdrawal and EGD needed to be deferred both for alcohol withdrawal as well as A-fib with RVR.  She is currently under good rate control with good blood pressure control requiring minimal amounts of benzodiazepines for withdrawal symptoms.  Medically cleared for EGD tomorrow

## 2025-07-29 NOTE — DISCHARGE PLANNING
CM overheard pt calling her family and explained to them that she will be transferred to SNF possible tomorrow.

## 2025-07-29 NOTE — PROGRESS NOTES
Gastroenterology Progress Note     Author: CYNDY Delcid     Date & Time Created: 7/29/2025 9:07 AM    Patient ID:  Name:             Marcia Koch   YOB: 1950  Age:                 75 y.o.  female   MRN:               [id number]    Chief Complaint    Hematemesis    GI History  This is a 74 yo female PMH HTN, alcohol abuse, GERD, HLD who was seen in consultation for hematemesis x 4-5 episodes starting yesterday. Has been experiencing N/V/D for the past few days but yesterday began to notice coffee ground emesis. No melena. Began to have SOB and generalized weakness decided to come to ER. Notable labs: WBC: 12. Hgb: 16-->14.3. MCV: 101.8. Platelet count: 265. ALP: 150. INR: -.92. Lactic acid: 5.8-->6.6-->5.6-->3.4.      CTA: no pulmonary embolism. Diffuse esophageal wall thickening with adjacent inflammation      Over the past week, took some Aleve for back pain. Also, drinks 3-4 glasses of wine per day. No history of cirrhosis.       Initial labs  CBC: WBC 12,400.  Hgb 16.7, .  INR: 0.92, PTT 24.  CMP: GFR 96.  Alkaline phosphatase 150.  Lipase: 14.  Lactic acid: 5.8 initially.      Imaging  CXR 7/26/2025: No acute process.  CTA chest 7/26/2025: No pulmonary embolism.  Diffusely thick esophageal wall.  CXR 7/27/2025: New retrocardiac density.    US RUQ 7/26/2025: Echogenic liver.  No liver mass.        Interval History  7/27/2025   Hgb 13.1, lactic acid 1.4 today    She has a chronic productive cough attributed to smoking.  About July 19 the productive cough got worse, and about that time the severity of the cough caused gagging and vomiting 3 times daily.  Each time she vomited, she brought up brown liquid.  On July 25 she started bringing up brown-maroon liquid.  During the week she also developed worsening shortness of breath, which resulted in admission through the emergency room at 2 AM on July 26.    She was drinking about 4 glasses of wine daily and taking Aleve as needed,  about every other day.    Initial WBC was 12,400 with lactate 5.6, Hgb 16.7 and .  The lactate level normalized at 1.6 on July 26 at 1600.    Initial CTA chest found clear lungs and a circumferentially thick esophageal wall.    She was treated with ceftriaxone, pantoprazole and octreotide infusion.  She has not had further signs of active GI bleeding.  Hgb stabilized at 13.1 today.  Chest x-ray today found a new retrocardiac density, and pneumonia is suspected.    EGD was initially scheduled for today, but it was canceled because the current risk of endoscopy seems higher than the benefit.  For now, endoscopy would be best performed shortly before discharge, when her condition has improved.  If active bleeding occurs in the interim, then urgent endoscopy could be reconsidered.    Hgb 13.1, lactic acid 1.4    7/28/2025:  Patient more confused and agitated overnight, treated for ETOH withdrawal. She is sedated and obtunded this AM. Minimally responsive.  No hematemesis or melena. Hgb is stable. She is requiring oxygen    7/29/2025  Patient alert and oriented today. No bleeding. No pain. Lack of appetite. Still getting small amounts of ativan. No current signs of withdrawal.       Problem List  Active Hospital Problems    Diagnosis     *UGIB (upper gastrointestinal bleed) [K92.2]     Lactic acidosis [E87.20]     ETOH abuse [F10.10]        Labs:  Recent Labs     07/27/25  0405 07/27/25  1000 07/28/25  0013 07/28/25  0731 07/29/25  0057   WBC 9.5  --  6.2  --  5.0   RBC 3.47*  --  3.51*  --  3.50*   HEMOGLOBIN 12.0   < > 12.1 12.2 12.1   HEMATOCRIT 35.7*   < > 36.3* 37.7 36.0*   .9*  --  103.4*  --  102.9*   MCH 34.6*  --  34.5*  --  34.6*   MCHC 33.6  --  33.3  --  33.6   RDW 47.8  --  47.3  --  45.7   PLATELETCT 129*  --  138*  --  137*   MPV 10.1  --  9.7  --  9.7    < > = values in this interval not displayed.            Recent Labs     07/27/25  0016 07/28/25  0013 07/29/25  0057   SODIUM 137 140 138    POTASSIUM 3.8 3.7 3.3*   CHLORIDE 103 105 97   CO2 23 23 30   GLUCOSE 195* 127* 108*   BUN 6* 3* 3*   CREATININE 0.61 0.50 0.45*   CALCIUM 8.1* 8.6 8.2*     Recent Labs     07/27/25  0016 07/28/25  0013 07/29/25  0057   ALTSGPT  --  15 13   ASTSGOT  --  23 21   ALKPHOSPHAT  --  101* 89   TBILIRUBIN  --  0.4 0.5   GLUCOSE 195* 127* 108*       Blood Culture   Date Value Ref Range Status   07/06/2011 No growth after 5 days of incubation.  Final        GI/Nutrition:  Orders Placed This Encounter   Procedures    Diet Order Diet: Full Liquid; Tray Modifications (optional): SLP - 1:1 Supervision by Nursing, SLP - Deliver to Nursing Station     Standing Status:   Standing     Number of Occurrences:   1     Diet::   Full Liquid [11]     Tray Modifications (optional):   SLP - 1:1 Supervision by Nursing     Tray Modifications (optional):   SLP - Deliver to Nursing Station       Hospital Medications:  thiamine, 200 mg, Intravenous, TID  carvedilol, 3.125 mg, Oral, BID WITH MEALS  cloNIDine, 0.1 mg, Oral, TWICE DAILY  magnesium oxide, 400 mg, Oral, BID  doxycycline monohydrate, 100 mg, Oral, Q12HRS  folic acid, 1 mg, Oral, DAILY  therapeutic multivitamin-minerals, 1 Tablet, Oral, DAILY  ammonium lactate, , Topical, BID  pantoprazole, 40 mg, Intravenous, BID  escitalopram, 20 mg, Oral, DAILY  sucralfate, 1 g, Oral, TID      PRN medications: Metoprolol Tartrate, hydrALAZINE, benzocaine-menthol, LORazepam, LORazepam, LORazepam, LORazepam, LORazepam **OR** diazePAM, acetaminophen, labetalol, ondansetron **OR** ondansetron    Current Outpatient Medications:  Medications Ordered Prior to Encounter[1]    Fluids:    Intake/Output Summary (Last 24 hours) at 7/27/2025 1152  Last data filed at 7/27/2025 0610  Gross per 24 hour   Intake 1556.49 ml   Output 100 ml   Net 1456.49 ml     Weight: 63.3 kg (139 lb 8.8 oz)    Past Medical History:   Past Medical History[2]    Past Surgical History:  Past Surgical History[3]    Physical  "Exam:  Vitals/ General Appearance:   Weight/BMI: Body mass index is 24.26 kg/m².  /62   Pulse 65   Temp 36.6 °C (97.8 °F) (Temporal)   Resp 17   Ht 1.615 m (5' 3.6\")   Wt 63.3 kg (139 lb 8.8 oz)   SpO2 91%   Vitals:    07/29/25 0440 07/29/25 0544 07/29/25 0545 07/29/25 0706   BP: (!) 151/73   121/62   Pulse: 73   65   Resp: 18   17   Temp: 36.4 °C (97.5 °F)   36.6 °C (97.8 °F)   TempSrc: Temporal   Temporal   SpO2: 95% (!) 87% 96% 91%   Weight: 55.5 kg (122 lb 5.7 oz)  63.3 kg (139 lb 8.8 oz)    Height:         Oxygen Therapy:  Pulse Oximetry: 91 %, O2 (LPM): 2, O2 Delivery Device: None - Room Air    Physical Exam  Vitals reviewed.   Constitutional:       General: She is not in acute distress.  HENT:      Head: Normocephalic and atraumatic.      Right Ear: External ear normal.      Left Ear: External ear normal.      Nose: Nose normal.      Mouth/Throat:      Mouth: Mucous membranes are moist.      Pharynx: Oropharynx is clear.   Eyes:      Extraocular Movements: Extraocular movements intact.      Pupils: Pupils are equal, round, and reactive to light.      Comments: Pupils were equal and round.   Cardiovascular:      Rate and Rhythm: Regular rhythm.      Heart sounds: Normal heart sounds. No murmur heard.     No gallop.   Pulmonary:      Comments: Normal breath sounds anteriorly.  Abdominal:      Palpations: Abdomen is soft. There is no mass.      Tenderness: There is no abdominal tenderness.   Neurological:      General: No focal deficit present.      Mental Status: She is alert and oriented to person, place, and time.   Psychiatric:         Attention and Perception: Attention and perception normal. She is attentive.         Mood and Affect: Mood normal.         Behavior: Behavior normal. Behavior is cooperative.         Review of Systems:  Review of Systems   Constitutional:  Negative for chills and fever.   Cardiovascular:  Negative for chest pain and palpitations.   Gastrointestinal:  Negative for " melena and vomiting.   Neurological:  Negative for weakness.   Psychiatric/Behavioral:  Negative for hallucinations. The patient is not nervous/anxious.        ASSESSMENT  Upper GI bleeding with hematemesis  Treating with octreotide infusion and pantoprazole 40 mg IV twice daily.  Evaluate with EGD at the optimal timing, preferably on the day before planned discharge.    Thick esophageal wall on CTA  Evaluate this issue during the EGD.    Alcohol abuse  She plans to stop drinking alcohol.    Recent diarrhea  If she continues to have diarrhea then check stool studies.    Probable pneumonia  Being treated with ceftriaxone.        PLAN  Continue pantoprazole  Plan for EGD tomorrow with Dr. Canales. Currently scheduled at 0700, but may be moved to follow outpatients.     Risks, benefits, and alternatives of aforementioned procedures were discussed with patient.  Consenting person(s) were given opportunities to ask questions and discuss other options.  Risks including but not limited to perforation, infection, bleeding, missed lesion(s), possible need for surgery(ies) and/or interventional radiology, possible need for repeat procedure(s) and/or additional testing, hospitalization possibly prolonged, cardiac and/or pulmonary event, aspiration, hypoxia, stroke, medication and/or anesthesia reaction, indefinite diagnosis, discomfort, unsuccessful and/or incomplete procedure, ineffective therapy and/or persistent symptoms, damage to adjacent organs and/or vascular structures, and other adverse events possibly life-threatening.  Interactive discussion was undertaken with Layman's terms. I answered questions in full and to satisfaction.  Consenting person(s) stated understanding and acceptance of these risks, and wished to proceed.  Informed consent was given in clear state of mind.    -Wean off ativan. No current withdrawal symptoms      DELANO Delcid                     [1]   No current facility-administered  medications on file prior to encounter.     Current Outpatient Medications on File Prior to Encounter   Medication Sig Dispense Refill    Multiple Vitamin (MULTIVITAMIN PO) Take 1 Tablet by mouth every morning.     [2]   Past Medical History:  Diagnosis Date    Alcohol abuse     Alcoholism (HCC)     Anxiety     Anxiety     Depression     Hiatus hernia syndrome     High cholesterol     Hypertension     Indigestion     Other specified disorder of intestines     diarrhea    Pneumonia     for 2 days    Psychiatric disorder     anxiety, depression    Sleep apnea     Snoring     Unspecified urinary incontinence     Urinary bladder disorder    [3]   Past Surgical History:  Procedure Laterality Date    BLADDER SLING FEMALE  8/30/2011    Performed by RENATE STERLING at SURGERY [facility]    OTHER  2010    surgery for sleep apnea    OTHER  2010    big toenails and second toenails removed    OTHER ABDOMINAL SURGERY  2002    choley    GYN SURGERY  1991    hyst    OTHER  1956    tonsillectomy    ABDOMINAL HYSTERECTOMY TOTAL      Hysterectomy, Total Abdominal    BRIEN BY LAPAROSCOPY      Cholecystectomy, Laparoscopic

## 2025-07-29 NOTE — DISCHARGE PLANNING
"  Met with pt who reports that she is independent with ADLS and IADLS. She did not use any DMEs for mobility and no home O2. She did not have any HH. She no longer drives. She usually rides a cab.     CM LVM for father Danilo to call back and discuss discharge planning. CM did talk to pt about going to a \"rehab\" facility and she said anything close to here an explained that [facility] is about a 5-10 minute drive. Pt chose [facility] and she signed the choice.     PCP is Sharmin MCDANIEL.    Care Transition Team Assessment    Information Source  Orientation Level: Oriented to place, Oriented to person, Oriented to time  Information Given By: Patient  Who is responsible for making decisions for patient? : Patient    Readmission Evaluation  Is this a readmission?: No    Elopement Risk  Legal Hold: No  Ambulatory or Self Mobile in Wheelchair: Yes  Disoriented: Situation-At Risk for Elopement  Psychiatric Symptoms: None  History of Wandering: No  Elopement this Admit: No  Vocalizing Wanting to Leave: No  Displays Behaviors, Body Language Wanting to Leave: No-Not at Risk for Elopement  Elopement Risk: Not at Risk for Elopement    Interdisciplinary Discharge Planning  Does Admitting Nurse Feel This Could be a Complex Discharge?: No  Primary Care Physician: Sharmin Cazares  Lives with - Patient's Self Care Capacity: Alone and Able to Care For Self  Patient or legal guardian wants to designate a caregiver: No  Support Systems: None  Housing / Facility: [address]  Do You Take your Prescribed Medications Regularly: Yes  Able to Return to Previous ADL's: Future Time w/Therapy  Mobility Issues: No  Prior Services: Home-Independent  Patient Prefers to be Discharged to:: SNF  Assistance Needed: Yes    Discharge Preparedness  What is your plan after discharge?: Skilled nursing facility  What are your discharge supports?: Parent  Prior Functional Level: Ambulatory, Independent with Activities of Daily Living, Independent with " Medication Management  Difficulity with ADLs: Walking, Bathing, Dressing  Difficulity with IADLs: Cooking, Driving, Laundry, Shopping    Functional Assesment  Prior Functional Level: Ambulatory, Independent with Activities of Daily Living, Independent with Medication Management    Finances  Financial Barriers to Discharge: No  Prescription Coverage: Yes    Vision / Hearing Impairment  Vision Impairment : No  Hearing Impairment : No    Values / Beliefs / Concerns  Values / Beliefs Concerns : No    Advance Directive  Advance Directive?: DPOA for Health Care    Domestic Abuse  Have you ever been the victim of abuse or violence?: No  Possible Abuse/Neglect Reported to:: Not Applicable         Discharge Risks or Barriers  Discharge risks or barriers?: Post-acute placement / services  Patient risk factors: Cognitive / sensory / physical deficit, Vulnerable adult    Anticipated Discharge Information  Discharge Disposition: D/T to SNF with Medicare cert in anticipation of skilled care (03)

## 2025-07-29 NOTE — PROGRESS NOTES
Hospital Medicine Daily Progress Note    Date of Service  7/29/2025    Chief Complaint  Marcia Koch is a 75 y.o. female admitted 7/26/2025 with hematemesis    Hospital Course  Marcia Koch is a 75 y.o. female with a past medical history of hyperlipidemia, gastroesophageal flux disease, hypertension, and alcohol dependence admitted 7/26/2025 with alcohol withdrawal and GI bleeding.  CT imaging showed evidence for diffuse esophageal wall thickening with adjacent inflammation. Patient has been started on ceftriaxone, octreotide and intravenous pantoprazole.  Gastroenterology consulted, plan for scope evaluation.  She was found to have pneumonia, she has been started on ceftriaxone.  Doxycycline has been added.     Patient with severe alcohol withdrawal and EGD needed to be deferred both for alcohol withdrawal as well as A-fib with RVR.  She is currently under good rate control with good blood pressure control requiring minimal amounts of benzodiazepines for withdrawal symptoms.  Medically cleared for EGD tomorrow    Interval Problem Update  7/29 patient states she feels okay today.  She is tolerating a full liquid diet.  EGD planned for tomorrow.  She is requiring minimal amounts of benzodiazepines.  She has not had a bowel movement in multiple days and I suspect it will be black when she does.  Continue bowel protocol.    I have discussed this patient's plan of care and discharge plan at IDT rounds today with Case Management, Nursing, Nursing leadership, and other members of the IDT team.    Consultants/Specialty  GI    Code Status  Full Code    Disposition  The patient is not medically cleared for discharge to home or a post-acute facility.  Anticipate discharge to: skilled nursing facility    I have placed the appropriate orders for post-discharge needs.    Review of Systems  Review of Systems   Constitutional:  Negative for chills and fever.   HENT:  Negative for congestion.    Eyes:  Negative for  blurred vision and photophobia.   Respiratory:  Negative for cough and shortness of breath.    Cardiovascular:  Negative for chest pain, claudication and leg swelling.   Gastrointestinal:  Positive for constipation. Negative for abdominal pain, diarrhea, heartburn and vomiting.   Genitourinary:  Negative for dysuria and hematuria.   Musculoskeletal:  Negative for joint pain and myalgias.   Skin:  Negative for itching and rash.   Neurological:  Negative for dizziness, sensory change, speech change, weakness and headaches.   Psychiatric/Behavioral:  Negative for depression. The patient is not nervous/anxious and does not have insomnia.         Physical Exam  Temp:  [36.4 °C (97.5 °F)-36.7 °C (98.1 °F)] 36.7 °C (98 °F)  Pulse:  [64-75] 66  Resp:  [16-18] 16  BP: (114-169)/(62-85) 169/77  SpO2:  [87 %-97 %] 97 %    Physical Exam  Vitals and nursing note reviewed.   Constitutional:       General: She is not in acute distress.     Appearance: Normal appearance. She is not ill-appearing.   HENT:      Head: Normocephalic and atraumatic.      Nose: Nose normal.   Cardiovascular:      Rate and Rhythm: Normal rate and regular rhythm.      Heart sounds: Normal heart sounds. No murmur heard.  Pulmonary:      Effort: Pulmonary effort is normal.      Breath sounds: Normal breath sounds.   Abdominal:      General: Bowel sounds are normal. There is no distension.      Palpations: Abdomen is soft.   Musculoskeletal:         General: No swelling or tenderness.      Cervical back: Neck supple.   Skin:     General: Skin is warm and dry.   Neurological:      General: No focal deficit present.      Mental Status: She is alert and oriented to person, place, and time.   Psychiatric:         Mood and Affect: Mood normal.         Fluids    Intake/Output Summary (Last 24 hours) at 7/29/2025 1259  Last data filed at 7/29/2025 0800  Gross per 24 hour   Intake 60 ml   Output 2600 ml   Net -2540 ml        Laboratory  Recent Labs     07/27/25  0404  07/27/25  1000 07/28/25  0013 07/28/25  0731 07/29/25  0057   WBC 9.5  --  6.2  --  5.0   RBC 3.47*  --  3.51*  --  3.50*   HEMOGLOBIN 12.0   < > 12.1 12.2 12.1   HEMATOCRIT 35.7*   < > 36.3* 37.7 36.0*   .9*  --  103.4*  --  102.9*   MCH 34.6*  --  34.5*  --  34.6*   MCHC 33.6  --  33.3  --  33.6   RDW 47.8  --  47.3  --  45.7   PLATELETCT 129*  --  138*  --  137*   MPV 10.1  --  9.7  --  9.7    < > = values in this interval not displayed.     Recent Labs     07/27/25  0016 07/28/25  0013 07/29/25  0057   SODIUM 137 140 138   POTASSIUM 3.8 3.7 3.3*   CHLORIDE 103 105 97   CO2 23 23 30   GLUCOSE 195* 127* 108*   BUN 6* 3* 3*   CREATININE 0.61 0.50 0.45*   CALCIUM 8.1* 8.6 8.2*                   Imaging  EC-ECHOCARDIOGRAM COMPLETE W/ CONT   Final Result      DX-CHEST-PORTABLE (1 VIEW)   Final Result      New retrocardiac density, which could represent airspace disease or atelectasis.      US-RUQ   Final Result      1.  Echogenic heterogeneous liver parenchyma could relate to hepatocellular disease/cirrhosis.   2.  No hepatic mass identified.         CT-CTA CHEST PULMONARY ARTERY W/ RECONS   Final Result      1.  No pulmonary embolism.   2.  Diffuse esophageal wall thickening with adjacent inflammation, consistent with esophagitis.            DX-CHEST-PORTABLE (1 VIEW)   Final Result      No acute process.           Assessment/Plan  * UGIB (upper gastrointestinal bleed)- (present on admission)  Assessment & Plan  Patient with alcohol use presents for hematemesis, unclear whether or not patient has cirrhosis and varices  Hemoglobin 16.4  Protonix 40 mg IV twice daily  Octreotide  Ceftriaxone  GI consulted, plan for colonoscopy during this hospitalization    7/28/2025   Not stable for the procedure  I discussed patient condition with gastroenterology, anticipate that she can tolerate the procedure by 7/30 7/29  Patient medically stable but still receiving Ativan.  Planned intervention with endoscopy  tomorrow    Preoperative examination- (present on admission)  Assessment & Plan  Currently the patient has hypertensive urgency, atrial fibrillation with rapid ventricular response, respiratory failure on 4 L of oxygen.  As long as there is no emergency, I recommend to delay her GI procedure until she is more hemodynamically stable and her respiratory status improves.  Anticipate the patient to be more appropriate for procedure by 7/30 depending on how she progresses.       Medical Assessment Risk:  High    Age more than 65   Has hypertensive urgency.  Has acute hypoxemic respiratory failure.  Requiring intravenous antibiotics for pneumonia.  Has atrial fibrillation with rapid ventricular response.  Going through alcohol withdrawal.    Surgical Risk:   Intermediate    Cardiovascular:   Hide is atrial fibrillation with rapid ventricular response.  Has hypertensive urgency  I will order a Pre-op EKG  I will order an Echo     Pulmonary:  Oxygen per protocol  Incentive Spirometer  Respiratory therapy consult   Has acute hypoxemic respiratory failure.  Has pneumonia, receiving intravenous antibiotics with  Transthoracic Echocardiogram (dyspnea of unkonwn etiology or elevated BNP)    Renal:   I will order follow-up BUN and creatinine   Will check a bladder scan to rule out urine retention    Neurologic:   Going through alcohol withdrawal.     Acetaminophen PO TID PRN  Try to minimize using opioid Pain medications.    If patient develops delirium or agitation consider quetiapine 12.5 mg PO x1 PRN agitation (can repeat x1 in 2 hours PRN agitation).      Hematologic:  Plan to hold pharmacologic DVT prophylaxis for now as she has GI bleeding    ACP (advance care planning)- (present on admission)  Assessment & Plan  I had a discussion with the patient regarding goals of care, diagnoses, prognosis, and CODE STATUS. We discussed her prognosis and comorbidities.  The patient has advanced age of 75 years.  She has a number of  chronic medical problems including alcohol dependence, debility, frequent hospitalizations for alcohol withdrawal she also has multiple acute medical problems including GI bleed, alcohol withdrawal and acute hypoxemic respiratory failure.  Currently she wants to maintain a full code.  She is open to all forms of invasive or noninvasive diagnostic and therapeutic interventions.       Atrial fibrillation (HCC)- (present on admission)  Assessment & Plan  Rate controlled with current interventions.  [id number] Score > 2, she will need therapeutic anticoagulation after having GI procedures and if cleared for anticoagulation     Atrial fibrillation with rapid ventricular response (HCC)- (present on admission)  Assessment & Plan  I will start scheduled carvedilol with hold parameters    I will start PRN IV metoprolol for sustained HR > 120   I will check an echo  I will place on continuous cardiac monitoring.      Hypertensive urgency- (present on admission)  Assessment & Plan  Secondary to alcohol withdrawal.  Blood pressure is quite elevated systolic reaching 200-212.  This is likely secondary to alcohol withdrawal  carvedilol 3.125 twice daily.  I will start clonidine with hold parameters.  I will start hydralazine as needed for extreme hypertension  Echo: EF 60% Mild mitral annular calcification.  Aortic valve sclerosis without significant stenosis.  Mild tricuspid regurgitation.  Continue to monitor on telemetry.  Continue CIWA protocol    Acute hypoxemic respiratory failure (HCC)- (present on admission)  Assessment & Plan  Secondary to pneumonia.  Imaging is concerning for pneumonia.    Rule out COVID-19, flu Dora and RSV  I will start doxycycline and ceftriaxone   I will check a speech evaluation to rule out aspiration  Oxygen as needed, Respiratory protocol, Bronchodilators, Incentive spirometry   Will check echocardiography to rule out heart failure    7/29/2025   Oxygen requirement slightly better this morning,  now requiring 2 L of oxygen.  Continue antibiotics, continue incentive spirometer.     Pneumonia of left lower lobe due to infectious organism- (present on admission)  Assessment & Plan  Imaging is concerning for pneumonia.    Complicated by acute hypoxemic respiratory failure  Rule out COVID-19, flu Dora and RSV  I will start doxycycline and ceftriaxone   I will check a speech evaluation to rule out aspiration    7/29/2025   Oxygen requirement improving.  Continue ceftriaxone and doxycycline    Lactic acidosis- (present on admission)  Assessment & Plan  Resolved with intravenous fluids.    Alcohol dependence with withdrawal (HCC)- (present on admission)  Assessment & Plan  I will start on CIWA protocol   I will place on continuous cardiac monitoring  I will check K, Mg, Na, Ca. Monitor electrolytes and replace accordingly, particularly magnesium, potassium and phosphorus  I will order fall, seizure, & aspiration precautions.  I will order thiamine folic acid and multivitamin.     7/29/2025   Minimal need of ativan on CIWA  A fib rate controlled.         VTE prophylaxis:   SCDs/TEDs      I have performed a physical exam and reviewed and updated ROS and Plan today (7/29/2025). In review of yesterday's note (7/28/2025), there are no changes except as documented above.    My total time spent caring for the patient on the day of the encounter was 54 minutes.   This does not include time spent on separately billable procedures/tests.

## 2025-07-29 NOTE — PROGRESS NOTES
Report received from NOC RN. Patient A&O X3, vitals stable, on 2L nc, and patient updated on POC. Bed locked and in lowest position, bed alarm on, and call light placed within reach.

## 2025-07-29 NOTE — DISCHARGE PLANNING
Case Management Discharge Planning    Admission Date: 7/26/2025  GMLOS: 4.2  ALOS: 3    6-Clicks ADL Score: 16  6-Clicks Mobility Score: 17  PT and/or OT Eval ordered: Yes  Post-acute Referrals Ordered: Yes  Post-acute Choice Obtained: Yes  Has referral(s) been sent to post-acute provider:  Yes      Anticipated Discharge Dispo: Discharge Disposition: D/T to SNF with Medicare cert in anticipation of skilled care (03)[facility] accepted.     DME Needed: none    Action(s) Taken: discussed during IDT, per MD, Endoscopy in the morning /tomorrow and if negative then pt can go to SNF. CIWA protocol ongoing.     PASRR completed. [id number]    Escalations Completed: n/a    Medically Clear: no-endoscopy tomorrow morning.     Next Steps: follow up with MD tomorrow.     Barriers to Discharge: pending endoscopy.    Is the patient up for discharge tomorrow: possible tomorrow.

## 2025-07-29 NOTE — CARE PLAN
The patient is Stable - Low risk of patient condition declining or worsening    Shift Goals  Clinical Goals: monitor CIWA  Patient Goals: rest, go home    Progress made toward(s) clinical / shift goals:    Problem: Knowledge Deficit - Standard  Goal: Patient and family/care givers will demonstrate understanding of plan of care, disease process/condition, diagnostic tests and medications  Outcome: Progressing     Problem: Optimal Care for Alcohol Withdrawal  Goal: Optimal Care for the alcohol withdrawal patient  Outcome: Progressing       Patient is not progressing towards the following goals:

## 2025-07-29 NOTE — THERAPY
Physical Therapy   Initial Evaluation     Patient Name:  Marcia Koch  Age:  75 y.o., Sex:  female  Medical Record #:  [id number]  Today's Date: 7/29/2025     Precautions  Medical: Fall Risk  Swallowing: Swallow Precautions    Assessment  Patient is 75 y.o. female with a diagnosis of Upper GI bleeding with hematemesis, PNA/ respiratory failure, ETOH withdrawal. Past medical history of hyperlipidemia, gastroesophageal flux disease, hypertension, and alcohol dependence.  Pt is presenting with weakness and impaired balance with overall decreased activity tolerance. Recommend continued acute PT to improve mobility with f/u therapy at SNF.    Plan    Physical Therapy Initial Treatment Plan   Treatment Plan : Bed Mobility, Family / Caregiver Training, Gait Training, Neuro Re-Education / Balance, Self Care / Home Evaluation, Stair Training, Therapeutic Activities, Therapeutic Exercise  Treatment Frequency: 3 Times per Week  Duration: Until Therapy Goals Met    DC Equipment Recommendations: Unable to determine at this time  Discharge Recommendations: Recommend post-acute placement for additional physical therapy services prior to discharge home        07/29/25 1225   Prior Living Situation   Housing / Facility 1 Story House   Steps Into Home 4   Steps In Home 0   Rail None   Equipment Owned Front-Wheel Walker   Lives with - Patient's Self Care Capacity Alone and Able to Care For Self   Prior Level of Functional Mobility   Bed Mobility Independent   Transfer Status Independent   Ambulation Independent   Assistive Devices Used None   Stairs Independent   Cognition    Level of Consciousness Alert   Comments Pt calm and cooperative, oriented x3   Active ROM Lower Body    Active ROM Lower Body  WDL   Strength Lower Body   Lower Body Strength  X   Gross Strength Generalized Weakness, Equal Bilaterally   Balance Assessment   Sitting Balance (Static) Fair   Sitting Balance (Dynamic) Fair   Standing Balance (Static) Fair -    Standing Balance (Dynamic) Fair -   Weight Shift Sitting Fair   Weight Shift Standing Fair   Comments stdg with FWW   Bed Mobility    Supine to Sit Supervised   Gait Analysis   Gait Level Of Assist Minimal Assist   Assistive Device Front Wheel Walker   Distance (Feet) 40   # of Times Distance was Traveled 1   Deviation Bradykinetic;Shuffled Gait   Functional Mobility   Sit to Stand Minimal Assist   Bed, Chair, Wheelchair Transfer Minimal Assist   Transfer Method Stand Step  (with FWW)   Short Term Goals    Short Term Goal # 1 Pt will be able to perform bed mobility and sup <> sit Indep in 6 visits.   Short Term Goal # 2 Pt will be able to perform sit <> stand and transfer Indep in 6 visits.   Short Term Goal # 3 Pt will be able to ambulate 150 ft with least restrictive device Veronica in 6 visits.   Short Term Goal # 4 Pt will be able to go up/down 4 steps SBA in 6 visits.

## 2025-07-29 NOTE — PROGRESS NOTES
Telemetry Shift Summary     Rhythm: SR  HR: 60-72  Ectopy: roPAC/rPVC/Couplet    Measurements: .16/.08/.43    Normal Values  Rhythm: SR  HR:   Measurements: 0.12-0.20/0.08-0.10/0.30-0.52

## 2025-07-30 ENCOUNTER — ANESTHESIA EVENT (OUTPATIENT)
Dept: SURGERY | Facility: MEDICAL CENTER | Age: 75
End: 2025-07-30
Payer: MEDICARE

## 2025-07-30 ENCOUNTER — ANESTHESIA (OUTPATIENT)
Dept: SURGERY | Facility: MEDICAL CENTER | Age: 75
End: 2025-07-30
Payer: MEDICARE

## 2025-07-30 ENCOUNTER — SURGERY (OUTPATIENT)
Age: 75
End: 2025-07-30
Payer: MEDICARE

## 2025-07-30 PROCEDURE — 700101 HCHG RX REV CODE 250: Performed by: ANESTHESIOLOGY

## 2025-07-30 PROCEDURE — 700111 HCHG RX REV CODE 636 W/ 250 OVERRIDE (IP): Performed by: ANESTHESIOLOGY

## 2025-07-30 RX ORDER — LIDOCAINE HYDROCHLORIDE 20 MG/ML
INJECTION, SOLUTION EPIDURAL; INFILTRATION; INTRACAUDAL; PERINEURAL PRN
Status: DISCONTINUED | OUTPATIENT
Start: 2025-07-30 | End: 2025-07-30 | Stop reason: SURG

## 2025-07-30 RX ADMIN — LIDOCAINE HYDROCHLORIDE 50 MG: 20 INJECTION, SOLUTION EPIDURAL; INFILTRATION; INTRACAUDAL; PERINEURAL at 07:56

## 2025-07-30 RX ADMIN — PROPOFOL 20 MG: 10 INJECTION, EMULSION INTRAVENOUS at 08:03

## 2025-07-30 RX ADMIN — PROPOFOL 50 MG: 10 INJECTION, EMULSION INTRAVENOUS at 07:59

## 2025-07-30 RX ADMIN — PROPOFOL 50 MG: 10 INJECTION, EMULSION INTRAVENOUS at 07:56

## 2025-07-30 ASSESSMENT — LIFESTYLE VARIABLES
TREMOR: *
HEADACHE, FULLNESS IN HEAD: NOT PRESENT
TREMOR: NO TREMOR
ORIENTATION AND CLOUDING OF SENSORIUM: ORIENTED AND CAN DO SERIAL ADDITIONS
PAROXYSMAL SWEATS: NO SWEAT VISIBLE
TOTAL SCORE: 1
PAROXYSMAL SWEATS: NO SWEAT VISIBLE
VISUAL DISTURBANCES: NOT PRESENT
HEADACHE, FULLNESS IN HEAD: NOT PRESENT
TOTAL SCORE: 2
PAROXYSMAL SWEATS: NO SWEAT VISIBLE
ORIENTATION AND CLOUDING OF SENSORIUM: ORIENTED AND CAN DO SERIAL ADDITIONS
PAROXYSMAL SWEATS: NO SWEAT VISIBLE
ANXIETY: *
ORIENTATION AND CLOUDING OF SENSORIUM: ORIENTED AND CAN DO SERIAL ADDITIONS
ANXIETY: *
AUDITORY DISTURBANCES: NOT PRESENT
NAUSEA AND VOMITING: NO NAUSEA AND NO VOMITING
TOTAL SCORE: 4
AGITATION: NORMAL ACTIVITY
NAUSEA AND VOMITING: NO NAUSEA AND NO VOMITING
AUDITORY DISTURBANCES: NOT PRESENT
AUDITORY DISTURBANCES: NOT PRESENT
TREMOR: NO TREMOR
AGITATION: NORMAL ACTIVITY
ORIENTATION AND CLOUDING OF SENSORIUM: ORIENTED AND CAN DO SERIAL ADDITIONS
HEADACHE, FULLNESS IN HEAD: NOT PRESENT
TREMOR: NO TREMOR
AGITATION: NORMAL ACTIVITY
VISUAL DISTURBANCES: NOT PRESENT
ANXIETY: *
HEADACHE, FULLNESS IN HEAD: NOT PRESENT
NAUSEA AND VOMITING: NO NAUSEA AND NO VOMITING
VISUAL DISTURBANCES: NOT PRESENT
NAUSEA AND VOMITING: NO NAUSEA AND NO VOMITING
ANXIETY: MILDLY ANXIOUS
TOTAL SCORE: 2
AGITATION: NORMAL ACTIVITY
VISUAL DISTURBANCES: NOT PRESENT
AUDITORY DISTURBANCES: NOT PRESENT

## 2025-07-30 ASSESSMENT — PAIN SCALES - GENERAL: PAIN_LEVEL: 0

## 2025-07-30 ASSESSMENT — PAIN DESCRIPTION - PAIN TYPE: TYPE: ACUTE PAIN

## 2025-07-30 NOTE — PROGRESS NOTES
Report received from PACU nurse. Patient arrived to floor alert and oriented, vitals stable, on 2L nc, and updated on POC

## 2025-07-30 NOTE — PROGRESS NOTES
Telemetry Shift Summary     Rhythm: SR/SB  Rate: 57-68  Measurements: .16/.06/.48  Ectopy (reported by Monitor Tech): r pvc, r pac, HR down to 50     Normal Values  Rhythm: Sinus  HR:   Measurements: 0.12-0.20/0.06-0.10/0.30-0.52

## 2025-07-30 NOTE — DISCHARGE PLANNING
[facility] authorization #[id number] in place for [facility].

## 2025-07-30 NOTE — DISCHARGE PLANNING
Case Management Discharge Planning    Admission Date: 7/26/2025  GMLOS: 5  ALOS: 4    6-Clicks ADL Score: 16  6-Clicks Mobility Score: 17  PT and/or OT Eval ordered: Yes  Post-acute Referrals Ordered: Yes  Post-acute Choice Obtained: Yes  Has referral(s) been sent to post-acute provider:  Yes      Anticipated Discharge Dispo: Discharge Disposition: D/T to SNF with Medicare cert in anticipation of skilled care (03)    DME Needed: No    Action(s) Taken:     RNCM attended IDT rounds and reviewed chart. Per IDT rounds discussion, pt mediccally cleared for DC to SNF.     1141: Spoke to Karyna from [facility]. Karyna confirmed the can  pt today at 1500. MD and bedside RN notified via Voalte.    1400: COBRA delivered to bedside RN.    Escalations Completed: None    Medically Clear: Yes    Next Steps: DC to [facility] today at 1500.    Barriers to Discharge: None

## 2025-07-30 NOTE — ANESTHESIA TIME REPORT
Anesthesia Start and Stop Event Times       Date Time Event    7/30/2025 0716 Ready for Procedure     0751 Anesthesia Start     0813 Anesthesia Stop          Responsible Staff  07/30/25      Name Role Begin End    Luzmaria Blue M.D. Anesth [id number]          Anesthesia Time Report

## 2025-07-30 NOTE — ANESTHESIA PREPROCEDURE EVALUATION
Case: [id number] Date/Time: 07/30/25 0715    Procedure: GASTROSCOPY - ESOPHAGOGASTRODUODENOSCOPY    Location:  ENDOSCOPIC ULTRASOUND ROOM / SURGERY [facility]    Surgeons: Jono Canales M.D.          75 y.o. female with a past medical history of hyperlipidemia, gastroesophageal flux disease, hypertension, and alcohol dependence admitted 7/26/2025 with alcohol withdrawal and GI bleeding.     Previously scheduled EGD on 7/27/25 cancelled secondary to withrdrawal symptoms and a-fib w/ RVR.  Relevant Problems   PULMONARY   (positive) Pneumonia of left lower lobe due to infectious organism      CARDIAC   (positive) Acute deep vein (gastrocnemius chilo) thrombosis (DVT) of right lower extremity (HCC)   (positive) Atrial fibrillation (HCC)   (positive) Primary hypertension      GI   (positive) GERD (gastroesophageal reflux disease)      Other   (positive) ACP (advance care planning)   (positive) Alcohol dependence with withdrawal (HCC)   (positive) Lactic acidosis   (positive) Tobacco dependence   (positive) UGIB (upper gastrointestinal bleed)       Physical Exam    Airway   Mallampati: II  TM distance: >3 FB  Neck ROM: full       Cardiovascular - normal exam  Rhythm: regular  Rate: normal    (-) murmur     Dental - normal exam           Pulmonary - normal examBreath sounds clear to auscultation     Abdominal    Neurological - normal exam                   Anesthesia Plan    ASA 3 (Admission for Acute alcohol withdrawal and Atrial fibrillation w/ RVR)   ASA physical status 3 criteria: other (comment)    Plan - general       Airway plan will be natural airway          Induction: intravenous    Postoperative Plan: Postoperative administration of opioids is intended.    Pertinent diagnostic labs and testing reviewed    Informed Consent:    Anesthetic plan and risks discussed with patient.    Use of blood products discussed with: patient whom consented to blood products.

## 2025-07-30 NOTE — OR SURGEON
Immediate Post OP Note    PreOp Diagnosis: Hematemesis      PostOp Diagnosis: Severe class 4 erosive esophagitis and erosive gastritis      Procedure(s):  GASTROSCOPY - ESOPHAGOGASTRODUODENOSCOPY WITH BIOPSIES    Surgeon(s):  Jono Canales M.D.    Anesthesiologist/Type of Anesthesia:  Anesthesiologist: Luzmaria Blue M.D./* No anesthesia type entered *    Surgical Staff:  Circulator: Corry Perla; Alcides Celaya R.N.  Endoscopy Technician: Claritza Malhotra; Chary Menezes    Specimens removed if any:  ID Type Source Tests Collected by Time Destination   A : Gastric Tissue Gastric PATHOLOGY SPECIMEN Jono Canales M.D. 7/30/2025  7:59 AM    B : Lower esophagus Tissue Esophagus PATHOLOGY SPECIMEN Jono Canales M.D. 7/30/2025  8:04 AM        Estimated Blood Loss: minimal    Findings: see post op dx above    Complications: none    We will sign off. I advanced her diet to renal (near equivalent of hepatic) and will send her a path letter with path results. She needs to avoid all NSAIDS and stay on once daily PPI 30 minutes before breakfast forever.     EMO        7/30/2025 8:09 AM Jono Canales M.D.

## 2025-07-30 NOTE — PROGRESS NOTES
D/C paperwork reviewed with patient, report called to [facility], IV removed, tele box removed, and all belongings with patient. [facility] packet given to transport. Patient to dc to [facility]

## 2025-07-30 NOTE — PROCEDURES
DATE OF PROCEDURE:  07/30/2025     PROCEDURE:  EGD with biopsy.     ENDOSCOPIST:  Jono Canales MD     INDICATIONS:  Hematemesis.     MEDICATIONS:  The patient received general anesthesia by Dr. Blue.    Please see anesthesia flow sheets for details.     DESCRIPTION OF PROCEDURE:  The patient was informed in detail of the   indications as well as the risks, benefits, and alternatives of the procedure   and she indicates understanding of all of this and agrees to proceed.  Consent   was signed and placed on the chart.  After the patient was deemed adequately   sedated, a standard Olympus flexible gastroscope was lubricated and gently   placed through a bite block over the top of her tongue down into her esophagus   and from here maneuvered through the esophagus into the stomach and any   residual fluid was thoroughly suctioned.  The scope was then traversed through   the body of the stomach, through the pylorus into the duodenum, and the   farthest reach of the endoscope was second portion of the duodenum.    Photographs were obtained.  The air was insufflated.  The scope was then   slowly withdrawn back into the stomach where as noted, she has evidence of   erosive gastritis with several erosions as well as edema and biopsies were   obtained to rule out H. pylori.  There was no blood within the stomach.    Retroflexion was performed and was unremarkable.  Air was suctioned out of the   stomach and the scope withdrawn back into the esophagus where by a careful   inspection did not reveal any esophageal varices, but does reveal evidence of   LA class 4 erosive esophagitis and biopsies were obtained to confirm.  The   scope was then withdrawn and the procedure terminated.     FINDINGS:  1.  Severe erosive esophagitis.  2.  Erosive gastritis.  3.  No evidence of recent bleeding.  4.  No esophageal varices noted.     COMPLICATIONS:  None.     TISSUE/BIOPSIES:  1.  Gastric biopsies to rule out H. pylori.  2.   Esophageal biopsies.     THERAPY:  None.     IMPRESSION/PLAN/MEDICAL DECISION MAKIN.  Hematemesis.  I have to suspect the cause of her hematemesis is likely the   esophagitis.  It is possible that some of this esophagitis is secondary to   the vomiting itself, although it seems rather severe for that, so I do suspect   chronic GERD and she will need to be on PPIs indefinitely.  Once a day, 30   minutes before breakfast is adequate.  I have advanced her diet to renal diet,   which is the sodium-wise near equivalent to hepatic diet, and we will sign   off.  I will arrange followup with our office non-urgently, and if you have   any additional questions, please let us know.  2.  Nausea and vomiting.        ______________________________  MD HERBERT SINGH/CRISTAL    DD:  2025 08:15  DT:  2025 08:53    Job#:  [id number]

## 2025-07-30 NOTE — ANESTHESIA POSTPROCEDURE EVALUATION
Patient: Marcia Koch    Procedure Summary       Date: 07/30/25 Room / Location:  ENDOSCOPIC ULTRASOUND ROOM / SURGERY [facility]    Anesthesia Start: 0751 Anesthesia Stop: 0813    Procedure: GASTROSCOPY - ESOPHAGOGASTRODUODENOSCOPY WITH BIOPSIES (Esophagus) Diagnosis: (Upper GI bleeding with hematemesis)    Surgeons: Jono Canales M.D. Responsible Provider: Luzmaria Blue M.D.    Anesthesia Type: general ASA Status: 3            Final Anesthesia Type: general  Last vitals  BP   Blood Pressure : 115/65    Temp   37 °C (98.6 °F)    Pulse   (!) 57   Resp   16    SpO2   100 %      Anesthesia Post Evaluation    Patient location during evaluation: PACU  Patient participation: complete - patient participated  Level of consciousness: awake and alert  Pain score: 0    Airway patency: patent  Anesthetic complications: no  Cardiovascular status: hemodynamically stable  Respiratory status: acceptable  Hydration status: euvolemic    PONV: none          No notable events documented.     Nurse Pain Score: 0 (NPRS)

## 2025-07-30 NOTE — DISCHARGE SUMMARY
Discharge Summary    CHIEF COMPLAINT ON ADMISSION  Chief Complaint   Patient presents with    Shortness of Breath     Started today w/productive cough     Epigastric Pain     Non radiating    N/V     From the mucus       Reason for Admission  EMS    Admission Date  7/26/2025     CODE STATUS  Full Code    HPI & HOSPITAL COURSE  Marcia Koch is a 75 y.o. female with a past medical history of hyperlipidemia, gastroesophageal flux disease, hypertension, and alcohol dependence admitted 7/26/2025 with alcohol withdrawal and GI bleeding.  CT imaging showed evidence for diffuse esophageal wall thickening with adjacent inflammation. Patient has been started on ceftriaxone, octreotide and intravenous pantoprazole.  Gastroenterology consulted, plan for scope evaluation.  She was found to have pneumonia, she has been started on ceftriaxone.  Doxycycline has been added.     Antibiotics for pneumonia have been completed, she is on 2 L oxygen at time of discharge and may require oxygen after dc from SNF.    Patient with severe alcohol withdrawal and EGD needed to be deferred both for alcohol withdrawal as well as A-fib with RVR.  She is currently under good rate control with good blood pressure control.  EGD showed esophagitis and likely the source of her bleeding.  She needs to be on a PPI indefinitely and GI recommending once daily.  She is medically cleared from an alcohol withdrawal and UGIB standpoint to move to SNF for continuation of care.      Therefore, she is discharged in good and stable condition to skilled nursing facility.    The patient met 2-midnight criteria for an inpatient stay at the time of discharge.      FOLLOW UP ITEMS POST DISCHARGE  PCP, GI    DISCHARGE DIAGNOSES  Principal Problem:    UGIB (upper gastrointestinal bleed) (POA: Yes)  Active Problems:    Primary hypertension (POA: Yes)    Alcohol dependence with withdrawal (HCC) (POA: Yes)      Overview: IMO load March 2020    Tobacco dependence (POA:  Yes)    Lactic acidosis (POA: Yes)    Pneumonia of left lower lobe due to infectious organism (POA: Yes)    Atrial fibrillation (HCC) (POA: Yes)    ACP (advance care planning) (POA: Yes)  Resolved Problems:    * No resolved hospital problems. *      FOLLOW UP  No future appointments.  [facility] [address]  [place] [address]  [phone]        Sharmin Mcdermott P.A.-C.  [address] [address]  [phone]    Follow up      Flaco Garber D.O.  [address] [address] [phone]            MEDICATIONS ON DISCHARGE     Medication List        START taking these medications        Instructions   carvedilol 3.125 MG Tabs  Commonly known as: Coreg   Take 1 Tablet by mouth 2 times a day with meals.  Dose: 3.125 mg     cloNIDine 0.1 MG Tabs  Commonly known as: Catapres   Take 1 Tablet by mouth 2 times a day.  Dose: 0.1 mg     folic acid 1 MG Tabs  Start taking on: July 31, 2025  Commonly known as: Folvite   Take 1 Tablet by mouth every day.  Dose: 1 mg     omeprazole 20 MG delayed-release capsule  Commonly known as: PriLOSEC   Take 1 Capsule by mouth every day.  Dose: 20 mg            CONTINUE taking these medications        Instructions   MULTIVITAMIN PO   Take 1 Tablet by mouth every morning.  Dose: 1 Tablet              Allergies  Allergies[1]    DIET  Orders Placed This Encounter   Procedures    Diet Order Diet: Renal     Standing Status:   Standing     Number of Occurrences:   1     Diet::   Renal [8]       ACTIVITY  As tolerated.  Weight bearing as tolerated    LINES, DRAINS, AND WOUNDS  This is an automated list. Peripheral IVs will be removed prior to discharge.  Peripheral IV 07/30/25 20 G Anterior;Right Forearm (Active)   Site Assessment Clean;Dry;Intact 07/30/25 0900   Dressing Type Transparent 07/30/25 0900   Line Status Flushed;Scrubbed the hub prior to access;Saline locked 07/30/25 0900   Dressing Status Clean;Dry;Intact  07/30/25 0900   Dressing Intervention N/A 07/30/25 0900   Infiltration Grading ([facility]) 0 07/30/25 0900   Phlebitis Scale ([facility] Only) 0 07/30/25 0900          Peripheral IV 07/30/25 20 G Anterior;Right Forearm (Active)   Site Assessment Clean;Dry;Intact 07/30/25 0900   Dressing Type Transparent 07/30/25 0900   Line Status Flushed;Scrubbed the hub prior to access;Saline locked 07/30/25 0900   Dressing Status Clean;Dry;Intact 07/30/25 0900   Dressing Intervention N/A 07/30/25 0900   Infiltration Grading ([facility]) 0 07/30/25 0900   Phlebitis Scale ([facility] Only) 0 07/30/25 0900               MENTAL STATUS ON TRANSFER             CONSULTATIONS  GI - [facility]    PROCEDURES  7/30/25 - [facility]    LABORATORY  Lab Results   Component Value Date    SODIUM 133 (L) 07/30/2025    POTASSIUM 3.1 (L) 07/30/2025    CHLORIDE 91 (L) 07/30/2025    CO2 29 07/30/2025    GLUCOSE 101 (H) 07/30/2025    BUN 5 (L) 07/30/2025    CREATININE 0.45 (L) 07/30/2025        Lab Results   Component Value Date    WBC 4.6 (L) 07/30/2025    HEMOGLOBIN 12.6 07/30/2025    HEMATOCRIT 37.6 07/30/2025    PLATELETCT 163 (L) 07/30/2025        Total time of the discharge process exceeds 38 minutes.       [1]   Allergies  Allergen Reactions    Atorvastatin Myalgia    Codeine Nausea    Lisinopril Cough     Cough      Morphine Nausea    Rosuvastatin Myalgia

## 2025-07-30 NOTE — CARE PLAN
The patient is Stable - Low risk of patient condition declining or worsening    Shift Goals  Clinical Goals: VSS, labs, CIWA  Patient Goals: Rest  Family Goals: MAJOR    Progress made toward(s) clinical / shift goals:    Problem: Optimal Care for Alcohol Withdrawal  Goal: Optimal Care for the alcohol withdrawal patient  Outcome: Progressing     Problem: Knowledge Deficit - Standard  Goal: Patient and family/care givers will demonstrate understanding of plan of care, disease process/condition, diagnostic tests and medications  Outcome: Progressing     Problem: Psychosocial  Goal: Patient's level of anxiety will decrease  Outcome: Progressing       Patient is not progressing towards the following goals:

## 2025-07-30 NOTE — PROGRESS NOTES
Telemetry Shift Summary     Rhythm: SB/SR  Rate: 50-60  Measurements: .16/.08/.46  Ectopy (reported by Monitor Tech): f pac, r pvc     Normal Values  Rhythm: Sinus  HR:   Measurements: 0.12-0.20/0.06-0.10/0.30-0.52

## 2025-07-30 NOTE — OR NURSING
Patient allergies and NPO status verified, home medication reconciliation completed and belongings secured. Surgical site verified with patient. Patient verbalizes understanding of pain scale, expected course of stay and plan of care; patient states verbal understanding at this time. IV access x 2 leaking; replaced IV in pre-op.

## 2025-07-30 NOTE — THERAPY
"Speech Language Pathology   Daily Treatment     Patient Name:  Marcia Koch  AGE:  75 y.o., SEX:  female  Medical Record #:  [id number]  Date of Service:  7/30/2025    Precautions  Medical: Fall Risk  Swallowing: Swallow Precautions, Reflux/Esophageal Precautions      Subjective  Pt cleared to participate with SLP following EGD this morning. MD advanced diet to regular (renal) diet. Pt alert and cooperative throughout session with flat affect. On 2L via nasal cannula.      EGD 7/30/25:   FINDINGS:  1.  Severe erosive esophagitis.  2.  Erosive gastritis.  3.  No evidence of recent bleeding.  4.  No esophageal varices noted.     COMPLICATIONS:  None.     TISSUE/BIOPSIES:  1.  Gastric biopsies to rule out H. pylori.  2.  Esophageal biopsies.    Assessment  Pt assessed with self-fed trials from regular diet tray, including thin liquids by cup and straw, scrambled eggs, pears, waffle with syrup, and lurdes cracker. Pt demonstrated adequate bolus acceptance with slow, prolonged bolus manipulation. Minimal-mild oral stasis cleared with liquid wash. Audible pharyngeal swallow with thin liquids by cup. Pt demonstrated immediate throat clear and/or cough x 2 following PO trials, concerning for possible airway invasion. Initial cough followed attempt to speak while swallowing, and second occurred with pears in juice, with pt stating, \"I think the juice got me.\" Additional possible delayed cough noted, with pt stating, \"I'm a smoker, so it could be from that.\" Pt did note an increase in cough over the past month; query possible esophageal dysphagia component, given outcomes of EGD.        Clinical Impressions  Pt presents with s/sx dysphagia, including prolonged bolus manipulation with oral residue and intermittent cough, concerning for possible airway invasion, complicated by suspected esophageal dysphagia. Recommend regular solids and thin liquids with medications whole as tolerated. Consider limiting mixed consistency " "solids. Pt may benefit from MBSS should intermittent coughing continue during PO intake. SLP will continue to follow.       Recommendations  Treatment Completed: Dysphagia Treatment       Dysphagia Treatment  Diet Consistency: regular/thin  Instrumentation: Instrumental swallow study pending clinical progress  Medication: Whole with liquid, As tolerated  Supervision: Distant supervision - check on patient 2-3 times per meal, Encourage self-feeding  Positioning: Fully upright and midline during oral intake, Remain upright for 45-60 minutes after oral intake, Meals sitting upright in a chair, as tolerated  Risk Management : Small bites/sips, Alternate bites and sips, Slow rate of intake, Reduce environmental distractions, Physical mobility, as tolerated  Oral Care: Q4h       SLP Treatment Plan  Treatment Plan: Dysphagia Treatment, Patient/Family/Caregiver Training  SLP Frequency: 4x Per Week  Estimated Duration: Until Therapy Goals Met      Anticipated Discharge Needs  Discharge Recommendations: Anticipate that the patient will have no further speech therapy needs after discharge from the hospital (pending clinical course)  Therapy Recommendations Upon DC: Patient / Family / Caregiver Education      Patient / Family Goals  Patient / Family Goal #1: \"I don't like applesauce\"  Goal #1 Outcome: Progressing as expected  Short Term Goals  Short Term Goal # 1: Patient will consume full liquid diet with 1:1 assistance with no overt s/sx of aspiration or decline in respiratory status.  Goal Outcome # 1: Goal met, new goal added  Short Term Goal # 1 B : Pt will consume RG7/TN0 free from s/sx aspiration or associated respiratory decline.  Short Term Goal # 2: Patient will trial solid textures after EGD when able/appropriate, and cleared by GI.  Goal Outcome # 2 : Goal met      Jolene Ruvalcaba MS,CCC-SLP  "

## 2025-07-30 NOTE — CARE PLAN
The patient is Stable - Low risk of patient condition declining or worsening    Shift Goals  Clinical Goals: monitor vitals/labs, ciwa, safety  Patient Goals: rest  Family Goals: kelly    Progress made toward(s) clinical / shift goals:      Problem: Knowledge Deficit - Standard  Goal: Patient and family/care givers will demonstrate understanding of plan of care, disease process/condition, diagnostic tests and medications  Description: Target End Date:  1-3 days or as soon as patient condition allows    Document in Patient Education    1.  Patient and family/caregiver oriented to unit, equipment, visitation policy and means for communicating concern  2.  Complete/review Learning Assessment  3.  Assess knowledge level of disease process/condition, treatment plan, diagnostic tests and medications  4.  Explain disease process/condition, treatment plan, diagnostic tests and medications  Outcome: Progressing  Note: Patient has been educated on plan of care, medications, and safety. No further questions from patient. Patient got her egd done today and no active bleeding was found      Problem: Optimal Care for Alcohol Withdrawal  Goal: Optimal Care for the alcohol withdrawal patient  Description: Target End Date:  1 to 3 days    1.  Alcohol history screening done on admission  2.  CIWA-AR score assessment (includes assessment of nausea/vomiting, tremor, sweats, anxiety, agitation, tactile, visual and auditory disturbances, headache and orientation/sensorium).  Document on CIWA flowsheet.  3.  Follow CIWA-AR score protocol  4.  Frequent reorientation  5.  Monitor for fluid and electrolyte imbalance.  6.  Assess for respiratory depression due to sedation (pulse ox)  7.  Consider thiamine, multivitamins, folic acid and magnesium sulfate per provider order  8.  Collaborate with Social Workers/Case Management  9.  Collaborate with mental health  Outcome: Progressing  Note: Patient is on Q4 CIWAs and has scored with minimal  withdrawal symptoms     Problem: Fall Risk  Goal: Patient will remain free from falls  Description: Target End Date:  Prior to discharge or change in level of care    Document interventions on the [facility] Fall Risk Assessment    1.  Assess for fall risk factors  2.  Implement fall precautions  Outcome: Progressing  Note: Patient has been educated on plan of care, medications, and safety. No further questions from patient        Patient is not progressing towards the following goals:

## 2025-07-30 NOTE — OR NURSING
0810 Pt arrived from Endo, report received from anesthesiologist and RN. Pt waking at this time. respirations spontaneous and unlabored. Pt denies pain and nausea. Per report, octreotide gtt to be discontinued.     0820 MD at bedside, verbal order received to d/c octreotide gtt.     0830 Pt still denies pain and nausea.     0840 Pt meets criteria to transfer to room. Report called to Mary PALOMO.     0846 Pt transferred to room by transport, chart and belongings on [facility] at time of transfer. Pt on 2L via NC, tank over 3/4 full.

## 2025-08-29 ENCOUNTER — APPOINTMENT (OUTPATIENT)
Dept: RADIOLOGY | Facility: MEDICAL CENTER | Age: 75
End: 2025-08-29
Attending: EMERGENCY MEDICINE
Payer: MEDICARE

## 2025-08-29 ENCOUNTER — HOSPITAL ENCOUNTER (EMERGENCY)
Facility: MEDICAL CENTER | Age: 75
End: 2025-08-29
Attending: EMERGENCY MEDICINE
Payer: MEDICARE

## 2025-08-29 VITALS
BODY MASS INDEX: 24.26 KG/M2 | DIASTOLIC BLOOD PRESSURE: 77 MMHG | SYSTOLIC BLOOD PRESSURE: 160 MMHG | RESPIRATION RATE: 16 BRPM | TEMPERATURE: 98 F | WEIGHT: 139.55 LBS | HEART RATE: 85 BPM | OXYGEN SATURATION: 94 %

## 2025-08-29 DIAGNOSIS — L03.119 CELLULITIS OF LOWER EXTREMITY, UNSPECIFIED LATERALITY: Primary | ICD-10-CM

## 2025-08-29 LAB
ALBUMIN SERPL BCP-MCNC: 3.4 G/DL (ref 3.2–4.9)
ALBUMIN/GLOB SERPL: 1.1 G/DL
ALP SERPL-CCNC: 130 U/L (ref 30–99)
ALT SERPL-CCNC: 12 U/L (ref 2–50)
ANION GAP SERPL CALC-SCNC: 15 MMOL/L (ref 7–16)
AST SERPL-CCNC: 19 U/L (ref 12–45)
BASOPHILS # BLD AUTO: 0.6 % (ref 0–1.8)
BASOPHILS # BLD: 0.04 K/UL (ref 0–0.12)
BILIRUB SERPL-MCNC: 0.3 MG/DL (ref 0.1–1.5)
BUN SERPL-MCNC: 2 MG/DL (ref 8–22)
CALCIUM ALBUM COR SERPL-MCNC: 9.4 MG/DL (ref 8.5–10.5)
CALCIUM SERPL-MCNC: 8.9 MG/DL (ref 8.5–10.5)
CHLORIDE SERPL-SCNC: 103 MMOL/L (ref 96–112)
CO2 SERPL-SCNC: 20 MMOL/L (ref 20–33)
CREAT SERPL-MCNC: 0.51 MG/DL (ref 0.5–1.4)
EOSINOPHIL # BLD AUTO: 0.18 K/UL (ref 0–0.51)
EOSINOPHIL NFR BLD: 2.6 % (ref 0–6.9)
ERYTHROCYTE [DISTWIDTH] IN BLOOD BY AUTOMATED COUNT: 48.7 FL (ref 35.9–50)
ETHANOL BLD-MCNC: 30.2 MG/DL
GFR SERPLBLD CREATININE-BSD FMLA CKD-EPI: 98 ML/MIN/1.73 M 2
GLOBULIN SER CALC-MCNC: 3.1 G/DL (ref 1.9–3.5)
GLUCOSE SERPL-MCNC: 89 MG/DL (ref 65–99)
HCT VFR BLD AUTO: 41.4 % (ref 37–47)
HGB BLD-MCNC: 13.9 G/DL (ref 12–16)
IMM GRANULOCYTES # BLD AUTO: 0.03 K/UL (ref 0–0.11)
IMM GRANULOCYTES NFR BLD AUTO: 0.4 % (ref 0–0.9)
LYMPHOCYTES # BLD AUTO: 1.97 K/UL (ref 1–4.8)
LYMPHOCYTES NFR BLD: 28.3 % (ref 22–41)
MCH RBC QN AUTO: 33.6 PG (ref 27–33)
MCHC RBC AUTO-ENTMCNC: 33.6 G/DL (ref 32.2–35.5)
MCV RBC AUTO: 100 FL (ref 81.4–97.8)
MONOCYTES # BLD AUTO: 0.58 K/UL (ref 0–0.85)
MONOCYTES NFR BLD AUTO: 8.3 % (ref 0–13.4)
NEUTROPHILS # BLD AUTO: 4.16 K/UL (ref 1.82–7.42)
NEUTROPHILS NFR BLD: 59.8 % (ref 44–72)
NRBC # BLD AUTO: 0 K/UL
NRBC BLD-RTO: 0 /100 WBC (ref 0–0.2)
PLATELET # BLD AUTO: 271 K/UL (ref 164–446)
PMV BLD AUTO: 9.4 FL (ref 9–12.9)
POTASSIUM SERPL-SCNC: 3.4 MMOL/L (ref 3.6–5.5)
PROT SERPL-MCNC: 6.5 G/DL (ref 6–8.2)
RBC # BLD AUTO: 4.14 M/UL (ref 4.2–5.4)
SODIUM SERPL-SCNC: 138 MMOL/L (ref 135–145)
WBC # BLD AUTO: 7 K/UL (ref 4.8–10.8)

## 2025-08-29 PROCEDURE — 80053 COMPREHEN METABOLIC PANEL: CPT

## 2025-08-29 PROCEDURE — 700102 HCHG RX REV CODE 250 W/ 637 OVERRIDE(OP): Performed by: EMERGENCY MEDICINE

## 2025-08-29 PROCEDURE — 99284 EMERGENCY DEPT VISIT MOD MDM: CPT

## 2025-08-29 PROCEDURE — 73620 X-RAY EXAM OF FOOT: CPT | Mod: RT

## 2025-08-29 PROCEDURE — 85025 COMPLETE CBC W/AUTO DIFF WBC: CPT

## 2025-08-29 PROCEDURE — 73620 X-RAY EXAM OF FOOT: CPT | Mod: LT

## 2025-08-29 PROCEDURE — A9270 NON-COVERED ITEM OR SERVICE: HCPCS | Performed by: EMERGENCY MEDICINE

## 2025-08-29 PROCEDURE — 36415 COLL VENOUS BLD VENIPUNCTURE: CPT

## 2025-08-29 PROCEDURE — 82077 ASSAY SPEC XCP UR&BREATH IA: CPT

## 2025-08-29 RX ORDER — LORAZEPAM 1 MG/1
1 TABLET ORAL ONCE
Status: COMPLETED | OUTPATIENT
Start: 2025-08-29 | End: 2025-08-29

## 2025-08-29 RX ADMIN — LORAZEPAM 1 MG: 1 TABLET ORAL at 19:30

## 2025-08-29 RX ADMIN — AMOXICILLIN AND CLAVULANATE POTASSIUM 1 TABLET: 875; 125 TABLET, FILM COATED ORAL at 20:13

## 2025-08-29 ASSESSMENT — FIBROSIS 4 INDEX: FIB4 SCORE: 3.15
